# Patient Record
Sex: FEMALE | Race: WHITE | NOT HISPANIC OR LATINO | Employment: OTHER | URBAN - METROPOLITAN AREA
[De-identification: names, ages, dates, MRNs, and addresses within clinical notes are randomized per-mention and may not be internally consistent; named-entity substitution may affect disease eponyms.]

---

## 2019-06-24 ENCOUNTER — HOSPITAL ENCOUNTER (EMERGENCY)
Facility: HOSPITAL | Age: 69
Discharge: HOME | End: 2019-06-24
Attending: EMERGENCY MEDICINE
Payer: MEDICARE

## 2019-06-24 VITALS
WEIGHT: 152 LBS | TEMPERATURE: 98 F | SYSTOLIC BLOOD PRESSURE: 187 MMHG | HEIGHT: 65 IN | DIASTOLIC BLOOD PRESSURE: 77 MMHG | HEART RATE: 75 BPM | BODY MASS INDEX: 25.33 KG/M2 | RESPIRATION RATE: 18 BRPM | OXYGEN SATURATION: 96 %

## 2019-06-24 DIAGNOSIS — M54.2 NECK PAIN: ICD-10-CM

## 2019-06-24 DIAGNOSIS — M25.512 LEFT SHOULDER PAIN: ICD-10-CM

## 2019-06-24 DIAGNOSIS — I10 HYPERTENSION: ICD-10-CM

## 2019-06-24 DIAGNOSIS — S43.002A SUBLUXATION OF LEFT SHOULDER JOINT, INITIAL ENCOUNTER: Primary | ICD-10-CM

## 2019-06-24 PROBLEM — Z72.0 TOBACCO ABUSE: Status: ACTIVE | Noted: 2018-12-03

## 2019-06-24 PROBLEM — I16.0 HYPERTENSIVE URGENCY: Status: ACTIVE | Noted: 2019-06-24

## 2019-06-24 PROBLEM — E78.5 HYPERLIPIDEMIA: Status: ACTIVE | Noted: 2018-12-03

## 2019-06-24 PROBLEM — E11.8 TYPE 2 DIABETES MELLITUS WITH COMPLICATION, WITHOUT LONG-TERM CURRENT USE OF INSULIN: Status: ACTIVE | Noted: 2018-12-03

## 2019-06-24 PROBLEM — R07.9 ACUTE CHEST PAIN: Status: ACTIVE | Noted: 2018-12-03

## 2019-06-24 PROBLEM — I20.0 UNSTABLE ANGINA PECTORIS: Status: ACTIVE | Noted: 2018-12-03

## 2019-06-24 PROBLEM — I25.10 CORONARY ARTERY DISEASE INVOLVING LEFT MAIN CORONARY ARTERY: Status: ACTIVE | Noted: 2018-12-03

## 2019-06-24 PROCEDURE — 93005 ELECTROCARDIOGRAM TRACING: CPT

## 2019-06-24 PROCEDURE — 25000003 PHARM REV CODE 250: Performed by: NURSE PRACTITIONER

## 2019-06-24 PROCEDURE — 99285 EMERGENCY DEPT VISIT HI MDM: CPT | Mod: 25

## 2019-06-24 RX ORDER — LOSARTAN POTASSIUM 50 MG/1
50 TABLET ORAL DAILY
COMMUNITY
End: 2021-04-29 | Stop reason: SDUPTHER

## 2019-06-24 RX ORDER — OXYCODONE AND ACETAMINOPHEN 5; 325 MG/1; MG/1
1 TABLET ORAL
Status: COMPLETED | OUTPATIENT
Start: 2019-06-24 | End: 2019-06-24

## 2019-06-24 RX ORDER — METHOCARBAMOL 500 MG/1
500 TABLET, FILM COATED ORAL
Status: COMPLETED | OUTPATIENT
Start: 2019-06-24 | End: 2019-06-24

## 2019-06-24 RX ORDER — CITALOPRAM 40 MG/1
40 TABLET, FILM COATED ORAL DAILY
COMMUNITY
End: 2021-04-29 | Stop reason: SDUPTHER

## 2019-06-24 RX ORDER — METFORMIN HYDROCHLORIDE 1000 MG/1
2000 TABLET ORAL 2 TIMES DAILY WITH MEALS
COMMUNITY
End: 2020-09-17 | Stop reason: SDUPTHER

## 2019-06-24 RX ORDER — GLYBURIDE 5 MG/1
5 TABLET ORAL
COMMUNITY
End: 2020-09-17 | Stop reason: SDUPTHER

## 2019-06-24 RX ORDER — OXYCODONE AND ACETAMINOPHEN 7.5; 325 MG/1; MG/1
1 TABLET ORAL EVERY 6 HOURS PRN
Qty: 15 TABLET | Refills: 0 | Status: SHIPPED | OUTPATIENT
Start: 2019-06-24 | End: 2020-09-20 | Stop reason: ALTCHOICE

## 2019-06-24 RX ADMIN — OXYCODONE HYDROCHLORIDE AND ACETAMINOPHEN 1 TABLET: 5; 325 TABLET ORAL at 06:06

## 2019-06-24 RX ADMIN — METHOCARBAMOL 500 MG: 500 TABLET, FILM COATED ORAL at 05:06

## 2019-06-24 NOTE — ED PROVIDER NOTES
"Encounter Date: 6/24/2019    SCRIBE #1 NOTE: I, Ju Karson, am scribing for, and in the presence of,  Sheri Griggs NP. I have scribed the following portions of the note - Other sections scribed: HPI,ROS,PE.       History     Chief Complaint   Patient presents with    Neck Pain     Neck pain that radiates to shoulder and back x 7 mths.     This is a 69 y.o smoking female patient, with a PMHX of DM, HTN, CAD, and pancreatitis, who presents to the ED with constant neck pain, that radiates from her left shoulder to upper back, since heart surgery x6 months ago. She describes feeling like "someone wacked me across the neck with plywood". She states her left shoulder "droops" forward and bothers her. Patient states having a cartoid artery test and pulmonary lung function test last week with normal results. Patient reports attempting treatment with Advil, with no relief and Hydrocodone, with little relief. Patient denies any fever, chills, headache, dizziness, chest pain, abdominal pain, shortness of breath, cough, sore throat, nausea, vomting, diarrhea, dysuria, or any other associated symptoms. Patient states she is allergic to Codeine. PSHx of CABG and left heart catheterization.    The history is provided by the patient and a friend. No  was used.     Review of patient's allergies indicates:   Allergen Reactions    Ambien [zolpidem]     Codeine      Past Medical History:   Diagnosis Date    Coronary artery disease     Depression     Diabetes mellitus     Hypertension     Pancreatitis      Past Surgical History:   Procedure Laterality Date    CARDIAC SURGERY      HYSTERECTOMY       History reviewed. No pertinent family history.  Social History     Tobacco Use    Smoking status: Current Every Day Smoker     Packs/day: 0.50    Smokeless tobacco: Never Used   Substance Use Topics    Alcohol use: Never     Frequency: Never    Drug use: Never     Review of Systems   Constitutional: " Negative for chills and fever.   HENT: Negative for congestion, ear pain, rhinorrhea and sore throat.    Eyes: Negative for pain and visual disturbance.   Respiratory: Negative for cough and shortness of breath.    Cardiovascular: Negative for chest pain.   Gastrointestinal: Negative for abdominal pain, diarrhea, nausea and vomiting.   Genitourinary: Negative for dysuria.   Musculoskeletal: Positive for arthralgias (left shoulder), back pain and neck pain.   Skin: Negative for rash.   Neurological: Negative for headaches.       Physical Exam     Initial Vitals [06/24/19 1613]   BP Pulse Resp Temp SpO2   (!) 190/81 72 18 98 °F (36.7 °C) 97 %      MAP       --         Physical Exam    Constitutional: She appears well-developed and well-nourished. No distress.   HENT:   Head: Normocephalic and atraumatic.   Nose: Nose normal.   Eyes: EOM are normal. Pupils are equal, round, and reactive to light.   Neck: Normal range of motion and full passive range of motion without pain. Neck supple.   Cardiovascular: Normal rate, regular rhythm and normal heart sounds. Exam reveals no gallop and no friction rub.    No murmur heard.  Pulses:       Radial pulses are 2+ on the right side, and 2+ on the left side.   Pulmonary/Chest: Breath sounds normal. No respiratory distress. She has no wheezes. She has no rhonchi. She has no rales.   Abdominal: Soft. Bowel sounds are normal. There is no tenderness. There is no rebound and no guarding.   Musculoskeletal:        Left shoulder: She exhibits decreased range of motion, tenderness and spasm.        Cervical back: She exhibits tenderness and spasm.        Back:    Neurological: She is alert and oriented to person, place, and time. She has normal strength. No cranial nerve deficit or sensory deficit.   Skin: Skin is warm and dry. Capillary refill takes less than 2 seconds.   Psychiatric: She has a normal mood and affect.         ED Course   Procedures  Labs Reviewed - No data to  display  EKG Readings: (Independently Interpreted)   Initial Reading: No STEMI. Rhythm: Normal Sinus Rhythm. Heart Rate: 70. Ectopy: No Ectopy. Conduction: Normal.       Imaging Results          X-Ray Shoulder Trauma Left (Final result)  Result time 06/24/19 17:34:52    Final result by Carlos Alberto Swift MD (06/24/19 17:34:52)                 Impression:      Inferior subluxation of the left glenohumeral joint.  No evidence of a fracture or dislocation.    Arthropathy of the left acromioclavicular joint.      Electronically signed by: Carlos Alberto Swift MD  Date:    06/24/2019  Time:    17:34             Narrative:    EXAMINATION:  XR SHOULDER TRAUMA 3 VIEW LEFT    CLINICAL HISTORY:  Pain in left shoulder    TECHNIQUE:  Three views of the left shoulder were performed.    COMPARISON  None    FINDINGS:  There is demineralization of the osseous structures.  There is slight inferior subluxation of the left humerus relative to the glenoid.  There is arthropathy involving the acromioclavicular joint.  There is no evidence of a fracture or dislocation of the left shoulder.    There are postoperative changes in the mediastinum.  The visualized left hemithorax is unremarkable.                               X-Ray Cervical Spine AP And Lateral (Final result)  Result time 06/24/19 17:31:35    Final result by Carlos Alberto Swift MD (06/24/19 17:31:35)                 Impression:      No evidence of acute fracture or listhesis of the cervical spine.    Multilevel degenerative changes of the cervical spine.      Electronically signed by: Carlos Alberto Swift MD  Date:    06/24/2019  Time:    17:31             Narrative:    EXAMINATION:  XR CERVICAL SPINE AP LATERAL    CLINICAL HISTORY:  Cervicalgia    TECHNIQUE:  AP, lateral and open mouth views of the cervical spine were performed.    COMPARISON:  None.    FINDINGS:  The craniocervical junction is within normal limits.  The predental space is maintained.  No prevertebral soft tissue swelling is  identified.    The cervical alignment is maintained.  The vertebral body heights are maintained.  There is hypertrophy of the posterior elements.  The lateral masses of C1 are poorly delineated.  There are multilevel degenerative changes in the cervical spine.  There is no evidence of acute fracture or listhesis of the cervical spine.    There are partially visualized postoperative changes in the mediastinum.  The visualized lung apices are unremarkable.  There is no evidence of a pneumothorax.  No pulmonary contusion is identified.                                 Medical Decision Making:   ED Management:  This is the evaluation of 6-year-old female presenting with left neck and shoulder pain x6 months.  There is no anterior neck pain. No fever.  No weakness.  Patient reports pain ever since she had open heart surgery.  On exam, she has tenderness with palpation of the left cervical and shoulder musculature with muscle spasms present.  There is decreased range of motion. She was most recently evaluated by her cardiovascular surgeon who recommended physical therapy and range of motion exercise for left shoulder pain. X-ray left shoulder with inferior subluxation of the left glenohumeral joint.  No evidence for fracture dislocation.  There is arthropathy of the left AC joint.  X-ray of the cervical spine with no acute fracture or listhesis.  There is multi level degenerative changes cervical spine.  Patient needs ortho follow-up with likely outpatient MRI and physical therapy.  Doubt emergent etiology of neck and shoulder pain. Patient's blood pressure is elevated.  She denies headache, chest pain, shortness of breath. She attributes her elevated blood pressure to pain. EKG without acute changes.  She had some improvement with pain following pain medication.  Will discharge patient home with ortho follow-up.    Based on my clinical evaluation, I do not appreciate any immediate, emergent, or life threatening condition  or etiology that warrants additional workup today.  I feel the patient can be discharged with close follow-up care.    This case was discussed with my attending physician who examined the patient and agrees with my plan of care.                      Clinical Impression:       ICD-10-CM ICD-9-CM   1. Subluxation of left shoulder joint, initial encounter S43.002A 831.00   2. Left shoulder pain M25.512 719.41   3. Neck pain M54.2 723.1   4. Hypertension I10 401.9         Disposition:   Disposition: Discharged  Condition: Stable               Scribe attestation: I, Indu Rivera NP, personally performed the services described in this documentation. All medical record entries made by the scribe were at my direction and in my presence.  I have reviewed the chart and agree that the record reflects my personal performance and is accurate and complete.         Sheri Griggs NP  06/24/19 [id number]

## 2019-06-25 NOTE — DISCHARGE INSTRUCTIONS
You have been prescribed PERCOCET for pain. Please do not take this medication while working, drinking alcohol, swimming, or while driving/operating heavy machinery. This medication may cause drowsiness, impair judgment, and reduce physical capabilities. This medication contains Tylenol. Please do not take any additional Tylenol while you are taking this medication.      Please return to the Emergency Department for any new or worsening symptoms including: fever, chest pain, shortness of breath, loss of consciousness, dizziness, weakness, or any other concerns.     Please follow up with your Primary Care Provider within in the week. If you do not have one, you may contact the one listed on your discharge paperwork or you may also call the [facility] Appointment Desk at [phone] to schedule an appointment with one.     Please take all medication as prescribed.

## 2019-10-09 ENCOUNTER — CLINICAL SUPPORT (OUTPATIENT)
Dept: SMOKING CESSATION | Facility: CLINIC | Age: 69
End: 2019-10-09
Payer: COMMERCIAL

## 2019-10-09 DIAGNOSIS — F17.200 NICOTINE DEPENDENCE: Primary | ICD-10-CM

## 2019-10-09 PROCEDURE — 99404 PR PREVENT COUNSEL,INDIV,60 MIN: ICD-10-PCS | Mod: S$GLB,,,

## 2019-10-09 PROCEDURE — 99404 PREV MED CNSL INDIV APPRX 60: CPT | Mod: S$GLB,,,

## 2019-10-09 RX ORDER — IBUPROFEN 200 MG
1 TABLET ORAL DAILY
Qty: 14 PATCH | Refills: 0 | Status: SHIPPED | OUTPATIENT
Start: 2019-10-09 | End: 2019-10-24 | Stop reason: SDUPTHER

## 2019-10-09 NOTE — PROGRESS NOTES
Patient will be participating in biweekly tobacco cessation meetings and will begin the prescribed tobacco cessation medication regime of  21 mg nicotine patch QD .  She currently smokes 20 cigarettes per day.  Pt started on rate reduction and wait time of 15 min prior to smoking. Pt's exhaled carbon monoxide level was  15 ppm as per Smokerlyzer. (non- smoker = 0-5 ppm.) Will see pt back in office in 2 weeks.

## 2019-10-24 ENCOUNTER — CLINICAL SUPPORT (OUTPATIENT)
Dept: SMOKING CESSATION | Facility: CLINIC | Age: 69
End: 2019-10-24
Payer: COMMERCIAL

## 2019-10-24 DIAGNOSIS — F17.200 NICOTINE DEPENDENCE: ICD-10-CM

## 2019-10-24 PROCEDURE — 99999 PR PBB SHADOW E&M-EST. PATIENT-LVL I: CPT | Mod: PBBFAC,,,

## 2019-10-24 PROCEDURE — 99404 PREV MED CNSL INDIV APPRX 60: CPT | Mod: S$GLB,,,

## 2019-10-24 PROCEDURE — 99999 PR PBB SHADOW E&M-EST. PATIENT-LVL I: ICD-10-PCS | Mod: PBBFAC,,,

## 2019-10-24 PROCEDURE — 99404 PR PREVENT COUNSEL,INDIV,60 MIN: ICD-10-PCS | Mod: S$GLB,,,

## 2019-10-24 RX ORDER — MICONAZOLE NITRATE 2 %
CREAM (GRAM) TOPICAL
Qty: 170 EACH | Refills: 0 | Status: SHIPPED | OUTPATIENT
Start: 2019-10-24 | End: 2019-11-21 | Stop reason: SDUPTHER

## 2019-10-24 RX ORDER — IBUPROFEN 200 MG
1 TABLET ORAL DAILY
Qty: 28 PATCH | Refills: 0 | Status: SHIPPED | OUTPATIENT
Start: 2019-10-24 | End: 2019-11-07 | Stop reason: SDUPTHER

## 2019-11-07 ENCOUNTER — CLINICAL SUPPORT (OUTPATIENT)
Dept: SMOKING CESSATION | Facility: CLINIC | Age: 69
End: 2019-11-07
Payer: COMMERCIAL

## 2019-11-07 DIAGNOSIS — F17.200 NICOTINE DEPENDENCE: Primary | ICD-10-CM

## 2019-11-07 PROCEDURE — 99999 PR PBB SHADOW E&M-EST. PATIENT-LVL I: CPT | Mod: PBBFAC,,,

## 2019-11-07 PROCEDURE — 99999 PR PBB SHADOW E&M-EST. PATIENT-LVL I: ICD-10-PCS | Mod: PBBFAC,,,

## 2019-11-07 PROCEDURE — 99403 PR PREVENT COUNSEL,INDIV,45 MIN: ICD-10-PCS | Mod: S$GLB,,,

## 2019-11-07 PROCEDURE — 99403 PREV MED CNSL INDIV APPRX 45: CPT | Mod: S$GLB,,,

## 2019-11-07 RX ORDER — IBUPROFEN 200 MG
1 TABLET ORAL DAILY
Qty: 28 PATCH | Refills: 0 | Status: SHIPPED | OUTPATIENT
Start: 2019-11-07 | End: 2019-11-21 | Stop reason: SDUPTHER

## 2019-11-07 NOTE — PROGRESS NOTES
Individual Follow-Up Form    11/7/2019    Quit Date: 12/1/19    Clinical Status of Patient: Outpatient    Length of Service: 45 minutes    Continuing Medication: yes  Patches    Other Medications: nrt gum     Target Symptoms: Withdrawal and medication side effects. The following were  rated moderate (3) to severe (4) on TCRS:  · Moderate (3): craving   · Severe (4): none    Comments:  Patient presents for follow up smoking 10  cigarettes per day. Pt remains on tobacco cessation medication of  21 mg nicotine patch QD and 2 mg nicotine gum PRN (1-2 per hour in place of cigarettes.) No adverse effects noted at this time. Discussed that she is lighting more due to habit not addiction. She does smoke much of the cigarette after she lights it.    Pt encouraged to pick a quit day, she said she will not buy another pack.   Reviewed coping strategies/habitual behavior/relapse prevention with patient. Exhaled carbon monoxide level was 5 ppm per Smokerlyzer  ( non- smoker = 0-5 ppm .)  Will see pt back in office in 2 weeks      Diagnosis: F17.200    Next Visit: 2 weeks

## 2019-11-21 ENCOUNTER — CLINICAL SUPPORT (OUTPATIENT)
Dept: SMOKING CESSATION | Facility: CLINIC | Age: 69
End: 2019-11-21
Payer: COMMERCIAL

## 2019-11-21 DIAGNOSIS — F17.200 NICOTINE DEPENDENCE: ICD-10-CM

## 2019-11-21 PROCEDURE — 99999 PR PBB SHADOW E&M-EST. PATIENT-LVL I: CPT | Mod: PBBFAC,,,

## 2019-11-21 PROCEDURE — 99999 PR PBB SHADOW E&M-EST. PATIENT-LVL I: ICD-10-PCS | Mod: PBBFAC,,,

## 2019-11-21 PROCEDURE — 99404 PR PREVENT COUNSEL,INDIV,60 MIN: ICD-10-PCS | Mod: S$GLB,,,

## 2019-11-21 PROCEDURE — 99404 PREV MED CNSL INDIV APPRX 60: CPT | Mod: S$GLB,,,

## 2019-11-21 RX ORDER — IBUPROFEN 200 MG
1 TABLET ORAL DAILY
Qty: 28 PATCH | Refills: 0 | Status: SHIPPED | OUTPATIENT
Start: 2019-11-21 | End: 2019-12-05 | Stop reason: SDUPTHER

## 2019-11-21 RX ORDER — MICONAZOLE NITRATE 2 %
CREAM (GRAM) TOPICAL
Qty: 170 EACH | Refills: 0 | Status: SHIPPED | OUTPATIENT
Start: 2019-11-21 | End: 2020-01-06 | Stop reason: SDUPTHER

## 2019-11-21 NOTE — PROGRESS NOTES
Individual Follow-Up Form    11/21/2019    Quit Date: tbd    Clinical Status of Patient: Outpatient    Length of Service: 60 minutes    Continuing Medication: yes  Patches or Nicotine gum    Other Medications: none     Target Symptoms: Withdrawal and medication side effects. The following were  rated moderate (3) to severe (4) on TCRS:  · Moderate (3): none  · Severe (4): none    Comments: Patient presents for follow up smoking 8 cigarettes per day. Pt remains on tobacco cessation medication of  21 mg nicotine patch QD and 2 mg nicotine gum PRN (1-2 per hour in place of cigarettes.) No adverse effects noted at this time. Pt doing well with rate reduction and wait times prior to smoking. Goal is to smoke 6 cigarettes or less.  Pt encouraged to pick a quit day.  Reviewed coping strategies/habitual behavior/relapse prevention with patient. Exhaled carbon monoxide level was 8 ppm per Smokerlyzer  ( non- smoker = 0-5 ppm .)    Will see pt back in office in 2 weeks      Diagnosis: F17.200    Next Visit: 2 weeks

## 2019-12-05 ENCOUNTER — CLINICAL SUPPORT (OUTPATIENT)
Dept: SMOKING CESSATION | Facility: CLINIC | Age: 69
End: 2019-12-05
Payer: COMMERCIAL

## 2019-12-05 DIAGNOSIS — F17.200 NICOTINE DEPENDENCE: ICD-10-CM

## 2019-12-05 PROCEDURE — 99403 PREV MED CNSL INDIV APPRX 45: CPT | Mod: S$GLB,,,

## 2019-12-05 PROCEDURE — 99403 PR PREVENT COUNSEL,INDIV,45 MIN: ICD-10-PCS | Mod: S$GLB,,,

## 2019-12-05 RX ORDER — IBUPROFEN 200 MG
1 TABLET ORAL DAILY
Qty: 28 PATCH | Refills: 0 | Status: SHIPPED | OUTPATIENT
Start: 2019-12-05 | End: 2020-01-02

## 2019-12-05 NOTE — PROGRESS NOTES
Individual Follow-Up Form    12/5/2019    Quit Date: tbd    Clinical Status of Patient: Outpatient    Length of Service: 45 minutes    Continuing Medication: yes  Patches    Other Medications: nrt gum     Target Symptoms: Withdrawal and medication side effects. The following were  rated moderate (3) to severe (4) on TCRS:  · Moderate (3): none  · Severe (4): none    Comments:  Patient presents for follow up smoking 4-5cigarettes per day. Pt remains on tobacco cessation medication of  21 mg nicotine patch QD and 2 mg nicotine  gum PRN (1-2 per hour in place of cigarettes.) No adverse effects noted at this time.  Pt encouraged to pick a quit day.  Reviewed coping strategies/habitual behavior/relapse prevention with patient. Exhaled carbon monoxide level was 6 ppm per Smokerlyzer  ( non- smoker = 0-5 ppm .)  Will see pt back in office in 2 weeks      Diagnosis: F17.200    Next Visit: 2 weeks

## 2020-01-06 ENCOUNTER — CLINICAL SUPPORT (OUTPATIENT)
Dept: SMOKING CESSATION | Facility: CLINIC | Age: 70
End: 2020-01-06
Payer: COMMERCIAL

## 2020-01-06 DIAGNOSIS — F17.200 NICOTINE DEPENDENCE: ICD-10-CM

## 2020-01-06 PROCEDURE — 99407 BEHAV CHNG SMOKING > 10 MIN: CPT | Mod: S$GLB,,,

## 2020-01-06 PROCEDURE — 99407 PR TOBACCO USE CESSATION INTENSIVE >10 MINUTES: ICD-10-PCS | Mod: S$GLB,,,

## 2020-01-06 RX ORDER — MICONAZOLE NITRATE 2 %
CREAM (GRAM) TOPICAL
Qty: 170 EACH | Refills: 0 | Status: SHIPPED | OUTPATIENT
Start: 2020-01-06 | End: 2020-09-20

## 2020-01-14 ENCOUNTER — CLINICAL SUPPORT (OUTPATIENT)
Dept: SMOKING CESSATION | Facility: CLINIC | Age: 70
End: 2020-01-14
Payer: COMMERCIAL

## 2020-01-14 DIAGNOSIS — F17.200 NICOTINE DEPENDENCE: Primary | ICD-10-CM

## 2020-01-14 PROCEDURE — 99407 PR TOBACCO USE CESSATION INTENSIVE >10 MINUTES: ICD-10-PCS | Mod: S$GLB,,,

## 2020-01-14 PROCEDURE — 99407 BEHAV CHNG SMOKING > 10 MIN: CPT | Mod: S$GLB,,,

## 2020-01-14 NOTE — PROGRESS NOTES
Spoke with patient today in regard to smoking cessation progress for 3 month telephone follow up, Patient states she  tobacco free since 12/2019. Informed patient of benefit period, future follow up, and contact information if any further help or support is needed. Will complete smart form for 3  month follow up on Quit attempt #1.

## 2020-03-13 LAB
CHOLEST SERPL-MSCNC: 105 MG/DL (ref 0–200)
HBA1C MFR BLD: 7.3 % (ref 4–6)
HDLC SERPL-MCNC: 30 MG/DL
LDLC SERPL CALC-MCNC: 20 MG/DL
TRIGL SERPL-MCNC: 274 MG/DL

## 2020-09-10 DIAGNOSIS — M79.672 FOOT PAIN, LEFT: ICD-10-CM
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
## 2020-09-10 DIAGNOSIS — R60.9 SWELLING: ICD-10-CM

## 2020-09-11 ENCOUNTER — HOSPITAL ENCOUNTER (OUTPATIENT)
Dept: RADIOLOGY | Facility: HOSPITAL | Age: 70
Discharge: HOME | End: 2020-09-11
Attending: FAMILY MEDICINE
Payer: MEDICARE

## 2020-09-11 DIAGNOSIS — M79.672 FOOT PAIN, LEFT: ICD-10-CM
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
## 2020-09-11 DIAGNOSIS — R60.9 SWELLING: ICD-10-CM

## 2020-09-11 PROCEDURE — 73630 XR FOOT COMPLETE 3 VIEW LEFT: ICD-10-PCS | Mod: 26,LT,, | Performed by: RADIOLOGY

## 2020-09-11 PROCEDURE — 73630 X-RAY EXAM OF FOOT: CPT | Mod: 26,LT,, | Performed by: RADIOLOGY

## 2020-09-11 PROCEDURE — 70450 CT HEAD/BRAIN W/O DYE: CPT | Mod: TC

## 2020-09-11 PROCEDURE — 70450 CT HEAD/BRAIN W/O DYE: CPT | Mod: 26,,, | Performed by: RADIOLOGY

## 2020-09-11 PROCEDURE — 73610 XR ANKLE COMPLETE 3 VIEW LEFT: ICD-10-PCS | Mod: 26,LT,, | Performed by: RADIOLOGY

## 2020-09-11 PROCEDURE — 73610 X-RAY EXAM OF ANKLE: CPT | Mod: 26,LT,, | Performed by: RADIOLOGY

## 2020-09-11 PROCEDURE — 73610 X-RAY EXAM OF ANKLE: CPT | Mod: TC,FY,LT

## 2020-09-11 PROCEDURE — 70450 CT HEAD WITHOUT CONTRAST: ICD-10-PCS | Mod: 26,,, | Performed by: RADIOLOGY

## 2020-09-11 PROCEDURE — 73630 X-RAY EXAM OF FOOT: CPT | Mod: TC,FY,LT

## 2020-09-17 ENCOUNTER — OFFICE VISIT (OUTPATIENT)
Dept: PODIATRY | Facility: CLINIC | Age: 70
End: 2020-09-17
Payer: MEDICARE

## 2020-09-17 VITALS
RESPIRATION RATE: 19 BRPM | TEMPERATURE: 97 F | OXYGEN SATURATION: 98 % | HEIGHT: 65 IN | WEIGHT: 152 LBS | HEART RATE: 68 BPM | DIASTOLIC BLOOD PRESSURE: 73 MMHG | BODY MASS INDEX: 25.33 KG/M2 | SYSTOLIC BLOOD PRESSURE: 179 MMHG

## 2020-09-17 DIAGNOSIS — S92.355A CLOSED NONDISPLACED FRACTURE OF FIFTH METATARSAL BONE OF LEFT FOOT, INITIAL ENCOUNTER: Primary | ICD-10-CM

## 2020-09-17 PROCEDURE — 99203 PR OFFICE/OUTPT VISIT, NEW, LEVL III, 30-44 MIN: ICD-10-PCS | Mod: S$GLB,,, | Performed by: PODIATRIST

## 2020-09-17 PROCEDURE — 1100F PTFALLS ASSESS-DOCD GE2>/YR: CPT | Mod: CPTII,S$GLB,, | Performed by: PODIATRIST

## 2020-09-17 PROCEDURE — 3078F DIAST BP <80 MM HG: CPT | Mod: CPTII,S$GLB,, | Performed by: PODIATRIST

## 2020-09-17 PROCEDURE — 1100F PR PT FALLS ASSESS DOC 2+ FALLS/FALL W/INJURY/YR: ICD-10-PCS | Mod: CPTII,S$GLB,, | Performed by: PODIATRIST

## 2020-09-17 PROCEDURE — 3078F PR MOST RECENT DIASTOLIC BLOOD PRESSURE < 80 MM HG: ICD-10-PCS | Mod: CPTII,S$GLB,, | Performed by: PODIATRIST

## 2020-09-17 PROCEDURE — 99999 PR PBB SHADOW E&M-EST. PATIENT-LVL V: ICD-10-PCS | Mod: PBBFAC,,, | Performed by: PODIATRIST

## 2020-09-17 PROCEDURE — 1159F PR MEDICATION LIST DOCUMENTED IN MEDICAL RECORD: ICD-10-PCS | Mod: S$GLB,,, | Performed by: PODIATRIST

## 2020-09-17 PROCEDURE — 3288F PR FALLS RISK ASSESSMENT DOCUMENTED: ICD-10-PCS | Mod: CPTII,S$GLB,, | Performed by: PODIATRIST

## 2020-09-17 PROCEDURE — 99999 PR PBB SHADOW E&M-EST. PATIENT-LVL V: CPT | Mod: PBBFAC,,, | Performed by: PODIATRIST

## 2020-09-17 PROCEDURE — 1159F MED LIST DOCD IN RCRD: CPT | Mod: S$GLB,,, | Performed by: PODIATRIST

## 2020-09-17 PROCEDURE — 1125F PR PAIN SEVERITY QUANTIFIED, PAIN PRESENT: ICD-10-PCS | Mod: S$GLB,,, | Performed by: PODIATRIST

## 2020-09-17 PROCEDURE — 3008F BODY MASS INDEX DOCD: CPT | Mod: CPTII,S$GLB,, | Performed by: PODIATRIST

## 2020-09-17 PROCEDURE — 1125F AMNT PAIN NOTED PAIN PRSNT: CPT | Mod: S$GLB,,, | Performed by: PODIATRIST

## 2020-09-17 PROCEDURE — 3008F PR BODY MASS INDEX (BMI) DOCUMENTED: ICD-10-PCS | Mod: CPTII,S$GLB,, | Performed by: PODIATRIST

## 2020-09-17 PROCEDURE — 3288F FALL RISK ASSESSMENT DOCD: CPT | Mod: CPTII,S$GLB,, | Performed by: PODIATRIST

## 2020-09-17 PROCEDURE — 3077F SYST BP >= 140 MM HG: CPT | Mod: CPTII,S$GLB,, | Performed by: PODIATRIST

## 2020-09-17 PROCEDURE — 3077F PR MOST RECENT SYSTOLIC BLOOD PRESSURE >= 140 MM HG: ICD-10-PCS | Mod: CPTII,S$GLB,, | Performed by: PODIATRIST

## 2020-09-17 PROCEDURE — 99203 OFFICE O/P NEW LOW 30 MIN: CPT | Mod: S$GLB,,, | Performed by: PODIATRIST

## 2020-09-17 RX ORDER — SULFAMETHOXAZOLE AND TRIMETHOPRIM 800; 160 MG/1; MG/1
1 TABLET ORAL 2 TIMES DAILY
COMMUNITY
Start: 2020-09-10 | End: 2021-04-29 | Stop reason: ALTCHOICE

## 2020-09-17 RX ORDER — GLUCOSAM/CHONDRO/HERB 149/HYAL 750-100 MG
1 TABLET ORAL
COMMUNITY
End: 2020-09-20

## 2020-09-17 RX ORDER — GLIMEPIRIDE 4 MG/1
4 TABLET ORAL DAILY
COMMUNITY
Start: 2020-09-11 | End: 2021-04-29 | Stop reason: SDUPTHER

## 2020-09-17 RX ORDER — VIT C/E/ZN/COPPR/LUTEIN/ZEAXAN 250MG-90MG
2000 CAPSULE ORAL
COMMUNITY
End: 2022-03-30

## 2020-09-17 RX ORDER — SILVER SULFADIAZINE 10 G/1000G
1 CREAM TOPICAL DAILY
COMMUNITY
Start: 2020-09-10 | End: 2021-04-29 | Stop reason: ALTCHOICE

## 2020-09-17 RX ORDER — ROSUVASTATIN CALCIUM 40 MG/1
40 TABLET, COATED ORAL DAILY
COMMUNITY
Start: 2020-09-11 | End: 2021-04-29 | Stop reason: SDUPTHER

## 2020-09-17 RX ORDER — PANTOPRAZOLE SODIUM 40 MG/1
40 TABLET, DELAYED RELEASE ORAL DAILY
COMMUNITY
Start: 2020-09-11 | End: 2021-04-29 | Stop reason: SDUPTHER

## 2020-09-17 RX ORDER — GLUCOSAMINE HCL 500 MG
1 TABLET ORAL
COMMUNITY
End: 2020-09-20

## 2020-09-17 RX ORDER — BUPROPION HYDROCHLORIDE 75 MG/1
150 TABLET ORAL
COMMUNITY
End: 2020-09-20

## 2020-09-17 RX ORDER — METFORMIN HYDROCHLORIDE 1000 MG/1
1000 TABLET ORAL
COMMUNITY
End: 2021-04-29 | Stop reason: SDUPTHER

## 2020-09-17 RX ORDER — DIAZEPAM 5 MG/1
5 TABLET ORAL DAILY PRN
COMMUNITY
Start: 2020-07-13 | End: 2021-04-29 | Stop reason: SDUPTHER

## 2020-09-17 RX ORDER — VENLAFAXINE HYDROCHLORIDE 75 MG/1
75 CAPSULE, EXTENDED RELEASE ORAL
COMMUNITY
End: 2020-09-17 | Stop reason: SDUPTHER

## 2020-09-17 RX ORDER — ASCORBIC ACID 500 MG
1 TABLET ORAL
COMMUNITY
End: 2020-09-20

## 2020-09-17 RX ORDER — METOPROLOL TARTRATE 25 MG/1
25 TABLET, FILM COATED ORAL 2 TIMES DAILY
COMMUNITY
Start: 2020-06-17 | End: 2021-02-18 | Stop reason: SDUPTHER

## 2020-09-17 NOTE — LETTER
September 17, 2020      Osiel Burger MD  [address]  [place] [address]

## 2020-09-20 NOTE — PROGRESS NOTES
Subjective:       Patient ID: Epi Mcintosh is a 70 y.o. female.    Chief Complaint: Foot Pain (injury left/fractue) and Wound Check  Patient presents for evaluation of fracture left foot and wound left lower leg, referred by Dr. Baker.  She fell down the stairs 09/10.  He ordered an x-ray and she had it performed 09/11 along with a head CT.  Reports her foot is painful, swollen, decreased bruising, slowly improving.  She has been wearing slippers, not doing much walking.  She has been applying Silvadene cream to leg wound, cleaning twice a day, keeping it covered, she feels it has improved considerably.  She is taking Bactrim. Relates there was a lot of black tissue through the wound and this has all resolved. (pic on phone)   Has a history of type 2 diabetes, feels it is well controlled on 1000 mg metformin and glimeperide     Past Medical History:   Diagnosis Date    Coronary artery disease     Depression     Diabetes mellitus     Hypertension     Pancreatitis      Past Surgical History:   Procedure Laterality Date    CARDIAC SURGERY      HYSTERECTOMY       History reviewed. No pertinent family history.  Social History     Socioeconomic History    Marital status:      Spouse name: Not on file    Number of children: Not on file    Years of education: Not on file    Highest education level: Not on file   Occupational History    Not on file   Social Needs    Financial resource strain: Not on file    Food insecurity     Worry: Not on file     Inability: Not on file    Transportation needs     Medical: Not on file     Non-medical: Not on file   Tobacco Use    Smoking status: Current Every Day Smoker     Packs/day: 0.50    Smokeless tobacco: Never Used   Substance and Sexual Activity    Alcohol use: Never     Frequency: Never    Drug use: Never    Sexual activity: Not on file   Lifestyle    Physical activity     Days per week: Not on file     Minutes per session: Not on file    Stress:  Not on file   Relationships    Social connections     Talks on phone: Not on file     Gets together: Not on file     Attends Yazidism service: Not on file     Active member of club or organization: Not on file     Attends meetings of clubs or organizations: Not on file     Relationship status: Not on file   Other Topics Concern    Not on file   Social History Narrative    Not on file       Current Outpatient Medications   Medication Sig Dispense Refill    cholecalciferol, vitamin D3, (VITAMIN D3) 25 mcg (1,000 unit) capsule Take 2,000 Units by mouth.      citalopram (CELEXA) 40 MG tablet Take 40 mg by mouth once daily.      diazePAM (VALIUM) 5 MG tablet Take 5 mg by mouth daily as needed.      glimepiride (AMARYL) 4 MG tablet Take 4 mg by mouth once daily.      losartan (COZAAR) 50 MG tablet Take 50 mg by mouth once daily.      metFORMIN (GLUCOPHAGE) 1000 MG tablet Take 1,000 mg by mouth.      metoprolol tartrate (LOPRESSOR) 25 MG tablet Take 25 mg by mouth 2 (two) times daily.      multivitamin with minerals tablet Take 1 tablet by mouth.      pantoprazole (PROTONIX) 40 MG tablet Take 40 mg by mouth once daily.      rosuvastatin (CRESTOR) 40 MG Tab Take 40 mg by mouth once daily.      silver sulfADIAZINE 1% (SILVADENE) 1 % cream 1 application once daily. Apply to affected area      sulfamethoxazole-trimethoprim 800-160mg (BACTRIM DS) 800-160 mg Tab Take 1 tablet by mouth 2 (two) times daily.      buPROPion (WELLBUTRIN) 75 MG tablet Take 150 mg by mouth.      calcium carb/mag oxide/Cu/zinc (CALCIUM-MAGNESIUM-COPPER-ZINC) Tab Take 1 tablet by mouth.      nicotine, polacrilex, (NICORETTE) 2 mg Gum 1-2 per hour in place of a cigarette. Limit to 10 a day - oral. (Patient not taking: Reported on 9/17/2020) 170 each 0    omega 3-dha-epa-fish oil 1,000 mg (120 mg-180 mg) Cap Take 1 capsule by mouth.      oxyCODONE-acetaminophen (PERCOCET) 7.5-325 mg per tablet Take 1 tablet by mouth every 6 (six) hours  "as needed for Pain. (Patient not taking: Reported on 9/17/2020) 15 tablet 0    ubidecarenone (COENZYME Q10) 100 mg Tab Take 1 tablet by mouth.       No current facility-administered medications for this visit.      Review of patient's allergies indicates:   Allergen Reactions    Zolpidem      Other reaction(s): Other (See Comments)  Seeing people who were not in the room     Codeine        Review of Systems   Constitutional: Negative for fever.   HENT: Negative for congestion.    Respiratory: Negative for cough and shortness of breath.    Cardiovascular: Positive for leg swelling.        Right lower/foot injury    Skin: Positive for wound.   All other systems reviewed and are negative.      Objective:      Vitals:    09/17/20 1311   BP: (!) 179/73   Pulse: 68   Resp: 19   Temp: 97.2 °F (36.2 °C)   TempSrc: Temporal   SpO2: 98%   Weight: 68.9 kg (152 lb)   Height: 5' 5" (1.651 m)     Physical Exam  Vitals signs and nursing note reviewed.   Constitutional:       General: She is not in acute distress.     Appearance: Normal appearance.   Cardiovascular:      Pulses:           Dorsalis pedis pulses are 2+ on the right side and 1+ on the left side.        Posterior tibial pulses are 2+ on the right side and 1+ on the left side.   Pulmonary:      Effort: Pulmonary effort is normal.   Musculoskeletal:         General: Swelling and tenderness present.   Skin:     Capillary Refill: Capillary refill takes 2 to 3 seconds.      Findings: Bruising and erythema present.     Vascular         Normal CFT bilateral   No overall lower extremity edema bilateral, but moderate left foot and ankle edema noted   Pedal skin temperature normal bilateral     Integumentary   Mild erythema dorsal lateral left foot, ecchymosis hallux, 2nd digits left  Wound anterior left lower leg is approximately  5-7 cm in length, 2-4 cm in with, 0.2 cm deep, fairly superficial with yellow drainage.  Cleaning this area with wound  revealed 2 small " areas fibrous tissue, no tracking or undermining.  There is some surrounding erythema, minimal calor.   Previous pictures patient had on her phone shows several areas of black necrotic tissue and it has improved since that time         Neurological   Gross sensation intact, no paresthesias bilateral feet     Musculoskeletal   Muscle Strength/Testing and Tone:  Intact right, guarding left, normal tone bilateral   Pain 5th metatarsal styloid process left  No pain ATFL ot CFT,  posterior tibial or peroneals left        Walks well unassisted        Presents in slippers        9/11/2020  XR FOOT COMPLETE 3 VIEW LEFT  CLINICAL HISTORY:  Pain in left foot  COMPARISON:  None     FINDINGS:  Nondisplaced, oblique fracture through the neck and distal shaft of the 5th metatarsal without intra-articular extension.  Moderate overlying soft tissue swelling.  Hallux valgus deformity with bunion formation along the medial aspect of the 1st metatarsal head.  Mild degenerative change at the 1st metatarsophalangeal and interphalangeal joints.  Additional degenerative joint space narrowing noted to involve the 2nd through 5th interphalangeal joints. Achilles tendon insertion calcaneal enthesophyte formation is noted.  Plantar calcaneal spurring is present.     Impression:  1. Nondisplaced, oblique fracture through the distal 5th metatarsal without intra-articular extension.  2. Hallux valgus deformity with bunion formation along the medial 1st metatarsal head.  3.  Achilles tendon insertion calcaneal enthesophyte formation and plantar calcaneal spurring.  4. Mild degenerative change of the forefoot joints..                    Assessment:       1. Closed nondisplaced fracture of fifth metatarsal bone of left foot, initial encounter        Plan:         Reviewed x-rays with patient and pointed out fairly lengthy fracture of the 5th metatarsal, however in excellent alignment.  Explained the patient she needs to utilize a walking boot which  is essentially a removable cast.  This will help protect her foot well weight-bearing, to be removed for elevation, shower in bed only.  Explained the patient due to the length of this fracture if she continues to walk on it unprotected i.e. in her slippers it can displace in this would require surgery.  Advised patient immobilization for fracture is typically 6 weeks, we will take another x-ray in 2 weeks to evaluate healing process.  Instructed patient to utilize ice/cool therapy we discussed frequency this should be applied to help resolve pain, swelling left foot  Walking boot ordered from [employer]  Advised patient compared to previous photo, wound leg is improving, however constant Silvadene and occlusion with bandage is keeping the area wet and this also can attract additional bacteria to the area.  Instructed patient to apply a very small amount of Silvadene cream at night only, cover with a gauze like dressing to help remove some drainage from the area. Instructed to use Betadine/iodine during the day keeping it uncovered while in a clean environment. Advised patient any drainage or discharge on the top of the wound does need to be cleaned regularly and can continue to clean it twice daily.  Continue Bactrim  Patient was in understanding and agreement with treatment plan.  I counseled the patient on their conditions, implications and medical management.  Instructed patient/family to contact the office with any changes, questions, concerns, worsening of symptoms.   Total face-to-face time, exam, assessment, treatment, discussion, documentation 30 minutes, more than half this time spent on consultation and coordination of care.  Follow up 2 weeks    This note was created using [employer] voice recognition software that occasionally misinterpreted phrases or words.

## 2020-10-01 ENCOUNTER — OFFICE VISIT (OUTPATIENT)
Dept: PODIATRY | Facility: CLINIC | Age: 70
End: 2020-10-01
Payer: MEDICARE

## 2020-10-01 ENCOUNTER — HOSPITAL ENCOUNTER (OUTPATIENT)
Dept: RADIOLOGY | Facility: HOSPITAL | Age: 70
Discharge: HOME | End: 2020-10-01
Attending: PODIATRIST
Payer: MEDICARE

## 2020-10-01 VITALS
WEIGHT: 152 LBS | RESPIRATION RATE: 19 BRPM | TEMPERATURE: 98 F | BODY MASS INDEX: 25.33 KG/M2 | OXYGEN SATURATION: 99 % | DIASTOLIC BLOOD PRESSURE: 66 MMHG | HEART RATE: 56 BPM | SYSTOLIC BLOOD PRESSURE: 122 MMHG | HEIGHT: 65 IN

## 2020-10-01 DIAGNOSIS — S81.802S WOUND OF LEFT LEG, SEQUELA: ICD-10-CM

## 2020-10-01 DIAGNOSIS — S92.355D CLOSED NONDISPLACED FRACTURE OF FIFTH METATARSAL BONE OF LEFT FOOT WITH ROUTINE HEALING, SUBSEQUENT ENCOUNTER: ICD-10-CM

## 2020-10-01 DIAGNOSIS — S92.355D CLOSED NONDISPLACED FRACTURE OF FIFTH METATARSAL BONE OF LEFT FOOT WITH ROUTINE HEALING, SUBSEQUENT ENCOUNTER: Primary | ICD-10-CM

## 2020-10-01 PROCEDURE — 3008F PR BODY MASS INDEX (BMI) DOCUMENTED: ICD-10-PCS | Mod: CPTII,S$GLB,, | Performed by: PODIATRIST

## 2020-10-01 PROCEDURE — 99214 OFFICE O/P EST MOD 30 MIN: CPT | Mod: S$GLB,,, | Performed by: PODIATRIST

## 2020-10-01 PROCEDURE — 87070 CULTURE OTHR SPECIMN AEROBIC: CPT

## 2020-10-01 PROCEDURE — 73630 X-RAY EXAM OF FOOT: CPT | Mod: TC,FY,LT

## 2020-10-01 PROCEDURE — 3074F SYST BP LT 130 MM HG: CPT | Mod: CPTII,S$GLB,, | Performed by: PODIATRIST

## 2020-10-01 PROCEDURE — 3078F DIAST BP <80 MM HG: CPT | Mod: CPTII,S$GLB,, | Performed by: PODIATRIST

## 2020-10-01 PROCEDURE — 3008F BODY MASS INDEX DOCD: CPT | Mod: CPTII,S$GLB,, | Performed by: PODIATRIST

## 2020-10-01 PROCEDURE — 73630 X-RAY EXAM OF FOOT: CPT | Mod: 26,LT,, | Performed by: RADIOLOGY

## 2020-10-01 PROCEDURE — 99214 PR OFFICE/OUTPT VISIT, EST, LEVL IV, 30-39 MIN: ICD-10-PCS | Mod: S$GLB,,, | Performed by: PODIATRIST

## 2020-10-01 PROCEDURE — 3078F PR MOST RECENT DIASTOLIC BLOOD PRESSURE < 80 MM HG: ICD-10-PCS | Mod: CPTII,S$GLB,, | Performed by: PODIATRIST

## 2020-10-01 PROCEDURE — 3074F PR MOST RECENT SYSTOLIC BLOOD PRESSURE < 130 MM HG: ICD-10-PCS | Mod: CPTII,S$GLB,, | Performed by: PODIATRIST

## 2020-10-01 PROCEDURE — 1159F MED LIST DOCD IN RCRD: CPT | Mod: S$GLB,,, | Performed by: PODIATRIST

## 2020-10-01 PROCEDURE — 1159F PR MEDICATION LIST DOCUMENTED IN MEDICAL RECORD: ICD-10-PCS | Mod: S$GLB,,, | Performed by: PODIATRIST

## 2020-10-01 PROCEDURE — 1101F PT FALLS ASSESS-DOCD LE1/YR: CPT | Mod: CPTII,S$GLB,, | Performed by: PODIATRIST

## 2020-10-01 PROCEDURE — 73630 XR FOOT COMPLETE 3 VIEW LEFT: ICD-10-PCS | Mod: 26,LT,, | Performed by: RADIOLOGY

## 2020-10-01 PROCEDURE — 99999 PR PBB SHADOW E&M-EST. PATIENT-LVL V: ICD-10-PCS | Mod: PBBFAC,,, | Performed by: PODIATRIST

## 2020-10-01 PROCEDURE — 1101F PR PT FALLS ASSESS DOC 0-1 FALLS W/OUT INJ PAST YR: ICD-10-PCS | Mod: CPTII,S$GLB,, | Performed by: PODIATRIST

## 2020-10-01 PROCEDURE — 87186 SC STD MICRODIL/AGAR DIL: CPT

## 2020-10-01 PROCEDURE — 1125F PR PAIN SEVERITY QUANTIFIED, PAIN PRESENT: ICD-10-PCS | Mod: S$GLB,,, | Performed by: PODIATRIST

## 2020-10-01 PROCEDURE — 87077 CULTURE AEROBIC IDENTIFY: CPT

## 2020-10-01 PROCEDURE — 1125F AMNT PAIN NOTED PAIN PRSNT: CPT | Mod: S$GLB,,, | Performed by: PODIATRIST

## 2020-10-01 PROCEDURE — 99999 PR PBB SHADOW E&M-EST. PATIENT-LVL V: CPT | Mod: PBBFAC,,, | Performed by: PODIATRIST

## 2020-10-01 RX ORDER — BLOOD GLUCOSE CONTROL HIGH,LOW
EACH MISCELLANEOUS
COMMUNITY
Start: 2020-09-17

## 2020-10-01 RX ORDER — BLOOD SUGAR DIAGNOSTIC
STRIP MISCELLANEOUS
COMMUNITY
Start: 2020-09-17

## 2020-10-01 RX ORDER — LANCETS
EACH MISCELLANEOUS
COMMUNITY
Start: 2020-09-17

## 2020-10-01 RX ORDER — ISOPROPYL ALCOHOL 0.75 G/1
SWAB TOPICAL
COMMUNITY
Start: 2020-09-17

## 2020-10-01 NOTE — LETTER
October 4, 2020      Osiel Burger MD  [address] [facility] [address]  [place] [address]           [facility]ound [facility]  [address] [place] [address]  Phone: [phone]  Fax: [phone]          Patient: Epi Mcintosh   MR Number: [id number]   YOB: 1950   Date of Visit: 10/1/2020       Dear Dr. Osiel Burger:    Thank you for referring Epi Mcintosh to me for evaluation. Attached you will find relevant portions of my assessment and plan of care.    If you have questions, please do not hesitate to call me. I look forward to following Epi Mcintosh along with you.    Sincerely,    Estrella Lopez, DPWM    Enclosure  CC:  No Recipients    If you would like to receive this communication electronically, please contact [email or web address] or [phone] to request more information on [employer] Link access.    For providers and/or their staff who would like to refer a patient to [facility], please contact us through our one-stop-shop provider referral line, [facility], at [phone].    If you feel you have received this communication in error or would no longer like to receive these types of communications, please e-mail [email or web address]

## 2020-10-05 ENCOUNTER — TELEPHONE (OUTPATIENT)
Dept: PODIATRY | Facility: CLINIC | Age: 70
End: 2020-10-05

## 2020-10-05 DIAGNOSIS — S81.802S WOUND OF LEFT LEG, SEQUELA: Primary | ICD-10-CM

## 2020-10-05 LAB — BACTERIA SPEC AEROBE CULT: ABNORMAL

## 2020-10-05 RX ORDER — DOXYCYCLINE 100 MG/1
100 CAPSULE ORAL EVERY 12 HOURS
Qty: 14 CAPSULE | Refills: 0 | Status: SHIPPED | OUTPATIENT
Start: 2020-10-05 | End: 2020-10-12

## 2020-10-05 NOTE — PROGRESS NOTES
Please call patient, culture of wound left lower leg is positive.  Antibiotic called in to her pharmacy.  Contact us if there are any changes.  Please verify patient is nonweightbearing due to fracture left foot.  Thank you

## 2020-10-05 NOTE — PROGRESS NOTES
Subjective:       Patient ID: Epi Mcintosh is a 70 y.o. female.    Chief Complaint: Wound Check, Follow-up, and Fracture (foot)  Patient presents for follow-up closed fracture 5th metatarsal left foot,  open wound anterior left lower leg.  Patient reports wound was doing great, cleaning with wound , applying iodine and leaving the area open, however she covered it with a Band-Aid yesterday and soon as she removed it she has noted drainage, now draining more this morning. Relates she has been wearing walking boot at all times of weight-bearing, left foot pain is not really too bad, wound on the front of her leg is more painful.   Finished Bactrim prescribed by Dr. Baker. Pain level 5/10.       Past Medical History:   Diagnosis Date    Coronary artery disease     Depression     Diabetes mellitus     Hypertension     Pancreatitis      Past Surgical History:   Procedure Laterality Date    CARDIAC SURGERY      HYSTERECTOMY       History reviewed. No pertinent family history.  Social History     Socioeconomic History    Marital status:      Spouse name: Not on file    Number of children: Not on file    Years of education: Not on file    Highest education level: Not on file   Occupational History    Not on file   Social Needs    Financial resource strain: Not on file    Food insecurity     Worry: Not on file     Inability: Not on file    Transportation needs     Medical: Not on file     Non-medical: Not on file   Tobacco Use    Smoking status: Current Every Day Smoker     Packs/day: 0.50    Smokeless tobacco: Never Used   Substance and Sexual Activity    Alcohol use: Never     Frequency: Never    Drug use: Never    Sexual activity: Not on file   Lifestyle    Physical activity     Days per week: Not on file     Minutes per session: Not on file    Stress: Not on file   Relationships    Social connections     Talks on phone: Not on file     Gets together: Not on file     Attends  Oriental orthodox service: Not on file     Active member of club or organization: Not on file     Attends meetings of clubs or organizations: Not on file     Relationship status: Not on file   Other Topics Concern    Not on file   Social History Narrative    Not on file       Current Outpatient Medications   Medication Sig Dispense Refill    ACCU-CHEK ALE CONTROL SOLN Soln       ACCU-CHEK ALE PLUS METER Misc       ACCU-CHEK ALE PLUS TEST STRP Strp       ACCU-CHEK SOFTCLIX LANCETS Misc       BD ALCOHOL SWABS PadM       cholecalciferol, vitamin D3, (VITAMIN D3) 25 mcg (1,000 unit) capsule Take 2,000 Units by mouth.      citalopram (CELEXA) 40 MG tablet Take 40 mg by mouth once daily.      diazePAM (VALIUM) 5 MG tablet Take 5 mg by mouth daily as needed.      glimepiride (AMARYL) 4 MG tablet Take 4 mg by mouth once daily.      losartan (COZAAR) 50 MG tablet Take 50 mg by mouth once daily.      metFORMIN (GLUCOPHAGE) 1000 MG tablet Take 1,000 mg by mouth.      metoprolol tartrate (LOPRESSOR) 25 MG tablet Take 25 mg by mouth 2 (two) times daily.      multivitamin with minerals tablet Take 1 tablet by mouth.      pantoprazole (PROTONIX) 40 MG tablet Take 40 mg by mouth once daily.      rosuvastatin (CRESTOR) 40 MG Tab Take 40 mg by mouth once daily.      silver sulfADIAZINE 1% (SILVADENE) 1 % cream 1 application once daily. Apply to affected area      sulfamethoxazole-trimethoprim 800-160mg (BACTRIM DS) 800-160 mg Tab Take 1 tablet by mouth 2 (two) times daily.       No current facility-administered medications for this visit.      Review of patient's allergies indicates:   Allergen Reactions    Zolpidem      Other reaction(s): Other (See Comments)  Seeing people who were not in the room     Codeine        Review of Systems   Constitutional: Negative for fever.   HENT: Negative for congestion.    Respiratory: Negative for cough and shortness of breath.    Cardiovascular: Negative for leg swelling.  "  Musculoskeletal: Positive for gait problem.        Boot left   Skin: Positive for wound.   All other systems reviewed and are negative.      Objective:      Vitals:    10/01/20 1356   BP: 122/66   Pulse: (!) 56   Resp: 19   Temp: 97.9 °F (36.6 °C)   TempSrc: Oral   SpO2: 99%   Weight: 68.9 kg (152 lb)   Height: 5' 5" (1.651 m)     Physical Exam  Vitals signs and nursing note reviewed.   Constitutional:       General: She is not in acute distress.  Cardiovascular:      Pulses:           Dorsalis pedis pulses are 2+ on the right side and 1+ on the left side.        Posterior tibial pulses are 2+ on the right side and 1+ on the left side.   Pulmonary:      Effort: Pulmonary effort is normal.   Musculoskeletal:         General: Swelling and tenderness present.   Skin:     Capillary Refill: Capillary refill takes 2 to 3 seconds.      Findings: Erythema present.   Neurological:      General: No focal deficit present.   Psychiatric:         Mood and Affect: Mood normal.         Behavior: Behavior normal.     Vascular         Normal CFT bilateral   No overall lower extremity edema bilateral, mild to moderate left dorsal-lateral left foot    Pedal skin temperature normal bilateral     Integumentary   Ecchymosis resolved left foot   Wound anterior left lower leg approximately  6 cm in length, 3 cm in with, fairly superficial with drainage from one area in the center.  This area is superficial, remaining portion of the wound has a firm scab.  There is surrounding erythema and minimal calor.        Neurological   Gross sensation intact bilateral feet     Musculoskeletal   Muscle Strength/Testing:  Intact right, guarding left, but improved   Pain 5th metatarsal styloid process left        Walks well unassisted        Presents short walking boot left, tennis shoe right      10/1/2020  XR FOOT COMPLETE 3 VIEW LEFT  CLINICAL HISTORY:  Nondisplaced fracture of fifth metatarsal bone, left foot, subsequent encounter for fracture " with routine healing  COMPARISON:  09/11/2020.     FINDINGS:  There is a subacute minimally displaced spiral fracture involving the 5th metacarpal shaft.  This is not significantly changed over the interval.  Persistent mild adjacent soft tissue swelling slightly improved over the interval.     Mild hallux valgus deformity with mild degenerative osteoarthrosis of the 1st MTP joint.  Mild degenerative osteoarthrosis involving the IP joints of the digits.     Tarsal bones are intact with mild degenerative osteoarthrosis.  Normal tarsometatarsal alignment.  Small dorsal and plantar calcaneal spurs.     Impression:  1. Subacute minimally displaced spiral fracture of the 5th metacarpal shaft.  2. Mild hallux valgus deformity with mild degenerative osteoarthrosis.  3. Small calcaneal spurs.              Assessment:       1. Closed nondisplaced fracture of fifth metatarsal bone of left foot with routine healing, subsequent encounter    2. Wound of left leg, sequela        Plan:         X-RAY THREE VIEWS LEFT FOOT  C&S WOUND LEFT LOWER LEG      We discussed changes in wound, concern for infection.  Discussed need to culture the wound and we will wait on any further antibiotics before the results come back since she just finished Bactrim.  Instructed patient to continue to clean the area with wound  daily, utilized iodine in the center only, keep open to air out but needs to keep covered when in the shower, do not allow the area to get wet.  Contact the office immediately with any change, any increased pain, redness, swelling  Reviewed x-rays with patient and advised there is minimal displacement compared to previous views, she needs to not only continue walking boot but she needs to be nonweightbearing.  We discussed knee roller later, knee walker /scooter  Utilized at all times of weight-bearing, even at home.  I offered to order equipment for patient but she states she has a family member who has a knee roller  which she can use and confirms she will be able to be nonweightbearing.  Advised patient if there is any further separation surgery may be required.  Patient verbalized understanding  Reviewed with patient ice/cool therapy in this location to reduce inflammation  Patient was in understanding and agreement with treatment plan.  I counseled the patient on their conditions, implications and medical management.  Instructed patient/family to contact the office with any changes, questions, concerns, worsening of symptoms.   Total face-to-face time, exam, assessment, treatment, discussion, documentation 25 minutes, more than half this time spent on consultation and coordination of care.  Follow up 2 weeks    This note was created using [employer] voice recognition software that occasionally misinterpreted phrases or words.

## 2020-10-05 NOTE — TELEPHONE ENCOUNTER
Done. Pt notified of results and instructed on boot. Pt. Stated she has already picked up Rx and has been NWB as instructed.

## 2020-10-05 NOTE — TELEPHONE ENCOUNTER
----- Message from Estrella Lopez DPM sent at 10/5/2020 10:41 AM CDT -----  Please call patient, culture of wound left lower leg is positive.  Antibiotic called in to her pharmacy.  Contact us if there are any changes.  Please verify patient is nonweightbearing due to fracture left foot.  Thank you

## 2020-10-09 ENCOUNTER — TELEPHONE (OUTPATIENT)
Dept: PODIATRY | Facility: CLINIC | Age: 70
End: 2020-10-09

## 2020-10-09 NOTE — TELEPHONE ENCOUNTER
Advised patient to eat breakfast in the am and then about 30 min later take the medication. Patient verbalized understanding and she may have misunderstood directions

## 2020-10-09 NOTE — TELEPHONE ENCOUNTER
----- Message from Ira Rubio sent at 10/9/2020 11:12 AM CDT -----  Type: Needs Medical Advice  Who Called:  pt  Best Call Back Number: [phone]  Additional Information: pt states that she was told to take medication doxycycline (VIBRAMYCIN) 100 MG Cap  on an empty stomach. States that she takes the medication on a empty stomach and stating vomiting 30 minutes later. She wants to know if that is ok or is it defeating the purpose. Please give call back. thanks

## 2020-10-15 ENCOUNTER — HOSPITAL ENCOUNTER (OUTPATIENT)
Dept: RADIOLOGY | Facility: HOSPITAL | Age: 70
Discharge: HOME | End: 2020-10-15
Attending: PODIATRIST
Payer: MEDICARE

## 2020-10-15 ENCOUNTER — OFFICE VISIT (OUTPATIENT)
Dept: PODIATRY | Facility: CLINIC | Age: 70
End: 2020-10-15
Payer: MEDICARE

## 2020-10-15 VITALS
HEART RATE: 51 BPM | WEIGHT: 152 LBS | TEMPERATURE: 97 F | HEIGHT: 65 IN | SYSTOLIC BLOOD PRESSURE: 132 MMHG | DIASTOLIC BLOOD PRESSURE: 55 MMHG | OXYGEN SATURATION: 99 % | RESPIRATION RATE: 18 BRPM | BODY MASS INDEX: 25.33 KG/M2

## 2020-10-15 DIAGNOSIS — S92.355D CLOSED NONDISPLACED FRACTURE OF FIFTH METATARSAL BONE OF LEFT FOOT WITH ROUTINE HEALING, SUBSEQUENT ENCOUNTER: ICD-10-CM

## 2020-10-15 DIAGNOSIS — S81.802S WOUND OF LEFT LEG, SEQUELA: ICD-10-CM

## 2020-10-15 DIAGNOSIS — S92.352G CLOSED DISPLACED FRACTURE OF FIFTH METATARSAL BONE OF LEFT FOOT WITH DELAYED HEALING, SUBSEQUENT ENCOUNTER: Primary | ICD-10-CM

## 2020-10-15 PROCEDURE — 99999 PR PBB SHADOW E&M-EST. PATIENT-LVL V: CPT | Mod: PBBFAC,,, | Performed by: PODIATRIST

## 2020-10-15 PROCEDURE — 3078F DIAST BP <80 MM HG: CPT | Mod: CPTII,S$GLB,, | Performed by: PODIATRIST

## 2020-10-15 PROCEDURE — 1126F PR PAIN SEVERITY QUANTIFIED, NO PAIN PRESENT: ICD-10-PCS | Mod: S$GLB,,, | Performed by: PODIATRIST

## 2020-10-15 PROCEDURE — 3075F SYST BP GE 130 - 139MM HG: CPT | Mod: CPTII,S$GLB,, | Performed by: PODIATRIST

## 2020-10-15 PROCEDURE — 1100F PR PT FALLS ASSESS DOC 2+ FALLS/FALL W/INJURY/YR: ICD-10-PCS | Mod: CPTII,S$GLB,, | Performed by: PODIATRIST

## 2020-10-15 PROCEDURE — 73630 X-RAY EXAM OF FOOT: CPT | Mod: TC,FY,LT

## 2020-10-15 PROCEDURE — 1159F PR MEDICATION LIST DOCUMENTED IN MEDICAL RECORD: ICD-10-PCS | Mod: S$GLB,,, | Performed by: PODIATRIST

## 2020-10-15 PROCEDURE — 3008F BODY MASS INDEX DOCD: CPT | Mod: CPTII,S$GLB,, | Performed by: PODIATRIST

## 2020-10-15 PROCEDURE — 3075F PR MOST RECENT SYSTOLIC BLOOD PRESS GE 130-139MM HG: ICD-10-PCS | Mod: CPTII,S$GLB,, | Performed by: PODIATRIST

## 2020-10-15 PROCEDURE — 3078F PR MOST RECENT DIASTOLIC BLOOD PRESSURE < 80 MM HG: ICD-10-PCS | Mod: CPTII,S$GLB,, | Performed by: PODIATRIST

## 2020-10-15 PROCEDURE — 3288F PR FALLS RISK ASSESSMENT DOCUMENTED: ICD-10-PCS | Mod: CPTII,S$GLB,, | Performed by: PODIATRIST

## 2020-10-15 PROCEDURE — 1126F AMNT PAIN NOTED NONE PRSNT: CPT | Mod: S$GLB,,, | Performed by: PODIATRIST

## 2020-10-15 PROCEDURE — 99214 PR OFFICE/OUTPT VISIT, EST, LEVL IV, 30-39 MIN: ICD-10-PCS | Mod: S$GLB,,, | Performed by: PODIATRIST

## 2020-10-15 PROCEDURE — 73630 XR FOOT COMPLETE 3 VIEW LEFT: ICD-10-PCS | Mod: 26,LT,, | Performed by: RADIOLOGY

## 2020-10-15 PROCEDURE — 99999 PR PBB SHADOW E&M-EST. PATIENT-LVL V: ICD-10-PCS | Mod: PBBFAC,,, | Performed by: PODIATRIST

## 2020-10-15 PROCEDURE — 3008F PR BODY MASS INDEX (BMI) DOCUMENTED: ICD-10-PCS | Mod: CPTII,S$GLB,, | Performed by: PODIATRIST

## 2020-10-15 PROCEDURE — 1100F PTFALLS ASSESS-DOCD GE2>/YR: CPT | Mod: CPTII,S$GLB,, | Performed by: PODIATRIST

## 2020-10-15 PROCEDURE — 99214 OFFICE O/P EST MOD 30 MIN: CPT | Mod: S$GLB,,, | Performed by: PODIATRIST

## 2020-10-15 PROCEDURE — 1159F MED LIST DOCD IN RCRD: CPT | Mod: S$GLB,,, | Performed by: PODIATRIST

## 2020-10-15 PROCEDURE — 73630 X-RAY EXAM OF FOOT: CPT | Mod: 26,LT,, | Performed by: RADIOLOGY

## 2020-10-15 PROCEDURE — 3288F FALL RISK ASSESSMENT DOCD: CPT | Mod: CPTII,S$GLB,, | Performed by: PODIATRIST

## 2020-10-18 PROBLEM — S92.352G CLOSED DISPLACED FRACTURE OF FIFTH METATARSAL BONE OF LEFT FOOT WITH DELAYED HEALING: Status: ACTIVE | Noted: 2020-10-18

## 2020-10-18 PROBLEM — S81.802S WOUND OF LEFT LEG, SEQUELA: Status: ACTIVE | Noted: 2020-10-18

## 2020-10-18 NOTE — PROGRESS NOTES
Subjective:       Patient ID: Epi Mcintosh is a 70 y.o. female.    Chief Complaint: Follow-up  Patient presents for follow-up closed fracture 5th metatarsal left foot,  open wound anterior left lower leg.    Patient confirms she received call from nurse regarding culture wound left leg and took doxycycline as directed.  She did have difficulty taking medicine originally, was told to take it as empty stomach, contacted the nurse and when she ate and took the medication 30 min later she tolerated it very well.  She feels it has benefitted wound on her left leg significantly which has stayed dry with no swelling, redness, warmth or pain.  Area is healing well.  She feels her foot is healing well also.  Has some residual swelling but no pain. Confirms she is wearing walking boot at all times comfortably, presents with walking boot today but not with knee scooter.  She states she is using a borrowed knee scooter from a friend at all times and not putting any weight on her foot.  No pain today       Past Medical History:   Diagnosis Date    Coronary artery disease     Depression     Diabetes mellitus     Hypertension     Pancreatitis      Past Surgical History:   Procedure Laterality Date    CARDIAC SURGERY      HYSTERECTOMY       History reviewed. No pertinent family history.  Social History     Socioeconomic History    Marital status:      Spouse name: Not on file    Number of children: Not on file    Years of education: Not on file    Highest education level: Not on file   Occupational History    Not on file   Social Needs    Financial resource strain: Not on file    Food insecurity     Worry: Not on file     Inability: Not on file    Transportation needs     Medical: Not on file     Non-medical: Not on file   Tobacco Use    Smoking status: Current Every Day Smoker     Packs/day: 0.50    Smokeless tobacco: Never Used   Substance and Sexual Activity    Alcohol use: Never     Frequency:  Never    Drug use: Never    Sexual activity: Not on file   Lifestyle    Physical activity     Days per week: Not on file     Minutes per session: Not on file    Stress: Not on file   Relationships    Social connections     Talks on phone: Not on file     Gets together: Not on file     Attends Evangelical service: Not on file     Active member of club or organization: Not on file     Attends meetings of clubs or organizations: Not on file     Relationship status: Not on file   Other Topics Concern    Not on file   Social History Narrative    Not on file       Current Outpatient Medications   Medication Sig Dispense Refill    ACCU-CHEK ALE CONTROL SOLN Soln       ACCU-CHEK ALE PLUS METER Misc       ACCU-CHEK ALE PLUS TEST STRP Strp       ACCU-CHEK SOFTCLIX LANCETS Misc       BD ALCOHOL SWABS PadM       cholecalciferol, vitamin D3, (VITAMIN D3) 25 mcg (1,000 unit) capsule Take 2,000 Units by mouth.      citalopram (CELEXA) 40 MG tablet Take 40 mg by mouth once daily.      diazePAM (VALIUM) 5 MG tablet Take 5 mg by mouth daily as needed.      glimepiride (AMARYL) 4 MG tablet Take 4 mg by mouth once daily.      losartan (COZAAR) 50 MG tablet Take 50 mg by mouth once daily.      metFORMIN (GLUCOPHAGE) 1000 MG tablet Take 1,000 mg by mouth.      metoprolol tartrate (LOPRESSOR) 25 MG tablet Take 25 mg by mouth 2 (two) times daily.      multivitamin with minerals tablet Take 1 tablet by mouth.      pantoprazole (PROTONIX) 40 MG tablet Take 40 mg by mouth once daily.      rosuvastatin (CRESTOR) 40 MG Tab Take 40 mg by mouth once daily.      silver sulfADIAZINE 1% (SILVADENE) 1 % cream 1 application once daily. Apply to affected area      sulfamethoxazole-trimethoprim 800-160mg (BACTRIM DS) 800-160 mg Tab Take 1 tablet by mouth 2 (two) times daily.       No current facility-administered medications for this visit.      Review of patient's allergies indicates:   Allergen Reactions    Zolpidem       "Other reaction(s): Other (See Comments)  Seeing people who were not in the room     Codeine        Review of Systems   Constitutional: Negative for fever.   HENT: Negative for congestion.    Respiratory: Negative for cough and shortness of breath.    Cardiovascular: Negative for leg swelling.   Musculoskeletal: Positive for gait problem.        Boot left   Skin: Positive for wound.   All other systems reviewed and are negative.      Objective:      Vitals:    10/15/20 1340   BP: (!) 132/55   Pulse: (!) 51   Resp: 18   Temp: 97.2 °F (36.2 °C)   TempSrc: Temporal   SpO2: 99%   Weight: 68.9 kg (152 lb)   Height: 5' 5" (1.651 m)     Physical Exam  Vitals signs and nursing note reviewed.   Constitutional:       General: She is not in acute distress.  Cardiovascular:      Pulses:           Dorsalis pedis pulses are 2+ on the right side and 2+ on the left side.        Posterior tibial pulses are 2+ on the right side and 2+ on the left side.   Pulmonary:      Effort: Pulmonary effort is normal.   Musculoskeletal:         General: Swelling present. No tenderness.   Feet:      Left foot:      Skin integrity: No erythema or warmth.   Skin:     Capillary Refill: Capillary refill takes 2 to 3 seconds.      Findings: No erythema.   Neurological:      General: No focal deficit present.   Psychiatric:         Mood and Affect: Mood normal.         Behavior: Behavior normal.     Vascular         Normal CFT bilateral   Mild edema left dorsal-lateral left foot    Pedal skin temperature normal bilateral     Integumentary  Wound anterior left lower leg approximately 5 x 2 cm is much improved. Dry, healthy scab, peeling edges, no drainage, erythema, calor or pain       Neurological   Gross sensation intact bilateral feet     Musculoskeletal   Muscle Strength/Testing:  Intact left foot against resistance in all planes with no pain         Presents walking boot left, tennis shoe right      10/15/2020  XR FOOT COMPLETE 3 VIEW LEFT  CLINICAL " HISTORY:  Nondisplaced fracture of fifth metatarsal bone, left foot, subsequent encounter for fracture with routine healing  COMPARISON:  Left foot-10/01/2020     FINDINGS:  There is bunion formation at the great toe metatarsal and left great toe hallux valgus.  There is degenerative change of the great toe MTP joints.  There is an unchanged obliquely oriented displaced left 5th metatarsal shaft fracture.  There is Achilles insertion calcaneal enthesophyte formation.  There is a plantar calcaneal spur present.  No widening of the Lisfranc joint.     Impression: No interval change in healing or alignment at 5th metatarsal shaft fracture site when compared to the prior study.        Aerobic culture  Specimen Information: Leg, Left; Wound        Component 2wk ago   Aerobic Bacterial Culture Abnormal   ACINETOBACTER BAUMANNII/HAEMOLYTICUS   Rare     Resulting Agency [facility]   Susceptibility     ACINETOBACTER BAUMANNII/HAEMOLYTICUS     CULTURE, AEROBIC  (SPECIFY SOURCE)     Amikacin <=16 mcg/mL Sensitive     Amp/Sulbactam <=8/4 mcg/mL Sensitive     Cefepime <=2 mcg/mL Sensitive     Ceftriaxone 8 mcg/mL Sensitive     Ciprofloxacin <=1 mcg/mL Sensitive     Gentamicin <=4 mcg/mL Sensitive     Levofloxacin <=2 mcg/mL Sensitive     Meropenem <=1 mcg/mL Sensitive     Tetracycline <=4 mcg/mL Sensitive     Tobramycin <=4 mcg/mL Sensitive     Trimeth/Sulfa >2/38 mcg/mL Resistant                               Assessment:       1. Closed displaced fracture of fifth metatarsal bone of left foot with delayed healing, subsequent encounter    2. Wound of left leg, sequela        Plan:         X-RAY THREE VIEWS LEFT FOOT      Advised patient wound left lower leg has improved considerable considerably with doxycycline.  No further treatment is needed, keep the area clean and dry, allow large scab to, on its own.  Contact the office immediately with any drainage, redness or warmth.  We had a lengthy discussion regarding x-rays today, same  slight displacement with no evidence of healing.  Advised patient although it is early to diagnose this as true delayed healing there should have been some signs at this point and there is concern regarding healing of this fracture.  Advised patient I would recommend she consider surgical intervention.    Patient did not want to entertain surgery at this time, she stated she would like to wait 4 weeks to see if any healing occurred.   I advised patient leading can potentially put her at risk for further problems healing this fracture even with surgical intervention  Instructed patient to remain in walking boot at all times, utilizing knee roller, absolutely no weight on left foot  Start vit C with D  Call with any changes   Patient was in understanding and agreement with treatment plan.  I counseled the patient on their conditions, implications and medical management.  Instructed patient/family to contact the office with any changes, questions, concerns, worsening of symptoms.   Total face-to-face time, exam, assessment, treatment, discussion, documentation 25 minutes, more than half this time spent on consultation and coordination of care.  Follow up 4 weeks    This note was created using [employer] voice recognition software that occasionally misinterpreted phrases or words.

## 2020-11-12 ENCOUNTER — HOSPITAL ENCOUNTER (OUTPATIENT)
Dept: RADIOLOGY | Facility: HOSPITAL | Age: 70
Discharge: HOME | End: 2020-11-12
Attending: PODIATRIST
Payer: MEDICARE

## 2020-11-12 ENCOUNTER — OFFICE VISIT (OUTPATIENT)
Dept: PODIATRY | Facility: CLINIC | Age: 70
End: 2020-11-12
Payer: MEDICARE

## 2020-11-12 VITALS
HEIGHT: 64 IN | RESPIRATION RATE: 16 BRPM | HEART RATE: 53 BPM | WEIGHT: 150.5 LBS | OXYGEN SATURATION: 97 % | SYSTOLIC BLOOD PRESSURE: 141 MMHG | BODY MASS INDEX: 25.7 KG/M2 | TEMPERATURE: 97 F | DIASTOLIC BLOOD PRESSURE: 65 MMHG

## 2020-11-12 DIAGNOSIS — S92.352G CLOSED DISPLACED FRACTURE OF FIFTH METATARSAL BONE OF LEFT FOOT WITH DELAYED HEALING, SUBSEQUENT ENCOUNTER: Primary | ICD-10-CM

## 2020-11-12 DIAGNOSIS — S92.352G CLOSED DISPLACED FRACTURE OF FIFTH METATARSAL BONE OF LEFT FOOT WITH DELAYED HEALING, SUBSEQUENT ENCOUNTER: ICD-10-CM

## 2020-11-12 PROCEDURE — 3008F PR BODY MASS INDEX (BMI) DOCUMENTED: ICD-10-PCS | Mod: CPTII,S$GLB,, | Performed by: PODIATRIST

## 2020-11-12 PROCEDURE — 3078F DIAST BP <80 MM HG: CPT | Mod: CPTII,S$GLB,, | Performed by: PODIATRIST

## 2020-11-12 PROCEDURE — 3077F PR MOST RECENT SYSTOLIC BLOOD PRESSURE >= 140 MM HG: ICD-10-PCS | Mod: CPTII,S$GLB,, | Performed by: PODIATRIST

## 2020-11-12 PROCEDURE — 3288F FALL RISK ASSESSMENT DOCD: CPT | Mod: CPTII,S$GLB,, | Performed by: PODIATRIST

## 2020-11-12 PROCEDURE — 3008F BODY MASS INDEX DOCD: CPT | Mod: CPTII,S$GLB,, | Performed by: PODIATRIST

## 2020-11-12 PROCEDURE — 3077F SYST BP >= 140 MM HG: CPT | Mod: CPTII,S$GLB,, | Performed by: PODIATRIST

## 2020-11-12 PROCEDURE — 73630 XR FOOT COMPLETE 3 VIEW LEFT: ICD-10-PCS | Mod: 26,LT,, | Performed by: RADIOLOGY

## 2020-11-12 PROCEDURE — 99213 PR OFFICE/OUTPT VISIT, EST, LEVL III, 20-29 MIN: ICD-10-PCS | Mod: S$GLB,,, | Performed by: PODIATRIST

## 2020-11-12 PROCEDURE — 1125F AMNT PAIN NOTED PAIN PRSNT: CPT | Mod: S$GLB,,, | Performed by: PODIATRIST

## 2020-11-12 PROCEDURE — 73630 X-RAY EXAM OF FOOT: CPT | Mod: 26,LT,, | Performed by: RADIOLOGY

## 2020-11-12 PROCEDURE — 3288F PR FALLS RISK ASSESSMENT DOCUMENTED: ICD-10-PCS | Mod: CPTII,S$GLB,, | Performed by: PODIATRIST

## 2020-11-12 PROCEDURE — 1100F PR PT FALLS ASSESS DOC 2+ FALLS/FALL W/INJURY/YR: ICD-10-PCS | Mod: CPTII,S$GLB,, | Performed by: PODIATRIST

## 2020-11-12 PROCEDURE — 1125F PR PAIN SEVERITY QUANTIFIED, PAIN PRESENT: ICD-10-PCS | Mod: S$GLB,,, | Performed by: PODIATRIST

## 2020-11-12 PROCEDURE — 99213 OFFICE O/P EST LOW 20 MIN: CPT | Mod: S$GLB,,, | Performed by: PODIATRIST

## 2020-11-12 PROCEDURE — 99999 PR PBB SHADOW E&M-EST. PATIENT-LVL IV: ICD-10-PCS | Mod: PBBFAC,,, | Performed by: PODIATRIST

## 2020-11-12 PROCEDURE — 3078F PR MOST RECENT DIASTOLIC BLOOD PRESSURE < 80 MM HG: ICD-10-PCS | Mod: CPTII,S$GLB,, | Performed by: PODIATRIST

## 2020-11-12 PROCEDURE — 99999 PR PBB SHADOW E&M-EST. PATIENT-LVL IV: CPT | Mod: PBBFAC,,, | Performed by: PODIATRIST

## 2020-11-12 PROCEDURE — 73630 X-RAY EXAM OF FOOT: CPT | Mod: TC,FY,LT

## 2020-11-12 PROCEDURE — 1100F PTFALLS ASSESS-DOCD GE2>/YR: CPT | Mod: CPTII,S$GLB,, | Performed by: PODIATRIST

## 2020-11-12 PROCEDURE — 1159F MED LIST DOCD IN RCRD: CPT | Mod: S$GLB,,, | Performed by: PODIATRIST

## 2020-11-12 PROCEDURE — 1159F PR MEDICATION LIST DOCUMENTED IN MEDICAL RECORD: ICD-10-PCS | Mod: S$GLB,,, | Performed by: PODIATRIST

## 2020-11-15 NOTE — PROGRESS NOTES
Subjective:       Patient ID: Epi Mcintosh is a 70 y.o. female.    Chief Complaint: Follow-up (foot fracture)  Patient presents for follow-up closed fracture 5th metatarsal left foot, open wound anterior left lower leg. Injury initially took place 9/10, not much healing on xray last month.  Patient confirms she has been wearing walking boot at all times and has been mostly nonweightbearing since last visit in hopes of avoiding surgery to reduce fracture. Has no foot pain.  States wound on the front of the left leg improved significantly following antibiotics about a month ago.  She has not had any further pain or redness around the area, it has remained dry with thick black scab.       Past Medical History:   Diagnosis Date    Coronary artery disease     Depression     Diabetes mellitus     Hypertension     Pancreatitis      Past Surgical History:   Procedure Laterality Date    CARDIAC SURGERY      HYSTERECTOMY       History reviewed. No pertinent family history.  Social History     Socioeconomic History    Marital status:      Spouse name: Not on file    Number of children: Not on file    Years of education: Not on file    Highest education level: Not on file   Occupational History    Not on file   Social Needs    Financial resource strain: Not on file    Food insecurity     Worry: Not on file     Inability: Not on file    Transportation needs     Medical: Not on file     Non-medical: Not on file   Tobacco Use    Smoking status: Current Every Day Smoker     Packs/day: 0.50    Smokeless tobacco: Never Used   Substance and Sexual Activity    Alcohol use: Never     Frequency: Never    Drug use: Never    Sexual activity: Not on file   Lifestyle    Physical activity     Days per week: Not on file     Minutes per session: Not on file    Stress: Not on file   Relationships    Social connections     Talks on phone: Not on file     Gets together: Not on file     Attends Zoroastrianism service:  Not on file     Active member of club or organization: Not on file     Attends meetings of clubs or organizations: Not on file     Relationship status: Not on file   Other Topics Concern    Not on file   Social History Narrative    Not on file       Current Outpatient Medications   Medication Sig Dispense Refill    ACCU-CHEK ALE CONTROL SOLN Soln       ACCU-CHEK ALE PLUS METER Misc       ACCU-CHEK ALE PLUS TEST STRP Strp       ACCU-CHEK SOFTCLIX LANCETS Misc       BD ALCOHOL SWABS PadM       cholecalciferol, vitamin D3, (VITAMIN D3) 25 mcg (1,000 unit) capsule Take 2,000 Units by mouth.      citalopram (CELEXA) 40 MG tablet Take 40 mg by mouth once daily.      diazePAM (VALIUM) 5 MG tablet Take 5 mg by mouth daily as needed.      glimepiride (AMARYL) 4 MG tablet Take 4 mg by mouth once daily.      losartan (COZAAR) 50 MG tablet Take 50 mg by mouth once daily.      metFORMIN (GLUCOPHAGE) 1000 MG tablet Take 1,000 mg by mouth.      metoprolol tartrate (LOPRESSOR) 25 MG tablet Take 25 mg by mouth 2 (two) times daily.      multivitamin with minerals tablet Take 1 tablet by mouth.      pantoprazole (PROTONIX) 40 MG tablet Take 40 mg by mouth once daily.      rosuvastatin (CRESTOR) 40 MG Tab Take 40 mg by mouth once daily.      silver sulfADIAZINE 1% (SILVADENE) 1 % cream 1 application once daily. Apply to affected area      sulfamethoxazole-trimethoprim 800-160mg (BACTRIM DS) 800-160 mg Tab Take 1 tablet by mouth 2 (two) times daily.       No current facility-administered medications for this visit.      Review of patient's allergies indicates:   Allergen Reactions    Zolpidem      Other reaction(s): Other (See Comments)  Seeing people who were not in the room     Codeine        Review of Systems   Constitutional: Negative for fever.   HENT: Negative for congestion.    Respiratory: Negative for cough and shortness of breath.    Cardiovascular: Negative for leg swelling.   Musculoskeletal:  "Positive for gait problem.        Boot left   Skin: Positive for wound.   All other systems reviewed and are negative.      Objective:      Vitals:    11/12/20 1428   BP: (!) 141/65   Pulse: (!) 53   Resp: 16   Temp: 96.9 °F (36.1 °C)   TempSrc: Temporal   SpO2: 97%   Weight: 68.3 kg (150 lb 8 oz)   Height: 5' 4" (1.626 m)     Physical Exam  Vitals signs and nursing note reviewed.   Constitutional:       General: She is not in acute distress.  Cardiovascular:      Pulses:           Dorsalis pedis pulses are 2+ on the right side and 2+ on the left side.        Posterior tibial pulses are 2+ on the right side and 2+ on the left side.   Pulmonary:      Effort: Pulmonary effort is normal.   Musculoskeletal:         General: Swelling present. No tenderness.   Feet:      Left foot:      Skin integrity: No erythema or warmth.   Skin:     Capillary Refill: Capillary refill takes 2 to 3 seconds.      Findings: No erythema.   Neurological:      General: No focal deficit present.   Psychiatric:         Mood and Affect: Mood normal.         Behavior: Behavior normal.     Vascular         Normal CFT bilateral   Mild edema left dorsal-lateral left foot    Pedal skin temperature normal bilateral     Integumentary        Raised black scab anterior left lower leg dry, non tender, no erythema        Neurological   Gross sensation intact bilateral feet     Musculoskeletal   Muscle Strength/Testing:  Intact left foot against resistance in all planes with no pain         Presents walking boot left, tennis shoe right        11/12/2020  XR FOOT COMPLETE 3 VIEW LEFT  CLINICAL HISTORY:  Displaced fracture of fifth metatarsal bone, left foot, subsequent encounter for fracture with delayed healing  COMPARISON:  10/15/2020.     FINDINGS:  Mild hallux valgus deformity with mild degenerative osteoarthrosis of the 1st MTP joint.  Mild degenerative osteoarthrosis involving the IP joints of the digits.  There is a persistent subacute minimally " displaced spiral fracture involving the body of the 5th metatarsal.  This is not significantly changed over the interval.  Persistent mild adjacent soft tissue swelling.  Tarsal bones are intact with mild degenerative osteoarthrosis.  Normal tarsometatarsal alignment.  Small dorsal and plantar calcaneal spurs.     Impression:  1. Subacute minimally displaced spiral fracture of the 5th metatarsal.  2. Mild hallux valgus deformity with mild degenerative osteoarthrosis.  3. Small calcaneal spurs.           Assessment:       1. Closed displaced fracture of fifth metatarsal bone of left foot with delayed healing, subsequent encounter        Plan:         X-RAY THREE VIEWS LEFT FOOT      Reviewed x-rays at length with patient and advised there are subtle changes indicating healing at the most proximal aspect of the fracture.  Reviewed AP view with patient, fracture remains in excellent alignment, however there is a slight gap which has not changed much.  Explained a nonhealing fracture is not diagnosed uness healing is not displayed for 6 months.  While I would do feel this fracture should displayed more signs of healing at this point there is a chance it can still possibly heal is long as she remains nonweightbearing  Patient does understand if at any point this fracture is determined a nonunion, with no ability to heal, she will require surgery  We agreed to continue treating conservatively, walking boot and nonweightbearing.    Patient declines surgery due to living at home by herself.  At this point she is not interested in pursuing surgery.  She would like to continue conservative treatments  Advised patient wound on the front of the left lower leg is healing well, stable, dry, allow scab to fall off on its own   Patient was in understanding and agreement with treatment plan.  I counseled the patient on their conditions, implications and medical management.  Instructed patient/family to contact the office with any  changes, questions, concerns, worsening of symptoms.   Total face-to-face time, exam, assessment, treatment, discussion, documentation 15 minutes, more than half this time spent on consultation and coordination of care.  Follow up 4 weeks    This note was created using [employer] voice recognition software that occasionally misinterpreted phrases or words.

## 2020-11-19 ENCOUNTER — CLINICAL SUPPORT (OUTPATIENT)
Dept: SMOKING CESSATION | Facility: CLINIC | Age: 70
End: 2020-11-19
Payer: COMMERCIAL

## 2020-11-19 DIAGNOSIS — F17.200 NICOTINE DEPENDENCE: Primary | ICD-10-CM

## 2020-11-19 PROCEDURE — 99407 PR TOBACCO USE CESSATION INTENSIVE >10 MINUTES: ICD-10-PCS | Mod: S$GLB,,,

## 2020-11-19 PROCEDURE — 99407 BEHAV CHNG SMOKING > 10 MIN: CPT | Mod: S$GLB,,,

## 2020-11-19 NOTE — PROGRESS NOTES
Spoke with patient today in regard to smoking cessation progress for 12-month telephone follow up on Quit 1. Patient states she is  tobacco free. Congratulated patient on her accomplishment. Informed patient of benefit period and contact information if any further help or support is needed. Will complete / resolve smart form for 12 month follow up on Quit attempt #1.

## 2021-01-05 ENCOUNTER — HOSPITAL ENCOUNTER (OUTPATIENT)
Dept: RADIOLOGY | Facility: HOSPITAL | Age: 71
Discharge: HOME | End: 2021-01-05
Attending: PODIATRIST
Payer: MEDICARE

## 2021-01-05 ENCOUNTER — OFFICE VISIT (OUTPATIENT)
Dept: PODIATRY | Facility: CLINIC | Age: 71
End: 2021-01-05
Payer: MEDICARE

## 2021-01-05 VITALS
HEIGHT: 64 IN | BODY MASS INDEX: 25.27 KG/M2 | SYSTOLIC BLOOD PRESSURE: 199 MMHG | WEIGHT: 148 LBS | TEMPERATURE: 98 F | HEART RATE: 73 BPM | OXYGEN SATURATION: 98 % | RESPIRATION RATE: 13 BRPM | DIASTOLIC BLOOD PRESSURE: 79 MMHG

## 2021-01-05 DIAGNOSIS — S92.352G CLOSED DISPLACED FRACTURE OF FIFTH METATARSAL BONE OF LEFT FOOT WITH DELAYED HEALING, SUBSEQUENT ENCOUNTER: ICD-10-CM

## 2021-01-05 DIAGNOSIS — S92.352G CLOSED DISPLACED FRACTURE OF FIFTH METATARSAL BONE OF LEFT FOOT WITH DELAYED HEALING, SUBSEQUENT ENCOUNTER: Primary | ICD-10-CM

## 2021-01-05 DIAGNOSIS — E11.8 TYPE 2 DIABETES MELLITUS WITH COMPLICATION, WITHOUT LONG-TERM CURRENT USE OF INSULIN: ICD-10-CM

## 2021-01-05 PROCEDURE — 3077F SYST BP >= 140 MM HG: CPT | Mod: CPTII,S$GLB,, | Performed by: PODIATRIST

## 2021-01-05 PROCEDURE — 1101F PT FALLS ASSESS-DOCD LE1/YR: CPT | Mod: CPTII,S$GLB,, | Performed by: PODIATRIST

## 2021-01-05 PROCEDURE — 1101F PR PT FALLS ASSESS DOC 0-1 FALLS W/OUT INJ PAST YR: ICD-10-PCS | Mod: CPTII,S$GLB,, | Performed by: PODIATRIST

## 2021-01-05 PROCEDURE — 1126F AMNT PAIN NOTED NONE PRSNT: CPT | Mod: S$GLB,,, | Performed by: PODIATRIST

## 2021-01-05 PROCEDURE — 1159F PR MEDICATION LIST DOCUMENTED IN MEDICAL RECORD: ICD-10-PCS | Mod: S$GLB,,, | Performed by: PODIATRIST

## 2021-01-05 PROCEDURE — 3077F PR MOST RECENT SYSTOLIC BLOOD PRESSURE >= 140 MM HG: ICD-10-PCS | Mod: CPTII,S$GLB,, | Performed by: PODIATRIST

## 2021-01-05 PROCEDURE — 99213 OFFICE O/P EST LOW 20 MIN: CPT | Mod: S$GLB,,, | Performed by: PODIATRIST

## 2021-01-05 PROCEDURE — 99999 PR PBB SHADOW E&M-EST. PATIENT-LVL IV: ICD-10-PCS | Mod: PBBFAC,,, | Performed by: PODIATRIST

## 2021-01-05 PROCEDURE — 3078F DIAST BP <80 MM HG: CPT | Mod: CPTII,S$GLB,, | Performed by: PODIATRIST

## 2021-01-05 PROCEDURE — 3288F FALL RISK ASSESSMENT DOCD: CPT | Mod: CPTII,S$GLB,, | Performed by: PODIATRIST

## 2021-01-05 PROCEDURE — 1159F MED LIST DOCD IN RCRD: CPT | Mod: S$GLB,,, | Performed by: PODIATRIST

## 2021-01-05 PROCEDURE — 73630 X-RAY EXAM OF FOOT: CPT | Mod: 26,LT,, | Performed by: RADIOLOGY

## 2021-01-05 PROCEDURE — 3288F PR FALLS RISK ASSESSMENT DOCUMENTED: ICD-10-PCS | Mod: CPTII,S$GLB,, | Performed by: PODIATRIST

## 2021-01-05 PROCEDURE — 99999 PR PBB SHADOW E&M-EST. PATIENT-LVL IV: CPT | Mod: PBBFAC,,, | Performed by: PODIATRIST

## 2021-01-05 PROCEDURE — 3008F BODY MASS INDEX DOCD: CPT | Mod: CPTII,S$GLB,, | Performed by: PODIATRIST

## 2021-01-05 PROCEDURE — 73630 X-RAY EXAM OF FOOT: CPT | Mod: TC,FY,LT

## 2021-01-05 PROCEDURE — 3078F PR MOST RECENT DIASTOLIC BLOOD PRESSURE < 80 MM HG: ICD-10-PCS | Mod: CPTII,S$GLB,, | Performed by: PODIATRIST

## 2021-01-05 PROCEDURE — 73630 XR FOOT COMPLETE 3 VIEW LEFT: ICD-10-PCS | Mod: 26,LT,, | Performed by: RADIOLOGY

## 2021-01-05 PROCEDURE — 1126F PR PAIN SEVERITY QUANTIFIED, NO PAIN PRESENT: ICD-10-PCS | Mod: S$GLB,,, | Performed by: PODIATRIST

## 2021-01-05 PROCEDURE — 99213 PR OFFICE/OUTPT VISIT, EST, LEVL III, 20-29 MIN: ICD-10-PCS | Mod: S$GLB,,, | Performed by: PODIATRIST

## 2021-01-05 PROCEDURE — 3008F PR BODY MASS INDEX (BMI) DOCUMENTED: ICD-10-PCS | Mod: CPTII,S$GLB,, | Performed by: PODIATRIST

## 2021-02-09 ENCOUNTER — TELEPHONE (OUTPATIENT)
Dept: PODIATRY | Facility: CLINIC | Age: 71
End: 2021-02-09

## 2021-02-17 ENCOUNTER — TELEPHONE (OUTPATIENT)
Dept: FAMILY MEDICINE | Facility: CLINIC | Age: 71
End: 2021-02-17

## 2021-02-18 RX ORDER — METOPROLOL TARTRATE 25 MG/1
25 TABLET, FILM COATED ORAL 2 TIMES DAILY
Qty: 42 TABLET | Refills: 0 | Status: SHIPPED | OUTPATIENT
Start: 2021-02-18 | End: 2021-03-04

## 2021-04-29 ENCOUNTER — OFFICE VISIT (OUTPATIENT)
Dept: FAMILY MEDICINE | Facility: CLINIC | Age: 71
End: 2021-04-29
Payer: MEDICARE

## 2021-04-29 VITALS
BODY MASS INDEX: 25.61 KG/M2 | RESPIRATION RATE: 18 BRPM | DIASTOLIC BLOOD PRESSURE: 74 MMHG | HEIGHT: 64 IN | HEART RATE: 52 BPM | OXYGEN SATURATION: 95 % | SYSTOLIC BLOOD PRESSURE: 166 MMHG | WEIGHT: 150 LBS

## 2021-04-29 DIAGNOSIS — I25.10 CORONARY ARTERY DISEASE INVOLVING LEFT MAIN CORONARY ARTERY: ICD-10-CM

## 2021-04-29 DIAGNOSIS — E11.8 TYPE 2 DIABETES MELLITUS WITH COMPLICATION, WITHOUT LONG-TERM CURRENT USE OF INSULIN: ICD-10-CM

## 2021-04-29 DIAGNOSIS — Z76.0 MEDICATION REFILL: ICD-10-CM

## 2021-04-29 DIAGNOSIS — I10 ESSENTIAL HYPERTENSION: Primary | ICD-10-CM

## 2021-04-29 PROBLEM — R07.9 ACUTE CHEST PAIN: Status: RESOLVED | Noted: 2018-12-03 | Resolved: 2021-04-29

## 2021-04-29 PROCEDURE — 3078F DIAST BP <80 MM HG: CPT | Mod: CPTII,S$GLB,, | Performed by: FAMILY MEDICINE

## 2021-04-29 PROCEDURE — 1126F PR PAIN SEVERITY QUANTIFIED, NO PAIN PRESENT: ICD-10-PCS | Mod: S$GLB,,, | Performed by: FAMILY MEDICINE

## 2021-04-29 PROCEDURE — 99999 PR PBB SHADOW E&M-EST. PATIENT-LVL IV: CPT | Mod: PBBFAC,,, | Performed by: FAMILY MEDICINE

## 2021-04-29 PROCEDURE — 1100F PR PT FALLS ASSESS DOC 2+ FALLS/FALL W/INJURY/YR: ICD-10-PCS | Mod: CPTII,S$GLB,, | Performed by: FAMILY MEDICINE

## 2021-04-29 PROCEDURE — 3078F PR MOST RECENT DIASTOLIC BLOOD PRESSURE < 80 MM HG: ICD-10-PCS | Mod: CPTII,S$GLB,, | Performed by: FAMILY MEDICINE

## 2021-04-29 PROCEDURE — 99203 OFFICE O/P NEW LOW 30 MIN: CPT | Mod: S$GLB,,, | Performed by: FAMILY MEDICINE

## 2021-04-29 PROCEDURE — 1100F PTFALLS ASSESS-DOCD GE2>/YR: CPT | Mod: CPTII,S$GLB,, | Performed by: FAMILY MEDICINE

## 2021-04-29 PROCEDURE — 3008F BODY MASS INDEX DOCD: CPT | Mod: CPTII,S$GLB,, | Performed by: FAMILY MEDICINE

## 2021-04-29 PROCEDURE — 3288F FALL RISK ASSESSMENT DOCD: CPT | Mod: CPTII,S$GLB,, | Performed by: FAMILY MEDICINE

## 2021-04-29 PROCEDURE — 3077F SYST BP >= 140 MM HG: CPT | Mod: CPTII,S$GLB,, | Performed by: FAMILY MEDICINE

## 2021-04-29 PROCEDURE — 1159F MED LIST DOCD IN RCRD: CPT | Mod: S$GLB,,, | Performed by: FAMILY MEDICINE

## 2021-04-29 PROCEDURE — 99203 PR OFFICE/OUTPT VISIT, NEW, LEVL III, 30-44 MIN: ICD-10-PCS | Mod: S$GLB,,, | Performed by: FAMILY MEDICINE

## 2021-04-29 PROCEDURE — 1126F AMNT PAIN NOTED NONE PRSNT: CPT | Mod: S$GLB,,, | Performed by: FAMILY MEDICINE

## 2021-04-29 PROCEDURE — 3288F PR FALLS RISK ASSESSMENT DOCUMENTED: ICD-10-PCS | Mod: CPTII,S$GLB,, | Performed by: FAMILY MEDICINE

## 2021-04-29 PROCEDURE — 99999 PR PBB SHADOW E&M-EST. PATIENT-LVL IV: ICD-10-PCS | Mod: PBBFAC,,, | Performed by: FAMILY MEDICINE

## 2021-04-29 PROCEDURE — 3077F PR MOST RECENT SYSTOLIC BLOOD PRESSURE >= 140 MM HG: ICD-10-PCS | Mod: CPTII,S$GLB,, | Performed by: FAMILY MEDICINE

## 2021-04-29 PROCEDURE — 3008F PR BODY MASS INDEX (BMI) DOCUMENTED: ICD-10-PCS | Mod: CPTII,S$GLB,, | Performed by: FAMILY MEDICINE

## 2021-04-29 PROCEDURE — 1159F PR MEDICATION LIST DOCUMENTED IN MEDICAL RECORD: ICD-10-PCS | Mod: S$GLB,,, | Performed by: FAMILY MEDICINE

## 2021-04-29 RX ORDER — METOPROLOL TARTRATE 25 MG/1
25 TABLET, FILM COATED ORAL 2 TIMES DAILY
Qty: 180 TABLET | Refills: 1 | Status: SHIPPED | OUTPATIENT
Start: 2021-04-29 | End: 2021-07-29 | Stop reason: SDUPTHER

## 2021-04-29 RX ORDER — METFORMIN HYDROCHLORIDE 1000 MG/1
1000 TABLET ORAL 2 TIMES DAILY WITH MEALS
Qty: 180 TABLET | Refills: 1 | Status: SHIPPED | OUTPATIENT
Start: 2021-04-29 | End: 2021-07-29 | Stop reason: SDUPTHER

## 2021-04-29 RX ORDER — GLIMEPIRIDE 4 MG/1
4 TABLET ORAL DAILY
Qty: 90 TABLET | Refills: 1 | Status: SHIPPED | OUTPATIENT
Start: 2021-04-29 | End: 2021-07-29 | Stop reason: SDUPTHER

## 2021-04-29 RX ORDER — AMOXICILLIN 500 MG
1 CAPSULE ORAL DAILY
COMMUNITY
End: 2022-03-30

## 2021-04-29 RX ORDER — TIZANIDINE HYDROCHLORIDE 2 MG/1
CAPSULE, GELATIN COATED ORAL DAILY
COMMUNITY
End: 2021-06-15

## 2021-04-29 RX ORDER — PANTOPRAZOLE SODIUM 40 MG/1
40 TABLET, DELAYED RELEASE ORAL DAILY
Qty: 90 TABLET | Refills: 1 | Status: SHIPPED | OUTPATIENT
Start: 2021-04-29 | End: 2021-07-29 | Stop reason: SDUPTHER

## 2021-04-29 RX ORDER — DIAZEPAM 5 MG/1
5 TABLET ORAL DAILY PRN
Qty: 30 TABLET | Refills: 0 | Status: SHIPPED | OUTPATIENT
Start: 2021-04-29 | End: 2021-07-29 | Stop reason: SDUPTHER

## 2021-04-29 RX ORDER — ROSUVASTATIN CALCIUM 40 MG/1
40 TABLET, COATED ORAL DAILY
Qty: 90 TABLET | Refills: 1 | Status: SHIPPED | OUTPATIENT
Start: 2021-04-29 | End: 2021-07-29 | Stop reason: SDUPTHER

## 2021-04-29 RX ORDER — ASPIRIN 81 MG/1
81 TABLET ORAL DAILY
COMMUNITY
End: 2022-03-30

## 2021-04-29 RX ORDER — LOSARTAN POTASSIUM 50 MG/1
50 TABLET ORAL DAILY
Qty: 90 TABLET | Refills: 1 | Status: SHIPPED | OUTPATIENT
Start: 2021-04-29 | End: 2021-06-08 | Stop reason: SDUPTHER

## 2021-04-29 RX ORDER — CITALOPRAM 40 MG/1
40 TABLET, FILM COATED ORAL DAILY
Qty: 90 TABLET | Refills: 1 | Status: SHIPPED | OUTPATIENT
Start: 2021-04-29 | End: 2021-07-29 | Stop reason: SDUPTHER

## 2021-05-03 ENCOUNTER — PATIENT OUTREACH (OUTPATIENT)
Dept: ADMINISTRATIVE | Facility: HOSPITAL | Age: 71
End: 2021-05-03

## 2021-05-03 DIAGNOSIS — Z11.59 NEED FOR HEPATITIS C SCREENING TEST: Primary | ICD-10-CM

## 2021-05-04 DIAGNOSIS — E11.9 TYPE 2 DIABETES MELLITUS WITHOUT COMPLICATION: ICD-10-CM

## 2021-05-04 DIAGNOSIS — Z12.11 COLON CANCER SCREENING: ICD-10-CM

## 2021-05-04 DIAGNOSIS — Z12.31 OTHER SCREENING MAMMOGRAM: ICD-10-CM

## 2021-05-13 DIAGNOSIS — E11.9 TYPE 2 DIABETES MELLITUS WITHOUT COMPLICATION, UNSPECIFIED WHETHER LONG TERM INSULIN USE: ICD-10-CM

## 2021-05-21 ENCOUNTER — LAB VISIT (OUTPATIENT)
Dept: LAB | Facility: HOSPITAL | Age: 71
End: 2021-05-21
Attending: FAMILY MEDICINE
Payer: MEDICARE

## 2021-05-21 DIAGNOSIS — E11.8 TYPE 2 DIABETES MELLITUS WITH COMPLICATION, WITHOUT LONG-TERM CURRENT USE OF INSULIN: ICD-10-CM

## 2021-05-21 DIAGNOSIS — Z11.59 NEED FOR HEPATITIS C SCREENING TEST: ICD-10-CM

## 2021-05-21 DIAGNOSIS — I10 ESSENTIAL HYPERTENSION: ICD-10-CM

## 2021-05-21 LAB
ALBUMIN SERPL BCP-MCNC: 3.8 G/DL (ref 3.5–5.2)
ALP SERPL-CCNC: 56 U/L (ref 55–135)
ALT SERPL W/O P-5'-P-CCNC: 28 U/L (ref 10–44)
ANION GAP SERPL CALC-SCNC: 10 MMOL/L (ref 8–16)
AST SERPL-CCNC: 33 U/L (ref 10–40)
BASOPHILS # BLD AUTO: 0.03 K/UL (ref 0–0.2)
BASOPHILS NFR BLD: 0.5 % (ref 0–1.9)
BILIRUB SERPL-MCNC: 0.4 MG/DL (ref 0.1–1)
BUN SERPL-MCNC: 21 MG/DL (ref 8–23)
CALCIUM SERPL-MCNC: 9 MG/DL (ref 8.7–10.5)
CHLORIDE SERPL-SCNC: 108 MMOL/L (ref 95–110)
CHOLEST SERPL-MCNC: 137 MG/DL (ref 120–199)
CHOLEST/HDLC SERPL: 3.8 {RATIO} (ref 2–5)
CO2 SERPL-SCNC: 22 MMOL/L (ref 23–29)
CREAT SERPL-MCNC: 1.6 MG/DL (ref 0.5–1.4)
DIFFERENTIAL METHOD: ABNORMAL
EOSINOPHIL # BLD AUTO: 0.2 K/UL (ref 0–0.5)
EOSINOPHIL NFR BLD: 2.8 % (ref 0–8)
ERYTHROCYTE [DISTWIDTH] IN BLOOD BY AUTOMATED COUNT: 12.8 % (ref 11.5–14.5)
EST. GFR  (AFRICAN AMERICAN): 36.8 ML/MIN/1.73 M^2
EST. GFR  (NON AFRICAN AMERICAN): 32 ML/MIN/1.73 M^2
ESTIMATED AVG GLUCOSE: 177 MG/DL (ref 68–131)
GLUCOSE SERPL-MCNC: 162 MG/DL (ref 70–110)
HBA1C MFR BLD: 7.8 % (ref 4–5.6)
HCT VFR BLD AUTO: 36.3 % (ref 37–48.5)
HCV AB SERPL QL IA: NEGATIVE
HDLC SERPL-MCNC: 36 MG/DL (ref 40–75)
HDLC SERPL: 26.3 % (ref 20–50)
HGB BLD-MCNC: 11.8 G/DL (ref 12–16)
IMM GRANULOCYTES # BLD AUTO: 0.05 K/UL (ref 0–0.04)
IMM GRANULOCYTES NFR BLD AUTO: 0.8 % (ref 0–0.5)
LDLC SERPL CALC-MCNC: 60.4 MG/DL (ref 63–159)
LYMPHOCYTES # BLD AUTO: 1.7 K/UL (ref 1–4.8)
LYMPHOCYTES NFR BLD: 26.3 % (ref 18–48)
MCH RBC QN AUTO: 32.2 PG (ref 27–31)
MCHC RBC AUTO-ENTMCNC: 32.5 G/DL (ref 32–36)
MCV RBC AUTO: 99 FL (ref 82–98)
MONOCYTES # BLD AUTO: 0.7 K/UL (ref 0.3–1)
MONOCYTES NFR BLD: 10.2 % (ref 4–15)
NEUTROPHILS # BLD AUTO: 3.9 K/UL (ref 1.8–7.7)
NEUTROPHILS NFR BLD: 59.4 % (ref 38–73)
NONHDLC SERPL-MCNC: 101 MG/DL
NRBC BLD-RTO: 0 /100 WBC
PLATELET # BLD AUTO: 220 K/UL (ref 150–450)
PMV BLD AUTO: 9.3 FL (ref 9.2–12.9)
POTASSIUM SERPL-SCNC: 5.2 MMOL/L (ref 3.5–5.1)
PROT SERPL-MCNC: 6.6 G/DL (ref 6–8.4)
RBC # BLD AUTO: 3.66 M/UL (ref 4–5.4)
SODIUM SERPL-SCNC: 140 MMOL/L (ref 136–145)
T4 FREE SERPL-MCNC: 0.87 NG/DL (ref 0.71–1.51)
TRIGL SERPL-MCNC: 203 MG/DL (ref 30–150)
TSH SERPL DL<=0.005 MIU/L-ACNC: 5.37 UIU/ML (ref 0.4–4)
WBC # BLD AUTO: 6.5 K/UL (ref 3.9–12.7)

## 2021-05-21 PROCEDURE — 84439 ASSAY OF FREE THYROXINE: CPT | Performed by: FAMILY MEDICINE

## 2021-05-21 PROCEDURE — 84443 ASSAY THYROID STIM HORMONE: CPT | Performed by: FAMILY MEDICINE

## 2021-05-21 PROCEDURE — 85025 COMPLETE CBC W/AUTO DIFF WBC: CPT | Performed by: FAMILY MEDICINE

## 2021-05-21 PROCEDURE — 83036 HEMOGLOBIN GLYCOSYLATED A1C: CPT | Performed by: FAMILY MEDICINE

## 2021-05-21 PROCEDURE — 80053 COMPREHEN METABOLIC PANEL: CPT | Performed by: FAMILY MEDICINE

## 2021-05-21 PROCEDURE — 86803 HEPATITIS C AB TEST: CPT | Performed by: FAMILY MEDICINE

## 2021-05-21 PROCEDURE — 36415 COLL VENOUS BLD VENIPUNCTURE: CPT | Performed by: FAMILY MEDICINE

## 2021-05-21 PROCEDURE — 80061 LIPID PANEL: CPT | Performed by: FAMILY MEDICINE

## 2021-06-08 ENCOUNTER — CLINICAL SUPPORT (OUTPATIENT)
Dept: FAMILY MEDICINE | Facility: CLINIC | Age: 71
End: 2021-06-08
Attending: FAMILY MEDICINE
Payer: MEDICARE

## 2021-06-08 ENCOUNTER — OFFICE VISIT (OUTPATIENT)
Dept: FAMILY MEDICINE | Facility: CLINIC | Age: 71
End: 2021-06-08
Payer: MEDICARE

## 2021-06-08 VITALS
RESPIRATION RATE: 17 BRPM | WEIGHT: 152.88 LBS | BODY MASS INDEX: 26.1 KG/M2 | HEIGHT: 64 IN | SYSTOLIC BLOOD PRESSURE: 189 MMHG | DIASTOLIC BLOOD PRESSURE: 82 MMHG | HEART RATE: 57 BPM | OXYGEN SATURATION: 97 %

## 2021-06-08 DIAGNOSIS — R79.89 ELEVATED TSH: ICD-10-CM

## 2021-06-08 DIAGNOSIS — I10 ESSENTIAL HYPERTENSION: Primary | ICD-10-CM

## 2021-06-08 DIAGNOSIS — Z79.899 ENCOUNTER FOR LONG-TERM CURRENT USE OF MEDICATION: ICD-10-CM

## 2021-06-08 DIAGNOSIS — E11.9 TYPE 2 DIABETES MELLITUS WITHOUT COMPLICATION, UNSPECIFIED WHETHER LONG TERM INSULIN USE: ICD-10-CM

## 2021-06-08 DIAGNOSIS — E11.8 TYPE 2 DIABETES MELLITUS WITH COMPLICATION, WITHOUT LONG-TERM CURRENT USE OF INSULIN: ICD-10-CM

## 2021-06-08 DIAGNOSIS — D64.9 ANEMIA, UNSPECIFIED TYPE: ICD-10-CM

## 2021-06-08 DIAGNOSIS — R94.4 DECREASED GFR: ICD-10-CM

## 2021-06-08 DIAGNOSIS — I73.9 CLAUDICATION OF RIGHT LOWER EXTREMITY: ICD-10-CM

## 2021-06-08 PROCEDURE — 1100F PTFALLS ASSESS-DOCD GE2>/YR: CPT | Mod: CPTII,S$GLB,, | Performed by: FAMILY MEDICINE

## 2021-06-08 PROCEDURE — 80307 DRUG TEST PRSMV CHEM ANLYZR: CPT | Performed by: FAMILY MEDICINE

## 2021-06-08 PROCEDURE — 3079F DIAST BP 80-89 MM HG: CPT | Mod: CPTII,S$GLB,, | Performed by: FAMILY MEDICINE

## 2021-06-08 PROCEDURE — 92228 DIABETIC EYE SCREENING PHOTO: ICD-10-PCS | Mod: 26,S$GLB,, | Performed by: OPTOMETRIST

## 2021-06-08 PROCEDURE — 1159F PR MEDICATION LIST DOCUMENTED IN MEDICAL RECORD: ICD-10-PCS | Mod: S$GLB,,, | Performed by: FAMILY MEDICINE

## 2021-06-08 PROCEDURE — 3077F SYST BP >= 140 MM HG: CPT | Mod: CPTII,S$GLB,, | Performed by: FAMILY MEDICINE

## 2021-06-08 PROCEDURE — 82570 ASSAY OF URINE CREATININE: CPT | Mod: 59 | Performed by: FAMILY MEDICINE

## 2021-06-08 PROCEDURE — 92228 IMG RTA DETC/MNTR DS PHY/QHP: CPT | Mod: 26,S$GLB,, | Performed by: OPTOMETRIST

## 2021-06-08 PROCEDURE — 3051F HG A1C>EQUAL 7.0%<8.0%: CPT | Mod: CPTII,S$GLB,, | Performed by: FAMILY MEDICINE

## 2021-06-08 PROCEDURE — 99214 PR OFFICE/OUTPT VISIT, EST, LEVL IV, 30-39 MIN: ICD-10-PCS | Mod: S$GLB,,, | Performed by: FAMILY MEDICINE

## 2021-06-08 PROCEDURE — 3051F PR MOST RECENT HEMOGLOBIN A1C LEVEL 7.0 - < 8.0%: ICD-10-PCS | Mod: CPTII,S$GLB,, | Performed by: FAMILY MEDICINE

## 2021-06-08 PROCEDURE — 1126F AMNT PAIN NOTED NONE PRSNT: CPT | Mod: S$GLB,,, | Performed by: FAMILY MEDICINE

## 2021-06-08 PROCEDURE — 82043 UR ALBUMIN QUANTITATIVE: CPT | Performed by: FAMILY MEDICINE

## 2021-06-08 PROCEDURE — 3008F PR BODY MASS INDEX (BMI) DOCUMENTED: ICD-10-PCS | Mod: CPTII,S$GLB,, | Performed by: FAMILY MEDICINE

## 2021-06-08 PROCEDURE — 3079F PR MOST RECENT DIASTOLIC BLOOD PRESSURE 80-89 MM HG: ICD-10-PCS | Mod: CPTII,S$GLB,, | Performed by: FAMILY MEDICINE

## 2021-06-08 PROCEDURE — 3077F PR MOST RECENT SYSTOLIC BLOOD PRESSURE >= 140 MM HG: ICD-10-PCS | Mod: CPTII,S$GLB,, | Performed by: FAMILY MEDICINE

## 2021-06-08 PROCEDURE — 99999 PR PBB SHADOW E&M-EST. PATIENT-LVL V: ICD-10-PCS | Mod: PBBFAC,,, | Performed by: FAMILY MEDICINE

## 2021-06-08 PROCEDURE — 92228 IMG RTA DETC/MNTR DS PHY/QHP: CPT | Mod: TC,S$GLB,, | Performed by: FAMILY MEDICINE

## 2021-06-08 PROCEDURE — 1159F MED LIST DOCD IN RCRD: CPT | Mod: S$GLB,,, | Performed by: FAMILY MEDICINE

## 2021-06-08 PROCEDURE — 3288F FALL RISK ASSESSMENT DOCD: CPT | Mod: CPTII,S$GLB,, | Performed by: FAMILY MEDICINE

## 2021-06-08 PROCEDURE — 99214 OFFICE O/P EST MOD 30 MIN: CPT | Mod: S$GLB,,, | Performed by: FAMILY MEDICINE

## 2021-06-08 PROCEDURE — 92228 DIABETIC EYE SCREENING PHOTO: ICD-10-PCS | Mod: TC,S$GLB,, | Performed by: FAMILY MEDICINE

## 2021-06-08 PROCEDURE — 3008F BODY MASS INDEX DOCD: CPT | Mod: CPTII,S$GLB,, | Performed by: FAMILY MEDICINE

## 2021-06-08 PROCEDURE — 99999 PR PBB SHADOW E&M-EST. PATIENT-LVL V: CPT | Mod: PBBFAC,,, | Performed by: FAMILY MEDICINE

## 2021-06-08 PROCEDURE — 1100F PR PT FALLS ASSESS DOC 2+ FALLS/FALL W/INJURY/YR: ICD-10-PCS | Mod: CPTII,S$GLB,, | Performed by: FAMILY MEDICINE

## 2021-06-08 PROCEDURE — 3288F PR FALLS RISK ASSESSMENT DOCUMENTED: ICD-10-PCS | Mod: CPTII,S$GLB,, | Performed by: FAMILY MEDICINE

## 2021-06-08 PROCEDURE — 1126F PR PAIN SEVERITY QUANTIFIED, NO PAIN PRESENT: ICD-10-PCS | Mod: S$GLB,,, | Performed by: FAMILY MEDICINE

## 2021-06-08 RX ORDER — LOSARTAN POTASSIUM 100 MG/1
100 TABLET ORAL DAILY
Qty: 90 TABLET | Refills: 1 | Status: SHIPPED | OUTPATIENT
Start: 2021-06-08 | End: 2021-07-12

## 2021-06-10 LAB
ALBUMIN/CREAT UR: 129.6 UG/MG (ref 0–30)
CREAT UR-MCNC: 27 MG/DL (ref 15–325)
MICROALBUMIN UR DL<=1MG/L-MCNC: 35 UG/ML

## 2021-06-11 ENCOUNTER — TELEPHONE (OUTPATIENT)
Dept: FAMILY MEDICINE | Facility: CLINIC | Age: 71
End: 2021-06-11

## 2021-06-14 ENCOUNTER — PATIENT OUTREACH (OUTPATIENT)
Dept: ADMINISTRATIVE | Facility: OTHER | Age: 71
End: 2021-06-14

## 2021-06-14 LAB
6MAM UR QL: NOT DETECTED
7AMINOCLONAZEPAM UR QL: NOT DETECTED
A-OH ALPRAZ UR QL: NOT DETECTED
ALPHA-OH-MIDAZOLAM: NOT DETECTED
ALPRAZ UR QL: NOT DETECTED
AMPHET UR QL SCN: NOT DETECTED
ANNOTATION COMMENT IMP: NORMAL
ANNOTATION COMMENT IMP: NORMAL
BARBITURATES UR QL: NOT DETECTED
BUPRENORPHINE UR QL: NOT DETECTED
BZE UR QL: NOT DETECTED
CARBOXYTHC UR QL: NOT DETECTED
CARISOPRODOL UR QL: NOT DETECTED
CLONAZEPAM UR QL: NOT DETECTED
CODEINE UR QL: NOT DETECTED
CREAT UR-MCNC: 27.3 MG/DL (ref 20–400)
DIAZEPAM UR QL: NOT DETECTED
ETHYL GLUCURONIDE UR QL: NOT DETECTED
FENTANYL UR QL: NOT DETECTED
GABAPENTIN: NOT DETECTED
HYDROCODONE UR QL: NOT DETECTED
HYDROMORPHONE UR QL: NOT DETECTED
LORAZEPAM UR QL: NOT DETECTED
MDA UR QL: NOT DETECTED
MDEA UR QL: NOT DETECTED
MDMA UR QL: NOT DETECTED
ME-PHENIDATE UR QL: NOT DETECTED
METHADONE UR QL: NOT DETECTED
METHAMPHET UR QL: NOT DETECTED
MIDAZOLAM UR QL SCN: NOT DETECTED
MORPHINE UR QL: NOT DETECTED
NALOXONE: NOT DETECTED
NORBUPRENORPHINE UR QL CFM: NOT DETECTED
NORDIAZEPAM UR QL: PRESENT
NORFENTANYL UR QL: NOT DETECTED
NORHYDROCODONE UR QL CFM: NOT DETECTED
NORMEPERIDINE UR QL CFM: NOT DETECTED
NOROXYCODONE UR QL CFM: NOT DETECTED
NOROXYMORPHONE UR QL SCN: NOT DETECTED
OXAZEPAM UR QL: PRESENT
OXYCODONE UR QL: NOT DETECTED
OXYMORPHONE UR QL: NOT DETECTED
PATHOLOGY STUDY: NORMAL
PCP UR QL: NOT DETECTED
PHENTERMINE UR QL: NOT DETECTED
PREGABALIN: NOT DETECTED
SERVICE CMNT-IMP: NORMAL
TAPENTADOL UR QL SCN: NOT DETECTED
TAPENTADOL-O-SULF: NOT DETECTED
TEMAZEPAM UR QL: PRESENT
TRAMADOL UR QL: NOT DETECTED
ZOLPIDEM METABOLITE: NOT DETECTED
ZOLPIDEM UR QL: NOT DETECTED

## 2021-06-15 ENCOUNTER — OFFICE VISIT (OUTPATIENT)
Dept: CARDIOLOGY | Facility: CLINIC | Age: 71
End: 2021-06-15
Payer: MEDICARE

## 2021-06-15 ENCOUNTER — LAB VISIT (OUTPATIENT)
Dept: LAB | Facility: HOSPITAL | Age: 71
End: 2021-06-15
Attending: FAMILY MEDICINE
Payer: MEDICARE

## 2021-06-15 VITALS
OXYGEN SATURATION: 98 % | HEART RATE: 57 BPM | RESPIRATION RATE: 17 BRPM | WEIGHT: 152.19 LBS | DIASTOLIC BLOOD PRESSURE: 65 MMHG | HEIGHT: 65 IN | BODY MASS INDEX: 25.36 KG/M2 | TEMPERATURE: 98 F | SYSTOLIC BLOOD PRESSURE: 148 MMHG

## 2021-06-15 DIAGNOSIS — R06.09 DOE (DYSPNEA ON EXERTION): ICD-10-CM

## 2021-06-15 DIAGNOSIS — R79.89 ELEVATED TSH: ICD-10-CM

## 2021-06-15 DIAGNOSIS — T50.905A BRADYCARDIA, DRUG INDUCED: ICD-10-CM

## 2021-06-15 DIAGNOSIS — G47.9 SLEEP DISORDER: ICD-10-CM

## 2021-06-15 DIAGNOSIS — R80.9 MICROALBUMINURIA: ICD-10-CM

## 2021-06-15 DIAGNOSIS — Z79.899 ENCOUNTER FOR LONG-TERM CURRENT USE OF MEDICATION: ICD-10-CM

## 2021-06-15 DIAGNOSIS — E87.5 HYPERKALEMIA: ICD-10-CM

## 2021-06-15 DIAGNOSIS — E65 ABDOMINAL OBESITY: ICD-10-CM

## 2021-06-15 DIAGNOSIS — D64.9 ANEMIA, UNSPECIFIED TYPE: ICD-10-CM

## 2021-06-15 DIAGNOSIS — F17.298 OTHER TOBACCO PRODUCT NICOTINE DEPENDENCE WITH OTHER NICOTINE-INDUCED DISORDER: ICD-10-CM

## 2021-06-15 DIAGNOSIS — I10 ESSENTIAL HYPERTENSION: ICD-10-CM

## 2021-06-15 DIAGNOSIS — F09 COGNITIVE DYSFUNCTION: ICD-10-CM

## 2021-06-15 DIAGNOSIS — E11.8 TYPE 2 DIABETES MELLITUS WITH COMPLICATION, WITHOUT LONG-TERM CURRENT USE OF INSULIN: ICD-10-CM

## 2021-06-15 DIAGNOSIS — R94.4 DECREASED GFR: ICD-10-CM

## 2021-06-15 DIAGNOSIS — E78.1 HYPERTRIGLYCERIDEMIA: ICD-10-CM

## 2021-06-15 DIAGNOSIS — N18.32 STAGE 3B CHRONIC KIDNEY DISEASE: ICD-10-CM

## 2021-06-15 DIAGNOSIS — J44.9 CHRONIC OBSTRUCTIVE PULMONARY DISEASE, UNSPECIFIED COPD TYPE: ICD-10-CM

## 2021-06-15 DIAGNOSIS — Z78.9 EXCESSIVE CAFFEINE INTAKE: ICD-10-CM

## 2021-06-15 DIAGNOSIS — I70.0 AORTIC CALCIFICATION: ICD-10-CM

## 2021-06-15 DIAGNOSIS — R00.1 BRADYCARDIA, DRUG INDUCED: ICD-10-CM

## 2021-06-15 DIAGNOSIS — Z95.1 S/P CABG X 1: Primary | ICD-10-CM

## 2021-06-15 PROBLEM — F17.200 NICOTINE ADDICTION: Status: ACTIVE | Noted: 2021-06-15

## 2021-06-15 LAB
ANION GAP SERPL CALC-SCNC: 13 MMOL/L (ref 8–16)
BUN SERPL-MCNC: 20 MG/DL (ref 8–23)
CALCIUM SERPL-MCNC: 9.6 MG/DL (ref 8.7–10.5)
CHLORIDE SERPL-SCNC: 103 MMOL/L (ref 95–110)
CO2 SERPL-SCNC: 22 MMOL/L (ref 23–29)
CREAT SERPL-MCNC: 1.5 MG/DL (ref 0.5–1.4)
EST. GFR  (AFRICAN AMERICAN): 39.8 ML/MIN/1.73 M^2
EST. GFR  (NON AFRICAN AMERICAN): 34.6 ML/MIN/1.73 M^2
FOLATE SERPL-MCNC: 16 NG/ML (ref 4–24)
GLUCOSE SERPL-MCNC: 176 MG/DL (ref 70–110)
IRON SERPL-MCNC: 87 UG/DL (ref 30–160)
POTASSIUM SERPL-SCNC: 5 MMOL/L (ref 3.5–5.1)
SATURATED IRON: 22 % (ref 20–50)
SODIUM SERPL-SCNC: 138 MMOL/L (ref 136–145)
T4 FREE SERPL-MCNC: 0.84 NG/DL (ref 0.71–1.51)
THYROGLOB AB SERPL IA-ACNC: <4 IU/ML (ref 0–3.9)
THYROPEROXIDASE IGG SERPL-ACNC: <6 IU/ML
TOTAL IRON BINDING CAPACITY: 397 UG/DL (ref 250–450)
TRANSFERRIN SERPL-MCNC: 268 MG/DL (ref 200–375)
TSH SERPL DL<=0.005 MIU/L-ACNC: 4.95 UIU/ML (ref 0.4–4)
VIT B12 SERPL-MCNC: 1941 PG/ML (ref 210–950)

## 2021-06-15 PROCEDURE — 1126F PR PAIN SEVERITY QUANTIFIED, NO PAIN PRESENT: ICD-10-PCS | Mod: S$GLB,,, | Performed by: INTERNAL MEDICINE

## 2021-06-15 PROCEDURE — 84439 ASSAY OF FREE THYROXINE: CPT | Performed by: FAMILY MEDICINE

## 2021-06-15 PROCEDURE — 3051F HG A1C>EQUAL 7.0%<8.0%: CPT | Mod: CPTII,S$GLB,, | Performed by: INTERNAL MEDICINE

## 2021-06-15 PROCEDURE — 99999 PR PBB SHADOW E&M-EST. PATIENT-LVL V: ICD-10-PCS | Mod: PBBFAC,,, | Performed by: INTERNAL MEDICINE

## 2021-06-15 PROCEDURE — 3008F BODY MASS INDEX DOCD: CPT | Mod: CPTII,S$GLB,, | Performed by: INTERNAL MEDICINE

## 2021-06-15 PROCEDURE — 93000 ELECTROCARDIOGRAM COMPLETE: CPT | Mod: S$GLB,,, | Performed by: INTERNAL MEDICINE

## 2021-06-15 PROCEDURE — 1100F PTFALLS ASSESS-DOCD GE2>/YR: CPT | Mod: CPTII,S$GLB,, | Performed by: INTERNAL MEDICINE

## 2021-06-15 PROCEDURE — 82746 ASSAY OF FOLIC ACID SERUM: CPT | Performed by: FAMILY MEDICINE

## 2021-06-15 PROCEDURE — 99999 PR PBB SHADOW E&M-EST. PATIENT-LVL V: CPT | Mod: PBBFAC,,, | Performed by: INTERNAL MEDICINE

## 2021-06-15 PROCEDURE — 3008F PR BODY MASS INDEX (BMI) DOCUMENTED: ICD-10-PCS | Mod: CPTII,S$GLB,, | Performed by: INTERNAL MEDICINE

## 2021-06-15 PROCEDURE — 3288F PR FALLS RISK ASSESSMENT DOCUMENTED: ICD-10-PCS | Mod: CPTII,S$GLB,, | Performed by: INTERNAL MEDICINE

## 2021-06-15 PROCEDURE — 84443 ASSAY THYROID STIM HORMONE: CPT | Performed by: FAMILY MEDICINE

## 2021-06-15 PROCEDURE — 99205 PR OFFICE/OUTPT VISIT, NEW, LEVL V, 60-74 MIN: ICD-10-PCS | Mod: S$GLB,,, | Performed by: INTERNAL MEDICINE

## 2021-06-15 PROCEDURE — 86800 THYROGLOBULIN ANTIBODY: CPT | Performed by: FAMILY MEDICINE

## 2021-06-15 PROCEDURE — 1159F MED LIST DOCD IN RCRD: CPT | Mod: S$GLB,,, | Performed by: INTERNAL MEDICINE

## 2021-06-15 PROCEDURE — 83540 ASSAY OF IRON: CPT | Performed by: FAMILY MEDICINE

## 2021-06-15 PROCEDURE — 3288F FALL RISK ASSESSMENT DOCD: CPT | Mod: CPTII,S$GLB,, | Performed by: INTERNAL MEDICINE

## 2021-06-15 PROCEDURE — 99205 OFFICE O/P NEW HI 60 MIN: CPT | Mod: S$GLB,,, | Performed by: INTERNAL MEDICINE

## 2021-06-15 PROCEDURE — 86376 MICROSOMAL ANTIBODY EACH: CPT | Performed by: FAMILY MEDICINE

## 2021-06-15 PROCEDURE — 1126F AMNT PAIN NOTED NONE PRSNT: CPT | Mod: S$GLB,,, | Performed by: INTERNAL MEDICINE

## 2021-06-15 PROCEDURE — 1100F PR PT FALLS ASSESS DOC 2+ FALLS/FALL W/INJURY/YR: ICD-10-PCS | Mod: CPTII,S$GLB,, | Performed by: INTERNAL MEDICINE

## 2021-06-15 PROCEDURE — 1159F PR MEDICATION LIST DOCUMENTED IN MEDICAL RECORD: ICD-10-PCS | Mod: S$GLB,,, | Performed by: INTERNAL MEDICINE

## 2021-06-15 PROCEDURE — 3051F PR MOST RECENT HEMOGLOBIN A1C LEVEL 7.0 - < 8.0%: ICD-10-PCS | Mod: CPTII,S$GLB,, | Performed by: INTERNAL MEDICINE

## 2021-06-15 PROCEDURE — 36415 COLL VENOUS BLD VENIPUNCTURE: CPT | Performed by: FAMILY MEDICINE

## 2021-06-15 PROCEDURE — 93000 EKG 12-LEAD: ICD-10-PCS | Mod: S$GLB,,, | Performed by: INTERNAL MEDICINE

## 2021-06-15 PROCEDURE — 80048 BASIC METABOLIC PNL TOTAL CA: CPT | Performed by: FAMILY MEDICINE

## 2021-06-15 PROCEDURE — 82607 VITAMIN B-12: CPT | Performed by: FAMILY MEDICINE

## 2021-06-17 ENCOUNTER — TELEPHONE (OUTPATIENT)
Dept: FAMILY MEDICINE | Facility: CLINIC | Age: 71
End: 2021-06-17

## 2021-06-17 DIAGNOSIS — I73.9 CLAUDICATION OF BOTH LOWER EXTREMITIES: Primary | ICD-10-CM

## 2021-06-21 ENCOUNTER — TELEPHONE (OUTPATIENT)
Dept: FAMILY MEDICINE | Facility: CLINIC | Age: 71
End: 2021-06-21

## 2021-06-22 ENCOUNTER — HOSPITAL ENCOUNTER (OUTPATIENT)
Dept: RADIOLOGY | Facility: HOSPITAL | Age: 71
Discharge: HOME | End: 2021-06-22
Attending: FAMILY MEDICINE
Payer: MEDICARE

## 2021-06-22 ENCOUNTER — HOSPITAL ENCOUNTER (OUTPATIENT)
Dept: CARDIOLOGY | Facility: HOSPITAL | Age: 71
Discharge: HOME | End: 2021-06-22
Attending: INTERNAL MEDICINE
Payer: MEDICARE

## 2021-06-22 VITALS — BODY MASS INDEX: 25.33 KG/M2 | WEIGHT: 152 LBS | HEIGHT: 65 IN

## 2021-06-22 DIAGNOSIS — E11.8 TYPE 2 DIABETES MELLITUS WITH COMPLICATION, WITHOUT LONG-TERM CURRENT USE OF INSULIN: ICD-10-CM

## 2021-06-22 DIAGNOSIS — I10 ESSENTIAL HYPERTENSION: ICD-10-CM

## 2021-06-22 DIAGNOSIS — R06.09 DOE (DYSPNEA ON EXERTION): ICD-10-CM

## 2021-06-22 DIAGNOSIS — Z95.1 S/P CABG X 1: ICD-10-CM

## 2021-06-22 DIAGNOSIS — I73.9 CLAUDICATION IN PERIPHERAL VASCULAR DISEASE: ICD-10-CM

## 2021-06-22 DIAGNOSIS — I73.9 CLAUDICATION OF BOTH LOWER EXTREMITIES: ICD-10-CM

## 2021-06-22 DIAGNOSIS — I73.9 PERIPHERAL VASCULAR DISEASE: Primary | ICD-10-CM

## 2021-06-22 DIAGNOSIS — J44.9 CHRONIC OBSTRUCTIVE PULMONARY DISEASE, UNSPECIFIED COPD TYPE: ICD-10-CM

## 2021-06-22 LAB
AORTIC ROOT ANNULUS: 2.72 CM
AORTIC VALVE CUSP SEPERATION: 1.97 CM
AV INDEX (PROSTH): 0.59
AV MEAN GRADIENT: 5 MMHG
AV PEAK GRADIENT: 9 MMHG
AV VALVE AREA: 2.06 CM2
AV VELOCITY RATIO: 0.55
BSA FOR ECHO PROCEDURE: 1.78 M2
CV ECHO LV RWT: 0.44 CM
DOP CALC AO PEAK VEL: 1.47 M/S
DOP CALC AO VTI: 41.36 CM
DOP CALC LVOT AREA: 3.5 CM2
DOP CALC LVOT DIAMETER: 2.11 CM
DOP CALC LVOT PEAK VEL: 0.81 M/S
DOP CALC LVOT STROKE VOLUME: 85.17 CM3
DOP CALCLVOT PEAK VEL VTI: 24.37 CM
E WAVE DECELERATION TIME: 306.78 MSEC
E/A RATIO: 0.93
E/E' RATIO: 13.71 M/S
ECHO LV POSTERIOR WALL: 0.98 CM (ref 0.6–1.1)
EJECTION FRACTION: 64 %
FRACTIONAL SHORTENING: 35 % (ref 28–44)
INTERVENTRICULAR SEPTUM: 1.29 CM (ref 0.6–1.1)
LA MAJOR: 4.23 CM
LA MINOR: 3.4 CM
LEFT ATRIUM SIZE: 3.88 CM
LEFT ATRIUM VOLUME INDEX MOD: 10.8 ML/M2
LEFT ATRIUM VOLUME MOD: 18.94 CM3
LEFT INTERNAL DIMENSION IN SYSTOLE: 2.91 CM (ref 2.1–4)
LEFT VENTRICLE DIASTOLIC VOLUME INDEX: 51.08 ML/M2
LEFT VENTRICLE DIASTOLIC VOLUME: 89.9 ML
LEFT VENTRICLE MASS INDEX: 102 G/M2
LEFT VENTRICLE SYSTOLIC VOLUME INDEX: 18.5 ML/M2
LEFT VENTRICLE SYSTOLIC VOLUME: 32.53 ML
LEFT VENTRICULAR INTERNAL DIMENSION IN DIASTOLE: 4.45 CM (ref 3.5–6)
LEFT VENTRICULAR MASS: 179.77 G
LV LATERAL E/E' RATIO: 12 M/S
LV SEPTAL E/E' RATIO: 16 M/S
MV PEAK A VEL: 1.03 M/S
MV PEAK E VEL: 0.96 M/S
MV STENOSIS PRESSURE HALF TIME: 88.97 MS
MV VALVE AREA P 1/2 METHOD: 2.47 CM2
PISA MRMAX VEL: 0.06 M/S
PISA TR MAX VEL: 1.99 M/S
PV PEAK VELOCITY: 0.94 CM/S
RA MAJOR: 4.68 CM
RA PRESSURE: 3 MMHG
RA WIDTH: 2.3 CM
RIGHT VENTRICULAR END-DIASTOLIC DIMENSION: 2.49 CM
TDI LATERAL: 0.08 M/S
TDI SEPTAL: 0.06 M/S
TDI: 0.07 M/S
TR MAX PG: 16 MMHG
TV REST PULMONARY ARTERY PRESSURE: 19 MMHG

## 2021-06-22 PROCEDURE — 93356 MYOCRD STRAIN IMG SPCKL TRCK: CPT | Mod: ,,, | Performed by: INTERNAL MEDICINE

## 2021-06-22 PROCEDURE — 93925 LOWER EXTREMITY STUDY: CPT | Mod: 26,,, | Performed by: RADIOLOGY

## 2021-06-22 PROCEDURE — 93306 ECHO (CUPID ONLY): ICD-10-PCS | Mod: 26,,, | Performed by: INTERNAL MEDICINE

## 2021-06-22 PROCEDURE — 93306 TTE W/DOPPLER COMPLETE: CPT

## 2021-06-22 PROCEDURE — 93922 UPR/L XTREMITY ART 2 LEVELS: CPT | Mod: 26,,, | Performed by: RADIOLOGY

## 2021-06-22 PROCEDURE — 93306 TTE W/DOPPLER COMPLETE: CPT | Mod: 26,,, | Performed by: INTERNAL MEDICINE

## 2021-06-22 PROCEDURE — 93356 ECHO (CUPID ONLY): ICD-10-PCS | Mod: ,,, | Performed by: INTERNAL MEDICINE

## 2021-06-22 PROCEDURE — 93922 UPR/L XTREMITY ART 2 LEVELS: CPT | Mod: TC

## 2021-06-22 PROCEDURE — 93922 US ARTERIAL LOWER EXTREMITY BILAT WITH ABI (XPD): ICD-10-PCS | Mod: 26,,, | Performed by: RADIOLOGY

## 2021-06-22 PROCEDURE — 93925 US ARTERIAL LOWER EXTREMITY BILAT WITH ABI (XPD): ICD-10-PCS | Mod: 26,,, | Performed by: RADIOLOGY

## 2021-07-09 ENCOUNTER — TELEPHONE (OUTPATIENT)
Dept: FAMILY MEDICINE | Facility: CLINIC | Age: 71
End: 2021-07-09

## 2021-07-09 DIAGNOSIS — I10 ESSENTIAL HYPERTENSION: ICD-10-CM

## 2021-07-12 RX ORDER — LOSARTAN POTASSIUM 25 MG/1
75 TABLET ORAL DAILY
Qty: 90 TABLET | Refills: 0 | Status: SHIPPED | OUTPATIENT
Start: 2021-07-12 | End: 2021-07-13

## 2021-07-15 ENCOUNTER — PATIENT OUTREACH (OUTPATIENT)
Dept: ADMINISTRATIVE | Facility: HOSPITAL | Age: 71
End: 2021-07-15

## 2021-07-15 ENCOUNTER — PATIENT MESSAGE (OUTPATIENT)
Dept: ADMINISTRATIVE | Facility: HOSPITAL | Age: 71
End: 2021-07-15

## 2021-07-23 ENCOUNTER — PATIENT OUTREACH (OUTPATIENT)
Dept: ADMINISTRATIVE | Facility: HOSPITAL | Age: 71
End: 2021-07-23

## 2021-07-29 ENCOUNTER — OFFICE VISIT (OUTPATIENT)
Dept: FAMILY MEDICINE | Facility: CLINIC | Age: 71
End: 2021-07-29
Payer: MEDICARE

## 2021-07-29 VITALS
HEIGHT: 65 IN | DIASTOLIC BLOOD PRESSURE: 80 MMHG | WEIGHT: 150.69 LBS | BODY MASS INDEX: 25.11 KG/M2 | HEART RATE: 112 BPM | OXYGEN SATURATION: 94 % | SYSTOLIC BLOOD PRESSURE: 132 MMHG

## 2021-07-29 DIAGNOSIS — I10 ESSENTIAL HYPERTENSION: ICD-10-CM

## 2021-07-29 DIAGNOSIS — I73.9 PERIPHERAL VASCULAR DISEASE: ICD-10-CM

## 2021-07-29 DIAGNOSIS — Z76.0 MEDICATION REFILL: ICD-10-CM

## 2021-07-29 DIAGNOSIS — N18.32 STAGE 3B CHRONIC KIDNEY DISEASE: Primary | ICD-10-CM

## 2021-07-29 DIAGNOSIS — E11.8 TYPE 2 DIABETES MELLITUS WITH COMPLICATION, WITHOUT LONG-TERM CURRENT USE OF INSULIN: ICD-10-CM

## 2021-07-29 PROCEDURE — 99999 PR PBB SHADOW E&M-EST. PATIENT-LVL III: ICD-10-PCS | Mod: PBBFAC,,, | Performed by: FAMILY MEDICINE

## 2021-07-29 PROCEDURE — 3075F PR MOST RECENT SYSTOLIC BLOOD PRESS GE 130-139MM HG: ICD-10-PCS | Mod: CPTII,S$GLB,, | Performed by: FAMILY MEDICINE

## 2021-07-29 PROCEDURE — 3051F HG A1C>EQUAL 7.0%<8.0%: CPT | Mod: CPTII,S$GLB,, | Performed by: FAMILY MEDICINE

## 2021-07-29 PROCEDURE — 99999 PR PBB SHADOW E&M-EST. PATIENT-LVL III: CPT | Mod: PBBFAC,,, | Performed by: FAMILY MEDICINE

## 2021-07-29 PROCEDURE — 1101F PT FALLS ASSESS-DOCD LE1/YR: CPT | Mod: CPTII,S$GLB,, | Performed by: FAMILY MEDICINE

## 2021-07-29 PROCEDURE — 3075F SYST BP GE 130 - 139MM HG: CPT | Mod: CPTII,S$GLB,, | Performed by: FAMILY MEDICINE

## 2021-07-29 PROCEDURE — 99214 PR OFFICE/OUTPT VISIT, EST, LEVL IV, 30-39 MIN: ICD-10-PCS | Mod: S$GLB,,, | Performed by: FAMILY MEDICINE

## 2021-07-29 PROCEDURE — 99214 OFFICE O/P EST MOD 30 MIN: CPT | Mod: S$GLB,,, | Performed by: FAMILY MEDICINE

## 2021-07-29 PROCEDURE — 1159F PR MEDICATION LIST DOCUMENTED IN MEDICAL RECORD: ICD-10-PCS | Mod: CPTII,S$GLB,, | Performed by: FAMILY MEDICINE

## 2021-07-29 PROCEDURE — 1159F MED LIST DOCD IN RCRD: CPT | Mod: CPTII,S$GLB,, | Performed by: FAMILY MEDICINE

## 2021-07-29 PROCEDURE — 3008F BODY MASS INDEX DOCD: CPT | Mod: CPTII,S$GLB,, | Performed by: FAMILY MEDICINE

## 2021-07-29 PROCEDURE — 1160F RVW MEDS BY RX/DR IN RCRD: CPT | Mod: CPTII,S$GLB,, | Performed by: FAMILY MEDICINE

## 2021-07-29 PROCEDURE — 1126F PR PAIN SEVERITY QUANTIFIED, NO PAIN PRESENT: ICD-10-PCS | Mod: CPTII,S$GLB,, | Performed by: FAMILY MEDICINE

## 2021-07-29 PROCEDURE — 3008F PR BODY MASS INDEX (BMI) DOCUMENTED: ICD-10-PCS | Mod: CPTII,S$GLB,, | Performed by: FAMILY MEDICINE

## 2021-07-29 PROCEDURE — 3051F PR MOST RECENT HEMOGLOBIN A1C LEVEL 7.0 - < 8.0%: ICD-10-PCS | Mod: CPTII,S$GLB,, | Performed by: FAMILY MEDICINE

## 2021-07-29 PROCEDURE — 1160F PR REVIEW ALL MEDS BY PRESCRIBER/CLIN PHARMACIST DOCUMENTED: ICD-10-PCS | Mod: CPTII,S$GLB,, | Performed by: FAMILY MEDICINE

## 2021-07-29 PROCEDURE — 3079F PR MOST RECENT DIASTOLIC BLOOD PRESSURE 80-89 MM HG: ICD-10-PCS | Mod: CPTII,S$GLB,, | Performed by: FAMILY MEDICINE

## 2021-07-29 PROCEDURE — 1126F AMNT PAIN NOTED NONE PRSNT: CPT | Mod: CPTII,S$GLB,, | Performed by: FAMILY MEDICINE

## 2021-07-29 PROCEDURE — 3288F FALL RISK ASSESSMENT DOCD: CPT | Mod: CPTII,S$GLB,, | Performed by: FAMILY MEDICINE

## 2021-07-29 PROCEDURE — 1101F PR PT FALLS ASSESS DOC 0-1 FALLS W/OUT INJ PAST YR: ICD-10-PCS | Mod: CPTII,S$GLB,, | Performed by: FAMILY MEDICINE

## 2021-07-29 PROCEDURE — 3288F PR FALLS RISK ASSESSMENT DOCUMENTED: ICD-10-PCS | Mod: CPTII,S$GLB,, | Performed by: FAMILY MEDICINE

## 2021-07-29 PROCEDURE — 3079F DIAST BP 80-89 MM HG: CPT | Mod: CPTII,S$GLB,, | Performed by: FAMILY MEDICINE

## 2021-07-29 RX ORDER — PANTOPRAZOLE SODIUM 40 MG/1
40 TABLET, DELAYED RELEASE ORAL DAILY
Qty: 90 TABLET | Refills: 1 | Status: SHIPPED | OUTPATIENT
Start: 2021-07-29 | End: 2022-03-16 | Stop reason: SDUPTHER

## 2021-07-29 RX ORDER — METFORMIN HYDROCHLORIDE 1000 MG/1
1000 TABLET ORAL 2 TIMES DAILY WITH MEALS
Qty: 180 TABLET | Refills: 1 | Status: SHIPPED | OUTPATIENT
Start: 2021-07-29 | End: 2022-03-16

## 2021-07-29 RX ORDER — GLIMEPIRIDE 4 MG/1
4 TABLET ORAL DAILY
Qty: 90 TABLET | Refills: 1 | Status: SHIPPED | OUTPATIENT
Start: 2021-07-29 | End: 2022-03-10

## 2021-07-29 RX ORDER — CITALOPRAM 40 MG/1
40 TABLET, FILM COATED ORAL DAILY
Qty: 90 TABLET | Refills: 1 | Status: SHIPPED | OUTPATIENT
Start: 2021-07-29 | End: 2022-03-16 | Stop reason: SDUPTHER

## 2021-07-29 RX ORDER — LOSARTAN POTASSIUM 50 MG/1
50 TABLET ORAL 2 TIMES DAILY
Qty: 180 TABLET | Refills: 3 | Status: SHIPPED | OUTPATIENT
Start: 2021-07-29 | End: 2022-03-16 | Stop reason: SDUPTHER

## 2021-07-29 RX ORDER — ROSUVASTATIN CALCIUM 40 MG/1
40 TABLET, COATED ORAL DAILY
Qty: 90 TABLET | Refills: 1 | Status: SHIPPED | OUTPATIENT
Start: 2021-07-29 | End: 2022-03-16 | Stop reason: SDUPTHER

## 2021-07-29 RX ORDER — METOPROLOL TARTRATE 25 MG/1
25 TABLET, FILM COATED ORAL 2 TIMES DAILY
Qty: 180 TABLET | Refills: 1 | Status: SHIPPED | OUTPATIENT
Start: 2021-07-29 | End: 2022-03-16 | Stop reason: SDUPTHER

## 2021-07-29 RX ORDER — DIAZEPAM 5 MG/1
5 TABLET ORAL DAILY PRN
Qty: 30 TABLET | Refills: 0 | Status: SHIPPED | OUTPATIENT
Start: 2021-07-29 | End: 2022-03-16 | Stop reason: SDUPTHER

## 2021-08-02 ENCOUNTER — PATIENT MESSAGE (OUTPATIENT)
Dept: ADMINISTRATIVE | Facility: HOSPITAL | Age: 71
End: 2021-08-02

## 2021-08-02 ENCOUNTER — TELEPHONE (OUTPATIENT)
Dept: VASCULAR SURGERY | Facility: CLINIC | Age: 71
End: 2021-08-02

## 2021-08-09 ENCOUNTER — PATIENT OUTREACH (OUTPATIENT)
Dept: ADMINISTRATIVE | Facility: OTHER | Age: 71
End: 2021-08-09

## 2021-08-11 ENCOUNTER — OFFICE VISIT (OUTPATIENT)
Dept: VASCULAR SURGERY | Facility: CLINIC | Age: 71
End: 2021-08-11
Payer: MEDICARE

## 2021-08-11 VITALS
HEIGHT: 65 IN | DIASTOLIC BLOOD PRESSURE: 67 MMHG | BODY MASS INDEX: 24.99 KG/M2 | HEART RATE: 49 BPM | WEIGHT: 150 LBS | SYSTOLIC BLOOD PRESSURE: 161 MMHG

## 2021-08-11 DIAGNOSIS — I73.9 PERIPHERAL VASCULAR DISEASE: Primary | ICD-10-CM

## 2021-08-11 PROCEDURE — 1159F MED LIST DOCD IN RCRD: CPT | Mod: CPTII,S$GLB,, | Performed by: THORACIC SURGERY (CARDIOTHORACIC VASCULAR SURGERY)

## 2021-08-11 PROCEDURE — 99999 PR PBB SHADOW E&M-EST. PATIENT-LVL IV: ICD-10-PCS | Mod: PBBFAC,,, | Performed by: THORACIC SURGERY (CARDIOTHORACIC VASCULAR SURGERY)

## 2021-08-11 PROCEDURE — 1160F PR REVIEW ALL MEDS BY PRESCRIBER/CLIN PHARMACIST DOCUMENTED: ICD-10-PCS | Mod: CPTII,S$GLB,, | Performed by: THORACIC SURGERY (CARDIOTHORACIC VASCULAR SURGERY)

## 2021-08-11 PROCEDURE — 99203 OFFICE O/P NEW LOW 30 MIN: CPT | Mod: S$GLB,,, | Performed by: THORACIC SURGERY (CARDIOTHORACIC VASCULAR SURGERY)

## 2021-08-11 PROCEDURE — 1160F RVW MEDS BY RX/DR IN RCRD: CPT | Mod: CPTII,S$GLB,, | Performed by: THORACIC SURGERY (CARDIOTHORACIC VASCULAR SURGERY)

## 2021-08-11 PROCEDURE — 1159F PR MEDICATION LIST DOCUMENTED IN MEDICAL RECORD: ICD-10-PCS | Mod: CPTII,S$GLB,, | Performed by: THORACIC SURGERY (CARDIOTHORACIC VASCULAR SURGERY)

## 2021-08-11 PROCEDURE — 99999 PR PBB SHADOW E&M-EST. PATIENT-LVL IV: CPT | Mod: PBBFAC,,, | Performed by: THORACIC SURGERY (CARDIOTHORACIC VASCULAR SURGERY)

## 2021-08-11 PROCEDURE — 99203 PR OFFICE/OUTPT VISIT, NEW, LEVL III, 30-44 MIN: ICD-10-PCS | Mod: S$GLB,,, | Performed by: THORACIC SURGERY (CARDIOTHORACIC VASCULAR SURGERY)

## 2021-08-11 PROCEDURE — 3078F PR MOST RECENT DIASTOLIC BLOOD PRESSURE < 80 MM HG: ICD-10-PCS | Mod: CPTII,S$GLB,, | Performed by: THORACIC SURGERY (CARDIOTHORACIC VASCULAR SURGERY)

## 2021-08-11 PROCEDURE — 1101F PR PT FALLS ASSESS DOC 0-1 FALLS W/OUT INJ PAST YR: ICD-10-PCS | Mod: CPTII,S$GLB,, | Performed by: THORACIC SURGERY (CARDIOTHORACIC VASCULAR SURGERY)

## 2021-08-11 PROCEDURE — 3288F PR FALLS RISK ASSESSMENT DOCUMENTED: ICD-10-PCS | Mod: CPTII,S$GLB,, | Performed by: THORACIC SURGERY (CARDIOTHORACIC VASCULAR SURGERY)

## 2021-08-11 PROCEDURE — 3008F PR BODY MASS INDEX (BMI) DOCUMENTED: ICD-10-PCS | Mod: CPTII,S$GLB,, | Performed by: THORACIC SURGERY (CARDIOTHORACIC VASCULAR SURGERY)

## 2021-08-11 PROCEDURE — 3078F DIAST BP <80 MM HG: CPT | Mod: CPTII,S$GLB,, | Performed by: THORACIC SURGERY (CARDIOTHORACIC VASCULAR SURGERY)

## 2021-08-11 PROCEDURE — 3008F BODY MASS INDEX DOCD: CPT | Mod: CPTII,S$GLB,, | Performed by: THORACIC SURGERY (CARDIOTHORACIC VASCULAR SURGERY)

## 2021-08-11 PROCEDURE — 1101F PT FALLS ASSESS-DOCD LE1/YR: CPT | Mod: CPTII,S$GLB,, | Performed by: THORACIC SURGERY (CARDIOTHORACIC VASCULAR SURGERY)

## 2021-08-11 PROCEDURE — 1126F PR PAIN SEVERITY QUANTIFIED, NO PAIN PRESENT: ICD-10-PCS | Mod: CPTII,S$GLB,, | Performed by: THORACIC SURGERY (CARDIOTHORACIC VASCULAR SURGERY)

## 2021-08-11 PROCEDURE — 3077F PR MOST RECENT SYSTOLIC BLOOD PRESSURE >= 140 MM HG: ICD-10-PCS | Mod: CPTII,S$GLB,, | Performed by: THORACIC SURGERY (CARDIOTHORACIC VASCULAR SURGERY)

## 2021-08-11 PROCEDURE — 1126F AMNT PAIN NOTED NONE PRSNT: CPT | Mod: CPTII,S$GLB,, | Performed by: THORACIC SURGERY (CARDIOTHORACIC VASCULAR SURGERY)

## 2021-08-11 PROCEDURE — 3051F HG A1C>EQUAL 7.0%<8.0%: CPT | Mod: CPTII,S$GLB,, | Performed by: THORACIC SURGERY (CARDIOTHORACIC VASCULAR SURGERY)

## 2021-08-11 PROCEDURE — 3288F FALL RISK ASSESSMENT DOCD: CPT | Mod: CPTII,S$GLB,, | Performed by: THORACIC SURGERY (CARDIOTHORACIC VASCULAR SURGERY)

## 2021-08-11 PROCEDURE — 3051F PR MOST RECENT HEMOGLOBIN A1C LEVEL 7.0 - < 8.0%: ICD-10-PCS | Mod: CPTII,S$GLB,, | Performed by: THORACIC SURGERY (CARDIOTHORACIC VASCULAR SURGERY)

## 2021-08-11 PROCEDURE — 3077F SYST BP >= 140 MM HG: CPT | Mod: CPTII,S$GLB,, | Performed by: THORACIC SURGERY (CARDIOTHORACIC VASCULAR SURGERY)

## 2021-09-28 ENCOUNTER — LAB VISIT (OUTPATIENT)
Dept: LAB | Facility: HOSPITAL | Age: 71
End: 2021-09-28
Attending: FAMILY MEDICINE
Payer: MEDICARE

## 2021-09-28 DIAGNOSIS — N18.32 STAGE 3B CHRONIC KIDNEY DISEASE: ICD-10-CM

## 2021-09-28 DIAGNOSIS — E11.8 TYPE 2 DIABETES MELLITUS WITH COMPLICATION, WITHOUT LONG-TERM CURRENT USE OF INSULIN: ICD-10-CM

## 2021-09-28 LAB
ALBUMIN SERPL BCP-MCNC: 3.8 G/DL (ref 3.5–5.2)
ALP SERPL-CCNC: 61 U/L (ref 55–135)
ALT SERPL W/O P-5'-P-CCNC: 20 U/L (ref 10–44)
ANION GAP SERPL CALC-SCNC: 10 MMOL/L (ref 8–16)
AST SERPL-CCNC: 27 U/L (ref 10–40)
BASOPHILS # BLD AUTO: 0.03 K/UL (ref 0–0.2)
BASOPHILS NFR BLD: 0.5 % (ref 0–1.9)
BILIRUB SERPL-MCNC: 0.3 MG/DL (ref 0.1–1)
BUN SERPL-MCNC: 20 MG/DL (ref 8–23)
CALCIUM SERPL-MCNC: 9.9 MG/DL (ref 8.7–10.5)
CHLORIDE SERPL-SCNC: 106 MMOL/L (ref 95–110)
CO2 SERPL-SCNC: 23 MMOL/L (ref 23–29)
CREAT SERPL-MCNC: 1.6 MG/DL (ref 0.5–1.4)
DIFFERENTIAL METHOD: ABNORMAL
EOSINOPHIL # BLD AUTO: 0.2 K/UL (ref 0–0.5)
EOSINOPHIL NFR BLD: 2.7 % (ref 0–8)
ERYTHROCYTE [DISTWIDTH] IN BLOOD BY AUTOMATED COUNT: 12.9 % (ref 11.5–14.5)
EST. GFR  (AFRICAN AMERICAN): 36.8 ML/MIN/1.73 M^2
EST. GFR  (NON AFRICAN AMERICAN): 32 ML/MIN/1.73 M^2
ESTIMATED AVG GLUCOSE: 186 MG/DL (ref 68–131)
GLUCOSE SERPL-MCNC: 159 MG/DL (ref 70–110)
HBA1C MFR BLD: 8.1 % (ref 4–5.6)
HCT VFR BLD AUTO: 37.1 % (ref 37–48.5)
HGB BLD-MCNC: 12.2 G/DL (ref 12–16)
IMM GRANULOCYTES # BLD AUTO: 0.02 K/UL (ref 0–0.04)
IMM GRANULOCYTES NFR BLD AUTO: 0.4 % (ref 0–0.5)
LYMPHOCYTES # BLD AUTO: 1.9 K/UL (ref 1–4.8)
LYMPHOCYTES NFR BLD: 34.8 % (ref 18–48)
MCH RBC QN AUTO: 31.3 PG (ref 27–31)
MCHC RBC AUTO-ENTMCNC: 32.9 G/DL (ref 32–36)
MCV RBC AUTO: 95 FL (ref 82–98)
MONOCYTES # BLD AUTO: 0.4 K/UL (ref 0.3–1)
MONOCYTES NFR BLD: 7.7 % (ref 4–15)
NEUTROPHILS # BLD AUTO: 3 K/UL (ref 1.8–7.7)
NEUTROPHILS NFR BLD: 53.9 % (ref 38–73)
NRBC BLD-RTO: 0 /100 WBC
PLATELET # BLD AUTO: 223 K/UL (ref 150–450)
PMV BLD AUTO: 9.5 FL (ref 9.2–12.9)
POTASSIUM SERPL-SCNC: 4.7 MMOL/L (ref 3.5–5.1)
PROT SERPL-MCNC: 6.8 G/DL (ref 6–8.4)
RBC # BLD AUTO: 3.9 M/UL (ref 4–5.4)
SODIUM SERPL-SCNC: 139 MMOL/L (ref 136–145)
T4 FREE SERPL-MCNC: 0.91 NG/DL (ref 0.71–1.51)
TSH SERPL DL<=0.005 MIU/L-ACNC: 5.93 UIU/ML (ref 0.4–4)
WBC # BLD AUTO: 5.57 K/UL (ref 3.9–12.7)

## 2021-09-28 PROCEDURE — 85025 COMPLETE CBC W/AUTO DIFF WBC: CPT | Performed by: FAMILY MEDICINE

## 2021-09-28 PROCEDURE — 36415 COLL VENOUS BLD VENIPUNCTURE: CPT | Performed by: FAMILY MEDICINE

## 2021-09-28 PROCEDURE — 80053 COMPREHEN METABOLIC PANEL: CPT | Performed by: FAMILY MEDICINE

## 2021-09-28 PROCEDURE — 84439 ASSAY OF FREE THYROXINE: CPT | Performed by: FAMILY MEDICINE

## 2021-09-28 PROCEDURE — 83036 HEMOGLOBIN GLYCOSYLATED A1C: CPT | Performed by: FAMILY MEDICINE

## 2021-09-28 PROCEDURE — 84443 ASSAY THYROID STIM HORMONE: CPT | Performed by: FAMILY MEDICINE

## 2021-10-05 ENCOUNTER — OFFICE VISIT (OUTPATIENT)
Dept: FAMILY MEDICINE | Facility: CLINIC | Age: 71
End: 2021-10-05
Payer: MEDICARE

## 2021-10-05 VITALS
OXYGEN SATURATION: 96 % | WEIGHT: 152.63 LBS | HEART RATE: 60 BPM | DIASTOLIC BLOOD PRESSURE: 62 MMHG | SYSTOLIC BLOOD PRESSURE: 138 MMHG | BODY MASS INDEX: 25.43 KG/M2 | HEIGHT: 65 IN | RESPIRATION RATE: 17 BRPM

## 2021-10-05 DIAGNOSIS — Z23 NEEDS FLU SHOT: ICD-10-CM

## 2021-10-05 DIAGNOSIS — R79.89 ELEVATED TSH: ICD-10-CM

## 2021-10-05 DIAGNOSIS — E11.8 TYPE 2 DIABETES MELLITUS WITH COMPLICATION, WITHOUT LONG-TERM CURRENT USE OF INSULIN: Primary | ICD-10-CM

## 2021-10-05 DIAGNOSIS — N18.32 STAGE 3B CHRONIC KIDNEY DISEASE: ICD-10-CM

## 2021-10-05 PROCEDURE — 3288F FALL RISK ASSESSMENT DOCD: CPT | Mod: CPTII,S$GLB,, | Performed by: FAMILY MEDICINE

## 2021-10-05 PROCEDURE — 3052F PR MOST RECENT HEMOGLOBIN A1C LEVEL 8.0 - < 9.0%: ICD-10-PCS | Mod: CPTII,S$GLB,, | Performed by: FAMILY MEDICINE

## 2021-10-05 PROCEDURE — 90694 VACC AIIV4 NO PRSRV 0.5ML IM: CPT | Mod: S$GLB,,, | Performed by: FAMILY MEDICINE

## 2021-10-05 PROCEDURE — 3008F BODY MASS INDEX DOCD: CPT | Mod: CPTII,S$GLB,, | Performed by: FAMILY MEDICINE

## 2021-10-05 PROCEDURE — 99999 PR PBB SHADOW E&M-EST. PATIENT-LVL IV: ICD-10-PCS | Mod: PBBFAC,,, | Performed by: FAMILY MEDICINE

## 2021-10-05 PROCEDURE — 1126F AMNT PAIN NOTED NONE PRSNT: CPT | Mod: CPTII,S$GLB,, | Performed by: FAMILY MEDICINE

## 2021-10-05 PROCEDURE — 4010F PR ACE/ARB THEARPY RXD/TAKEN: ICD-10-PCS | Mod: CPTII,S$GLB,, | Performed by: FAMILY MEDICINE

## 2021-10-05 PROCEDURE — G0008 ADMIN INFLUENZA VIRUS VAC: HCPCS | Mod: S$GLB,,, | Performed by: FAMILY MEDICINE

## 2021-10-05 PROCEDURE — 3075F SYST BP GE 130 - 139MM HG: CPT | Mod: CPTII,S$GLB,, | Performed by: FAMILY MEDICINE

## 2021-10-05 PROCEDURE — 3078F PR MOST RECENT DIASTOLIC BLOOD PRESSURE < 80 MM HG: ICD-10-PCS | Mod: CPTII,S$GLB,, | Performed by: FAMILY MEDICINE

## 2021-10-05 PROCEDURE — 3066F NEPHROPATHY DOC TX: CPT | Mod: CPTII,S$GLB,, | Performed by: FAMILY MEDICINE

## 2021-10-05 PROCEDURE — 1160F RVW MEDS BY RX/DR IN RCRD: CPT | Mod: CPTII,S$GLB,, | Performed by: FAMILY MEDICINE

## 2021-10-05 PROCEDURE — 4010F ACE/ARB THERAPY RXD/TAKEN: CPT | Mod: CPTII,S$GLB,, | Performed by: FAMILY MEDICINE

## 2021-10-05 PROCEDURE — 3075F PR MOST RECENT SYSTOLIC BLOOD PRESS GE 130-139MM HG: ICD-10-PCS | Mod: CPTII,S$GLB,, | Performed by: FAMILY MEDICINE

## 2021-10-05 PROCEDURE — 3066F PR DOCUMENTATION OF TREATMENT FOR NEPHROPATHY: ICD-10-PCS | Mod: CPTII,S$GLB,, | Performed by: FAMILY MEDICINE

## 2021-10-05 PROCEDURE — 1101F PT FALLS ASSESS-DOCD LE1/YR: CPT | Mod: CPTII,S$GLB,, | Performed by: FAMILY MEDICINE

## 2021-10-05 PROCEDURE — 3052F HG A1C>EQUAL 8.0%<EQUAL 9.0%: CPT | Mod: CPTII,S$GLB,, | Performed by: FAMILY MEDICINE

## 2021-10-05 PROCEDURE — 99214 OFFICE O/P EST MOD 30 MIN: CPT | Mod: S$GLB,,, | Performed by: FAMILY MEDICINE

## 2021-10-05 PROCEDURE — 1160F PR REVIEW ALL MEDS BY PRESCRIBER/CLIN PHARMACIST DOCUMENTED: ICD-10-PCS | Mod: CPTII,S$GLB,, | Performed by: FAMILY MEDICINE

## 2021-10-05 PROCEDURE — 99999 PR PBB SHADOW E&M-EST. PATIENT-LVL IV: CPT | Mod: PBBFAC,,, | Performed by: FAMILY MEDICINE

## 2021-10-05 PROCEDURE — 3008F PR BODY MASS INDEX (BMI) DOCUMENTED: ICD-10-PCS | Mod: CPTII,S$GLB,, | Performed by: FAMILY MEDICINE

## 2021-10-05 PROCEDURE — 3288F PR FALLS RISK ASSESSMENT DOCUMENTED: ICD-10-PCS | Mod: CPTII,S$GLB,, | Performed by: FAMILY MEDICINE

## 2021-10-05 PROCEDURE — 1126F PR PAIN SEVERITY QUANTIFIED, NO PAIN PRESENT: ICD-10-PCS | Mod: CPTII,S$GLB,, | Performed by: FAMILY MEDICINE

## 2021-10-05 PROCEDURE — 90694 FLU VACCINE - QUADRIVALENT - ADJUVANTED: ICD-10-PCS | Mod: S$GLB,,, | Performed by: FAMILY MEDICINE

## 2021-10-05 PROCEDURE — 99214 PR OFFICE/OUTPT VISIT, EST, LEVL IV, 30-39 MIN: ICD-10-PCS | Mod: S$GLB,,, | Performed by: FAMILY MEDICINE

## 2021-10-05 PROCEDURE — 1101F PR PT FALLS ASSESS DOC 0-1 FALLS W/OUT INJ PAST YR: ICD-10-PCS | Mod: CPTII,S$GLB,, | Performed by: FAMILY MEDICINE

## 2021-10-05 PROCEDURE — 1159F MED LIST DOCD IN RCRD: CPT | Mod: CPTII,S$GLB,, | Performed by: FAMILY MEDICINE

## 2021-10-05 PROCEDURE — G0008 FLU VACCINE - QUADRIVALENT - ADJUVANTED: ICD-10-PCS | Mod: S$GLB,,, | Performed by: FAMILY MEDICINE

## 2021-10-05 PROCEDURE — 3078F DIAST BP <80 MM HG: CPT | Mod: CPTII,S$GLB,, | Performed by: FAMILY MEDICINE

## 2021-10-05 PROCEDURE — 3060F PR POS MICROALBUMINURIA RESULT DOCUMENTED/REVIEW: ICD-10-PCS | Mod: CPTII,S$GLB,, | Performed by: FAMILY MEDICINE

## 2021-10-05 PROCEDURE — 3060F POS MICROALBUMINURIA REV: CPT | Mod: CPTII,S$GLB,, | Performed by: FAMILY MEDICINE

## 2021-10-05 PROCEDURE — 1159F PR MEDICATION LIST DOCUMENTED IN MEDICAL RECORD: ICD-10-PCS | Mod: CPTII,S$GLB,, | Performed by: FAMILY MEDICINE

## 2021-10-19 ENCOUNTER — TELEPHONE (OUTPATIENT)
Dept: FAMILY MEDICINE | Facility: CLINIC | Age: 71
End: 2021-10-19

## 2021-12-21 ENCOUNTER — IMMUNIZATION (OUTPATIENT)
Dept: FAMILY MEDICINE | Facility: CLINIC | Age: 71
End: 2021-12-21
Payer: MEDICARE

## 2021-12-21 DIAGNOSIS — Z23 NEED FOR VACCINATION: Primary | ICD-10-CM

## 2021-12-21 PROCEDURE — 0013A COVID-19, MRNA, LNP-S, PF, 100 MCG/0.5 ML DOSE VACCINE: CPT | Mod: PBBFAC | Performed by: FAMILY MEDICINE

## 2021-12-21 PROCEDURE — 91301 COVID-19, MRNA, LNP-S, PF, 100 MCG/0.5 ML DOSE VACCINE: CPT | Mod: PBBFAC | Performed by: FAMILY MEDICINE

## 2022-01-06 ENCOUNTER — PATIENT MESSAGE (OUTPATIENT)
Dept: ADMINISTRATIVE | Facility: HOSPITAL | Age: 72
End: 2022-01-06
Payer: MEDICARE

## 2022-01-06 ENCOUNTER — PATIENT OUTREACH (OUTPATIENT)
Dept: ADMINISTRATIVE | Facility: HOSPITAL | Age: 72
End: 2022-01-06
Payer: MEDICARE

## 2022-01-06 NOTE — PROGRESS NOTES
Population Health Outreach.  Calling patient regarding overdue diabetic eye exam for 2021. No answer, left Vm and sending portal message at this time.

## 2022-01-10 ENCOUNTER — PATIENT MESSAGE (OUTPATIENT)
Dept: ADMINISTRATIVE | Facility: HOSPITAL | Age: 72
End: 2022-01-10
Payer: MEDICARE

## 2022-02-15 NOTE — PROGRESS NOTES
Entered by Adriana Wadsworth CMA on February 13, 2019 9:21:31 AM CST  ---------------------  From: Adriana Wadsworth CMA   To: [id number]    Sent: 2/13/2019 9:21:31 AM CST  Subject: Medication Management     ** Not Approved: Patient needs appointment **  traZODone (TRAZODONE 50MG TABLETS)  TAKE 1 TABLET BY MOUTH AT BEDTIME  Qty:  90 tab(s)        Days Supply:  90        Refills:  0          BRIANNA     Route To Pharmacy - [facility] [id number]   Signed by Adriana Wadsworth CMA          Entered by Adriana Wadsworth CMA on February 13, 2019 9:20:52 AM CST  3/29/18 #90, 3 refills Ambika Girard      ** Patient matched by Adriana Wadsworth CMA on 2/13/2019 9:18:46 AM CST **      ------------------------------------------  From: [id number]  To: Kaylah Ruiz MD  Sent: February 12, 2019 12:42:06 PM CST  Subject: Medication Management  Due: February 13, 2019 12:42:06 PM CST    ** On Hold Pending Signature **  Drug: traZODone (traZODone 50 mg oral tablet)  TAKE 1 TABLET BY MOUTH AT BEDTIME  Quantity: 90 tab(s)     Days Supply: 90        Refills: 0  Substitutions Allowed  Notes from Pharmacy:     Dispensed Drug: traZODone (traZODone 50 mg oral tablet)  TAKE 1 TABLET BY MOUTH AT BEDTIME  Quantity: 90 tab(s)     Days Supply: 90        Refills: 0  Substitutions Allowed  Notes from Pharmacy:   ------------------------------------------   Individual Follow-Up Form    10/24/2019    Quit Date: tbd    Clinical Status of Patient: Outpatient    Length of Service: 60 minutes    Continuing Medication: yes  Patches    Other Medications: none     Target Symptoms: Withdrawal and medication side effects. The following were  rated moderate (3) to severe (4) on TCRS:  · Moderate (3): crave  · Severe (4): none    Comments: Patient presents for follow up smoking 10 cigarettes per day ,previously she smoked 20, today she started her goal of 8 per day today .   Pt remains on tobacco cessation medication of  21 mg nicotine patch QD and 2 mg nicotine gum PRN (1-2 per hour in place of cigarettes.) was added today for crave.  No adverse effects noted at this time. Pt doing very well with rate reduction and wait times prior to smoking.   Reviewed coping strategies/habitual behavior/relapse prevention with patient. Exhaled carbon monoxide level was 13 ppm per Smokerlyzer  ( non- smoker = 0-5 ppm .)  Will see pt back in office in 2 weeks      Diagnosis: F17.200    Next Visit: 2 weeks

## 2022-02-23 ENCOUNTER — TELEPHONE (OUTPATIENT)
Dept: FAMILY MEDICINE | Facility: CLINIC | Age: 72
End: 2022-02-23
Payer: MEDICARE

## 2022-02-23 NOTE — TELEPHONE ENCOUNTER
----- Message from Dre Lopez sent at 2/23/2022  2:04 PM CST -----  Contact: Self  Type:  Sooner Apoointment Request    Caller is requesting a sooner appointment.  Caller declined first available appointment listed below.  Caller will not accept being placed on the waitlist and is requesting a message be sent to doctor.    Name of Caller:  Patient  When is the first available appointment?  5/31  Symptoms:  f/u, refills  Best Call Back Number:  [phone]   Additional Information:  Scheduled labs 2/25, states Dr GUILLORY wanted to see her after.

## 2022-02-23 NOTE — TELEPHONE ENCOUNTER
Spoke with patient. Offered 3/16 at 140. Patient proceeded to schedule. Verbalized understanding.

## 2022-03-03 ENCOUNTER — LAB VISIT (OUTPATIENT)
Dept: LAB | Facility: HOSPITAL | Age: 72
End: 2022-03-03
Attending: FAMILY MEDICINE
Payer: MEDICARE

## 2022-03-03 DIAGNOSIS — R79.89 ELEVATED TSH: ICD-10-CM

## 2022-03-03 DIAGNOSIS — Z79.899 ENCOUNTER FOR LONG-TERM CURRENT USE OF MEDICATION: ICD-10-CM

## 2022-03-03 DIAGNOSIS — N18.32 STAGE 3B CHRONIC KIDNEY DISEASE: ICD-10-CM

## 2022-03-03 DIAGNOSIS — E11.8 TYPE 2 DIABETES MELLITUS WITH COMPLICATION, WITHOUT LONG-TERM CURRENT USE OF INSULIN: ICD-10-CM

## 2022-03-03 DIAGNOSIS — E11.8 TYPE 2 DIABETES MELLITUS WITH COMPLICATION, WITHOUT LONG-TERM CURRENT USE OF INSULIN: Primary | ICD-10-CM

## 2022-03-03 LAB
ALBUMIN SERPL BCP-MCNC: 4 G/DL (ref 3.5–5.2)
ALP SERPL-CCNC: 69 U/L (ref 55–135)
ALT SERPL W/O P-5'-P-CCNC: 24 U/L (ref 10–44)
ANION GAP SERPL CALC-SCNC: 10 MMOL/L (ref 8–16)
AST SERPL-CCNC: 32 U/L (ref 10–40)
BASOPHILS # BLD AUTO: 0.03 K/UL (ref 0–0.2)
BASOPHILS NFR BLD: 0.5 % (ref 0–1.9)
BILIRUB SERPL-MCNC: 0.3 MG/DL (ref 0.1–1)
BUN SERPL-MCNC: 27 MG/DL (ref 8–23)
CALCIUM SERPL-MCNC: 9.2 MG/DL (ref 8.7–10.5)
CHLORIDE SERPL-SCNC: 103 MMOL/L (ref 95–110)
CO2 SERPL-SCNC: 24 MMOL/L (ref 23–29)
CREAT SERPL-MCNC: 1.7 MG/DL (ref 0.5–1.4)
DIFFERENTIAL METHOD: ABNORMAL
EOSINOPHIL # BLD AUTO: 0.1 K/UL (ref 0–0.5)
EOSINOPHIL NFR BLD: 2.1 % (ref 0–8)
ERYTHROCYTE [DISTWIDTH] IN BLOOD BY AUTOMATED COUNT: 13.4 % (ref 11.5–14.5)
EST. GFR  (AFRICAN AMERICAN): 34.2 ML/MIN/1.73 M^2
EST. GFR  (NON AFRICAN AMERICAN): 29.7 ML/MIN/1.73 M^2
ESTIMATED AVG GLUCOSE: 217 MG/DL (ref 68–131)
GLUCOSE SERPL-MCNC: 190 MG/DL (ref 70–110)
HBA1C MFR BLD: 9.2 % (ref 4–5.6)
HCT VFR BLD AUTO: 39.7 % (ref 37–48.5)
HGB BLD-MCNC: 13.2 G/DL (ref 12–16)
IMM GRANULOCYTES # BLD AUTO: 0.02 K/UL (ref 0–0.04)
IMM GRANULOCYTES NFR BLD AUTO: 0.3 % (ref 0–0.5)
LYMPHOCYTES # BLD AUTO: 1.8 K/UL (ref 1–4.8)
LYMPHOCYTES NFR BLD: 31.3 % (ref 18–48)
MCH RBC QN AUTO: 31.7 PG (ref 27–31)
MCHC RBC AUTO-ENTMCNC: 33.2 G/DL (ref 32–36)
MCV RBC AUTO: 95 FL (ref 82–98)
MONOCYTES # BLD AUTO: 0.5 K/UL (ref 0.3–1)
MONOCYTES NFR BLD: 8 % (ref 4–15)
NEUTROPHILS # BLD AUTO: 3.3 K/UL (ref 1.8–7.7)
NEUTROPHILS NFR BLD: 57.8 % (ref 38–73)
NRBC BLD-RTO: 0 /100 WBC
PLATELET # BLD AUTO: 224 K/UL (ref 150–450)
PMV BLD AUTO: 9.4 FL (ref 9.2–12.9)
POTASSIUM SERPL-SCNC: 4.9 MMOL/L (ref 3.5–5.1)
PROT SERPL-MCNC: 6.9 G/DL (ref 6–8.4)
RBC # BLD AUTO: 4.17 M/UL (ref 4–5.4)
SODIUM SERPL-SCNC: 137 MMOL/L (ref 136–145)
T4 FREE SERPL-MCNC: 0.96 NG/DL (ref 0.71–1.51)
TSH SERPL DL<=0.005 MIU/L-ACNC: 5.73 UIU/ML (ref 0.4–4)
WBC # BLD AUTO: 5.75 K/UL (ref 3.9–12.7)

## 2022-03-03 PROCEDURE — 84439 ASSAY OF FREE THYROXINE: CPT | Performed by: FAMILY MEDICINE

## 2022-03-03 PROCEDURE — 84480 ASSAY TRIIODOTHYRONINE (T3): CPT | Performed by: FAMILY MEDICINE

## 2022-03-03 PROCEDURE — 36415 COLL VENOUS BLD VENIPUNCTURE: CPT | Performed by: FAMILY MEDICINE

## 2022-03-03 PROCEDURE — 83036 HEMOGLOBIN GLYCOSYLATED A1C: CPT | Performed by: FAMILY MEDICINE

## 2022-03-03 PROCEDURE — 84443 ASSAY THYROID STIM HORMONE: CPT | Performed by: FAMILY MEDICINE

## 2022-03-03 PROCEDURE — 80053 COMPREHEN METABOLIC PANEL: CPT | Performed by: FAMILY MEDICINE

## 2022-03-03 PROCEDURE — 85025 COMPLETE CBC W/AUTO DIFF WBC: CPT | Performed by: FAMILY MEDICINE

## 2022-03-04 LAB — T3 SERPL-MCNC: 81 NG/DL (ref 60–180)

## 2022-03-16 ENCOUNTER — OFFICE VISIT (OUTPATIENT)
Dept: FAMILY MEDICINE | Facility: CLINIC | Age: 72
End: 2022-03-16
Payer: MEDICARE

## 2022-03-16 VITALS
DIASTOLIC BLOOD PRESSURE: 72 MMHG | HEIGHT: 65 IN | HEART RATE: 55 BPM | SYSTOLIC BLOOD PRESSURE: 128 MMHG | WEIGHT: 151.81 LBS | OXYGEN SATURATION: 96 % | BODY MASS INDEX: 25.29 KG/M2

## 2022-03-16 DIAGNOSIS — Z76.0 MEDICATION REFILL: ICD-10-CM

## 2022-03-16 DIAGNOSIS — I73.9 PERIPHERAL VASCULAR DISEASE: ICD-10-CM

## 2022-03-16 DIAGNOSIS — E11.8 TYPE 2 DIABETES MELLITUS WITH COMPLICATION, WITHOUT LONG-TERM CURRENT USE OF INSULIN: ICD-10-CM

## 2022-03-16 DIAGNOSIS — I10 ESSENTIAL HYPERTENSION: ICD-10-CM

## 2022-03-16 DIAGNOSIS — J44.9 CHRONIC OBSTRUCTIVE PULMONARY DISEASE, UNSPECIFIED COPD TYPE: ICD-10-CM

## 2022-03-16 DIAGNOSIS — N18.32 STAGE 3B CHRONIC KIDNEY DISEASE: ICD-10-CM

## 2022-03-16 PROCEDURE — 1125F AMNT PAIN NOTED PAIN PRSNT: CPT | Mod: CPTII,S$GLB,, | Performed by: FAMILY MEDICINE

## 2022-03-16 PROCEDURE — 4010F PR ACE/ARB THEARPY RXD/TAKEN: ICD-10-PCS | Mod: CPTII,S$GLB,, | Performed by: FAMILY MEDICINE

## 2022-03-16 PROCEDURE — 3046F HEMOGLOBIN A1C LEVEL >9.0%: CPT | Mod: CPTII,S$GLB,, | Performed by: FAMILY MEDICINE

## 2022-03-16 PROCEDURE — 99999 PR PBB SHADOW E&M-EST. PATIENT-LVL III: ICD-10-PCS | Mod: PBBFAC,,, | Performed by: FAMILY MEDICINE

## 2022-03-16 PROCEDURE — 3078F DIAST BP <80 MM HG: CPT | Mod: CPTII,S$GLB,, | Performed by: FAMILY MEDICINE

## 2022-03-16 PROCEDURE — 3008F PR BODY MASS INDEX (BMI) DOCUMENTED: ICD-10-PCS | Mod: CPTII,S$GLB,, | Performed by: FAMILY MEDICINE

## 2022-03-16 PROCEDURE — 3078F PR MOST RECENT DIASTOLIC BLOOD PRESSURE < 80 MM HG: ICD-10-PCS | Mod: CPTII,S$GLB,, | Performed by: FAMILY MEDICINE

## 2022-03-16 PROCEDURE — 3288F FALL RISK ASSESSMENT DOCD: CPT | Mod: CPTII,S$GLB,, | Performed by: FAMILY MEDICINE

## 2022-03-16 PROCEDURE — 1125F PR PAIN SEVERITY QUANTIFIED, PAIN PRESENT: ICD-10-PCS | Mod: CPTII,S$GLB,, | Performed by: FAMILY MEDICINE

## 2022-03-16 PROCEDURE — 99999 PR PBB SHADOW E&M-EST. PATIENT-LVL III: CPT | Mod: PBBFAC,,, | Performed by: FAMILY MEDICINE

## 2022-03-16 PROCEDURE — 3008F BODY MASS INDEX DOCD: CPT | Mod: CPTII,S$GLB,, | Performed by: FAMILY MEDICINE

## 2022-03-16 PROCEDURE — 3074F PR MOST RECENT SYSTOLIC BLOOD PRESSURE < 130 MM HG: ICD-10-PCS | Mod: CPTII,S$GLB,, | Performed by: FAMILY MEDICINE

## 2022-03-16 PROCEDURE — 1159F MED LIST DOCD IN RCRD: CPT | Mod: CPTII,S$GLB,, | Performed by: FAMILY MEDICINE

## 2022-03-16 PROCEDURE — 3074F SYST BP LT 130 MM HG: CPT | Mod: CPTII,S$GLB,, | Performed by: FAMILY MEDICINE

## 2022-03-16 PROCEDURE — 99214 PR OFFICE/OUTPT VISIT, EST, LEVL IV, 30-39 MIN: ICD-10-PCS | Mod: S$GLB,,, | Performed by: FAMILY MEDICINE

## 2022-03-16 PROCEDURE — 3046F PR MOST RECENT HEMOGLOBIN A1C LEVEL > 9.0%: ICD-10-PCS | Mod: CPTII,S$GLB,, | Performed by: FAMILY MEDICINE

## 2022-03-16 PROCEDURE — 1159F PR MEDICATION LIST DOCUMENTED IN MEDICAL RECORD: ICD-10-PCS | Mod: CPTII,S$GLB,, | Performed by: FAMILY MEDICINE

## 2022-03-16 PROCEDURE — 1101F PT FALLS ASSESS-DOCD LE1/YR: CPT | Mod: CPTII,S$GLB,, | Performed by: FAMILY MEDICINE

## 2022-03-16 PROCEDURE — 1101F PR PT FALLS ASSESS DOC 0-1 FALLS W/OUT INJ PAST YR: ICD-10-PCS | Mod: CPTII,S$GLB,, | Performed by: FAMILY MEDICINE

## 2022-03-16 PROCEDURE — 4010F ACE/ARB THERAPY RXD/TAKEN: CPT | Mod: CPTII,S$GLB,, | Performed by: FAMILY MEDICINE

## 2022-03-16 PROCEDURE — 3288F PR FALLS RISK ASSESSMENT DOCUMENTED: ICD-10-PCS | Mod: CPTII,S$GLB,, | Performed by: FAMILY MEDICINE

## 2022-03-16 PROCEDURE — 99214 OFFICE O/P EST MOD 30 MIN: CPT | Mod: S$GLB,,, | Performed by: FAMILY MEDICINE

## 2022-03-16 RX ORDER — PANTOPRAZOLE SODIUM 40 MG/1
40 TABLET, DELAYED RELEASE ORAL DAILY
Qty: 90 TABLET | Refills: 1 | Status: SHIPPED | OUTPATIENT
Start: 2022-03-16

## 2022-03-16 RX ORDER — METOPROLOL TARTRATE 25 MG/1
25 TABLET, FILM COATED ORAL 2 TIMES DAILY
Qty: 180 TABLET | Refills: 1 | Status: SHIPPED | OUTPATIENT
Start: 2022-03-16

## 2022-03-16 RX ORDER — METFORMIN HYDROCHLORIDE 1000 MG/1
1000 TABLET ORAL
Qty: 180 TABLET | Refills: 1
Start: 2022-03-16 | End: 2024-03-17

## 2022-03-16 RX ORDER — PIOGLITAZONEHYDROCHLORIDE 15 MG/1
TABLET ORAL
Qty: 90 TABLET | Refills: 1 | Status: SHIPPED | OUTPATIENT
Start: 2022-03-16

## 2022-03-16 RX ORDER — LOSARTAN POTASSIUM 50 MG/1
50 TABLET ORAL 2 TIMES DAILY
Qty: 180 TABLET | Refills: 3 | Status: SHIPPED | OUTPATIENT
Start: 2022-03-16 | End: 2024-03-17 | Stop reason: DRUGHIGH

## 2022-03-16 RX ORDER — CITALOPRAM 40 MG/1
40 TABLET, FILM COATED ORAL DAILY
Qty: 90 TABLET | Refills: 1 | Status: SHIPPED | OUTPATIENT
Start: 2022-03-16

## 2022-03-16 RX ORDER — DIAZEPAM 10 MG/1
10 TABLET ORAL DAILY PRN
Qty: 30 TABLET | Refills: 1 | Status: SHIPPED | OUTPATIENT
Start: 2022-03-16

## 2022-03-16 RX ORDER — ROSUVASTATIN CALCIUM 20 MG/1
20 TABLET, COATED ORAL NIGHTLY
Qty: 90 TABLET | Refills: 1 | Status: SHIPPED | OUTPATIENT
Start: 2022-03-16 | End: 2024-03-17 | Stop reason: DRUGHIGH

## 2022-03-16 NOTE — PROGRESS NOTES
"Subjective:       Patient ID: Epi Mcintosh is a 72 y.o. female.    Chief Complaint: Follow-up (Lab review. Pt declines colon screening, Dexa and mammo. ), Leg Pain (Pt states when she walks her legs hurt.), and Anxiety (Pt requesting to increase Valium. )    HPI   Follow-up and lab review. Weight is stable. A1c has gone up, kidney function is slightly worse. States she's only taking metformin once daily, since taking it twice daily causes her to have diarrhea for hours. States her anxiety has gotten worse due to current world events. Reports her leg pain due to PAD has also gotten worse.    HM: declines colon cancer screening, dexa scan, and mammogram.    Review of Systems   Constitutional: Negative for chills, fever and unexpected weight change.   Psychiatric/Behavioral: Negative for hallucinations. The patient is nervous/anxious.        Past Medical History:   Diagnosis Date    Coronary artery disease     Depression     Diabetes mellitus     Hypertension     Pancreatitis      Past Surgical History:   Procedure Laterality Date    CARDIAC SURGERY      CORONARY ARTERY BYPASS GRAFT  2018    HYSTERECTOMY       Social History     Socioeconomic History    Marital status:    Tobacco Use    Smoking status: Former Smoker     Packs/day: 0.00     Types: Vaping w/o nicotine     Quit date: 4/15/2021     Years since quittin.9    Smokeless tobacco: Never Used   Substance and Sexual Activity    Alcohol use: Yes     Comment: occasionally    Drug use: Never     History reviewed. No pertinent family history.    Objective:      /72   Pulse (!) 55   Ht 5' 5" (1.651 m)   Wt 68.9 kg (151 lb 12.8 oz)   SpO2 96%   BMI 25.26 kg/m²   Physical Exam  Vitals reviewed.   Constitutional:       General: She is not in acute distress.     Appearance: She is normal weight. She is not toxic-appearing.   HENT:      Head: Normocephalic and atraumatic.   Eyes:      General: No scleral icterus.     " Conjunctiva/sclera: Conjunctivae normal.   Cardiovascular:      Rate and Rhythm: Normal rate.   Pulmonary:      Effort: Pulmonary effort is normal. No respiratory distress.   Neurological:      Mental Status: She is alert and oriented to person, place, and time.   Psychiatric:         Mood and Affect: Mood normal.         Behavior: Behavior normal.         Thought Content: Thought content normal.         Assessment:       1. Medication refill    2. Essential hypertension, dx 2018    3. Type 2 diabetes mellitus with complication, without long-term current use of insulin    4. Peripheral vascular disease    5. Chronic obstructive pulmonary disease, unspecified COPD type    6. Stage 3b chronic kidney disease        Plan:       Meds refilled (only taking metformin once daily), actos added. Rosuvastatin dose decreased due to worsening kidney function, but patient to schedule f/u with vascular and it may need to be increased again. Repeat labs in 4 months. Patient encouraged to increase her water intake.    Medication refill  -     citalopram (CELEXA) 40 MG tablet; Take 1 tablet (40 mg total) by mouth once daily.  Dispense: 90 tablet; Refill: 1  -     pantoprazole (PROTONIX) 40 MG tablet; Take 1 tablet (40 mg total) by mouth once daily.  Dispense: 90 tablet; Refill: 1  -     diazePAM (VALIUM) 10 MG Tab; Take 1 tablet (10 mg total) by mouth daily as needed for Anxiety.  Dispense: 30 tablet; Refill: 1    Essential hypertension, dx 2018  -     losartan (COZAAR) 50 MG tablet; Take 1 tablet (50 mg total) by mouth 2 (two) times daily.  Dispense: 180 tablet; Refill: 3  -     metoprolol tartrate (LOPRESSOR) 25 MG tablet; Take 1 tablet (25 mg total) by mouth 2 (two) times daily.  Dispense: 180 tablet; Refill: 1  -     TSH; Future; Expected date: 03/16/2022    Type 2 diabetes mellitus with complication, without long-term current use of insulin  -     rosuvastatin (CRESTOR) 20 MG tablet; Take 1 tablet (20 mg total) by mouth every  evening.  Dispense: 90 tablet; Refill: 1  -     metFORMIN (GLUCOPHAGE) 1000 MG tablet; Take 1 tablet (1,000 mg total) by mouth daily with breakfast.  Dispense: 180 tablet; Refill: 1  -     pioglitazone (ACTOS) 15 MG tablet; Take 15 mg daily with dinner  Dispense: 90 tablet; Refill: 1  -     Lipid Panel; Future; Expected date: 03/16/2022  -     CBC Auto Differential; Future; Expected date: 03/16/2022  -     Comprehensive Metabolic Panel; Future; Expected date: 03/16/2022  -     TSH; Future; Expected date: 03/16/2022  -     Hemoglobin A1C; Future; Expected date: 03/16/2022    Peripheral vascular disease  -     Lipid Panel; Future; Expected date: 03/16/2022  -     CBC Auto Differential; Future; Expected date: 03/16/2022  -     Comprehensive Metabolic Panel; Future; Expected date: 03/16/2022    Chronic obstructive pulmonary disease, unspecified COPD type    Stage 3b chronic kidney disease            Risks, benefits, and side effects were discussed with the patient. All questions were answered to the fullest satisfaction of the patient, and pt verbalized understanding and agreement to treatment plan. Pt was to call with any new or worsening symptoms, or present to the ER.

## 2022-03-21 ENCOUNTER — TELEPHONE (OUTPATIENT)
Dept: VASCULAR SURGERY | Facility: CLINIC | Age: 72
End: 2022-03-21
Payer: MEDICARE

## 2022-03-21 NOTE — TELEPHONE ENCOUNTER
----- Message from Caroline Huddleston sent at 3/21/2022 11:51 AM CDT -----  Contact: Epi  .Type:  Sooner Apoointment Request    Caller is requesting a sooner appointment.  Caller declined first available appointment listed below.  Caller will not accept being placed on the waitlist and is requesting a message be sent to doctor.  Name of Caller: Epi   When is the first available appointment?:  Unknown  Symptoms: consult for stents in leg  Would the patient rather a call back or a [facility]? call  Best Call Back Number: .[phone]  Additional Information: pleas give patient a call back as requested.  Thanks,  RP

## 2022-03-22 ENCOUNTER — TELEPHONE (OUTPATIENT)
Dept: VASCULAR SURGERY | Facility: CLINIC | Age: 72
End: 2022-03-22
Payer: MEDICARE

## 2022-03-22 DIAGNOSIS — I73.9 PERIPHERAL VASCULAR DISEASE, UNSPECIFIED: ICD-10-CM

## 2022-03-22 DIAGNOSIS — Z79.01 LONG TERM (CURRENT) USE OF ANTICOAGULANTS: ICD-10-CM

## 2022-03-22 DIAGNOSIS — I73.9 PERIPHERAL VASCULAR DISEASE: Primary | ICD-10-CM

## 2022-03-22 NOTE — TELEPHONE ENCOUNTER
----- Message from Julianna Galdamez sent at 3/22/2022  9:59 AM CDT -----  Type:  Patient Returning Call    Who Called:  Patient  Who Left Message for Patient:  Mary Jane  Does the patient know what this is regarding?:  yes  Best Call Back Number:  [phone]  Additional Information:  No response on messenger

## 2022-03-30 ENCOUNTER — HOSPITAL ENCOUNTER (OUTPATIENT)
Dept: PREADMISSION TESTING | Facility: HOSPITAL | Age: 72
Discharge: HOME | End: 2022-03-30
Attending: THORACIC SURGERY (CARDIOTHORACIC VASCULAR SURGERY)
Payer: MEDICARE

## 2022-03-30 ENCOUNTER — HOSPITAL ENCOUNTER (OUTPATIENT)
Dept: RADIOLOGY | Facility: HOSPITAL | Age: 72
Discharge: HOME | End: 2022-03-30
Attending: THORACIC SURGERY (CARDIOTHORACIC VASCULAR SURGERY)
Payer: MEDICARE

## 2022-03-30 VITALS — BODY MASS INDEX: 25.16 KG/M2 | HEIGHT: 65 IN | WEIGHT: 151 LBS

## 2022-03-30 DIAGNOSIS — I73.9 PERIPHERAL VASCULAR DISEASE: ICD-10-CM

## 2022-03-30 DIAGNOSIS — Z01.818 PRE-OP EXAM: Primary | ICD-10-CM

## 2022-03-30 DIAGNOSIS — Z79.01 LONG TERM (CURRENT) USE OF ANTICOAGULANTS: ICD-10-CM

## 2022-03-30 DIAGNOSIS — Z01.818 PRE-OP TESTING: ICD-10-CM

## 2022-03-30 LAB
ANION GAP SERPL CALC-SCNC: 12 MMOL/L (ref 8–16)
BASOPHILS # BLD AUTO: 0.02 K/UL (ref 0–0.2)
BASOPHILS NFR BLD: 0.4 % (ref 0–1.9)
BUN SERPL-MCNC: 27 MG/DL (ref 8–23)
CALCIUM SERPL-MCNC: 9.2 MG/DL (ref 8.7–10.5)
CHLORIDE SERPL-SCNC: 98 MMOL/L (ref 95–110)
CO2 SERPL-SCNC: 24 MMOL/L (ref 23–29)
CREAT SERPL-MCNC: 1.7 MG/DL (ref 0.5–1.4)
DIFFERENTIAL METHOD: ABNORMAL
EOSINOPHIL # BLD AUTO: 0.1 K/UL (ref 0–0.5)
EOSINOPHIL NFR BLD: 1.1 % (ref 0–8)
ERYTHROCYTE [DISTWIDTH] IN BLOOD BY AUTOMATED COUNT: 13.2 % (ref 11.5–14.5)
EST. GFR  (AFRICAN AMERICAN): 34 ML/MIN/1.73 M^2
EST. GFR  (NON AFRICAN AMERICAN): 29.5 ML/MIN/1.73 M^2
GLUCOSE SERPL-MCNC: 341 MG/DL (ref 70–110)
HCT VFR BLD AUTO: 40.4 % (ref 37–48.5)
HGB BLD-MCNC: 12.8 G/DL (ref 12–16)
IMM GRANULOCYTES # BLD AUTO: 0.03 K/UL (ref 0–0.04)
IMM GRANULOCYTES NFR BLD AUTO: 0.6 % (ref 0–0.5)
LYMPHOCYTES # BLD AUTO: 1.5 K/UL (ref 1–4.8)
LYMPHOCYTES NFR BLD: 27 % (ref 18–48)
MCH RBC QN AUTO: 31.1 PG (ref 27–31)
MCHC RBC AUTO-ENTMCNC: 31.7 G/DL (ref 32–36)
MCV RBC AUTO: 98 FL (ref 82–98)
MONOCYTES # BLD AUTO: 0.4 K/UL (ref 0.3–1)
MONOCYTES NFR BLD: 7.9 % (ref 4–15)
NEUTROPHILS # BLD AUTO: 3.4 K/UL (ref 1.8–7.7)
NEUTROPHILS NFR BLD: 63 % (ref 38–73)
NRBC BLD-RTO: 0 /100 WBC
PLATELET # BLD AUTO: 224 K/UL (ref 150–450)
PMV BLD AUTO: 9.4 FL (ref 9.2–12.9)
POTASSIUM SERPL-SCNC: 4.2 MMOL/L (ref 3.5–5.1)
RBC # BLD AUTO: 4.12 M/UL (ref 4–5.4)
SARS-COV-2 RDRP RESP QL NAA+PROBE: NEGATIVE
SODIUM SERPL-SCNC: 134 MMOL/L (ref 136–145)
WBC # BLD AUTO: 5.45 K/UL (ref 3.9–12.7)

## 2022-03-30 PROCEDURE — 93005 ELECTROCARDIOGRAM TRACING: CPT | Performed by: GENERAL PRACTICE

## 2022-03-30 PROCEDURE — 36415 COLL VENOUS BLD VENIPUNCTURE: CPT | Performed by: THORACIC SURGERY (CARDIOTHORACIC VASCULAR SURGERY)

## 2022-03-30 PROCEDURE — 85025 COMPLETE CBC W/AUTO DIFF WBC: CPT | Performed by: THORACIC SURGERY (CARDIOTHORACIC VASCULAR SURGERY)

## 2022-03-30 PROCEDURE — U0002 COVID-19 LAB TEST NON-CDC: HCPCS | Performed by: THORACIC SURGERY (CARDIOTHORACIC VASCULAR SURGERY)

## 2022-03-30 PROCEDURE — 93010 EKG 12-LEAD: ICD-10-PCS | Mod: ,,, | Performed by: GENERAL PRACTICE

## 2022-03-30 PROCEDURE — 71046 X-RAY EXAM CHEST 2 VIEWS: CPT | Mod: TC

## 2022-03-30 PROCEDURE — 93010 ELECTROCARDIOGRAM REPORT: CPT | Mod: ,,, | Performed by: GENERAL PRACTICE

## 2022-03-30 PROCEDURE — 80048 BASIC METABOLIC PNL TOTAL CA: CPT | Performed by: THORACIC SURGERY (CARDIOTHORACIC VASCULAR SURGERY)

## 2022-03-30 NOTE — DISCHARGE INSTRUCTIONS
Nothing to eat or drink after midnight the night before your procedure.  Do not take any medications the morning of your procedure  Bring all your medications with you in the original pill bottles from pharmacy.  If you take blood thinners, ask your doctor if you should stop taking them.  Do not take metformin 24 hours prior to your procedure.  Do your chlorhexidine wash the night before and morning of your procedure.  Make arrangements for someone you know to drive you home after your procedure. Taxi and Uber are not acceptable.

## 2022-04-01 ENCOUNTER — HOSPITAL ENCOUNTER (OUTPATIENT)
Facility: HOSPITAL | Age: 72
Discharge: HOME | End: 2022-04-01
Attending: THORACIC SURGERY (CARDIOTHORACIC VASCULAR SURGERY) | Admitting: THORACIC SURGERY (CARDIOTHORACIC VASCULAR SURGERY)
Payer: MEDICARE

## 2022-04-01 VITALS
WEIGHT: 151 LBS | BODY MASS INDEX: 25.16 KG/M2 | OXYGEN SATURATION: 99 % | DIASTOLIC BLOOD PRESSURE: 62 MMHG | HEIGHT: 65 IN | SYSTOLIC BLOOD PRESSURE: 160 MMHG | RESPIRATION RATE: 17 BRPM | HEART RATE: 63 BPM

## 2022-04-01 DIAGNOSIS — I73.9 PERIPHERAL VASCULAR DISEASE, UNSPECIFIED: ICD-10-CM

## 2022-04-01 DIAGNOSIS — I73.9 PERIPHERAL VASCULAR DISEASE: ICD-10-CM

## 2022-04-01 DIAGNOSIS — F17.298 OTHER TOBACCO PRODUCT NICOTINE DEPENDENCE WITH OTHER NICOTINE-INDUCED DISORDER: Primary | ICD-10-CM

## 2022-04-01 PROCEDURE — C1894 INTRO/SHEATH, NON-LASER: HCPCS | Performed by: THORACIC SURGERY (CARDIOTHORACIC VASCULAR SURGERY)

## 2022-04-01 PROCEDURE — 25000003 PHARM REV CODE 250: Performed by: THORACIC SURGERY (CARDIOTHORACIC VASCULAR SURGERY)

## 2022-04-01 PROCEDURE — 37226 HC FEM/POPL REVASC W/STENT: CPT | Mod: RT | Performed by: THORACIC SURGERY (CARDIOTHORACIC VASCULAR SURGERY)

## 2022-04-01 PROCEDURE — 75625 PR  ANGIO AORTOGRAM ABD SERIAL: ICD-10-PCS | Mod: 26,,, | Performed by: THORACIC SURGERY (CARDIOTHORACIC VASCULAR SURGERY)

## 2022-04-01 PROCEDURE — 99153 MOD SED SAME PHYS/QHP EA: CPT | Performed by: THORACIC SURGERY (CARDIOTHORACIC VASCULAR SURGERY)

## 2022-04-01 PROCEDURE — 99152 MOD SED SAME PHYS/QHP 5/>YRS: CPT | Mod: ,,, | Performed by: THORACIC SURGERY (CARDIOTHORACIC VASCULAR SURGERY)

## 2022-04-01 PROCEDURE — 63600175 PHARM REV CODE 636 W HCPCS: Performed by: THORACIC SURGERY (CARDIOTHORACIC VASCULAR SURGERY)

## 2022-04-01 PROCEDURE — 75710 PR  ANGIO EXTREMITY UNILAT: ICD-10-PCS | Mod: 26,59,, | Performed by: THORACIC SURGERY (CARDIOTHORACIC VASCULAR SURGERY)

## 2022-04-01 PROCEDURE — 75710 ARTERY X-RAYS ARM/LEG: CPT | Mod: 26,59,, | Performed by: THORACIC SURGERY (CARDIOTHORACIC VASCULAR SURGERY)

## 2022-04-01 PROCEDURE — 37226 PR FEM/POPL REVASC W/STENT: CPT | Mod: RT,,, | Performed by: THORACIC SURGERY (CARDIOTHORACIC VASCULAR SURGERY)

## 2022-04-01 PROCEDURE — 75625 CONTRAST EXAM ABDOMINL AORTA: CPT | Mod: 26,,, | Performed by: THORACIC SURGERY (CARDIOTHORACIC VASCULAR SURGERY)

## 2022-04-01 PROCEDURE — 99152 PR MOD CONSCIOUS SEDATION, SAME PHYS, 5+ YRS, FIRST 15 MIN: ICD-10-PCS | Mod: ,,, | Performed by: THORACIC SURGERY (CARDIOTHORACIC VASCULAR SURGERY)

## 2022-04-01 PROCEDURE — 25500020 PHARM REV CODE 255: Performed by: THORACIC SURGERY (CARDIOTHORACIC VASCULAR SURGERY)

## 2022-04-01 PROCEDURE — 37221 HC ILIAC REVASC W/STENT: CPT | Mod: RT | Performed by: THORACIC SURGERY (CARDIOTHORACIC VASCULAR SURGERY)

## 2022-04-01 PROCEDURE — C1769 GUIDE WIRE: HCPCS | Performed by: THORACIC SURGERY (CARDIOTHORACIC VASCULAR SURGERY)

## 2022-04-01 PROCEDURE — C1725 CATH, TRANSLUMIN NON-LASER: HCPCS | Performed by: THORACIC SURGERY (CARDIOTHORACIC VASCULAR SURGERY)

## 2022-04-01 PROCEDURE — 27201423 OPTIME MED/SURG SUP & DEVICES STERILE SUPPLY: Performed by: THORACIC SURGERY (CARDIOTHORACIC VASCULAR SURGERY)

## 2022-04-01 PROCEDURE — 99152 MOD SED SAME PHYS/QHP 5/>YRS: CPT | Performed by: THORACIC SURGERY (CARDIOTHORACIC VASCULAR SURGERY)

## 2022-04-01 PROCEDURE — C1887 CATHETER, GUIDING: HCPCS | Performed by: THORACIC SURGERY (CARDIOTHORACIC VASCULAR SURGERY)

## 2022-04-01 PROCEDURE — C1876 STENT, NON-COA/NON-COV W/DEL: HCPCS | Performed by: THORACIC SURGERY (CARDIOTHORACIC VASCULAR SURGERY)

## 2022-04-01 PROCEDURE — 37226 PR FEM/POPL REVASC W/STENT: ICD-10-PCS | Mod: RT,,, | Performed by: THORACIC SURGERY (CARDIOTHORACIC VASCULAR SURGERY)

## 2022-04-01 PROCEDURE — C1874 STENT, COATED/COV W/DEL SYS: HCPCS | Performed by: THORACIC SURGERY (CARDIOTHORACIC VASCULAR SURGERY)

## 2022-04-01 PROCEDURE — 75630 X-RAY AORTA LEG ARTERIES: CPT | Mod: XU | Performed by: THORACIC SURGERY (CARDIOTHORACIC VASCULAR SURGERY)

## 2022-04-01 DEVICE — PREMOUNTED STENT SYSTEM
Type: IMPLANTABLE DEVICE | Site: ARTERIAL | Status: FUNCTIONAL
Brand: EXPRESS® LD ILIAC / BILIARY

## 2022-04-01 DEVICE — DRUG-ELUTING VASCULAR STENT SYSTEM
Type: IMPLANTABLE DEVICE | Site: ARTERIAL | Status: FUNCTIONAL
Brand: ELUVIA™

## 2022-04-01 RX ORDER — FENTANYL CITRATE 50 UG/ML
INJECTION, SOLUTION INTRAMUSCULAR; INTRAVENOUS
Status: DISCONTINUED | OUTPATIENT
Start: 2022-04-01 | End: 2022-04-01 | Stop reason: HOSPADM

## 2022-04-01 RX ORDER — HYDRALAZINE HYDROCHLORIDE 20 MG/ML
INJECTION INTRAMUSCULAR; INTRAVENOUS
Status: DISCONTINUED | OUTPATIENT
Start: 2022-04-01 | End: 2022-04-01 | Stop reason: HOSPADM

## 2022-04-01 RX ORDER — HEPARIN SODIUM 1000 [USP'U]/ML
INJECTION, SOLUTION INTRAVENOUS; SUBCUTANEOUS
Status: DISCONTINUED | OUTPATIENT
Start: 2022-04-01 | End: 2022-04-01 | Stop reason: HOSPADM

## 2022-04-01 RX ORDER — OXYCODONE HYDROCHLORIDE 5 MG/1
5 TABLET ORAL EVERY 4 HOURS PRN
Status: DISCONTINUED | OUTPATIENT
Start: 2022-04-01 | End: 2022-04-01 | Stop reason: HOSPADM

## 2022-04-01 RX ORDER — SODIUM CHLORIDE 9 MG/ML
INJECTION, SOLUTION INTRAVENOUS CONTINUOUS
Status: ACTIVE | OUTPATIENT
Start: 2022-04-01

## 2022-04-01 RX ORDER — ACETAMINOPHEN 325 MG/1
650 TABLET ORAL EVERY 4 HOURS PRN
Status: DISCONTINUED | OUTPATIENT
Start: 2022-04-01 | End: 2022-04-01 | Stop reason: HOSPADM

## 2022-04-01 RX ORDER — SODIUM CHLORIDE 9 MG/ML
INJECTION, SOLUTION INTRAVENOUS CONTINUOUS
Status: DISCONTINUED | OUTPATIENT
Start: 2022-04-01 | End: 2022-04-01 | Stop reason: HOSPADM

## 2022-04-01 RX ORDER — MIDAZOLAM HYDROCHLORIDE 1 MG/ML
INJECTION INTRAMUSCULAR; INTRAVENOUS
Status: DISCONTINUED | OUTPATIENT
Start: 2022-04-01 | End: 2022-04-01 | Stop reason: HOSPADM

## 2022-04-01 RX ORDER — METOCLOPRAMIDE HYDROCHLORIDE 5 MG/ML
5 INJECTION INTRAMUSCULAR; INTRAVENOUS EVERY 6 HOURS PRN
Status: DISCONTINUED | OUTPATIENT
Start: 2022-04-01 | End: 2022-04-01 | Stop reason: HOSPADM

## 2022-04-01 RX ORDER — CLOPIDOGREL BISULFATE 75 MG/1
75 TABLET ORAL DAILY
Qty: 30 TABLET | Refills: 11 | Status: SHIPPED | OUTPATIENT
Start: 2022-04-01 | End: 2024-04-01

## 2022-04-01 RX ORDER — LIDOCAINE HYDROCHLORIDE 10 MG/ML
INJECTION, SOLUTION EPIDURAL; INFILTRATION; INTRACAUDAL; PERINEURAL
Status: DISCONTINUED | OUTPATIENT
Start: 2022-04-01 | End: 2022-04-01 | Stop reason: HOSPADM

## 2022-04-01 RX ORDER — IODIXANOL 320 MG/ML
INJECTION, SOLUTION INTRAVASCULAR
Status: DISCONTINUED | OUTPATIENT
Start: 2022-04-01 | End: 2022-04-01 | Stop reason: HOSPADM

## 2022-04-01 RX ADMIN — SODIUM CHLORIDE: 0.9 INJECTION, SOLUTION INTRAVENOUS at 10:04

## 2022-04-01 RX ADMIN — OXYCODONE HYDROCHLORIDE 5 MG: 5 TABLET ORAL at 04:04

## 2022-04-01 NOTE — OP NOTE
This is Dr. Kaufman dictating with date of service being 1st April 2022.  Preoperative diagnosis:  Peripheral arterial disease with disabling right lower extremity claudication.  Postoperative diagnosis:  Same.  Operation:  Aortography with lower extremity runoff and selective right lower extremity angiography with angioplasty and stenting of the right superficial femoral artery with 6 mm self expanding stents and angioplasty and stenting of the right common iliac artery with a 7 mm balloon expandable stent using conscious moderate sedation.  Operation in detail:  The patient was prepped and draped in usual sterile fashion.  Conscious moderate sedation was used for the procedure which consisted of fentanyl and Versed and the doses described in the nursing notes.  Continuous pulse oximetry, telemetry and blood pressure were monitored during the procedure which took about an hour.  Local anesthesia was infiltrated over the left common femoral artery which was then punctured and J-wire advanced.  A sheath was then placed.  With catheter and guidewire techniques omni Flush catheter was placed into the distal abdominal aorta where aortography was performed revealing patency of the infrarenal abdominal aorta with moderate stenosis of the distal abdominal aorta.  The right renal artery had a stenosis in the range of 60%.  The iliac arteries were patent but there was a high-grade right common iliac artery stenosis.  The external and internal iliac arteries were patent.  The catheter was brought down to the bifurcation and the runoff of the right lower extremity revealed patency of the common and deep femoral arteries.  The superficial femoral artery was chronically occluded.  The popliteal artery reconstituted above the knee and there was satisfactory 2 vessel runoff through the calf to the foot.  Based on the diagnostic findings the patient was partially heparinized.  Intervention was then performed.  With catheter and  guidewire techniques the lesion within the right superficial femoral artery was crossed with a Glidewire and a directional catheter.  The occlusion was treated 1st with angioplasty.  There was residual dissection and therefore to 5 mm self expanding stents were used to treat the diseased segment.  Angiography revealed that there was an excellent result with little to no residual stenosis.  The right common iliac artery was treated with a 7 mm balloon expandable stent.  An excellent result was achieved with little to no residual stenosis.  At this point the procedure was concluded.  The catheters and wires as well as the sheath were removed from the left common femoral artery and gentle manual pressure applied.  The patient tolerated the procedure well and there appeared to be no major obvious intraoperative complications.  The patient was brought to the recovery area in satisfactory condition.

## 2022-04-01 NOTE — Clinical Note
The catheter was removed from the  ostial right common iliac arteries. Stent deployment device removed.

## 2022-04-01 NOTE — Clinical Note
The catheter was removed from the right superficial femoral artery. Stent deployment device removed.

## 2022-04-01 NOTE — Clinical Note
The catheter was inserted into the  distal right superficial femoral artery. Self expanding deployed.

## 2022-04-01 NOTE — Clinical Note
The catheter was inserted into the right superficial femoral artery. Distal-prox. 8atm 20sec, 7atm 15sec.

## 2022-04-01 NOTE — Clinical Note
The catheter was removed from the  distal right superficial femoral artery. Stent deployment device removed.

## 2022-04-01 NOTE — Clinical Note
An angiography was performed of the mid suprarenal abdominal aorta  via power injection. Injection was performed with 10 mL contrast at 10 mL/s. The power injection PSI was 500.

## 2022-04-01 NOTE — Clinical Note
An angiography of the  right lower extremity was performed with the catheter and power injected with 10 mL contrast at at 10 mL/s.  Multiple images taken

## 2022-04-01 NOTE — Clinical Note
The catheter was inserted into the right superficial femoral artery. Distal-proximal. 8atm 15sec x2 inflations.

## 2022-04-02 ENCOUNTER — NURSE TRIAGE (OUTPATIENT)
Dept: ADMINISTRATIVE | Facility: CLINIC | Age: 72
End: 2022-04-02
Payer: MEDICARE

## 2022-04-02 NOTE — TELEPHONE ENCOUNTER
Attempted to call x 2 no answer.  LVM to call back if further assistance is needed.      Reason for Disposition   Second attempt to contact caller AND no contact made. Phone number verified.    Protocols used: NO CONTACT OR DUPLICATE CONTACT CALL-A-AH

## 2022-04-02 NOTE — NURSING TRANSFER
Nursing Transfer Note      4/1/2022     Reason patient is being transferred: hold over bed    Transfer To: [id number]    Transfer via stretcher    Transfer with cardiac monitoring    Transported by     Medicines sent: none    Any special needs or follow-up needed: assess groin for bleeding    Chart send with patient: Yes    Notified: friend    Patient reassessed at: 04/01/2022 1840    Upon arrival to floor: cardiac monitor applied, patient oriented to room, call bell in reach and bed in lowest position

## 2022-04-02 NOTE — TELEPHONE ENCOUNTER
Spoke with patient she states she had sten surgery on yesterday with Dr. Kaufman.  Patient rates current pain 12/10 at this rime.  States she had 5 stents placed in her right leg.  Patient states it feels like her leg is being hit with a hammer.  Per protocol attempted to contact on call provider.  Per Kristopher () there is no on call provider listed.  Advised patient to go to ER for further evaluation.  Patient verbalized understanding.     Reason for Disposition   [1] SEVERE post-op pain (e.g., excruciating, pain scale 8-10) AND [2] not controlled with pain medications    Additional Information   Negative: Sounds like a life-threatening emergency to the triager   Negative: [1] Widespread rash AND [2] bright red, sunburn-like   Negative: [1] SEVERE headache AND [2] after spinal (epidural) anesthesia   Negative: [1] Vomiting AND [2] persists > 4 hours   Negative: [1] Vomiting AND [2] abdomen looks much more swollen than usual   Negative: [1] Drinking very little AND [2] dehydration suspected (e.g., no urine > 12 hours, very dry mouth, very lightheaded)   Negative: Patient sounds very sick or weak to the triager   Negative: Sounds like a serious complication to the triager   Negative: Fever > 100.4 F (38.0 C)    Protocols used: POST-OP SYMPTOMS AND [id number]

## 2022-04-02 NOTE — PLAN OF CARE
04/02/22 0907   Final Note   Assessment Type Final Discharge Note   Anticipated Discharge Disposition Home   What phone number can be called within the next 1-3 days to see how you are doing after discharge? [phone]   Post-Acute Status   Discharge Delays None known at this time       Chart and discharge orders reviewed.  Patient discharged home with no further case management needs.

## 2022-04-02 NOTE — NURSING
Discharge patient education given. Pt brought downstairs via wheelchair accompanied by friend. Discharged to home.

## 2022-04-04 ENCOUNTER — TELEPHONE (OUTPATIENT)
Dept: VASCULAR SURGERY | Facility: CLINIC | Age: 72
End: 2022-04-04
Payer: MEDICARE

## 2022-04-04 ENCOUNTER — PATIENT MESSAGE (OUTPATIENT)
Dept: ADMINISTRATIVE | Facility: HOSPITAL | Age: 72
End: 2022-04-04
Payer: MEDICARE

## 2022-04-04 NOTE — H&P
This patient has peripheral arterial disease.  She is brought in for elective angiography of the extremities.  Past history is pertinent for smoking.  Other issues are part of the epic record and recent documentation.  She has no other pertinent family social history.  She has had no major recent surgeries.  On exam vital signs are stable.  Pupils are equal and round reactive to light.  Neck is supple.  Chest is equal breath sounds.  Heart is in a regular rate and rhythm.  Abdomen is benign.  Femoral and pedal pulses are diminished bilaterally.  Doppler signals are monophasic in the foot.  Recent studies were reviewed and show significant peripheral arterial disease are primarily superficial femoral arteries bilaterally.  The plan is to proceed with angiography of the extremities and intervention as necessary.

## 2022-04-04 NOTE — TELEPHONE ENCOUNTER
----- Message from Brittney Donovan sent at 4/4/2022 11:57 AM CDT -----  Pt called requesting a call back in regards to some pain medicine , please give a call back at  [phone]

## 2022-04-04 NOTE — TELEPHONE ENCOUNTER
Tramadol 50 mg #30 -one tab every 4-6 hours as needed for pain called to [facility] [phone] per . To call if no improvement or have more concerns.

## 2022-04-06 ENCOUNTER — TELEPHONE (OUTPATIENT)
Dept: VASCULAR SURGERY | Facility: CLINIC | Age: 72
End: 2022-04-06
Payer: MEDICARE

## 2022-04-06 NOTE — TELEPHONE ENCOUNTER
----- Message from Rosana Brooks sent at 4/6/2022 10:49 AM CDT -----  Contact: JAVI WRIGHT [[id number]]  .Type:  Needs Medical Advice    Who Called: JAVI WRIGHT [[id number]]  Would the patient rather a call back or a response via [facility]?call   Best Call Back Number:..[phone] (home)    Additional Information: Pt is req a call back in regards to having a follow up appointment scheduled... Thanks AW

## 2022-04-08 ENCOUNTER — TELEPHONE (OUTPATIENT)
Dept: FAMILY MEDICINE | Facility: CLINIC | Age: 72
End: 2022-04-08
Payer: MEDICARE

## 2022-04-08 DIAGNOSIS — R06.02 SHORTNESS OF BREATH: Primary | ICD-10-CM

## 2022-04-08 NOTE — TELEPHONE ENCOUNTER
Had procedure last week, and was placed on plavix. Operating provider is out of town. Patient states she feels very feels breathless, no energy. Patient would like Dr. Kendall advise. Informed patient Dr. Kendall is not in clinic today but I would send a message. Advised patient if she is breathless and feels she cannot catch her breath, she should report to local ED. Patient voiced understanding

## 2022-04-08 NOTE — TELEPHONE ENCOUNTER
----- Message from Brooke Sylvester sent at 4/8/2022 12:20 PM CDT -----  Type:  Same Day Appointment Request    Caller is requesting a same day appointment.  Caller declined first available appointment listed below.      Name of Caller:Epi     When is the first available appointment?07/21    Symptoms: Pt States that she had stents put in and she think something is wrong or she is having an reaction to the new medication she started    Best Call Back Number:[phone]

## 2022-04-11 ENCOUNTER — PATIENT MESSAGE (OUTPATIENT)
Dept: FAMILY MEDICINE | Facility: CLINIC | Age: 72
End: 2022-04-11
Payer: MEDICARE

## 2022-04-11 ENCOUNTER — PATIENT OUTREACH (OUTPATIENT)
Dept: ADMINISTRATIVE | Facility: OTHER | Age: 72
End: 2022-04-11
Payer: MEDICARE

## 2022-04-11 NOTE — TELEPHONE ENCOUNTER
Please contact patient to see how she's feeling and if she went to the ER. I'll order a cbc, bnp, and cmp for her to do today if she didn't go to the ER over the weekend. Thanks

## 2022-04-11 NOTE — PROGRESS NOTES
Chart was reviewed for overdue [facility] Encounters (NOELLE)  topics  Updates were requested from care everywhere  Health Maintenance has been updated  [place] immunization registry triggered

## 2022-04-11 NOTE — TELEPHONE ENCOUNTER
Message sent via PP   Patient with elevated TSH 22 and low norm T4 0.79 with symptoms. Will start 112 mcg of levothyroxine on patient. May be causing worsening of headaches and fatigue over the last year as 1 year ago her thyroid function was normal. Will c/w prn fiorcet and verapamil at this time and monitor for improvement with treatment of thyroid disease. Will repeat TSH in 6 weeks (second week of January). If not improvement in headache symptoms after improvement in thyroid function and prophylactic medication, will send to neurology.

## 2022-04-13 ENCOUNTER — OFFICE VISIT (OUTPATIENT)
Dept: VASCULAR SURGERY | Facility: CLINIC | Age: 72
End: 2022-04-13
Payer: MEDICARE

## 2022-04-13 VITALS — HEIGHT: 65 IN | BODY MASS INDEX: 25.13 KG/M2

## 2022-04-13 DIAGNOSIS — I73.9 PAD (PERIPHERAL ARTERY DISEASE): Primary | ICD-10-CM

## 2022-04-13 PROCEDURE — 99999 PR PBB SHADOW E&M-EST. PATIENT-LVL III: ICD-10-PCS | Mod: PBBFAC,,, | Performed by: THORACIC SURGERY (CARDIOTHORACIC VASCULAR SURGERY)

## 2022-04-13 PROCEDURE — 1125F AMNT PAIN NOTED PAIN PRSNT: CPT | Mod: CPTII,S$GLB,, | Performed by: THORACIC SURGERY (CARDIOTHORACIC VASCULAR SURGERY)

## 2022-04-13 PROCEDURE — 99024 POSTOP FOLLOW-UP VISIT: CPT | Mod: S$GLB,,, | Performed by: THORACIC SURGERY (CARDIOTHORACIC VASCULAR SURGERY)

## 2022-04-13 PROCEDURE — 3008F BODY MASS INDEX DOCD: CPT | Mod: CPTII,S$GLB,, | Performed by: THORACIC SURGERY (CARDIOTHORACIC VASCULAR SURGERY)

## 2022-04-13 PROCEDURE — 1125F PR PAIN SEVERITY QUANTIFIED, PAIN PRESENT: ICD-10-PCS | Mod: CPTII,S$GLB,, | Performed by: THORACIC SURGERY (CARDIOTHORACIC VASCULAR SURGERY)

## 2022-04-13 PROCEDURE — 3046F PR MOST RECENT HEMOGLOBIN A1C LEVEL > 9.0%: ICD-10-PCS | Mod: CPTII,S$GLB,, | Performed by: THORACIC SURGERY (CARDIOTHORACIC VASCULAR SURGERY)

## 2022-04-13 PROCEDURE — 99999 PR PBB SHADOW E&M-EST. PATIENT-LVL III: CPT | Mod: PBBFAC,,, | Performed by: THORACIC SURGERY (CARDIOTHORACIC VASCULAR SURGERY)

## 2022-04-13 PROCEDURE — 99024 PR POST-OP FOLLOW-UP VISIT: ICD-10-PCS | Mod: S$GLB,,, | Performed by: THORACIC SURGERY (CARDIOTHORACIC VASCULAR SURGERY)

## 2022-04-13 PROCEDURE — 3288F PR FALLS RISK ASSESSMENT DOCUMENTED: ICD-10-PCS | Mod: CPTII,S$GLB,, | Performed by: THORACIC SURGERY (CARDIOTHORACIC VASCULAR SURGERY)

## 2022-04-13 PROCEDURE — 4010F ACE/ARB THERAPY RXD/TAKEN: CPT | Mod: CPTII,S$GLB,, | Performed by: THORACIC SURGERY (CARDIOTHORACIC VASCULAR SURGERY)

## 2022-04-13 PROCEDURE — 3008F PR BODY MASS INDEX (BMI) DOCUMENTED: ICD-10-PCS | Mod: CPTII,S$GLB,, | Performed by: THORACIC SURGERY (CARDIOTHORACIC VASCULAR SURGERY)

## 2022-04-13 PROCEDURE — 1159F PR MEDICATION LIST DOCUMENTED IN MEDICAL RECORD: ICD-10-PCS | Mod: CPTII,S$GLB,, | Performed by: THORACIC SURGERY (CARDIOTHORACIC VASCULAR SURGERY)

## 2022-04-13 PROCEDURE — 3288F FALL RISK ASSESSMENT DOCD: CPT | Mod: CPTII,S$GLB,, | Performed by: THORACIC SURGERY (CARDIOTHORACIC VASCULAR SURGERY)

## 2022-04-13 PROCEDURE — 1101F PR PT FALLS ASSESS DOC 0-1 FALLS W/OUT INJ PAST YR: ICD-10-PCS | Mod: CPTII,S$GLB,, | Performed by: THORACIC SURGERY (CARDIOTHORACIC VASCULAR SURGERY)

## 2022-04-13 PROCEDURE — 1159F MED LIST DOCD IN RCRD: CPT | Mod: CPTII,S$GLB,, | Performed by: THORACIC SURGERY (CARDIOTHORACIC VASCULAR SURGERY)

## 2022-04-13 PROCEDURE — 1101F PT FALLS ASSESS-DOCD LE1/YR: CPT | Mod: CPTII,S$GLB,, | Performed by: THORACIC SURGERY (CARDIOTHORACIC VASCULAR SURGERY)

## 2022-04-13 PROCEDURE — 3046F HEMOGLOBIN A1C LEVEL >9.0%: CPT | Mod: CPTII,S$GLB,, | Performed by: THORACIC SURGERY (CARDIOTHORACIC VASCULAR SURGERY)

## 2022-04-13 PROCEDURE — 4010F PR ACE/ARB THEARPY RXD/TAKEN: ICD-10-PCS | Mod: CPTII,S$GLB,, | Performed by: THORACIC SURGERY (CARDIOTHORACIC VASCULAR SURGERY)

## 2022-04-13 RX ORDER — ASPIRIN 81 MG/1
81 TABLET ORAL DAILY
COMMUNITY
End: 2024-03-17

## 2022-04-13 NOTE — PROGRESS NOTES
This patient has peripheral arterial disease.  She is status post angiography with angioplasty stenting of the right superficial femoral and right iliac arteries.  She comes back to the office today in follow-up.  She had pain after the procedure that lasted for 24-48 hours.  This largely has resolved.  She still has some discomfort in the right thigh.  Medicines are noted and are part of the epic record.  Problem list was reviewed.  On exam vital signs are stable.  The puncture site in the left groin is healing satisfactorily.  Perfusion to the right leg and foot is satisfactory.  Recommendations for regular exercise and increase in activity.  I gave her a prescription for Percocet given the discomfort in the right groin and thigh.  Recommendation is for arterial duplex of the extremities in 2-3 months.  Based on this further recommendations can be made.

## 2022-04-27 ENCOUNTER — TELEPHONE (OUTPATIENT)
Dept: VASCULAR SURGERY | Facility: CLINIC | Age: 72
End: 2022-04-27
Payer: MEDICARE

## 2022-04-27 DIAGNOSIS — I73.9 PAD (PERIPHERAL ARTERY DISEASE): Primary | ICD-10-CM

## 2022-04-27 NOTE — TELEPHONE ENCOUNTER
----- Message from Clyde Murillo MA sent at 4/27/2022  1:51 PM CDT -----  Contact: JAVI WRIGHT [[id number]]  Type: Needs Medical Advice    Who CalledJAVI WRIGHT [[id number]]  Best Call Back Number: [phone]  Inquiry/Question: Would you kindly call JAVI WRIGHT [[id number]] regarding referral for ultrasound  Thank you~

## 2022-04-27 NOTE — TELEPHONE ENCOUNTER
----- Message from Guerad Rao sent at 4/27/2022  2:15 PM CDT -----  Contact: pt [phone]  Type:  Patient Returning Call    Who Called:  Pt  Who Left Message for Patient:  NA  Does the patient know what this is regarding?: set up appt for ultrasound   Best Call Back Number:  [phone]      Additional Information:  Pls call back and advise

## 2022-05-12 ENCOUNTER — HOSPITAL ENCOUNTER (OUTPATIENT)
Dept: RADIOLOGY | Facility: HOSPITAL | Age: 72
Discharge: HOME | End: 2022-05-12
Attending: THORACIC SURGERY (CARDIOTHORACIC VASCULAR SURGERY)
Payer: MEDICARE

## 2022-05-12 DIAGNOSIS — I73.9 PAD (PERIPHERAL ARTERY DISEASE): ICD-10-CM

## 2022-05-12 PROCEDURE — 93925 LOWER EXTREMITY STUDY: CPT | Mod: TC

## 2022-05-12 PROCEDURE — 93922 US ARTERIAL LOWER EXTREMITY BILAT WITH ABI (XPD): ICD-10-PCS | Mod: 26,,, | Performed by: RADIOLOGY

## 2022-05-12 PROCEDURE — 93922 UPR/L XTREMITY ART 2 LEVELS: CPT | Mod: 26,,, | Performed by: RADIOLOGY

## 2022-05-12 PROCEDURE — 93925 LOWER EXTREMITY STUDY: CPT | Mod: 26,,, | Performed by: RADIOLOGY

## 2022-05-12 PROCEDURE — 93925 US ARTERIAL LOWER EXTREMITY BILAT WITH ABI (XPD): ICD-10-PCS | Mod: 26,,, | Performed by: RADIOLOGY

## 2022-05-31 ENCOUNTER — PATIENT MESSAGE (OUTPATIENT)
Dept: ADMINISTRATIVE | Facility: HOSPITAL | Age: 72
End: 2022-05-31
Payer: MEDICARE

## 2022-06-29 DIAGNOSIS — Z12.31 ENCOUNTER FOR SCREENING MAMMOGRAM FOR MALIGNANT NEOPLASM OF BREAST: Primary | ICD-10-CM

## 2022-07-27 DIAGNOSIS — Z12.11 COLON CANCER SCREENING: ICD-10-CM

## 2022-08-01 ENCOUNTER — PATIENT MESSAGE (OUTPATIENT)
Dept: ADMINISTRATIVE | Facility: HOSPITAL | Age: 72
End: 2022-08-01
Payer: MEDICARE

## 2022-08-03 ENCOUNTER — PATIENT MESSAGE (OUTPATIENT)
Dept: ADMINISTRATIVE | Facility: HOSPITAL | Age: 72
End: 2022-08-03
Payer: MEDICARE

## 2022-08-04 ENCOUNTER — TELEPHONE (OUTPATIENT)
Dept: ADMINISTRATIVE | Facility: HOSPITAL | Age: 72
End: 2022-08-04
Payer: MEDICARE

## 2022-08-04 ENCOUNTER — PATIENT MESSAGE (OUTPATIENT)
Dept: FAMILY MEDICINE | Facility: CLINIC | Age: 72
End: 2022-08-04
Payer: MEDICARE

## 2022-08-04 DIAGNOSIS — E11.8 TYPE 2 DIABETES MELLITUS WITH COMPLICATION, WITHOUT LONG-TERM CURRENT USE OF INSULIN: ICD-10-CM

## 2022-08-04 RX ORDER — GLIMEPIRIDE 4 MG/1
TABLET ORAL
Qty: 90 TABLET | Refills: 1 | Status: SHIPPED | OUTPATIENT
Start: 2022-08-04 | End: 2024-03-17 | Stop reason: DRUGHIGH

## 2022-08-04 NOTE — TELEPHONE ENCOUNTER
Refill sent. Please schedule patient's labs and mammo and a f/u with Dr. Herrera or Dr. Collier afterwards. Thanks

## 2022-08-10 DIAGNOSIS — E11.9 TYPE 2 DIABETES MELLITUS WITHOUT COMPLICATION: ICD-10-CM

## 2022-08-24 ENCOUNTER — PATIENT MESSAGE (OUTPATIENT)
Dept: ADMINISTRATIVE | Facility: HOSPITAL | Age: 72
End: 2022-08-24
Payer: MEDICARE

## 2022-09-01 ENCOUNTER — PATIENT MESSAGE (OUTPATIENT)
Dept: ADMINISTRATIVE | Facility: HOSPITAL | Age: 72
End: 2022-09-01
Payer: MEDICARE

## 2022-09-02 DIAGNOSIS — E11.9 TYPE 2 DIABETES MELLITUS WITHOUT COMPLICATION, UNSPECIFIED WHETHER LONG TERM INSULIN USE: ICD-10-CM

## 2022-09-08 DIAGNOSIS — Z78.0 MENOPAUSE: ICD-10-CM

## 2022-09-15 ENCOUNTER — PATIENT MESSAGE (OUTPATIENT)
Dept: ADMINISTRATIVE | Facility: HOSPITAL | Age: 72
End: 2022-09-15
Payer: MEDICARE

## 2022-11-13 ENCOUNTER — PATIENT MESSAGE (OUTPATIENT)
Dept: FAMILY MEDICINE | Facility: CLINIC | Age: 72
End: 2022-11-13
Payer: MEDICARE

## 2022-12-13 ENCOUNTER — PATIENT OUTREACH (OUTPATIENT)
Dept: ADMINISTRATIVE | Facility: HOSPITAL | Age: 72
End: 2022-12-13
Payer: MEDICARE

## 2022-12-13 NOTE — PROGRESS NOTES
Scheduled for 3/29/22 Working A1c report for [place], chart reviewed at this time. Attempted to reach pt by phone, no answer, no VM. After reviewing chart in care everywhere noted that pt is using Dr kinga Parker for PCP needs, Last A1 was from 12/08/2022 and was 11.0. Updated care team at this time.

## 2024-03-17 ENCOUNTER — HOSPITAL ENCOUNTER (OUTPATIENT)
Dept: RADIOLOGY | Facility: HOSPITAL | Age: 74
Discharge: HOME | DRG: 689 | End: 2024-03-17
Attending: STUDENT IN AN ORGANIZED HEALTH CARE EDUCATION/TRAINING PROGRAM | Admitting: INTERNAL MEDICINE
Payer: MEDICARE

## 2024-03-17 ENCOUNTER — HOSPITAL ENCOUNTER (INPATIENT)
Facility: HOSPITAL | Age: 74
LOS: 2 days | Discharge: HOME HEALTH | DRG: 689 | End: 2024-03-20
Attending: STUDENT IN AN ORGANIZED HEALTH CARE EDUCATION/TRAINING PROGRAM | Admitting: INTERNAL MEDICINE
Payer: MEDICARE

## 2024-03-17 DIAGNOSIS — N39.0 URINARY TRACT INFECTION WITHOUT HEMATURIA, SITE UNSPECIFIED: Primary | ICD-10-CM

## 2024-03-17 DIAGNOSIS — R05.9 COUGH: ICD-10-CM

## 2024-03-17 DIAGNOSIS — I26.99 OTHER ACUTE PULMONARY EMBOLISM WITHOUT ACUTE COR PULMONALE: ICD-10-CM

## 2024-03-17 DIAGNOSIS — R06.02 SOB (SHORTNESS OF BREATH): ICD-10-CM

## 2024-03-17 DIAGNOSIS — R07.9 CHEST PAIN: ICD-10-CM

## 2024-03-17 DIAGNOSIS — I26.99 PULMONARY EMBOLISM: ICD-10-CM

## 2024-03-17 DIAGNOSIS — N39.0 UTI (URINARY TRACT INFECTION): ICD-10-CM

## 2024-03-17 DIAGNOSIS — I26.99 ACUTE PULMONARY EMBOLISM: ICD-10-CM

## 2024-03-17 PROBLEM — E87.1 HYPONATREMIA: Status: ACTIVE | Noted: 2024-03-17

## 2024-03-17 PROBLEM — I95.9 HYPOTENSIVE EPISODE: Status: ACTIVE | Noted: 2024-03-17

## 2024-03-17 PROBLEM — R79.89 POSITIVE D DIMER: Status: ACTIVE | Noted: 2024-03-17

## 2024-03-17 PROBLEM — N17.9 AKI (ACUTE KIDNEY INJURY): Status: ACTIVE | Noted: 2024-03-17

## 2024-03-17 PROBLEM — R10.9 ACUTE ABDOMINAL PAIN: Status: ACTIVE | Noted: 2024-03-17

## 2024-03-17 LAB
ALBUMIN SERPL BCP-MCNC: 3 G/DL (ref 3.5–5.2)
ALP SERPL-CCNC: 71 U/L (ref 55–135)
ALT SERPL W/O P-5'-P-CCNC: 12 U/L (ref 10–44)
ANION GAP SERPL CALC-SCNC: 8 MMOL/L (ref 8–16)
APTT PPP: 25.8 SEC (ref 21–32)
AST SERPL-CCNC: 10 U/L (ref 10–40)
BACTERIA #/AREA URNS HPF: ABNORMAL /HPF
BASOPHILS # BLD AUTO: 0.03 K/UL (ref 0–0.2)
BASOPHILS # BLD AUTO: 0.04 K/UL (ref 0–0.2)
BASOPHILS NFR BLD: 0.2 % (ref 0–1.9)
BASOPHILS NFR BLD: 0.3 % (ref 0–1.9)
BILIRUB SERPL-MCNC: 0.2 MG/DL (ref 0.1–1)
BILIRUB UR QL STRIP: NEGATIVE
BNP SERPL-MCNC: 273 PG/ML (ref 0–99)
BUN SERPL-MCNC: 31 MG/DL (ref 8–23)
CALCIUM SERPL-MCNC: 8.2 MG/DL (ref 8.7–10.5)
CHLORIDE SERPL-SCNC: 99 MMOL/L (ref 95–110)
CLARITY UR: ABNORMAL
CO2 SERPL-SCNC: 23 MMOL/L (ref 23–29)
COLOR UR: COLORLESS
CORTIS SERPL-MCNC: 14.4 UG/DL
CREAT SERPL-MCNC: 2.6 MG/DL (ref 0.5–1.4)
D DIMER PPP IA.FEU-MCNC: 1.67 MG/L FEU (ref 0–0.49)
DIFFERENTIAL METHOD BLD: ABNORMAL
DIFFERENTIAL METHOD BLD: ABNORMAL
EOSINOPHIL # BLD AUTO: 0.1 K/UL (ref 0–0.5)
EOSINOPHIL # BLD AUTO: 0.1 K/UL (ref 0–0.5)
EOSINOPHIL NFR BLD: 0.3 % (ref 0–8)
EOSINOPHIL NFR BLD: 0.6 % (ref 0–8)
ERYTHROCYTE [DISTWIDTH] IN BLOOD BY AUTOMATED COUNT: 13.9 % (ref 11.5–14.5)
ERYTHROCYTE [DISTWIDTH] IN BLOOD BY AUTOMATED COUNT: 13.9 % (ref 11.5–14.5)
EST. GFR  (NO RACE VARIABLE): 18.7 ML/MIN/1.73 M^2
GLUCOSE SERPL-MCNC: 143 MG/DL (ref 70–110)
GLUCOSE SERPL-MCNC: 350 MG/DL (ref 70–110)
GLUCOSE UR QL STRIP: ABNORMAL
HCT VFR BLD AUTO: 30.2 % (ref 37–48.5)
HCT VFR BLD AUTO: 30.8 % (ref 37–48.5)
HGB BLD-MCNC: 10.2 G/DL (ref 12–16)
HGB BLD-MCNC: 9.9 G/DL (ref 12–16)
HGB UR QL STRIP: ABNORMAL
HYALINE CASTS #/AREA URNS LPF: 0 /LPF
IMM GRANULOCYTES # BLD AUTO: 0.23 K/UL (ref 0–0.04)
IMM GRANULOCYTES # BLD AUTO: 0.28 K/UL (ref 0–0.04)
IMM GRANULOCYTES NFR BLD AUTO: 1.7 % (ref 0–0.5)
IMM GRANULOCYTES NFR BLD AUTO: 1.9 % (ref 0–0.5)
INFLUENZA A, MOLECULAR: NEGATIVE
INFLUENZA B, MOLECULAR: NEGATIVE
INR PPP: 1 (ref 0.8–1.2)
KETONES UR QL STRIP: NEGATIVE
LEUKOCYTE ESTERASE UR QL STRIP: ABNORMAL
LIPASE SERPL-CCNC: 57 U/L (ref 4–60)
LYMPHOCYTES # BLD AUTO: 1.2 K/UL (ref 1–4.8)
LYMPHOCYTES # BLD AUTO: 1.5 K/UL (ref 1–4.8)
LYMPHOCYTES NFR BLD: 10.6 % (ref 18–48)
LYMPHOCYTES NFR BLD: 7.8 % (ref 18–48)
MAGNESIUM SERPL-MCNC: 1.7 MG/DL (ref 1.6–2.6)
MCH RBC QN AUTO: 31.5 PG (ref 27–31)
MCH RBC QN AUTO: 31.9 PG (ref 27–31)
MCHC RBC AUTO-ENTMCNC: 32.8 G/DL (ref 32–36)
MCHC RBC AUTO-ENTMCNC: 33.1 G/DL (ref 32–36)
MCV RBC AUTO: 96 FL (ref 82–98)
MCV RBC AUTO: 96 FL (ref 82–98)
MICROSCOPIC COMMENT: ABNORMAL
MONOCYTES # BLD AUTO: 0.8 K/UL (ref 0.3–1)
MONOCYTES # BLD AUTO: 0.9 K/UL (ref 0.3–1)
MONOCYTES NFR BLD: 5.7 % (ref 4–15)
MONOCYTES NFR BLD: 6 % (ref 4–15)
NEUTROPHILS # BLD AUTO: 11.3 K/UL (ref 1.8–7.7)
NEUTROPHILS # BLD AUTO: 12.6 K/UL (ref 1.8–7.7)
NEUTROPHILS NFR BLD: 80.9 % (ref 38–73)
NEUTROPHILS NFR BLD: 84 % (ref 38–73)
NITRITE UR QL STRIP: NEGATIVE
NRBC BLD-RTO: 0 /100 WBC
NRBC BLD-RTO: 0 /100 WBC
OSMOLALITY SERPL: 294 MOSM/KG (ref 275–295)
PH UR STRIP: 6 [PH] (ref 5–8)
PLATELET # BLD AUTO: 326 K/UL (ref 150–450)
PLATELET # BLD AUTO: 342 K/UL (ref 150–450)
PMV BLD AUTO: 8.2 FL (ref 9.2–12.9)
PMV BLD AUTO: 8.3 FL (ref 9.2–12.9)
POTASSIUM SERPL-SCNC: 4.6 MMOL/L (ref 3.5–5.1)
PROT SERPL-MCNC: 6.1 G/DL (ref 6–8.4)
PROT UR QL STRIP: ABNORMAL
PROTHROMBIN TIME: 11.1 SEC (ref 9–12.5)
RBC # BLD AUTO: 3.14 M/UL (ref 4–5.4)
RBC # BLD AUTO: 3.2 M/UL (ref 4–5.4)
RBC #/AREA URNS HPF: 3 /HPF (ref 0–4)
SARS-COV-2 RDRP RESP QL NAA+PROBE: NEGATIVE
SODIUM SERPL-SCNC: 130 MMOL/L (ref 136–145)
SODIUM SERPL-SCNC: 134 MMOL/L (ref 136–145)
SP GR UR STRIP: 1 (ref 1–1.03)
SPECIMEN SOURCE: NORMAL
SQUAMOUS #/AREA URNS HPF: 0 /HPF
TROPONIN I SERPL HS-MCNC: 19.1 PG/ML (ref 0–14.9)
TSH SERPL DL<=0.005 MIU/L-ACNC: 0.56 UIU/ML (ref 0.34–5.6)
UNIDENT CRYS URNS QL MICRO: 2
URN SPEC COLLECT METH UR: ABNORMAL
UROBILINOGEN UR STRIP-ACNC: NEGATIVE EU/DL
WBC # BLD AUTO: 13.93 K/UL (ref 3.9–12.7)
WBC # BLD AUTO: 14.95 K/UL (ref 3.9–12.7)
WBC #/AREA URNS HPF: >100 /HPF (ref 0–5)
WBC CLUMPS URNS QL MICRO: ABNORMAL

## 2024-03-17 PROCEDURE — 84295 ASSAY OF SERUM SODIUM: CPT | Performed by: NURSE PRACTITIONER

## 2024-03-17 PROCEDURE — 63600175 PHARM REV CODE 636 W HCPCS: Performed by: STUDENT IN AN ORGANIZED HEALTH CARE EDUCATION/TRAINING PROGRAM

## 2024-03-17 PROCEDURE — 82533 TOTAL CORTISOL: CPT | Performed by: NURSE PRACTITIONER

## 2024-03-17 PROCEDURE — U0002 COVID-19 LAB TEST NON-CDC: HCPCS | Performed by: STUDENT IN AN ORGANIZED HEALTH CARE EDUCATION/TRAINING PROGRAM

## 2024-03-17 PROCEDURE — 87502 INFLUENZA DNA AMP PROBE: CPT | Performed by: STUDENT IN AN ORGANIZED HEALTH CARE EDUCATION/TRAINING PROGRAM

## 2024-03-17 PROCEDURE — 87086 URINE CULTURE/COLONY COUNT: CPT | Performed by: STUDENT IN AN ORGANIZED HEALTH CARE EDUCATION/TRAINING PROGRAM

## 2024-03-17 PROCEDURE — 63600175 PHARM REV CODE 636 W HCPCS: Performed by: INTERNAL MEDICINE

## 2024-03-17 PROCEDURE — 85610 PROTHROMBIN TIME: CPT | Performed by: NURSE PRACTITIONER

## 2024-03-17 PROCEDURE — 83880 ASSAY OF NATRIURETIC PEPTIDE: CPT | Performed by: STUDENT IN AN ORGANIZED HEALTH CARE EDUCATION/TRAINING PROGRAM

## 2024-03-17 PROCEDURE — 25000003 PHARM REV CODE 250: Performed by: STUDENT IN AN ORGANIZED HEALTH CARE EDUCATION/TRAINING PROGRAM

## 2024-03-17 PROCEDURE — 87077 CULTURE AEROBIC IDENTIFY: CPT | Performed by: STUDENT IN AN ORGANIZED HEALTH CARE EDUCATION/TRAINING PROGRAM

## 2024-03-17 PROCEDURE — 96375 TX/PRO/DX INJ NEW DRUG ADDON: CPT

## 2024-03-17 PROCEDURE — 85730 THROMBOPLASTIN TIME PARTIAL: CPT | Performed by: NURSE PRACTITIONER

## 2024-03-17 PROCEDURE — G0378 HOSPITAL OBSERVATION PER HR: HCPCS

## 2024-03-17 PROCEDURE — 84484 ASSAY OF TROPONIN QUANT: CPT | Performed by: STUDENT IN AN ORGANIZED HEALTH CARE EDUCATION/TRAINING PROGRAM

## 2024-03-17 PROCEDURE — 85025 COMPLETE CBC W/AUTO DIFF WBC: CPT | Performed by: NURSE PRACTITIONER

## 2024-03-17 PROCEDURE — 83735 ASSAY OF MAGNESIUM: CPT | Performed by: STUDENT IN AN ORGANIZED HEALTH CARE EDUCATION/TRAINING PROGRAM

## 2024-03-17 PROCEDURE — 25000003 PHARM REV CODE 250: Performed by: INTERNAL MEDICINE

## 2024-03-17 PROCEDURE — 83690 ASSAY OF LIPASE: CPT | Performed by: STUDENT IN AN ORGANIZED HEALTH CARE EDUCATION/TRAINING PROGRAM

## 2024-03-17 PROCEDURE — 96361 HYDRATE IV INFUSION ADD-ON: CPT

## 2024-03-17 PROCEDURE — 80053 COMPREHEN METABOLIC PANEL: CPT | Performed by: STUDENT IN AN ORGANIZED HEALTH CARE EDUCATION/TRAINING PROGRAM

## 2024-03-17 PROCEDURE — 25000003 PHARM REV CODE 250: Performed by: NURSE PRACTITIONER

## 2024-03-17 PROCEDURE — 85025 COMPLETE CBC W/AUTO DIFF WBC: CPT | Mod: 91 | Performed by: STUDENT IN AN ORGANIZED HEALTH CARE EDUCATION/TRAINING PROGRAM

## 2024-03-17 PROCEDURE — 84443 ASSAY THYROID STIM HORMONE: CPT | Performed by: STUDENT IN AN ORGANIZED HEALTH CARE EDUCATION/TRAINING PROGRAM

## 2024-03-17 PROCEDURE — 96367 TX/PROPH/DG ADDL SEQ IV INF: CPT

## 2024-03-17 PROCEDURE — 83930 ASSAY OF BLOOD OSMOLALITY: CPT | Performed by: NURSE PRACTITIONER

## 2024-03-17 PROCEDURE — 93010 ELECTROCARDIOGRAM REPORT: CPT | Mod: ,,, | Performed by: GENERAL PRACTICE

## 2024-03-17 PROCEDURE — 96365 THER/PROPH/DIAG IV INF INIT: CPT

## 2024-03-17 PROCEDURE — 85379 FIBRIN DEGRADATION QUANT: CPT | Performed by: STUDENT IN AN ORGANIZED HEALTH CARE EDUCATION/TRAINING PROGRAM

## 2024-03-17 PROCEDURE — 93005 ELECTROCARDIOGRAM TRACING: CPT | Performed by: GENERAL PRACTICE

## 2024-03-17 PROCEDURE — 99285 EMERGENCY DEPT VISIT HI MDM: CPT | Mod: 25

## 2024-03-17 PROCEDURE — 78580 LUNG PERFUSION IMAGING: CPT | Mod: TC

## 2024-03-17 PROCEDURE — 87186 SC STD MICRODIL/AGAR DIL: CPT | Performed by: STUDENT IN AN ORGANIZED HEALTH CARE EDUCATION/TRAINING PROGRAM

## 2024-03-17 PROCEDURE — 81001 URINALYSIS AUTO W/SCOPE: CPT | Performed by: STUDENT IN AN ORGANIZED HEALTH CARE EDUCATION/TRAINING PROGRAM

## 2024-03-17 PROCEDURE — A9540 TC99M MAA: HCPCS | Performed by: INTERNAL MEDICINE

## 2024-03-17 RX ORDER — GLIPIZIDE 10 MG/1
10 TABLET, FILM COATED, EXTENDED RELEASE ORAL DAILY
COMMUNITY
Start: 2024-02-19 | End: 2024-04-01

## 2024-03-17 RX ORDER — GLUCAGON 1 MG
1 KIT INJECTION
Status: DISCONTINUED | OUTPATIENT
Start: 2024-03-17 | End: 2024-03-20 | Stop reason: HOSPADM

## 2024-03-17 RX ORDER — SODIUM CHLORIDE 9 MG/ML
INJECTION, SOLUTION INTRAVENOUS CONTINUOUS
Status: DISCONTINUED | OUTPATIENT
Start: 2024-03-17 | End: 2024-03-20 | Stop reason: HOSPADM

## 2024-03-17 RX ORDER — DIAZEPAM 5 MG/1
10 TABLET ORAL DAILY PRN
Status: DISCONTINUED | OUTPATIENT
Start: 2024-03-17 | End: 2024-03-20 | Stop reason: HOSPADM

## 2024-03-17 RX ORDER — NALOXONE HCL 0.4 MG/ML
0.02 VIAL (ML) INJECTION
Status: DISCONTINUED | OUTPATIENT
Start: 2024-03-17 | End: 2024-03-20 | Stop reason: HOSPADM

## 2024-03-17 RX ORDER — ROSUVASTATIN CALCIUM 40 MG/1
40 TABLET, COATED ORAL DAILY
COMMUNITY
Start: 2024-02-19

## 2024-03-17 RX ORDER — ATORVASTATIN CALCIUM 40 MG/1
80 TABLET, FILM COATED ORAL DAILY
Status: DISCONTINUED | OUTPATIENT
Start: 2024-03-18 | End: 2024-03-20 | Stop reason: HOSPADM

## 2024-03-17 RX ORDER — MIRABEGRON 50 MG/1
1 TABLET, FILM COATED, EXTENDED RELEASE ORAL DAILY
COMMUNITY
Start: 2024-03-05 | End: 2024-04-01

## 2024-03-17 RX ORDER — METOPROLOL TARTRATE 25 MG/1
25 TABLET, FILM COATED ORAL 2 TIMES DAILY
Status: DISCONTINUED | OUTPATIENT
Start: 2024-03-17 | End: 2024-03-20 | Stop reason: HOSPADM

## 2024-03-17 RX ORDER — PANTOPRAZOLE SODIUM 40 MG/1
40 TABLET, DELAYED RELEASE ORAL
Status: DISCONTINUED | OUTPATIENT
Start: 2024-03-18 | End: 2024-03-20 | Stop reason: HOSPADM

## 2024-03-17 RX ORDER — LOSARTAN POTASSIUM 25 MG/1
100 TABLET ORAL DAILY
Status: DISCONTINUED | OUTPATIENT
Start: 2024-03-18 | End: 2024-03-17

## 2024-03-17 RX ORDER — NITROFURANTOIN 25; 75 MG/1; MG/1
100 CAPSULE ORAL 2 TIMES DAILY
COMMUNITY
Start: 2024-03-11 | End: 2024-04-01

## 2024-03-17 RX ORDER — ALBUTEROL SULFATE 90 UG/1
2 AEROSOL, METERED RESPIRATORY (INHALATION) 4 TIMES DAILY PRN
COMMUNITY
Start: 2024-02-02

## 2024-03-17 RX ORDER — TALC
9 POWDER (GRAM) TOPICAL NIGHTLY PRN
Status: DISCONTINUED | OUTPATIENT
Start: 2024-03-17 | End: 2024-03-20 | Stop reason: HOSPADM

## 2024-03-17 RX ORDER — IBUPROFEN 200 MG
16 TABLET ORAL
Status: DISCONTINUED | OUTPATIENT
Start: 2024-03-17 | End: 2024-03-20 | Stop reason: HOSPADM

## 2024-03-17 RX ORDER — INSULIN ASPART 100 [IU]/ML
0-10 INJECTION, SOLUTION INTRAVENOUS; SUBCUTANEOUS
Status: DISCONTINUED | OUTPATIENT
Start: 2024-03-17 | End: 2024-03-20 | Stop reason: HOSPADM

## 2024-03-17 RX ORDER — SODIUM CHLORIDE 0.9 % (FLUSH) 0.9 %
10 SYRINGE (ML) INJECTION
Status: DISCONTINUED | OUTPATIENT
Start: 2024-03-17 | End: 2024-03-20 | Stop reason: HOSPADM

## 2024-03-17 RX ORDER — ONDANSETRON HYDROCHLORIDE 2 MG/ML
4 INJECTION, SOLUTION INTRAVENOUS EVERY 6 HOURS PRN
Status: DISCONTINUED | OUTPATIENT
Start: 2024-03-17 | End: 2024-03-20 | Stop reason: HOSPADM

## 2024-03-17 RX ORDER — IPRATROPIUM BROMIDE AND ALBUTEROL SULFATE 2.5; .5 MG/3ML; MG/3ML
3 SOLUTION RESPIRATORY (INHALATION) EVERY 6 HOURS PRN
Status: DISCONTINUED | OUTPATIENT
Start: 2024-03-17 | End: 2024-03-20 | Stop reason: HOSPADM

## 2024-03-17 RX ORDER — LEVOTHYROXINE SODIUM 88 UG/1
88 TABLET ORAL DAILY
COMMUNITY
Start: 2024-02-19 | End: 2024-05-01 | Stop reason: SDUPTHER

## 2024-03-17 RX ORDER — HEPARIN SODIUM,PORCINE/D5W 25000/250
0-40 INTRAVENOUS SOLUTION INTRAVENOUS CONTINUOUS
Status: DISPENSED | OUTPATIENT
Start: 2024-03-17 | End: 2024-03-18

## 2024-03-17 RX ORDER — LEVOTHYROXINE SODIUM 88 UG/1
88 TABLET ORAL
Status: DISCONTINUED | OUTPATIENT
Start: 2024-03-18 | End: 2024-03-20 | Stop reason: HOSPADM

## 2024-03-17 RX ORDER — CITALOPRAM 20 MG/1
40 TABLET, FILM COATED ORAL DAILY
Status: DISCONTINUED | OUTPATIENT
Start: 2024-03-18 | End: 2024-03-20 | Stop reason: HOSPADM

## 2024-03-17 RX ORDER — ACETAMINOPHEN 325 MG/1
650 TABLET ORAL EVERY 4 HOURS PRN
Status: DISCONTINUED | OUTPATIENT
Start: 2024-03-17 | End: 2024-03-20 | Stop reason: HOSPADM

## 2024-03-17 RX ORDER — IBUPROFEN 200 MG
24 TABLET ORAL
Status: DISCONTINUED | OUTPATIENT
Start: 2024-03-17 | End: 2024-03-20 | Stop reason: HOSPADM

## 2024-03-17 RX ORDER — HEPARIN SODIUM 5000 [USP'U]/ML
5000 INJECTION, SOLUTION INTRAVENOUS; SUBCUTANEOUS EVERY 8 HOURS
Status: DISCONTINUED | OUTPATIENT
Start: 2024-03-17 | End: 2024-03-17

## 2024-03-17 RX ORDER — LOSARTAN POTASSIUM 100 MG/1
100 TABLET ORAL DAILY
Status: ON HOLD | COMMUNITY
End: 2024-03-20 | Stop reason: HOSPADM

## 2024-03-17 RX ORDER — SODIUM CHLORIDE 0.9 % (FLUSH) 0.9 %
2 SYRINGE (ML) INJECTION EVERY 12 HOURS PRN
Status: DISCONTINUED | OUTPATIENT
Start: 2024-03-17 | End: 2024-03-20 | Stop reason: HOSPADM

## 2024-03-17 RX ORDER — HYDROCODONE BITARTRATE AND ACETAMINOPHEN 5; 325 MG/1; MG/1
1 TABLET ORAL EVERY 6 HOURS PRN
Status: DISCONTINUED | OUTPATIENT
Start: 2024-03-17 | End: 2024-03-20 | Stop reason: HOSPADM

## 2024-03-17 RX ORDER — HEPARIN SODIUM,PORCINE/D5W 25000/250
0-40 INTRAVENOUS SOLUTION INTRAVENOUS CONTINUOUS
Status: DISCONTINUED | OUTPATIENT
Start: 2024-03-17 | End: 2024-03-17

## 2024-03-17 RX ADMIN — SODIUM CHLORIDE, POTASSIUM CHLORIDE, SODIUM LACTATE AND CALCIUM CHLORIDE 1000 ML: 600; 310; 30; 20 INJECTION, SOLUTION INTRAVENOUS at 04:03

## 2024-03-17 RX ADMIN — CEFTRIAXONE SODIUM 2 G: 2 INJECTION, POWDER, FOR SOLUTION INTRAMUSCULAR; INTRAVENOUS at 06:03

## 2024-03-17 RX ADMIN — METOPROLOL TARTRATE 25 MG: 25 TABLET, FILM COATED ORAL at 09:03

## 2024-03-17 RX ADMIN — SODIUM CHLORIDE: 9 INJECTION, SOLUTION INTRAVENOUS at 11:03

## 2024-03-17 RX ADMIN — HEPARIN SODIUM 18 UNITS/KG/HR: 10000 INJECTION, SOLUTION INTRAVENOUS at 10:03

## 2024-03-17 RX ADMIN — KIT FOR THE PREPARATION OF TECHNETIUM TC 99M ALBUMIN AGGREGATED 5.2 MILLICURIE: 2.5 INJECTION, POWDER, FOR SOLUTION INTRAVENOUS at 07:03

## 2024-03-17 NOTE — ED PROVIDER NOTES
Encounter Date: 3/17/2024       History     Chief Complaint   Patient presents with    Shortness of Breath    Chest Pain     HPI  74-year-old presenting with multiple complaints.  Patient reports that she was had all the symptoms that she was discussing today for at least the last 3 months but that she was sick of dealing with them at home and that is why she came in today.  She reports that there has been no worsening, no new symptoms recently.  Nothing acute that triggered her visit today except that she was feeling tired of being sick at home and tired of working with her primary care doctor.  Reports that she does not necessarily want to stay in the hospital wants test done and wants to know what is wrong her.    Patient has smoked for a long time, COPD, CAD, depression, diabetes, hypertension, pancreatitis, peripheral vascular disease /pad status post stent in the right leg with angioplasty stenting of the right superficial femoral and right iliac arteries.    Patient says that she has been feeling short of breath for at least 3 months.  She usually coughs because she has smoked for a long time but says that the cough has actually gone down because she stopped smoking as she was so tired.  She says she was being treated for a urine infection and has been on Macrobid.  She says that she had nausea and vomiting before she was diagnosed with a urine and kidney infection but does not have this any longer but says that she barely eats at home because she was so tired.  She says she has fallen when she gets off of balance and has hit her head but the falling is always when she is climbing off tall bed or when she has but down to pick something up and then feels off balance.  No chest pain.    She says she does have some lower abdominal pain and it was bad last night, worse than usual.  Review of patient's allergies indicates:   Allergen Reactions    Plavix [clopidogrel] Rash    Zolpidem      Other reaction(s): Other  (See Comments)  Seeing people who were not in the room     Codeine      Past Medical History:   Diagnosis Date    COPD (chronic obstructive pulmonary disease)     Coronary artery disease     Depression     Diabetes mellitus     Hypertension     Pancreatitis     PVD (peripheral vascular disease)      Past Surgical History:   Procedure Laterality Date    AORTOGRAPHY WITH SERIALOGRAPHY N/A 2022    Procedure: AORTOGRAM, WITH SERIALOGRAPHY;  Surgeon: Petr Kaufman MD;  Location: [facility] CATH/EP LAB;  Service: Peripheral Vascular;  Laterality: N/A;    CARDIAC SURGERY      CORONARY ARTERY BYPASS GRAFT  2018    HYSTERECTOMY       No family history on file.  Social History     Tobacco Use    Smoking status: Former     Current packs/day: 0.00     Types: Vaping w/o nicotine, Cigarettes     Quit date: 4/15/2021     Years since quittin.9    Smokeless tobacco: Never   Substance Use Topics    Alcohol use: Yes     Comment: occasionally    Drug use: Never     Review of Systems   Constitutional:  Positive for appetite change and fatigue. Negative for chills and fever.   HENT:  Negative for congestion and sore throat.    Eyes:  Negative for redness and visual disturbance.   Respiratory:  Positive for cough (improved) and shortness of breath (unchanged recently).    Cardiovascular:  Negative for chest pain, palpitations and leg swelling.   Gastrointestinal:  Negative for abdominal pain, blood in stool, constipation, diarrhea, nausea and vomiting.   Genitourinary:  Negative for dysuria, frequency and hematuria.   Musculoskeletal:  Negative for back pain, joint swelling, neck pain and neck stiffness.   Skin:  Negative for rash and wound.   Neurological:  Negative for weakness and numbness.   Psychiatric/Behavioral:  Negative for confusion.        Physical Exam     Initial Vitals [24 1417]   BP Pulse Resp Temp SpO2   (!) 82/49 87 16 97.7 °F (36.5 °C) 96 %      MAP       --         Physical Exam    Nursing note and  vitals reviewed.  Constitutional: She appears well-developed. She is not diaphoretic.   HENT:   Head: Normocephalic.   Eyes: Right eye exhibits no discharge. Left eye exhibits no discharge. No scleral icterus.   Neck: Neck supple. No tracheal deviation present.   Cardiovascular:  Normal rate and regular rhythm.           Pulmonary/Chest: Breath sounds normal. No stridor. No respiratory distress. She has no wheezes. She has no rhonchi. She has no rales.   Abdominal: Abdomen is soft. She exhibits no distension. There is abdominal tenderness (mild, RUQ, suprapubic). There is no rebound and no guarding.   Musculoskeletal:         General: No edema.      Cervical back: Neck supple.     Neurological: She is alert and oriented to person, place, and time.   Skin: Skin is warm and dry.         ED Course   Procedures  Labs Reviewed   CBC W/ AUTO DIFFERENTIAL - Abnormal; Notable for the following components:       Result Value    WBC 14.95 (*)     RBC 3.20 (*)     Hemoglobin 10.2 (*)     Hematocrit 30.8 (*)     MCH 31.9 (*)     MPV 8.3 (*)     Immature Granulocytes 1.9 (*)     Gran # (ANC) 12.6 (*)     Immature Grans (Abs) 0.28 (*)     Gran % 84.0 (*)     Lymph % 7.8 (*)     All other components within normal limits   COMPREHENSIVE METABOLIC PANEL - Abnormal; Notable for the following components:    Sodium 130 (*)     Glucose 350 (*)     BUN 31 (*)     Creatinine 2.6 (*)     Calcium 8.2 (*)     Albumin 3.0 (*)     eGFR 18.7 (*)     All other components within normal limits   TROPONIN I HIGH SENSITIVITY - Abnormal; Notable for the following components:    Troponin I High Sensitivity 19.1 (*)     All other components within normal limits   B-TYPE NATRIURETIC PEPTIDE - Abnormal; Notable for the following components:     (*)     All other components within normal limits   URINALYSIS, REFLEX TO URINE CULTURE - Abnormal; Notable for the following components:    Color, UA Colorless (*)     Appearance, UA Hazy (*)     Protein,  UA 1+ (*)     Glucose, UA 1+ (*)     Occult Blood UA 2+ (*)     Leukocytes, UA 3+ (*)     All other components within normal limits    Narrative:     Specimen Source->Urine   D DIMER, QUANTITATIVE - Abnormal; Notable for the following components:    D-Dimer 1.67 (*)     All other components within normal limits    Narrative:       DDimer  critical result(s) called and verbal readback obtained from   Cristine Fraire RN ED  by MP4 03/17/2024 17:13   URINALYSIS MICROSCOPIC - Abnormal; Notable for the following components:    WBC, UA >100 (*)     WBC Clumps, UA Many (*)     Bacteria Many (*)     All other components within normal limits    Narrative:     Specimen Source->Urine   CULTURE, URINE   LIPASE   MAGNESIUM   TSH   INFLUENZA A AND B ANTIGEN    Narrative:     Specimen Source->Nasopharyngeal Swab   SARS-COV-2 RNA AMPLIFICATION, QUAL   SODIUM   SODIUM, URINE, RANDOM   OSMOLALITY, URINE RANDOM   OSMOLALITY, SERUM   CORTISOL, RANDOM   APTT   PROTIME-INR   CBC W/ AUTO DIFFERENTIAL   POCT GLUCOSE, HAND-HELD DEVICE        ECG Results              EKG 12-lead (In process)        Collection Time Result Time QRS Duration [facility] QTC Calculation    03/17/24 15:37:34 03/17/24 16:09:59 86 475                     In process by Interface, [facility] (03/17/24 16:10:05)                   Narrative:    Test Reason : R06.02,    Vent. Rate : 066 BPM     Atrial Rate : 066 BPM     P-R Int : 142 ms          QRS Dur : 086 ms      QT Int : 454 ms       P-R-T Axes : 064 -48 070 degrees     QTc Int : 475 ms    Normal sinus rhythm  Left anterior fascicular block  Nonspecific T wave abnormality  Prolonged QT  Abnormal ECG  When compared with ECG of 30-MAR-2022 09:10,  T wave inversion now evident in Anterior leads    Referred By: [email or web address]   SELF           Confirmed By:                                   Imaging Results              US Lower Extremity Veins Bilateral (Final result)  Result time 03/17/24 21:25:31      Final result by  Marya Izquierdo MD (03/17/24 21:25:31)                   Narrative:    PROCEDURE:  US Duplex Bilateral Lower Extremities Veins    CLINICAL INDICATION:  The patient is 74 years old and is Female; elevated d dimer, r/o dvt    TECHNIQUE:  Real-time duplex ultrasound scan of the bilateral lower extremity veins integrating B-mode two-dimensional vascular structure, Doppler spectral analysis, color flow Doppler imaging and compression.    COMPARISON:  No relevant prior studies available.    FINDINGS:  RIGHT DEEP VEINS:  Unremarkable.  No DVT in the right common femoral, femoral, proximal deep femoral or popliteal veins or peroneal vein or anterior tibial veins.  The veins demonstrate normal color flow, are normally compressible, with normal phasic flow and/or augmentation response.  RIGHT SUPERFICIAL VEINS:  Unremarkable.  No thrombus in the visualized right great saphenous vein.    LEFT DEEP VEINS:  Unremarkable.  No DVT in the left common femoral, femoral, proximal deep femoral or popliteal veins or peroneal vein or anterior tibial veins.  The veins demonstrate normal color flow, are normally compressible, with normal phasic flow and/or augmentation response.  LEFT SUPERFICIAL VEINS:  Unremarkable.  No thrombus in the visualized left great saphenous vein.    SOFT TISSUES:  No acute findings.  No popliteal cyst.    IMPRESSION:  No DVT of the bilateral lower extremities.    Electronically signed by:  Marya Dawson MD  3/17/2024 9:25 PM CDT Workstation: [id number]                                     CT Abdomen Pelvis  Without Contrast (Final result)  Result time 03/17/24 20:57:44      Final result by Isac Connell III, MD (03/17/24 20:57:44)                   Narrative:    EXAM:  CT Abdomen and Pelvis Without Intravenous Contrast    CLINICAL HISTORY:  The patient is 74 years old and is Female; Abdominal pain, acute, nonlocalized    TECHNIQUE:  Axial computed tomography images of the abdomen and pelvis  without intravenous contrast.  Sagittal and coronal reformatted images were created and reviewed.  This CT exam was performed using one or more of the following dose reduction techniques:  automated exposure control, adjustment of the mA and/or kV according to patient size, and/or use of iterative reconstruction technique.    COMPARISON:  No relevant prior studies available.    FINDINGS:  LUNG BASES:  Unremarkable.  No consolidation or acute findings.    ABDOMEN:  LIVER:  Unremarkable for noncontrast exam.  GALLBLADDER AND BILE DUCTS:  There appear to be small gallstones without obvious complication.  PANCREAS:  Unremarkable.  No ductal dilation.  SPLEEN:  Unremarkable without enlargement.  ADRENALS:  Unremarkable.  KIDNEYS AND URETERS:  Left kidney is mildly enlarged and there is mild left perinephric stranding. Several punctate bilateral renal calcifications may be within the collecting systems. No obvious ureteral stones but there does appear to be mild left periureteral stranding.  STOMACH AND BOWEL:  Mild diverticulosis distal colon. Unopacified bowel is otherwise unremarkable.    PELVIS:  APPENDIX:  The appendix appears normal.  BLADDER:  Unremarkable.  No stones.    ABDOMEN and PELVIS:  INTRAPERITONEAL SPACE:  Unremarkable.  No free air.  No significant fluid collection.  BONES/JOINTS: Old healing bilateral inferior ramus fractures. No acute findings.  SOFT TISSUES:  Unremarkable.  VASCULATURE:  Aorta and iliac vessels are heavily atherosclerotic with evidence of high-grade stenosis just above the aortic bifurcation and extending into bilateral common iliac arteries.  LYMPH NODES:  No suspicious adenopathy.    IMPRESSION:  1.  Left renal findings as described are not well characterized on noncontrast exam but suspicious for upper urinary tract infection. Cannot exclude a few punctate bilateral nonobstructive renal calculi.  2.  Severe vascular disease with evidence of high-grade stenosis distal aorta and  bilateral common iliac arteries.  3.  Incidental gallstones    Electronically signed by:  Isac Connell MD  03/17/2024 08:57 PM CDT Workstation: [id number]                                     NM Lung Scan Perfusion Particulate (Final result)  Result time 03/17/24 20:16:33   Procedure changed from NM Lung Scan Ventilation Perfusion     Final result by Cipriano Del Rio DO (03/17/24 20:16:33)                   Narrative:    EXAM:  NM Lung Perfusion Scan    CLINICAL HISTORY:  Pulmonary embolism (PE) suspected, positive D-dimer; pos d dimer    TECHNIQUE:  Nuclear Medicine perfusion images of the lungs were obtained in multiple projections following injection of 5.2 mCi Tc99m MAA.    COMPARISON:  Chest radiograph from March 17, 2024.    FINDINGS:  Perfusion:  There are several segmental sized wedge-shaped areas of photopenia demonstrated.    IMPRESSION:  High probability of pulmonary embolism.    Thank you for allowing us to participate in the care of this patient.    Electronically signed by:  Cipriano Del Rio DO  03/17/2024 08:16 PM CDT Workstation: [id number]                                     X-Ray Chest AP Portable (Final result)  Result time 03/17/24 15:11:12      Final result by Prasanth Lomax MD (03/17/24 15:11:12)                   Narrative:    XR CHEST 1 VIEW    CLINICAL HISTORY:  74 years Female shortness of breath, chest pain    COMPARISON: March 30, 2022    FINDINGS: Cardiac silhouette size is within normal limits. Status post median sternotomy. Atherosclerotic calcification of the aorta. No confluent airspace disease. No large pleural effusion or pneumothorax. No acute osseous abnormality.    IMPRESSION: No acute pulmonary process.    Electronically signed by:  Prasanth Lomax MD  03/17/2024 03:11 PM CDT Workstation: [id number]                                     Medications   sodium chloride 0.9% flush 10 mL (has no administration in time range)   cefTRIAXone (ROCEPHIN) 1 g in dextrose 5 %  100 mL IVPB (ready to mix) (has no administration in time range)   acetaminophen tablet 650 mg (has no administration in time range)   HYDROcodone-acetaminophen 5-325 mg per tablet 1 tablet (has no administration in time range)   ondansetron injection 4 mg (has no administration in time range)   sodium chloride 0.9% flush 2 mL (has no administration in time range)   albuterol-ipratropium 2.5 mg-0.5 mg/3 mL nebulizer solution 3 mL (has no administration in time range)   melatonin tablet 9 mg (has no administration in time range)   naloxone 0.4 mg/mL injection 0.02 mg (has no administration in time range)   glucose chewable tablet 16 g (has no administration in time range)   glucose chewable tablet 24 g (has no administration in time range)   dextrose 50% injection 12.5 g (has no administration in time range)   dextrose 50% injection 25 g (has no administration in time range)   glucagon (human recombinant) injection 1 mg (has no administration in time range)   insulin aspart U-100 pen 0-10 Units (has no administration in time range)   citalopram tablet 40 mg (has no administration in time range)   diazePAM tablet 10 mg (has no administration in time range)   levothyroxine tablet 88 mcg (has no administration in time range)   metoprolol tartrate (LOPRESSOR) tablet 25 mg (has no administration in time range)   pantoprazole EC tablet 40 mg (has no administration in time range)   atorvastatin tablet 80 mg (has no administration in time range)   0.9%  NaCl infusion (has no administration in time range)   heparin 25,000 units in dextrose 5% (100 units/ml) IV bolus from bag HIGH INTENSITY nomogram - [facility] (has no administration in time range)   heparin 25,000 units in dextrose 5% (100 units/ml) IV bolus from bag HIGH INTENSITY nomogram - [facility] (has no administration in time range)   heparin 25,000 units in dextrose 5% 250 mL (100 units/mL) infusion HIGH INTENSITY nomogram - [facility] (has no administration in time range)   heparin  25,000 units in dextrose 5% (100 units/ml) IV bolus from bag HIGH INTENSITY nomogram - [facility] (has no administration in time range)   lactated ringers bolus 1,000 mL (0 mLs Intravenous Stopped 3/17/24 1703)   cefTRIAXone (ROCEPHIN) 2 g in dextrose 5 % 100 mL IVPB (ready to mix) (0 g Intravenous Stopped 3/17/24 1911)     Medical Decision Making  Amount and/or Complexity of Data Reviewed  Labs: ordered.  Radiology: ordered.    Vitals within acceptable limits except for initial blood pressure of 82/49 therefore patient was immediately brought back to a room but on repeat blood pressure check and since patient has been normotensive and slowly becoming hypertensive.    Differential includes KAIT, PE, pneumonia, UTI,    In his 74-year-old woman with multiple comorbidities and multiple symptoms a broad workup was completed with CBC, CMP, COVID, flu, EKG, chest x-ray, urinalysis, tsh, d-dimer, trop, bnp, mag, lipase, within acceptable limits except for UTI, KAIT, WBC 14, troponin 19, D-dimer 1.67.    Patient's vitals stable, patient reporting shortness of breath and cough has improved she stopped smoking.  Therefore D-dimer likely elevated in setting of infection, less likely PE therefore no CT PE at this time especially with patient's KAIT.  Patient's symptoms were monitored and if need be we will have further imaging for PE.  At this time treating patient for UTI and KAIT with fluids and ceftriaxone.  Admitted to medicine.  Cathi Simmons MD  Emergency Medicine Staff Physician                                      Clinical Impression:  Final diagnoses:  [R05.9] Cough  [R06.02] SOB (shortness of breath)  [N39.0] UTI (urinary tract infection)          ED Disposition Condition    Observation                 Cathi Simmons MD  03/17/24 [id number]

## 2024-03-17 NOTE — HPI
74-year-old female with a past medical history of tobacco use, COPD, CAD, depression, diabetes, hypertension, pancreatitis, peripheral vascular disease /pad status post stent in the right leg with angioplasty stenting of the right superficial femoral and right iliac arteries whom presents to the emergency room with  reports of shortness of breath and chest pain for the last 3 months. She reports today shortness of breath and cough. Recently diagnoses with UTI and completed Macrobid this week. She reports nausea, vomiting earlier this week but none today. Decrease appetite and feeling week. Reports feeling off balance and a couple of months ago fell, striking head. Reports abdominal pain, generalized non radiating since the fall. She says she does have some lower abdominal pain and it was bad last night, worse than usual. In ER, elvated D Dimer 1.167, , troponin I high sensativity 19.1, Na 130, Sr Cr 2.6 baseline Cr 1.6, UA with 1+ protein, 1+ glucose, 3+ leukocytes, WBC > 100, urine culture and blood culture pending. Patient given Rocephin 2gm IV and 1 L LR in ER. CXR nonacute, EKG with prolonged QT no ST elevation.   NM Lung scan with high probability of pulmonary embolism. Perfusion:  There are several segmental sized wedge-shaped areas of photopenia demonstrated.

## 2024-03-18 PROBLEM — R53.81 DEBILITY: Status: ACTIVE | Noted: 2024-03-18

## 2024-03-18 PROBLEM — E87.1 HYPONATREMIA: Status: RESOLVED | Noted: 2024-03-17 | Resolved: 2024-03-18

## 2024-03-18 PROBLEM — R79.89 POSITIVE D DIMER: Status: RESOLVED | Noted: 2024-03-17 | Resolved: 2024-03-18

## 2024-03-18 PROBLEM — N12 PYELONEPHRITIS: Status: ACTIVE | Noted: 2024-03-17

## 2024-03-18 PROBLEM — N18.30 ACUTE RENAL FAILURE SUPERIMPOSED ON STAGE 3 CHRONIC KIDNEY DISEASE: Status: ACTIVE | Noted: 2024-03-17

## 2024-03-18 PROBLEM — R10.9 ACUTE ABDOMINAL PAIN: Status: RESOLVED | Noted: 2024-03-17 | Resolved: 2024-03-18

## 2024-03-18 PROBLEM — I95.9 HYPOTENSIVE EPISODE: Status: RESOLVED | Noted: 2024-03-17 | Resolved: 2024-03-18

## 2024-03-18 PROBLEM — R06.02 SOB (SHORTNESS OF BREATH): Status: ACTIVE | Noted: 2021-06-15

## 2024-03-18 PROBLEM — R06.02 SOB (SHORTNESS OF BREATH): Status: RESOLVED | Noted: 2021-06-15 | Resolved: 2024-03-18

## 2024-03-18 LAB
ANION GAP SERPL CALC-SCNC: 8 MMOL/L (ref 8–16)
APTT PPP: 43.9 SEC (ref 21–32)
APTT PPP: 67.9 SEC (ref 21–32)
APTT PPP: 80.6 SEC (ref 21–32)
BASOPHILS # BLD AUTO: 0.05 K/UL (ref 0–0.2)
BASOPHILS # BLD AUTO: 0.05 K/UL (ref 0–0.2)
BASOPHILS NFR BLD: 0.4 % (ref 0–1.9)
BASOPHILS NFR BLD: 0.4 % (ref 0–1.9)
BUN SERPL-MCNC: 25 MG/DL (ref 8–23)
CALCIUM SERPL-MCNC: 8 MG/DL (ref 8.7–10.5)
CHLORIDE SERPL-SCNC: 108 MMOL/L (ref 95–110)
CHOLEST SERPL-MCNC: 73 MG/DL (ref 120–199)
CHOLEST/HDLC SERPL: 3.3 {RATIO} (ref 2–5)
CO2 SERPL-SCNC: 22 MMOL/L (ref 23–29)
CREAT SERPL-MCNC: 2.4 MG/DL (ref 0.5–1.4)
DIFFERENTIAL METHOD BLD: ABNORMAL
DIFFERENTIAL METHOD BLD: ABNORMAL
EOSINOPHIL # BLD AUTO: 0.1 K/UL (ref 0–0.5)
EOSINOPHIL # BLD AUTO: 0.1 K/UL (ref 0–0.5)
EOSINOPHIL NFR BLD: 0.7 % (ref 0–8)
EOSINOPHIL NFR BLD: 0.7 % (ref 0–8)
ERYTHROCYTE [DISTWIDTH] IN BLOOD BY AUTOMATED COUNT: 14 % (ref 11.5–14.5)
ERYTHROCYTE [DISTWIDTH] IN BLOOD BY AUTOMATED COUNT: 14 % (ref 11.5–14.5)
EST. GFR  (NO RACE VARIABLE): 20.5 ML/MIN/1.73 M^2
GLUCOSE SERPL-MCNC: 148 MG/DL (ref 70–110)
GLUCOSE SERPL-MCNC: 311 MG/DL (ref 70–110)
GLUCOSE SERPL-MCNC: 58 MG/DL (ref 70–110)
GLUCOSE SERPL-MCNC: 73 MG/DL (ref 70–110)
HCT VFR BLD AUTO: 28 % (ref 37–48.5)
HCT VFR BLD AUTO: 28 % (ref 37–48.5)
HDLC SERPL-MCNC: 22 MG/DL (ref 40–75)
HDLC SERPL: 30.1 % (ref 20–50)
HGB BLD-MCNC: 9.2 G/DL (ref 12–16)
HGB BLD-MCNC: 9.2 G/DL (ref 12–16)
IMM GRANULOCYTES # BLD AUTO: 0.23 K/UL (ref 0–0.04)
IMM GRANULOCYTES # BLD AUTO: 0.23 K/UL (ref 0–0.04)
IMM GRANULOCYTES NFR BLD AUTO: 1.7 % (ref 0–0.5)
IMM GRANULOCYTES NFR BLD AUTO: 1.7 % (ref 0–0.5)
LDLC SERPL CALC-MCNC: 25.6 MG/DL (ref 63–159)
LYMPHOCYTES # BLD AUTO: 1.5 K/UL (ref 1–4.8)
LYMPHOCYTES # BLD AUTO: 1.5 K/UL (ref 1–4.8)
LYMPHOCYTES NFR BLD: 11.2 % (ref 18–48)
LYMPHOCYTES NFR BLD: 11.2 % (ref 18–48)
MAGNESIUM SERPL-MCNC: 1.6 MG/DL (ref 1.6–2.6)
MCH RBC QN AUTO: 31.6 PG (ref 27–31)
MCH RBC QN AUTO: 31.6 PG (ref 27–31)
MCHC RBC AUTO-ENTMCNC: 32.9 G/DL (ref 32–36)
MCHC RBC AUTO-ENTMCNC: 32.9 G/DL (ref 32–36)
MCV RBC AUTO: 96 FL (ref 82–98)
MCV RBC AUTO: 96 FL (ref 82–98)
MONOCYTES # BLD AUTO: 0.9 K/UL (ref 0.3–1)
MONOCYTES # BLD AUTO: 0.9 K/UL (ref 0.3–1)
MONOCYTES NFR BLD: 6.7 % (ref 4–15)
MONOCYTES NFR BLD: 6.7 % (ref 4–15)
NEUTROPHILS # BLD AUTO: 10.7 K/UL (ref 1.8–7.7)
NEUTROPHILS # BLD AUTO: 10.7 K/UL (ref 1.8–7.7)
NEUTROPHILS NFR BLD: 79.3 % (ref 38–73)
NEUTROPHILS NFR BLD: 79.3 % (ref 38–73)
NONHDLC SERPL-MCNC: 51 MG/DL
NRBC BLD-RTO: 0 /100 WBC
NRBC BLD-RTO: 0 /100 WBC
PHOSPHATE SERPL-MCNC: 3.5 MG/DL (ref 2.7–4.5)
PLATELET # BLD AUTO: 298 K/UL (ref 150–450)
PLATELET # BLD AUTO: 298 K/UL (ref 150–450)
PMV BLD AUTO: 8.3 FL (ref 9.2–12.9)
PMV BLD AUTO: 8.3 FL (ref 9.2–12.9)
POTASSIUM SERPL-SCNC: 4.2 MMOL/L (ref 3.5–5.1)
RBC # BLD AUTO: 2.91 M/UL (ref 4–5.4)
RBC # BLD AUTO: 2.91 M/UL (ref 4–5.4)
SODIUM SERPL-SCNC: 138 MMOL/L (ref 136–145)
TRIGL SERPL-MCNC: 127 MG/DL (ref 30–150)
TROPONIN I SERPL HS-MCNC: 18.8 PG/ML (ref 0–14.9)
WBC # BLD AUTO: 13.53 K/UL (ref 3.9–12.7)
WBC # BLD AUTO: 13.53 K/UL (ref 3.9–12.7)

## 2024-03-18 PROCEDURE — 85730 THROMBOPLASTIN TIME PARTIAL: CPT | Performed by: NURSE PRACTITIONER

## 2024-03-18 PROCEDURE — 63600175 PHARM REV CODE 636 W HCPCS: Performed by: NURSE PRACTITIONER

## 2024-03-18 PROCEDURE — 21400001 HC TELEMETRY ROOM

## 2024-03-18 PROCEDURE — 83735 ASSAY OF MAGNESIUM: CPT | Performed by: NURSE PRACTITIONER

## 2024-03-18 PROCEDURE — 85025 COMPLETE CBC W/AUTO DIFF WBC: CPT | Performed by: NURSE PRACTITIONER

## 2024-03-18 PROCEDURE — 96366 THER/PROPH/DIAG IV INF ADDON: CPT

## 2024-03-18 PROCEDURE — 25000003 PHARM REV CODE 250: Performed by: NURSE PRACTITIONER

## 2024-03-18 PROCEDURE — 85730 THROMBOPLASTIN TIME PARTIAL: CPT | Mod: 91 | Performed by: INTERNAL MEDICINE

## 2024-03-18 PROCEDURE — 96361 HYDRATE IV INFUSION ADD-ON: CPT

## 2024-03-18 PROCEDURE — 99223 1ST HOSP IP/OBS HIGH 75: CPT | Mod: ,,, | Performed by: INTERNAL MEDICINE

## 2024-03-18 PROCEDURE — 80048 BASIC METABOLIC PNL TOTAL CA: CPT | Performed by: NURSE PRACTITIONER

## 2024-03-18 PROCEDURE — 99900035 HC TECH TIME PER 15 MIN (STAT)

## 2024-03-18 PROCEDURE — 84484 ASSAY OF TROPONIN QUANT: CPT | Performed by: NURSE PRACTITIONER

## 2024-03-18 PROCEDURE — 94761 N-INVAS EAR/PLS OXIMETRY MLT: CPT

## 2024-03-18 PROCEDURE — 36415 COLL VENOUS BLD VENIPUNCTURE: CPT | Performed by: INTERNAL MEDICINE

## 2024-03-18 PROCEDURE — 80061 LIPID PANEL: CPT | Performed by: NURSE PRACTITIONER

## 2024-03-18 PROCEDURE — 84100 ASSAY OF PHOSPHORUS: CPT | Performed by: NURSE PRACTITIONER

## 2024-03-18 RX ADMIN — METOPROLOL TARTRATE 25 MG: 25 TABLET, FILM COATED ORAL at 09:03

## 2024-03-18 RX ADMIN — CEFTRIAXONE SODIUM 1 G: 1 INJECTION, POWDER, FOR SOLUTION INTRAMUSCULAR; INTRAVENOUS at 11:03

## 2024-03-18 RX ADMIN — APIXABAN 10 MG: 5 TABLET, FILM COATED ORAL at 11:03

## 2024-03-18 RX ADMIN — PANTOPRAZOLE SODIUM 40 MG: 40 TABLET, DELAYED RELEASE ORAL at 05:03

## 2024-03-18 RX ADMIN — HEPARIN SODIUM 15 UNITS/KG/HR: 10000 INJECTION, SOLUTION INTRAVENOUS at 07:03

## 2024-03-18 RX ADMIN — LEVOTHYROXINE SODIUM 88 MCG: 88 TABLET ORAL at 05:03

## 2024-03-18 NOTE — PROGRESS NOTES
[facility] Medicine  Progress Note    Patient Name: Epi Mcintosh  MRN: [id number]  Patient Class: OP- Observation   Admission Date: 3/17/2024  Length of Stay: 0 days  Attending Physician: Maxine Issa MD  Primary Care Provider: Flaco Parker MD        Subjective:     Principal Problem:Acute pulmonary embolism        HPI:  74-year-old female with a past medical history of tobacco use, COPD, CAD, depression, diabetes, hypertension, pancreatitis, peripheral vascular disease /pad status post stent in the right leg with angioplasty stenting of the right superficial femoral and right iliac arteries whom presents to the emergency room with  reports of shortness of breath and chest pain for the last 3 months. She reports today shortness of breath and cough. Recently diagnoses with UTI and completed Macrobid this week. She reports nausea, vomiting earlier this week but none today. Decrease appetite and feeling week. Reports feeling off balance and a couple of months ago fell, striking head. Reports abdominal pain, generalized non radiating since the fall. She says she does have some lower abdominal pain and it was bad last night, worse than usual. In ER, elvated D Dimer 1.167, , troponin I high sensativity 19.1, Na 130, Sr Cr 2.6 baseline Cr 1.6, UA with 1+ protein, 1+ glucose, 3+ leukocytes, WBC > 100, urine culture and blood culture pending. Patient given Rocephin 2gm IV and 1 L LR in ER. CXR nonacute, EKG with prolonged QT no ST elevation.   NM Lung scan with high probability of pulmonary embolism. Perfusion:  There are several segmental sized wedge-shaped areas of photopenia demonstrated.     Overview/Hospital Course:  Ms. Mcintosh has been monitored closely during her hospitalization. She was admitted on 3/17/24 with sepsis d/t left pyelonephritis, KAIT on CKDIII, and VQ scan with high probability of PE. She was started on a heparin gtt. Bilat LE US are negative for DVT.  "Pt reports that she went on a trip to [place] for Thanksgiving, and she has been feeling bad for the last 3 months. In the ED she was hypotensive down to 82/49 and became normotensive after a 1L bolus. Baseline creatinine from 1 year ago was 1.7 and today is 2.4. she states she gets [id number] labs with her pcp Dr. Armendariz who has never mentioned her renal function, so she is unsure what her last creatinine was. Losartan has been on hold since admission. She's had left flank pain for atleast 2 weeks and finished 7 days of macrobid. UA is growing GNR but C&S is pending and CT abd/pelvis: "Left kidney is mildly enlarged and there is mild left perinephric stranding" and is on Rocephin. Sister is in the room and reports minimal activity d/t weakness and fatigue over the last 3 months and is concerned about this. PT/OT consult ordered for eval. Eliquis ordered to start tonight and transition off of heparin gtt. I have sent eliquis initial 10 day order to in hospital pharmacy and will ensure it is cost effective for her. Echo ordered and pending to evaluate for right heart strain.    Interval History: BP has normalized, renal function may be at new normal vs. KAIT, she will need outpt nephrology referral. Continue to hold losartan and transition heparin gtt to eliquis. PE may be provoked, but will need outpt referral to heme/onc for hypercoag work up. Likely d/c in AM with HH with PT/OT pending eval.    Review of Systems   Constitutional:  Positive for fatigue.   Neurological:  Positive for weakness.     Objective:     Vital Signs (Most Recent):  Temp: 97.7 °F (36.5 °C) (03/17/24 1417)  Pulse: 75 (03/18/24 1212)  Resp: 19 (03/18/24 1212)  BP: (!) 121/58 (03/18/24 1202)  SpO2: 99 % (03/18/24 1212) Vital Signs (24h Range):  Pulse:  [64-75] 75  Resp:  [18-19] 19  SpO2:  [97 %-100 %] 99 %  BP: (120-180)/(50-72) 121/58     Weight: 68 kg (150 lb)  Body mass index is 24.96 kg/m².    Intake/Output Summary (Last 24 hours) at " 3/18/2024 1439  Last data filed at 3/18/2024 1351  Gross per 24 hour   Intake 100 ml   Output 1200 ml   Net -1100 ml         Physical Exam  Vitals and nursing note reviewed.   Constitutional:       Appearance: Normal appearance.   HENT:      Head: Normocephalic and atraumatic.      Nose: Nose normal.      Mouth/Throat:      Mouth: Mucous membranes are moist.      Pharynx: Oropharynx is clear.   Eyes:      Extraocular Movements: Extraocular movements intact.      Pupils: Pupils are equal, round, and reactive to light.   Cardiovascular:      Rate and Rhythm: Normal rate and regular rhythm.      Pulses: Normal pulses.   Pulmonary:      Effort: Pulmonary effort is normal.      Breath sounds: Wheezing present.   Abdominal:      General: Bowel sounds are normal.      Palpations: Abdomen is soft.      Tenderness: There is left CVA tenderness.   Musculoskeletal:         General: Normal range of motion.      Cervical back: Normal range of motion and neck supple.   Skin:     General: Skin is warm and dry.      Capillary Refill: Capillary refill takes less than 2 seconds.   Neurological:      General: No focal deficit present.      Mental Status: She is alert and oriented to person, place, and time.   Psychiatric:         Mood and Affect: Mood normal.         Behavior: Behavior normal.             Significant Labs: All pertinent labs within the past 24 hours have been reviewed.  CBC:   Recent Labs   Lab 03/17/24  1602 03/17/24  2143 03/18/24  0522   WBC 14.95* 13.93* 13.53*  13.53*   HGB 10.2* 9.9* 9.2*  9.2*   HCT 30.8* 30.2* 28.0*  28.0*    326 298  298     CMP:   Recent Labs   Lab 03/17/24  1602 03/17/24  2143 03/18/24  0522   * 134* 138   K 4.6  --  4.2   CL 99  --  108   CO2 23  --  22*   *  --  58*   BUN 31*  --  25*   CREATININE 2.6*  --  2.4*   CALCIUM 8.2*  --  8.0*   PROT 6.1  --   --    ALBUMIN 3.0*  --   --    BILITOT 0.2  --   --    ALKPHOS 71  --   --    AST 10  --   --    ALT 12  --   --     ANIONGAP 8  --  8     Urine Culture:   Recent Labs   Lab 03/17/24  1613   LABURIN GRAM NEGATIVE ANGEL, NON-LACTOSE   >100,000 cfu/ml  Identification and susceptibility pending  *     Urine Studies:   Recent Labs   Lab 03/17/24  1613   COLORU Colorless*   APPEARANCEUA Hazy*   PHUR 6.0   SPECGRAV 1.005   PROTEINUA 1+*   GLUCUA 1+*   KETONESU Negative   BILIRUBINUA Negative   OCCULTUA 2+*   NITRITE Negative   UROBILINOGEN Negative   LEUKOCYTESUR 3+*   RBCUA 3   WBCUA >100*   BACTERIA Many*   SQUAMEPITHEL 0   HYALINECASTS 0       Significant Imaging: I have reviewed all pertinent imaging results/findings within the past 24 hours.    US Lower Extremity Veins Bilateral    Result Date: 3/17/2024  PROCEDURE: US Duplex Bilateral Lower Extremities Veins CLINICAL INDICATION: The patient is 74 years old and is Female; elevated d dimer, r/o dvt TECHNIQUE: Real-time duplex ultrasound scan of the bilateral lower extremity veins integrating B-mode two-dimensional vascular structure, Doppler spectral analysis, color flow Doppler imaging and compression. COMPARISON: No relevant prior studies available. FINDINGS: RIGHT DEEP VEINS:  Unremarkable.  No DVT in the right common femoral, femoral, proximal deep femoral or popliteal veins or peroneal vein or anterior tibial veins.  The veins demonstrate normal color flow, are normally compressible, with normal phasic flow and/or augmentation response. RIGHT SUPERFICIAL VEINS:  Unremarkable.  No thrombus in the visualized right great saphenous vein. LEFT DEEP VEINS:  Unremarkable.  No DVT in the left common femoral, femoral, proximal deep femoral or popliteal veins or peroneal vein or anterior tibial veins.  The veins demonstrate normal color flow, are normally compressible, with normal phasic flow and/or augmentation response. LEFT SUPERFICIAL VEINS:  Unremarkable.  No thrombus in the visualized left great saphenous vein. SOFT TISSUES:  No acute findings.  No popliteal cyst.  IMPRESSION: No DVT of the bilateral lower extremities. Electronically signed by:  Marya Dawson MD  3/17/2024 9:25 PM CDT Workstation: [id number]    CT Abdomen Pelvis  Without Contrast    Result Date: 3/17/2024  EXAM: CT Abdomen and Pelvis Without Intravenous Contrast CLINICAL HISTORY: The patient is 74 years old and is Female; Abdominal pain, acute, nonlocalized TECHNIQUE: Axial computed tomography images of the abdomen and pelvis without intravenous contrast.  Sagittal and coronal reformatted images were created and reviewed.  This CT exam was performed using one or more of the following dose reduction techniques:  automated exposure control, adjustment of the mA and/or kV according to patient size, and/or use of iterative reconstruction technique. COMPARISON: No relevant prior studies available. FINDINGS: LUNG BASES:  Unremarkable.  No consolidation or acute findings. ABDOMEN: LIVER:  Unremarkable for noncontrast exam. GALLBLADDER AND BILE DUCTS:  There appear to be small gallstones without obvious complication. PANCREAS:  Unremarkable.  No ductal dilation. SPLEEN:  Unremarkable without enlargement. ADRENALS:  Unremarkable. KIDNEYS AND URETERS:  Left kidney is mildly enlarged and there is mild left perinephric stranding. Several punctate bilateral renal calcifications may be within the collecting systems. No obvious ureteral stones but there does appear to be mild left periureteral stranding. STOMACH AND BOWEL:  Mild diverticulosis distal colon. Unopacified bowel is otherwise unremarkable. PELVIS: APPENDIX:  The appendix appears normal. BLADDER:  Unremarkable.  No stones. ABDOMEN and PELVIS: INTRAPERITONEAL SPACE:  Unremarkable.  No free air.  No significant fluid collection. BONES/JOINTS: Old healing bilateral inferior ramus fractures. No acute findings. SOFT TISSUES:  Unremarkable. VASCULATURE:  Aorta and iliac vessels are heavily atherosclerotic with evidence of high-grade stenosis just above the  aortic bifurcation and extending into bilateral common iliac arteries. LYMPH NODES:  No suspicious adenopathy. IMPRESSION: 1.  Left renal findings as described are not well characterized on noncontrast exam but suspicious for upper urinary tract infection. Cannot exclude a few punctate bilateral nonobstructive renal calculi. 2.  Severe vascular disease with evidence of high-grade stenosis distal aorta and bilateral common iliac arteries. 3.  Incidental gallstones Electronically signed by:  Isac Connell MD  03/17/2024 08:57 PM CDT Workstation: [id number]    NM Lung Scan Perfusion Particulate    Result Date: 3/17/2024  EXAM:  NM Lung Perfusion Scan CLINICAL HISTORY:  Pulmonary embolism (PE) suspected, positive D-dimer; pos d dimer TECHNIQUE:  Nuclear Medicine perfusion images of the lungs were obtained in multiple projections following injection of 5.2 mCi Tc99m MAA. COMPARISON:  Chest radiograph from March 17, 2024. FINDINGS: Perfusion:  There are several segmental sized wedge-shaped areas of photopenia demonstrated. IMPRESSION: High probability of pulmonary embolism. Thank you for allowing us to participate in the care of this patient. Electronically signed by:  Cipriano Del Rio DO  03/17/2024 08:16 PM CDT Workstation: [id number]    X-Ray Chest AP Portable    Result Date: 3/17/2024  XR CHEST 1 VIEW CLINICAL HISTORY: 74 years Female shortness of breath, chest pain COMPARISON: March 30, 2022 FINDINGS: Cardiac silhouette size is within normal limits. Status post median sternotomy. Atherosclerotic calcification of the aorta. No confluent airspace disease. No large pleural effusion or pneumothorax. No acute osseous abnormality. IMPRESSION: No acute pulmonary process. Electronically signed by:  Prasanth Lomax MD  03/17/2024 03:11 PM CDT Workstation: [id number]       Assessment/Plan:      * Acute pulmonary embolism  NM Lung scan with high probability of pulmonary embolism. Perfusion:  There are several segmental  "sized wedge-shaped areas of photopenia demonstrated.   Elevated D dimer 1.67  CXR: no acute pulmonary process  On heparin gtt   Transition to PO eliquis tonight   Echo to evaluate for Rt heart strain        Debility  PT/OT eval and treat    Acute renal failure superimposed on stage 3 chronic kidney disease    History of St 3b CKD.   Patient with acute kidney injury/acute renal failure likely due to pre-renal azotemia due to dehydration KAIT is currently worsening. Baseline creatinine  1.7  - Labs reviewed- Renal function/electrolytes with Estimated Creatinine Clearance: 18.2 mL/min (A) (based on SCr of 2.4 mg/dL (H)). according to latest data. Monitor urine output and serial BMP and adjust therapy as needed. Avoid nephrotoxins and renally dose meds for GFR listed above.    IV hydration  Monitor renal function daily  Daily wt/accurate I&O  Continue to hold losartan   Avoid nephrotoxic meds  Renally dose all meds    Pyelonephritis  Urine culture GNR  Continue rocephin  IV hydration      Type 2 diabetes mellitus with complication, without long-term current use of insulin  Patient's FSGs are uncontrolled due to hyperglycemia on current medication regimen.  Last A1c reviewed-   Lab Results   Component Value Date    HGBA1C 9.2 (H) 03/03/2022     Most recent fingerstick glucose reviewed- No results for input(s): "POCTGLUCOSE" in the last 24 hours.  Current correctional scale  High  Maintain anti-hyperglycemic dose as follows-   Antihyperglycemics (From admission, onward)      Start     Stop Route Frequency Ordered    03/17/24 1932  insulin aspart U-100 pen 0-10 Units         -- SubQ Before meals & nightly PRN 03/17/24 1833          Hold Oral hypoglycemics while patient is in the hospital.         VTE Risk Mitigation (From admission, onward)           Ordered     apixaban tablet 10 mg  2 times daily         03/18/24 1408     heparin 25,000 units in dextrose 5% 250 mL (100 units/mL) infusion HIGH INTENSITY nomogram - [facility]  " Continuous        Question:  Begin at (units/kg/hr)  Answer:  18    03/17/24 2055     heparin 25,000 units in dextrose 5% (100 units/ml) IV bolus from bag HIGH INTENSITY nomogram - [facility]  As needed (PRN)        Question:  Heparin Infusion Adjustment (DO NOT MODIFY ANSWER)  Answer:  [email or web address]\epic\Images\Pharmacy\HeparinInfusions\heparin HIGH INTENSITY nomogram for [facility] [id number]    03/17/24 2053     heparin 25,000 units in dextrose 5% (100 units/ml) IV bolus from bag HIGH INTENSITY nomogram - [facility]  As needed (PRN)        Question:  Heparin Infusion Adjustment (DO NOT MODIFY ANSWER)  Answer:  [email or web address]\epic\Images\Pharmacy\HeparinInfusions\heparin HIGH INTENSITY nomogram for [facility] [id number]    03/17/24 2053     IP VTE HIGH RISK PATIENT  Once         03/17/24 1831     Place sequential compression device  Until discontinued         03/17/24 1831                    Discharge Planning   TALITA:      Code Status: Full Code   Is the patient medically ready for discharge?:     Reason for patient still in hospital (select all that apply): Patient trending condition and Imaging                     Stacey Olmos NP  Department of Hospital Medicine   [facility]

## 2024-03-18 NOTE — CONSULTS
"Pulmonary/Critical Care Consult      Patient name: Epi Mcintosh  MRN: [id number]  Date: 03/18/2024    Admit Date: 3/17/2024  Consult Requested By: Maxine Issa MD    Reason for Consult: Pulmonary emboli, abdominal pain    HPI:    3/18/2024 - Pt presented to ER with about 1 month h/o not feeling well with abdominal pain (LLQ), + chills (no fever), + nausea, vomitting and diarrhea.  Because of her illness she has not been very active.  She remembers having some increased SOB but denies any cough, sputum or chest pain.  She came to ER and had elevated d-dimer and a high probability nuclear perfusion scan and has been started on ELIQUIS.  She is on room air, is hemodynamically stable and still has  her other symptoms and "just wants to feel better".  Dopplers negative, no S1Q3T3 on EKG.    Review of Systems    Review of Systems   Constitutional:  Positive for chills, malaise/fatigue and weight loss. Negative for diaphoresis and fever.   HENT:  Negative for congestion.    Eyes:  Negative for pain.   Respiratory:  Positive for shortness of breath. Negative for cough, hemoptysis, sputum production, wheezing and stridor.    Cardiovascular:  Negative for chest pain, palpitations, orthopnea, claudication, leg swelling and PND.   Gastrointestinal:  Positive for abdominal pain, diarrhea, nausea and vomiting. Negative for constipation and heartburn.   Genitourinary:  Negative for dysuria, frequency and urgency.   Musculoskeletal:  Negative for falls and myalgias.   Neurological:  Positive for weakness. Negative for sensory change and focal weakness.   Psychiatric/Behavioral:  Positive for suicidal ideas (she now denies to me). Negative for depression and substance abuse. The patient is not nervous/anxious.        Past Medical History    Past Medical History:   Diagnosis Date    COPD (chronic obstructive pulmonary disease)     Coronary artery disease     Depression     Diabetes mellitus     Hypertension     " Pancreatitis     PVD (peripheral vascular disease)        Past Surgical History    Past Surgical History:   Procedure Laterality Date    AORTOGRAPHY WITH SERIALOGRAPHY N/A 2022    Procedure: AORTOGRAM, WITH SERIALOGRAPHY;  Surgeon: Petr Kaufman MD;  Location: [facility] CATH/EP LAB;  Service: Peripheral Vascular;  Laterality: N/A;    CARDIAC SURGERY      CORONARY ARTERY BYPASS GRAFT  2018    HYSTERECTOMY         Medications (scheduled):      apixaban  10 mg Oral BID    atorvastatin  80 mg Oral Daily    cefTRIAXone (Rocephin) IV (PEDS and ADULTS)  1 g Intravenous Q24H    citalopram  40 mg Oral Daily    levothyroxine  88 mcg Oral Before breakfast    metoprolol tartrate  25 mg Oral BID    pantoprazole  40 mg Oral Before breakfast       Medications (infusions):      sodium chloride 0.9% 150 mL/hr at 24 2304    heparin (porcine) in D5W Stopped (24 1338)       Medications (prn):     acetaminophen, albuterol-ipratropium, dextrose 50% in water (D50W), dextrose 50% in water (D50W), diazePAM, glucagon (human recombinant), glucose, glucose, heparin (PORCINE), heparin (PORCINE), HYDROcodone-acetaminophen, insulin aspart U-100, melatonin, naloxone, ondansetron, sodium chloride 0.9%, sodium chloride 0.9%    Family History: No family history on file.    Social History: Tobacco:   Social History     Tobacco Use   Smoking Status Former    Current packs/day: 0.00    Types: Vaping w/o nicotine, Cigarettes    Quit date: 4/15/2021    Years since quittin.9   Smokeless Tobacco Never                                EtOH:   Social History     Substance and Sexual Activity   Alcohol Use Yes    Comment: occasionally                                Drugs:   Social History     Substance and Sexual Activity   Drug Use Never       Physical Exam    Vital signs:  Pulse:  [64-75]   Resp:  [18-19]   BP: (120-180)/(50-72)   SpO2:  [97 %-100 %]     Intake/Output:   Intake/Output Summary (Last 24 hours) at 3/18/2024 1428  Last  "data filed at 3/18/2024 1351  Gross per 24 hour   Intake 100 ml   Output 1200 ml   Net -1100 ml        BMI: Estimated body mass index is 24.96 kg/m² as calculated from the following:    Height as of this encounter: 5' 5" (1.651 m).    Weight as of this encounter: 68 kg (150 lb).    Physical Exam  Vitals and nursing note reviewed.   Constitutional:       General: She is not in acute distress.     Appearance: Normal appearance. She is ill-appearing. She is not toxic-appearing or diaphoretic.   HENT:      Head: Normocephalic and atraumatic.      Right Ear: External ear normal.      Left Ear: External ear normal.      Nose: Nose normal. No congestion or rhinorrhea.      Mouth/Throat:      Mouth: Mucous membranes are moist.      Pharynx: Oropharynx is clear. No oropharyngeal exudate or posterior oropharyngeal erythema.   Eyes:      General: No scleral icterus.        Right eye: No discharge.         Left eye: No discharge.      Extraocular Movements: Extraocular movements intact.      Conjunctiva/sclera: Conjunctivae normal.      Pupils: Pupils are equal, round, and reactive to light.   Neck:      Vascular: No carotid bruit.   Cardiovascular:      Rate and Rhythm: Normal rate and regular rhythm.      Pulses: Normal pulses.      Heart sounds: No murmur heard.     No friction rub. No gallop.   Pulmonary:      Effort: Pulmonary effort is normal. No respiratory distress.      Breath sounds: No stridor. No wheezing, rhonchi or rales.   Chest:      Chest wall: No tenderness.   Abdominal:      General: Bowel sounds are normal. There is no distension.      Tenderness: There is abdominal tenderness (some on deep palpation to LLQ). There is no guarding.   Musculoskeletal:         General: No swelling. Normal range of motion.      Cervical back: Normal range of motion and neck supple. No rigidity or tenderness.      Right lower leg: No edema.      Left lower leg: No edema.   Lymphadenopathy:      Cervical: No cervical adenopathy. " "  Skin:     General: Skin is warm and dry.      Capillary Refill: Capillary refill takes less than 2 seconds.      Coloration: Skin is not jaundiced.      Findings: No bruising.   Neurological:      General: No focal deficit present.      Mental Status: She is alert and oriented to person, place, and time. Mental status is at baseline.      Cranial Nerves: No cranial nerve deficit.      Sensory: No sensory deficit.      Motor: No weakness.   Psychiatric:         Mood and Affect: Mood normal.         Behavior: Behavior normal.         Thought Content: Thought content normal.         Judgment: Judgment normal.         Laboratory    Recent Labs   Lab 03/18/24  0522   WBC 13.53*  13.53*   RBC 2.91*  2.91*   HGB 9.2*  9.2*   HCT 28.0*  28.0*     298   MCV 96  96   MCH 31.6*  31.6*   MCHC 32.9  32.9       Recent Labs   Lab 03/17/24  1602 03/17/24 2143 03/18/24  0522   CALCIUM 8.2*  --  8.0*   ALBUMIN 3.0*  --   --    PROT 6.1  --   --    *   < > 138   K 4.6  --  4.2   CO2 23  --  22*   CL 99  --  108   BUN 31*  --  25*   CREATININE 2.6*  --  2.4*   ALKPHOS 71  --   --    ALT 12  --   --    AST 10  --   --    BILITOT 0.2  --   --     < > = values in this interval not displayed.       Recent Labs   Lab 03/17/24  2143 03/18/24  0522 03/18/24  1212   INR 1.0  --   --    APTT 25.8   < > 80.6*    < > = values in this interval not displayed.       No results for input(s): "CPK", "CPKMB", "TROPONINI", "MB" in the last 24 hours.    Additional labs: reviewed    Microbiology:       Microbiology Results (last 7 days)       Procedure Component Value Units Date/Time    Urine culture [[id number]]  (Abnormal) Collected: 03/17/24 1613    Order Status: Completed Specimen: Urine Updated: 03/18/24 0700     Urine Culture, Routine GRAM NEGATIVE ANGEL, NON-LACTOSE   >100,000 cfu/ml  Identification and susceptibility pending      Narrative:      Specimen Source->Urine            Radiology    US Lower Extremity Veins " Bilateral  PROCEDURE:  US Duplex Bilateral Lower Extremities Veins    CLINICAL INDICATION:  The patient is 74 years old and is Female; elevated d dimer, r/o dvt    TECHNIQUE:  Real-time duplex ultrasound scan of the bilateral lower extremity veins integrating B-mode two-dimensional vascular structure, Doppler spectral analysis, color flow Doppler imaging and compression.    COMPARISON:  No relevant prior studies available.    FINDINGS:  RIGHT DEEP VEINS:  Unremarkable.  No DVT in the right common femoral, femoral, proximal deep femoral or popliteal veins or peroneal vein or anterior tibial veins.  The veins demonstrate normal color flow, are normally compressible, with normal phasic flow and/or augmentation response.  RIGHT SUPERFICIAL VEINS:  Unremarkable.  No thrombus in the visualized right great saphenous vein.    LEFT DEEP VEINS:  Unremarkable.  No DVT in the left common femoral, femoral, proximal deep femoral or popliteal veins or peroneal vein or anterior tibial veins.  The veins demonstrate normal color flow, are normally compressible, with normal phasic flow and/or augmentation response.  LEFT SUPERFICIAL VEINS:  Unremarkable.  No thrombus in the visualized left great saphenous vein.    SOFT TISSUES:  No acute findings.  No popliteal cyst.    IMPRESSION:  No DVT of the bilateral lower extremities.    Electronically signed by:  Marya Dawson MD  3/17/2024 9:25 PM CDT Workstation: [id number]  CT Abdomen Pelvis  Without Contrast  EXAM:  CT Abdomen and Pelvis Without Intravenous Contrast    CLINICAL HISTORY:  The patient is 74 years old and is Female; Abdominal pain, acute, nonlocalized    TECHNIQUE:  Axial computed tomography images of the abdomen and pelvis without intravenous contrast.  Sagittal and coronal reformatted images were created and reviewed.  This CT exam was performed using one or more of the following dose reduction techniques:  automated exposure control, adjustment of the mA and/or kV  according to patient size, and/or use of iterative reconstruction technique.    COMPARISON:  No relevant prior studies available.    FINDINGS:  LUNG BASES:  Unremarkable.  No consolidation or acute findings.    ABDOMEN:  LIVER:  Unremarkable for noncontrast exam.  GALLBLADDER AND BILE DUCTS:  There appear to be small gallstones without obvious complication.  PANCREAS:  Unremarkable.  No ductal dilation.  SPLEEN:  Unremarkable without enlargement.  ADRENALS:  Unremarkable.  KIDNEYS AND URETERS:  Left kidney is mildly enlarged and there is mild left perinephric stranding. Several punctate bilateral renal calcifications may be within the collecting systems. No obvious ureteral stones but there does appear to be mild left periureteral stranding.  STOMACH AND BOWEL:  Mild diverticulosis distal colon. Unopacified bowel is otherwise unremarkable.    PELVIS:  APPENDIX:  The appendix appears normal.  BLADDER:  Unremarkable.  No stones.    ABDOMEN and PELVIS:  INTRAPERITONEAL SPACE:  Unremarkable.  No free air.  No significant fluid collection.  BONES/JOINTS: Old healing bilateral inferior ramus fractures. No acute findings.  SOFT TISSUES:  Unremarkable.  VASCULATURE:  Aorta and iliac vessels are heavily atherosclerotic with evidence of high-grade stenosis just above the aortic bifurcation and extending into bilateral common iliac arteries.  LYMPH NODES:  No suspicious adenopathy.    IMPRESSION:  1.  Left renal findings as described are not well characterized on noncontrast exam but suspicious for upper urinary tract infection. Cannot exclude a few punctate bilateral nonobstructive renal calculi.  2.  Severe vascular disease with evidence of high-grade stenosis distal aorta and bilateral common iliac arteries.  3.  Incidental gallstones    Electronically signed by:  Isac Connell MD  03/17/2024 08:57 PM CDT Workstation: [id number]  NM Lung Scan Perfusion Particulate  EXAM:  NM Lung Perfusion Scan    CLINICAL HISTORY:   "Pulmonary embolism (PE) suspected, positive D-dimer; pos d dimer    TECHNIQUE:  Nuclear Medicine perfusion images of the lungs were obtained in multiple projections following injection of 5.2 mCi Tc99m MAA.    COMPARISON:  Chest radiograph from March 17, 2024.    FINDINGS:  Perfusion:  There are several segmental sized wedge-shaped areas of photopenia demonstrated.    IMPRESSION:  High probability of pulmonary embolism.    Thank you for allowing us to participate in the care of this patient.    Electronically signed by:  Cipriano Del Rio DO  03/17/2024 08:16 PM CDT Workstation: [id number]  X-Ray Chest AP Portable  XR CHEST 1 VIEW    CLINICAL HISTORY:  74 years Female shortness of breath, chest pain    COMPARISON: March 30, 2022    FINDINGS: Cardiac silhouette size is within normal limits. Status post median sternotomy. Atherosclerotic calcification of the aorta. No confluent airspace disease. No large pleural effusion or pneumothorax. No acute osseous abnormality.    IMPRESSION: No acute pulmonary process.    Electronically signed by:  Prasanth Lomax MD  03/17/2024 03:11 PM CDT Workstation: [id number]        Additional Studies    reviewed    Ventilator Information                  No results for input(s): "PH", "PCO2", "PO2", "HCO3", "POCSATURATED", "BE" in the last 72 hours.      Impression    Active Hospital Problems    Diagnosis  POA    *UTI (urinary tract infection) [N39.0]  Yes    KAIT (acute kidney injury) [N17.9]  Yes    Positive D dimer [R79.89]  Yes    Hyponatremia [E87.1]  Yes    Acute abdominal pain [R10.9]  Yes    Hypotensive episode [I95.9]  Yes    Acute pulmonary embolism [I26.99]  Yes    Type 2 diabetes mellitus with complication, without long-term current use of insulin [E11.8]  Yes      Resolved Hospital Problems   No resolved problems to display.       Plan    Would treat as if there is a PE though we only have a perfusion scan (no ventilation scan done)  Cannot do CTA because of " KAIT/CKD  Continue treatment of UTI  Await cultures  Await ECHO report  Follow sodium  Follow abdominal pain - hopefully will improve with treatment of UTI    Thank you for this consult.  I will follow with you while the patient is hospitalized.        Carlitos Pope MD  [facility] Pulmonary/Critical Care  03/18/2024

## 2024-03-18 NOTE — ASSESSMENT & PLAN NOTE
On arrival to ER SBP 80s. Fluid bolus given in ER.   Continue IV hydration  Trend.   Hold HTN meds if SBP < 110

## 2024-03-18 NOTE — ASSESSMENT & PLAN NOTE
History of St 3b CKD.   Elevated troponin and BNP in ER    Patient with acute kidney injury/acute renal failure likely due to pre-renal azotemia due to dehydration KAIT is currently worsening. Baseline creatinine  1.5  - Labs reviewed- Renal function/electrolytes with CrCl cannot be calculated (Unknown ideal weight.). according to latest data. Monitor urine output and serial BMP and adjust therapy as needed. Avoid nephrotoxins and renally dose meds for GFR listed above.    IV hydration, avoid insults, trend renal functions

## 2024-03-18 NOTE — SUBJECTIVE & OBJECTIVE
Interval History: BP has normalized, renal function may be at new normal vs. KAIT, she will need outpt nephrology referral. Continue to hold losartan and transition heparin gtt to eliquis. PE may be provoked, but will need outpt referral to heme/onc for hypercoag work up. Likely d/c in AM with HH with PT/OT pending eval.    Review of Systems   Constitutional:  Positive for fatigue.   Neurological:  Positive for weakness.     Objective:     Vital Signs (Most Recent):  Temp: 97.7 °F (36.5 °C) (03/17/24 1417)  Pulse: 75 (03/18/24 1212)  Resp: 19 (03/18/24 1212)  BP: (!) 121/58 (03/18/24 1202)  SpO2: 99 % (03/18/24 1212) Vital Signs (24h Range):  Pulse:  [64-75] 75  Resp:  [18-19] 19  SpO2:  [97 %-100 %] 99 %  BP: (120-180)/(50-72) 121/58     Weight: 68 kg (150 lb)  Body mass index is 24.96 kg/m².    Intake/Output Summary (Last 24 hours) at 3/18/2024 1439  Last data filed at 3/18/2024 1351  Gross per 24 hour   Intake 100 ml   Output 1200 ml   Net -1100 ml         Physical Exam  Vitals and nursing note reviewed.   Constitutional:       Appearance: Normal appearance.   HENT:      Head: Normocephalic and atraumatic.      Nose: Nose normal.      Mouth/Throat:      Mouth: Mucous membranes are moist.      Pharynx: Oropharynx is clear.   Eyes:      Extraocular Movements: Extraocular movements intact.      Pupils: Pupils are equal, round, and reactive to light.   Cardiovascular:      Rate and Rhythm: Normal rate and regular rhythm.      Pulses: Normal pulses.   Pulmonary:      Effort: Pulmonary effort is normal.      Breath sounds: Wheezing present.   Abdominal:      General: Bowel sounds are normal.      Palpations: Abdomen is soft.      Tenderness: There is left CVA tenderness.   Musculoskeletal:         General: Normal range of motion.      Cervical back: Normal range of motion and neck supple.   Skin:     General: Skin is warm and dry.      Capillary Refill: Capillary refill takes less than 2 seconds.   Neurological:       General: No focal deficit present.      Mental Status: She is alert and oriented to person, place, and time.   Psychiatric:         Mood and Affect: Mood normal.         Behavior: Behavior normal.             Significant Labs: All pertinent labs within the past 24 hours have been reviewed.  CBC:   Recent Labs   Lab 03/17/24  1602 03/17/24 2143 03/18/24  0522   WBC 14.95* 13.93* 13.53*  13.53*   HGB 10.2* 9.9* 9.2*  9.2*   HCT 30.8* 30.2* 28.0*  28.0*    326 298  298     CMP:   Recent Labs   Lab 03/17/24  1602 03/17/24 2143 03/18/24  0522   * 134* 138   K 4.6  --  4.2   CL 99  --  108   CO2 23  --  22*   *  --  58*   BUN 31*  --  25*   CREATININE 2.6*  --  2.4*   CALCIUM 8.2*  --  8.0*   PROT 6.1  --   --    ALBUMIN 3.0*  --   --    BILITOT 0.2  --   --    ALKPHOS 71  --   --    AST 10  --   --    ALT 12  --   --    ANIONGAP 8  --  8     Urine Culture:   Recent Labs   Lab 03/17/24  1613   LABURIN GRAM NEGATIVE ANGEL, NON-LACTOSE   >100,000 cfu/ml  Identification and susceptibility pending  *     Urine Studies:   Recent Labs   Lab 03/17/24  1613   COLORU Colorless*   APPEARANCEUA Hazy*   PHUR 6.0   SPECGRAV 1.005   PROTEINUA 1+*   GLUCUA 1+*   KETONESU Negative   BILIRUBINUA Negative   OCCULTUA 2+*   NITRITE Negative   UROBILINOGEN Negative   LEUKOCYTESUR 3+*   RBCUA 3   WBCUA >100*   BACTERIA Many*   SQUAMEPITHEL 0   HYALINECASTS 0       Significant Imaging: I have reviewed all pertinent imaging results/findings within the past 24 hours.    US Lower Extremity Veins Bilateral    Result Date: 3/17/2024  PROCEDURE: US Duplex Bilateral Lower Extremities Veins CLINICAL INDICATION: The patient is 74 years old and is Female; elevated d dimer, r/o dvt TECHNIQUE: Real-time duplex ultrasound scan of the bilateral lower extremity veins integrating B-mode two-dimensional vascular structure, Doppler spectral analysis, color flow Doppler imaging and compression. COMPARISON: No relevant prior  studies available. FINDINGS: RIGHT DEEP VEINS:  Unremarkable.  No DVT in the right common femoral, femoral, proximal deep femoral or popliteal veins or peroneal vein or anterior tibial veins.  The veins demonstrate normal color flow, are normally compressible, with normal phasic flow and/or augmentation response. RIGHT SUPERFICIAL VEINS:  Unremarkable.  No thrombus in the visualized right great saphenous vein. LEFT DEEP VEINS:  Unremarkable.  No DVT in the left common femoral, femoral, proximal deep femoral or popliteal veins or peroneal vein or anterior tibial veins.  The veins demonstrate normal color flow, are normally compressible, with normal phasic flow and/or augmentation response. LEFT SUPERFICIAL VEINS:  Unremarkable.  No thrombus in the visualized left great saphenous vein. SOFT TISSUES:  No acute findings.  No popliteal cyst. IMPRESSION: No DVT of the bilateral lower extremities. Electronically signed by:  Marya Dawson MD  3/17/2024 9:25 PM CDT Workstation: [id number]    CT Abdomen Pelvis  Without Contrast    Result Date: 3/17/2024  EXAM: CT Abdomen and Pelvis Without Intravenous Contrast CLINICAL HISTORY: The patient is 74 years old and is Female; Abdominal pain, acute, nonlocalized TECHNIQUE: Axial computed tomography images of the abdomen and pelvis without intravenous contrast.  Sagittal and coronal reformatted images were created and reviewed.  This CT exam was performed using one or more of the following dose reduction techniques:  automated exposure control, adjustment of the mA and/or kV according to patient size, and/or use of iterative reconstruction technique. COMPARISON: No relevant prior studies available. FINDINGS: LUNG BASES:  Unremarkable.  No consolidation or acute findings. ABDOMEN: LIVER:  Unremarkable for noncontrast exam. GALLBLADDER AND BILE DUCTS:  There appear to be small gallstones without obvious complication. PANCREAS:  Unremarkable.  No ductal dilation. SPLEEN:   Unremarkable without enlargement. ADRENALS:  Unremarkable. KIDNEYS AND URETERS:  Left kidney is mildly enlarged and there is mild left perinephric stranding. Several punctate bilateral renal calcifications may be within the collecting systems. No obvious ureteral stones but there does appear to be mild left periureteral stranding. STOMACH AND BOWEL:  Mild diverticulosis distal colon. Unopacified bowel is otherwise unremarkable. PELVIS: APPENDIX:  The appendix appears normal. BLADDER:  Unremarkable.  No stones. ABDOMEN and PELVIS: INTRAPERITONEAL SPACE:  Unremarkable.  No free air.  No significant fluid collection. BONES/JOINTS: Old healing bilateral inferior ramus fractures. No acute findings. SOFT TISSUES:  Unremarkable. VASCULATURE:  Aorta and iliac vessels are heavily atherosclerotic with evidence of high-grade stenosis just above the aortic bifurcation and extending into bilateral common iliac arteries. LYMPH NODES:  No suspicious adenopathy. IMPRESSION: 1.  Left renal findings as described are not well characterized on noncontrast exam but suspicious for upper urinary tract infection. Cannot exclude a few punctate bilateral nonobstructive renal calculi. 2.  Severe vascular disease with evidence of high-grade stenosis distal aorta and bilateral common iliac arteries. 3.  Incidental gallstones Electronically signed by:  Isac Connell MD  03/17/2024 08:57 PM CDT Workstation: [id number]    NM Lung Scan Perfusion Particulate    Result Date: 3/17/2024  EXAM:  NM Lung Perfusion Scan CLINICAL HISTORY:  Pulmonary embolism (PE) suspected, positive D-dimer; pos d dimer TECHNIQUE:  Nuclear Medicine perfusion images of the lungs were obtained in multiple projections following injection of 5.2 mCi Tc99m MAA. COMPARISON:  Chest radiograph from March 17, 2024. FINDINGS: Perfusion:  There are several segmental sized wedge-shaped areas of photopenia demonstrated. IMPRESSION: High probability of pulmonary embolism. Thank you for  allowing us to participate in the care of this patient. Electronically signed by:  Cipriano Del Rio DO  03/17/2024 08:16 PM CDT Workstation: [id number]    X-Ray Chest AP Portable    Result Date: 3/17/2024  XR CHEST 1 VIEW CLINICAL HISTORY: 74 years Female shortness of breath, chest pain COMPARISON: March 30, 2022 FINDINGS: Cardiac silhouette size is within normal limits. Status post median sternotomy. Atherosclerotic calcification of the aorta. No confluent airspace disease. No large pleural effusion or pneumothorax. No acute osseous abnormality. IMPRESSION: No acute pulmonary process. Electronically signed by:  Prasanth Lomax MD  03/17/2024 03:11 PM CDT Workstation: [id number]

## 2024-03-18 NOTE — NURSING
Nurses Note -- 4 Eyes      3/18/2024   3:03 PM      Skin assessed during: Admit      [x] No Altered Skin Integrity Present    []Prevention Measures Documented      [] Yes- Altered Skin Integrity Present or Discovered   [] LDA Added if Not in Epic (Describe Wound)   [] New Altered Skin Integrity was Present on Admit and Documented in LDA   [] Wound Image Taken    Wound Care Consulted? No    Attending Nurse:  Zoe Chua RN/Staff Member:   ALEXANDER Ruiz

## 2024-03-18 NOTE — SUBJECTIVE & OBJECTIVE
Past Medical History:   Diagnosis Date    COPD (chronic obstructive pulmonary disease)     Coronary artery disease     Depression     Diabetes mellitus     Hypertension     Pancreatitis     PVD (peripheral vascular disease)        Past Surgical History:   Procedure Laterality Date    AORTOGRAPHY WITH SERIALOGRAPHY N/A 4/1/2022    Procedure: AORTOGRAM, WITH SERIALOGRAPHY;  Surgeon: Petr Kaufman MD;  Location: [facility] CATH/EP LAB;  Service: Peripheral Vascular;  Laterality: N/A;    CARDIAC SURGERY      CORONARY ARTERY BYPASS GRAFT  12/2018    HYSTERECTOMY         Review of patient's allergies indicates:   Allergen Reactions    Plavix [clopidogrel] Rash    Zolpidem      Other reaction(s): Other (See Comments)  Seeing people who were not in the room     Codeine        Current Facility-Administered Medications on File Prior to Encounter   Medication    0.9%  NaCl infusion     Current Outpatient Medications on File Prior to Encounter   Medication Sig    albuterol (PROVENTIL/VENTOLIN HFA) 90 mcg/actuation inhaler Inhale 2 puffs into the lungs 4 (four) times daily as needed.    citalopram (CELEXA) 40 MG tablet Take 1 tablet (40 mg total) by mouth once daily.    diazePAM (VALIUM) 10 MG Tab Take 1 tablet (10 mg total) by mouth daily as needed for Anxiety.    glipiZIDE (GLUCOTROL) 10 MG TR24 Take 10 mg by mouth once daily.    levothyroxine (SYNTHROID) 88 MCG tablet Take 88 mcg by mouth once daily.    losartan (COZAAR) 100 MG tablet Take 100 mg by mouth once daily.    metoprolol tartrate (LOPRESSOR) 25 MG tablet Take 1 tablet (25 mg total) by mouth 2 (two) times daily.    MYRBETRIQ 50 mg Tb24 Take 1 tablet by mouth once daily.    pantoprazole (PROTONIX) 40 MG tablet Take 1 tablet (40 mg total) by mouth once daily.    pioglitazone (ACTOS) 15 MG tablet Take 15 mg daily with dinner (Patient taking differently: Take 15 mg by mouth daily with dinner or evening meal. Take 15 mg daily with dinner)    rosuvastatin (CRESTOR) 40 MG  Tab Take 40 mg by mouth once daily.    ACCU-CHEK ALE CONTROL SOLN Soln     ACCU-CHEK ALE PLUS METER Misc     ACCU-CHEK ALE PLUS TEST STRP Strp     ACCU-CHEK SOFTCLIX LANCETS [facility]     BD ALCOHOL SWABS PadM     clopidogreL (PLAVIX) 75 mg tablet Take 1 tablet (75 mg total) by mouth once daily.    nitrofurantoin, macrocrystal-monohydrate, (MACROBID) 100 MG capsule Take 100 mg by mouth 2 (two) times daily.    [DISCONTINUED] aspirin (ECOTRIN) 81 MG EC tablet Take 81 mg by mouth once daily.    [DISCONTINUED] glimepiride (AMARYL) 4 MG tablet TAKE 1 TABLET EVERY DAY    [DISCONTINUED] losartan (COZAAR) 50 MG tablet Take 1 tablet (50 mg total) by mouth 2 (two) times daily.    [DISCONTINUED] metFORMIN (GLUCOPHAGE) 1000 MG tablet Take 1 tablet (1,000 mg total) by mouth daily with breakfast.    [DISCONTINUED] multivitamin with minerals tablet Take 1 tablet by mouth once daily.    [DISCONTINUED] rosuvastatin (CRESTOR) 20 MG tablet Take 1 tablet (20 mg total) by mouth every evening.     Family History    None       Tobacco Use    Smoking status: Former     Current packs/day: 0.00     Types: Vaping w/o nicotine, Cigarettes     Quit date: 4/15/2021     Years since quittin.9    Smokeless tobacco: Never   Substance and Sexual Activity    Alcohol use: Yes     Comment: occasionally    Drug use: Never    Sexual activity: Not on file     Review of Systems   Constitutional:  Positive for activity change and appetite change.   Respiratory:  Positive for cough and shortness of breath. Negative for wheezing.    Cardiovascular:  Positive for chest pain. Negative for palpitations and leg swelling.   Gastrointestinal:  Positive for abdominal pain. Negative for diarrhea, nausea and vomiting.   Psychiatric/Behavioral:  Negative for confusion and suicidal ideas.    All other systems reviewed and are negative.    Objective:     Vital Signs (Most Recent):  Temp: 97.7 °F (36.5 °C) (24 1417)  Pulse: 75 (24 1624)  Resp: 18 (24  1624)  BP: (!) 120/50 (03/17/24 1624)  SpO2: 100 % (03/17/24 1624) Vital Signs (24h Range):  Temp:  [97.7 °F (36.5 °C)] 97.7 °F (36.5 °C)  Pulse:  [64-87] 75  Resp:  [16-18] 18  SpO2:  [96 %-100 %] 100 %  BP: ()/(49-65) 120/50     Weight: 68 kg (150 lb)  Body mass index is 24.96 kg/m².     Physical Exam  Vitals reviewed.   Constitutional:       General: She is not in acute distress.     Appearance: Normal appearance. She is not ill-appearing.   HENT:      Head: Normocephalic and atraumatic.      Mouth/Throat:      Mouth: Mucous membranes are moist.   Eyes:      Extraocular Movements: Extraocular movements intact.      Pupils: Pupils are equal, round, and reactive to light.   Cardiovascular:      Rate and Rhythm: Normal rate and regular rhythm.      Pulses: Normal pulses.      Heart sounds: Normal heart sounds. No murmur heard.  Pulmonary:      Effort: Pulmonary effort is normal.      Breath sounds: Normal breath sounds.   Abdominal:      General: Bowel sounds are normal.      Palpations: Abdomen is soft.      Tenderness: There is generalized abdominal tenderness and tenderness in the right lower quadrant and left lower quadrant. There is no guarding.   Genitourinary:     Comments: Suprapubic pain  Musculoskeletal:         General: No swelling. Normal range of motion.      Cervical back: Normal range of motion and neck supple.   Skin:     General: Skin is warm and dry.   Neurological:      General: No focal deficit present.      Mental Status: She is alert. Mental status is at baseline.      GCS: GCS eye subscore is 4. GCS verbal subscore is 5. GCS motor subscore is 6.      Cranial Nerves: Cranial nerves 2-12 are intact.      Sensory: Sensation is intact.      Motor: No weakness.   Psychiatric:         Mood and Affect: Mood and affect normal.         Speech: Speech normal.              CRANIAL NERVES     CN III, IV, VI   Pupils are equal, round, and reactive to light.       Significant Labs: All pertinent labs  within the past 24 hours have been reviewed.  Recent Lab Results         03/17/24  1618   03/17/24  1613   03/17/24  1602        Influenza A, Molecular Negative           Influenza B, Molecular Negative           Albumin     3.0       ALP     71       ALT     12       Anion Gap     8       Appearance, UA   Hazy         AST     10       Bacteria, UA   Many         Baso #     0.04       Basophil %     0.3       Bilirubin (UA)   Negative         BILIRUBIN TOTAL     0.2  Comment: For infants and newborns, interpretation of results should be based  on gestational age, weight and in agreement with clinical  observations.    Premature Infant recommended reference ranges:  Up to 24 hours.............<8.0 mg/dL  Up to 48 hours............<12.0 mg/dL  3-5 days..................<15.0 mg/dL  6-29 days.................<15.0 mg/dL         BNP     273  Comment: Values of less than 100 pg/ml are consistent with non-CHF populations.       BUN     31       Calcium     8.2       Chloride     99       CO2     23       Color, UA   Colorless         Creatinine     2.6       D-Dimer     1.67  Comment: The quantitative D-dimer assay should be used as an aid in   the diagnosis of deep vein thrombosis and pulmonary embolism  in patients with the appropriate presentation and clinical  history. The upper limit of the reference interval and the clinical   cut off   point are identical. Causes of a positive (>0.50 mg/L FEU) D-Dimer   test  include, but are not limited to: DVT, PE, DIC, thrombolytic   therapy, anticoagulant therapy, recent surgery, trauma, or   pregnancy, disseminated malignancy, aortic aneurysm, cirrhosis,  and severe infection. False negative results may occur in   patients with distal DVT.  DDimer  critical result(s) called and verbal readback obtained from   Cristine Fraire RN ED  by MP4 03/17/2024 17:13         Differential Method     Automated       eGFR     18.7       Eos #     0.1       Eos %     0.3       Flu A & B Source  NP           Glucose     350       Glucose, UA   1+         Gran # (ANC)     12.6       Gran %     84.0       Hematocrit     30.8       Hemoglobin     10.2       Hyaline Casts, UA   0         Immature Grans (Abs)     0.28  Comment: Mild elevation in immature granulocytes is non specific and   can be seen in a variety of conditions including stress response,   acute inflammation, trauma and pregnancy. Correlation with other   laboratory and clinical findings is essential.         Immature Granulocytes     1.9       Ketones, UA   Negative         Leukocyte Esterase, UA   3+         Lipase     57       Lymph #     1.2       Lymph %     7.8       Magnesium      1.7       MCH     31.9       MCHC     33.1       MCV     96       Microscopic Comment   SEE COMMENT  Comment: Other formed elements not mentioned in the report are not   present in the microscopic examination.            Mono #     0.9       Mono %     5.7       MPV     8.3       NITRITE UA   Negative         nRBC     0       Blood, UA   2+         pH, UA   6.0         Platelet Count     342       Potassium     4.6       PROTEIN TOTAL     6.1       Protein, UA   1+  Comment: Recommend a 24 hour urine protein or a urine   protein/creatinine ratio if globulin induced proteinuria is  clinically suspected.           RBC     3.20       RBC, UA   3         RDW     13.9       SARS-CoV-2 RNA, Amplification, Qual Negative  Comment: This test utilizes isothermal nucleic acid amplification technology   to   detect the SARS-CoV-2 RdRp nucleic acid segment. The analytical   sensitivity   (limit of detection) is 500 copies/swab.     A POSITIVE result is indicative of the presence of SARS-CoV-2 RNA;   clinical   correlation with patient history and other diagnostic information is   necessary to determine patient infection status.    A NEGATIVE result means that SARS-CoV-2 nucleic acids are not present   above   the limit of detection. A NEGATIVE result should be treated as    presumptive.   It does not rule out the possibility of COVID-19 and should not be   the sole   basis for treatment decisions. If COVID-19 is strongly suspected   based on   clinical and exposure history, re-testing using an alternate   molecular assay   should be considered.     This test is only for use under the Food and Drug Administration s   Emergency   Use Authorization (EUA).     Commercial kits are provided by [employer]. Performance   characteristics of the EUA have been independently verified by   [facility]  _________________________________________________________________   The authorized Fact Sheet for Healthcare Providers and the authorized   Fact   Sheet for Patients of the ID NOW COVID-19 are available on the FDA   website:   https://www.fda.gov/media/648037/download   https://www.fda.gov/media/099254/download             Sodium     130       Specific [place], UA   1.005         Specimen UA   Urine, Clean Catch         Squam Epithel, UA   0         Troponin I High Sensitivity     19.1  Comment: Troponin results differ between methods. Do not use   results between Troponin methods interchangeably.    Alkaline Phospatase levels above 400 U/L may   cause false positive results.    Access hsTnI should not be used for patients taking   Asfotase ricky (Strensiq).         TSH     0.565       Unclassified Crystals   2         UROBILINOGEN UA   Negative         WBC Clumps, UA   Many         WBC, UA   >100         WBC     14.95               Significant Imaging: I have reviewed all pertinent imaging results/findings within the past 24 hours.  Imaging Results              US Lower Extremity Veins Bilateral (In process)                      CT Abdomen Pelvis  Without Contrast (Final result)  Result time 03/17/24 20:57:44      Final result by Isac Connell III, MD (03/17/24 20:57:44)                   Narrative:    EXAM:  CT Abdomen and Pelvis Without Intravenous  Contrast    CLINICAL HISTORY:  The patient is 74 years old and is Female; Abdominal pain, acute, nonlocalized    TECHNIQUE:  Axial computed tomography images of the abdomen and pelvis without intravenous contrast.  Sagittal and coronal reformatted images were created and reviewed.  This CT exam was performed using one or more of the following dose reduction techniques:  automated exposure control, adjustment of the mA and/or kV according to patient size, and/or use of iterative reconstruction technique.    COMPARISON:  No relevant prior studies available.    FINDINGS:  LUNG BASES:  Unremarkable.  No consolidation or acute findings.    ABDOMEN:  LIVER:  Unremarkable for noncontrast exam.  GALLBLADDER AND BILE DUCTS:  There appear to be small gallstones without obvious complication.  PANCREAS:  Unremarkable.  No ductal dilation.  SPLEEN:  Unremarkable without enlargement.  ADRENALS:  Unremarkable.  KIDNEYS AND URETERS:  Left kidney is mildly enlarged and there is mild left perinephric stranding. Several punctate bilateral renal calcifications may be within the collecting systems. No obvious ureteral stones but there does appear to be mild left periureteral stranding.  STOMACH AND BOWEL:  Mild diverticulosis distal colon. Unopacified bowel is otherwise unremarkable.    PELVIS:  APPENDIX:  The appendix appears normal.  BLADDER:  Unremarkable.  No stones.    ABDOMEN and PELVIS:  INTRAPERITONEAL SPACE:  Unremarkable.  No free air.  No significant fluid collection.  BONES/JOINTS: Old healing bilateral inferior ramus fractures. No acute findings.  SOFT TISSUES:  Unremarkable.  VASCULATURE:  Aorta and iliac vessels are heavily atherosclerotic with evidence of high-grade stenosis just above the aortic bifurcation and extending into bilateral common iliac arteries.  LYMPH NODES:  No suspicious adenopathy.    IMPRESSION:  1.  Left renal findings as described are not well characterized on noncontrast exam but suspicious for  upper urinary tract infection. Cannot exclude a few punctate bilateral nonobstructive renal calculi.  2.  Severe vascular disease with evidence of high-grade stenosis distal aorta and bilateral common iliac arteries.  3.  Incidental gallstones    Electronically signed by:  Isac Connell MD  03/17/2024 08:57 PM CDT Workstation: [id number]                                     NM Lung Scan Perfusion Particulate (Final result)  Result time 03/17/24 20:16:33   Procedure changed from NM Lung Scan Ventilation Perfusion     Final result by Cipriano Del Rio DO (03/17/24 20:16:33)                   Narrative:    EXAM:  NM Lung Perfusion Scan    CLINICAL HISTORY:  Pulmonary embolism (PE) suspected, positive D-dimer; pos d dimer    TECHNIQUE:  Nuclear Medicine perfusion images of the lungs were obtained in multiple projections following injection of 5.2 mCi Tc99m MAA.    COMPARISON:  Chest radiograph from March 17, 2024.    FINDINGS:  Perfusion:  There are several segmental sized wedge-shaped areas of photopenia demonstrated.    IMPRESSION:  High probability of pulmonary embolism.    Thank you for allowing us to participate in the care of this patient.    Electronically signed by:  Cipriano Del Rio DO  03/17/2024 08:16 PM CDT Workstation: [id number]                                     X-Ray Chest AP Portable (Final result)  Result time 03/17/24 15:11:12      Final result by Prasanth Lomax MD (03/17/24 15:11:12)                   Narrative:    XR CHEST 1 VIEW    CLINICAL HISTORY:  74 years Female shortness of breath, chest pain    COMPARISON: March 30, 2022    FINDINGS: Cardiac silhouette size is within normal limits. Status post median sternotomy. Atherosclerotic calcification of the aorta. No confluent airspace disease. No large pleural effusion or pneumothorax. No acute osseous abnormality.    IMPRESSION: No acute pulmonary process.    Electronically signed by:  Prasanth Lomax MD  03/17/2024 03:11 PM CDT  Workstation: [id number]

## 2024-03-18 NOTE — HOSPITAL COURSE
"Ms. Mcintosh has been monitored closely during her hospitalization. She was admitted on 3/17/24 with sepsis d/t left pyelonephritis, KAIT on CKDIII, and VQ scan with high probability of PE. She was started on a heparin gtt. Bilat LE US are negative for DVT. Pt reports that she went on a trip to [place] for Thanksgiving, and she has been feeling bad for the last 3 months. In the ED she was hypotensive down to 82/49 and became normotensive after a 1L bolus. Baseline creatinine from 1 year ago was 1.7 and today is 2.4. she states she gets [id number] labs with her pcp Dr. Armendariz who has never mentioned her renal function, so she is unsure what her last creatinine was. Losartan has been on hold since admission. She's had left flank pain for atleast 2 weeks and finished 7 days of macrobid. UA is growing GNR but C&S is pending and CT abd/pelvis: "Left kidney is mildly enlarged and there is mild left perinephric stranding" and is on Rocephin. Sister is in the room and reports minimal activity d/t weakness and fatigue over the last 3 months and is concerned about this. PT/OT consult ordered for eval. She was transitioned to Eliquis. TTE without evidence of right heart strain. Kidney function improved. She was discharged on doxy to complete a course for complicated UTI. Will follow up with PCP. Home health ordered on discharge.  "

## 2024-03-18 NOTE — PLAN OF CARE
[facility] $[facility] copayment     03/18/24 2673   Post-Acute Status   Post-Acute Authorization Medications

## 2024-03-18 NOTE — ASSESSMENT & PLAN NOTE
NM Lung scan with high probability of pulmonary embolism. Perfusion:  There are several segmental sized wedge-shaped areas of photopenia demonstrated.   Elevated D dimer 1.67  CXR: no acute pulmonary process  On heparin gtt   Transition to PO eliquis tonight   Echo to evaluate for Rt heart strain

## 2024-03-18 NOTE — ASSESSMENT & PLAN NOTE
UA Leukocyte 3+, glucose 1+, Protein 1+, blood 2+.   WBC 14.95, afebrile,   Rocephin given in ER  Continue rocephin  Urine culture pending  IV hydration

## 2024-03-18 NOTE — ASSESSMENT & PLAN NOTE
NM Lung scan with high probability of pulmonary embolism. Perfusion:  There are several segmental sized wedge-shaped areas of photopenia demonstrated.   Elevated D dimer 1.67  CXR: no acute pulmonary process  Start heparin drip per protocol

## 2024-03-18 NOTE — ASSESSMENT & PLAN NOTE
Reports lower abdominal generalized abdominal pain. No alleviating factors. Reports recent fall and began having persistent abdominal pain since her fall. Denies vomiting or diarrhea.     CT abdomen noncontrast:   Left renal findings as described are not well characterized on noncontrast exam but suspicious for upper urinary tract infection. Cannot exclude a few punctate bilateral nonobstructive renal calculi.   2.  Severe vascular disease with evidence of high-grade stenosis distal aorta and bilateral common iliac arteries.   3.  Incidental gallstones

## 2024-03-18 NOTE — ASSESSMENT & PLAN NOTE
Patient has hyponatremia which is uncontrolled,We will aim to correct the sodium by 4-6mEq in 24 hours. We will monitor sodium Every 6 hours. The hyponatremia is due to Dehydration/hypovolemia. We will obtain the following studies: Urine sodium, urine osmolality, serum osmolality, AM cortisol, or TSH, T4. We will treat the hyponatremia with IV fluids as follows: 0.9 NS. The patient's sodium results have been reviewed and are listed below.  Recent Labs   Lab 03/17/24  1602   *       TSH 0.565

## 2024-03-18 NOTE — ASSESSMENT & PLAN NOTE
"Patient's FSGs are uncontrolled due to hyperglycemia on current medication regimen.  Last A1c reviewed-   Lab Results   Component Value Date    HGBA1C 9.2 (H) 03/03/2022     Most recent fingerstick glucose reviewed- No results for input(s): "POCTGLUCOSE" in the last 24 hours.  Current correctional scale  High  Maintain anti-hyperglycemic dose as follows-   Antihyperglycemics (From admission, onward)    Start     Stop Route Frequency Ordered    03/17/24 1932  insulin aspart U-100 pen 0-10 Units         -- SubQ Before meals & nightly PRN 03/17/24 1833        Hold Oral hypoglycemics while patient is in the hospital.     "

## 2024-03-18 NOTE — ASSESSMENT & PLAN NOTE
Elevated D dimer of 1.67, reports feeling short of breath for at least 3 months. She usually coughs because she has smoked for a long time but says that the cough has actually gone down because she stopped smoking as she was so tired     Unable to order CTA thorax due to KAIT on CKD. Will order VQ perfusion scan and US BLE to rule out PE/DVT.     Start Heparin 5000U q8 hours.   Monitor for overt bleeding.

## 2024-03-18 NOTE — H&P
[facility] - Emergency Dept  Hospital Medicine  History & Physical    Patient Name: Epi Mcintosh  MRN: [id number]  Patient Class: OP- Observation  Admission Date: 3/17/2024  Attending Physician: Hero Vasquez MD   Primary Care Provider: Flaco Parker MD         Patient information was obtained from patient and ER records.     Subjective:     Principal Problem:UTI (urinary tract infection)    Chief Complaint:   Chief Complaint   Patient presents with    Shortness of Breath    Chest Pain        HPI: 74-year-old female with a past medical history of tobacco use, COPD, CAD, depression, diabetes, hypertension, pancreatitis, peripheral vascular disease /pad status post stent in the right leg with angioplasty stenting of the right superficial femoral and right iliac arteries whom presents to the emergency room with  reports of shortness of breath and chest pain for the last 3 months. She reports today shortness of breath and cough. Recently diagnoses with UTI and completed Macrobid this week. She reports nausea, vomiting earlier this week but none today. Decrease appetite and feeling week. Reports feeling off balance and a couple of months ago fell, striking head. Reports abdominal pain, generalized non radiating since the fall. She says she does have some lower abdominal pain and it was bad last night, worse than usual. In ER, elvated D Dimer 1.167, , troponin I high sensativity 19.1, Na 130, Sr Cr 2.6 baseline Cr 1.6, UA with 1+ protein, 1+ glucose, 3+ leukocytes, WBC > 100, urine culture and blood culture pending. Patient given Rocephin 2gm IV and 1 L LR in ER. CXR nonacute, EKG with prolonged QT no ST elevation.   NM Lung scan with high probability of pulmonary embolism. Perfusion:  There are several segmental sized wedge-shaped areas of photopenia demonstrated.     Past Medical History:   Diagnosis Date    COPD (chronic obstructive pulmonary disease)     Coronary artery disease      Depression     Diabetes mellitus     Hypertension     Pancreatitis     PVD (peripheral vascular disease)        Past Surgical History:   Procedure Laterality Date    AORTOGRAPHY WITH SERIALOGRAPHY N/A 4/1/2022    Procedure: AORTOGRAM, WITH SERIALOGRAPHY;  Surgeon: Petr Kaufman MD;  [facility] CATH/EP LAB;  Service: Peripheral Vascular;  Laterality: N/A;    CARDIAC SURGERY      CORONARY ARTERY BYPASS GRAFT  12/2018    HYSTERECTOMY         Review of patient's allergies indicates:   Allergen Reactions    Plavix [clopidogrel] Rash    Zolpidem      Other reaction(s): Other (See Comments)  Seeing people who were not in the room     Codeine        Current Facility-Administered Medications on File Prior to Encounter   Medication    0.9%  NaCl infusion     Current Outpatient Medications on File Prior to Encounter   Medication Sig    albuterol (PROVENTIL/VENTOLIN HFA) 90 mcg/actuation inhaler Inhale 2 puffs into the lungs 4 (four) times daily as needed.    citalopram (CELEXA) 40 MG tablet Take 1 tablet (40 mg total) by mouth once daily.    diazePAM (VALIUM) 10 MG Tab Take 1 tablet (10 mg total) by mouth daily as needed for Anxiety.    glipiZIDE (GLUCOTROL) 10 MG TR24 Take 10 mg by mouth once daily.    levothyroxine (SYNTHROID) 88 MCG tablet Take 88 mcg by mouth once daily.    losartan (COZAAR) 100 MG tablet Take 100 mg by mouth once daily.    metoprolol tartrate (LOPRESSOR) 25 MG tablet Take 1 tablet (25 mg total) by mouth 2 (two) times daily.    MYRBETRIQ 50 mg Tb24 Take 1 tablet by mouth once daily.    pantoprazole (PROTONIX) 40 MG tablet Take 1 tablet (40 mg total) by mouth once daily.    pioglitazone (ACTOS) 15 MG tablet Take 15 mg daily with dinner (Patient taking differently: Take 15 mg by mouth daily with dinner or evening meal. Take 15 mg daily with dinner)    rosuvastatin (CRESTOR) 40 MG Tab Take 40 mg by mouth once daily.    ACCU-CHEK ALE CONTROL SOLN Soln     ACCU-CHEK ALE PLUS METER [facility]      ACCU-CHEK ALE PLUS TEST STRP Strp     ACCU-CHEK SOFTCLIX LANCETS Misc     BD ALCOHOL SWABS PadM     clopidogreL (PLAVIX) 75 mg tablet Take 1 tablet (75 mg total) by mouth once daily.    nitrofurantoin, macrocrystal-monohydrate, (MACROBID) 100 MG capsule Take 100 mg by mouth 2 (two) times daily.    [DISCONTINUED] aspirin (ECOTRIN) 81 MG EC tablet Take 81 mg by mouth once daily.    [DISCONTINUED] glimepiride (AMARYL) 4 MG tablet TAKE 1 TABLET EVERY DAY    [DISCONTINUED] losartan (COZAAR) 50 MG tablet Take 1 tablet (50 mg total) by mouth 2 (two) times daily.    [DISCONTINUED] metFORMIN (GLUCOPHAGE) 1000 MG tablet Take 1 tablet (1,000 mg total) by mouth daily with breakfast.    [DISCONTINUED] multivitamin with minerals tablet Take 1 tablet by mouth once daily.    [DISCONTINUED] rosuvastatin (CRESTOR) 20 MG tablet Take 1 tablet (20 mg total) by mouth every evening.     Family History    None       Tobacco Use    Smoking status: Former     Current packs/day: 0.00     Types: Vaping w/o nicotine, Cigarettes     Quit date: 4/15/2021     Years since quittin.9    Smokeless tobacco: Never   Substance and Sexual Activity    Alcohol use: Yes     Comment: occasionally    Drug use: Never    Sexual activity: Not on file     Review of Systems   Constitutional:  Positive for activity change and appetite change.   Respiratory:  Positive for cough and shortness of breath. Negative for wheezing.    Cardiovascular:  Positive for chest pain. Negative for palpitations and leg swelling.   Gastrointestinal:  Positive for abdominal pain. Negative for diarrhea, nausea and vomiting.   Psychiatric/Behavioral:  Negative for confusion and suicidal ideas.    All other systems reviewed and are negative.    Objective:     Vital Signs (Most Recent):  Temp: 97.7 °F (36.5 °C) (24 1417)  Pulse: 75 (24 1624)  Resp: 18 (24)  BP: (!) 120/50 (24 162)  SpO2: 100 % (24) Vital Signs (24h Range):  Temp:  [97.7 °F  (36.5 °C)] 97.7 °F (36.5 °C)  Pulse:  [64-87] 75  Resp:  [16-18] 18  SpO2:  [96 %-100 %] 100 %  BP: ()/(49-65) 120/50     Weight: 68 kg (150 lb)  Body mass index is 24.96 kg/m².     Physical Exam  Vitals reviewed.   Constitutional:       General: She is not in acute distress.     Appearance: Normal appearance. She is not ill-appearing.   HENT:      Head: Normocephalic and atraumatic.      Mouth/Throat:      Mouth: Mucous membranes are moist.   Eyes:      Extraocular Movements: Extraocular movements intact.      Pupils: Pupils are equal, round, and reactive to light.   Cardiovascular:      Rate and Rhythm: Normal rate and regular rhythm.      Pulses: Normal pulses.      Heart sounds: Normal heart sounds. No murmur heard.  Pulmonary:      Effort: Pulmonary effort is normal.      Breath sounds: Normal breath sounds.   Abdominal:      General: Bowel sounds are normal.      Palpations: Abdomen is soft.      Tenderness: There is generalized abdominal tenderness and tenderness in the right lower quadrant and left lower quadrant. There is no guarding.   Genitourinary:     Comments: Suprapubic pain  Musculoskeletal:         General: No swelling. Normal range of motion.      Cervical back: Normal range of motion and neck supple.   Skin:     General: Skin is warm and dry.   Neurological:      General: No focal deficit present.      Mental Status: She is alert. Mental status is at baseline.      GCS: GCS eye subscore is 4. GCS verbal subscore is 5. GCS motor subscore is 6.      Cranial Nerves: Cranial nerves 2-12 are intact.      Sensory: Sensation is intact.      Motor: No weakness.   Psychiatric:         Mood and Affect: Mood and affect normal.         Speech: Speech normal.              CRANIAL NERVES     CN III, IV, VI   Pupils are equal, round, and reactive to light.       Significant Labs: All pertinent labs within the past 24 hours have been reviewed.  Recent Lab Results         03/17/24  1618   03/17/24  1613    03/17/24  1602        Influenza A, Molecular Negative           Influenza B, Molecular Negative           Albumin     3.0       ALP     71       ALT     12       Anion Gap     8       Appearance, UA   Hazy         AST     10       Bacteria, UA   Many         Baso #     0.04       Basophil %     0.3       Bilirubin (UA)   Negative         BILIRUBIN TOTAL     0.2  Comment: For infants and newborns, interpretation of results should be based  on gestational age, weight and in agreement with clinical  observations.    Premature Infant recommended reference ranges:  Up to 24 hours.............<8.0 mg/dL  Up to 48 hours............<12.0 mg/dL  3-5 days..................<15.0 mg/dL  6-29 days.................<15.0 mg/dL         BNP     273  Comment: Values of less than 100 pg/ml are consistent with non-CHF populations.       BUN     31       Calcium     8.2       Chloride     99       CO2     23       Color, UA   Colorless         Creatinine     2.6       D-Dimer     1.67  Comment: The quantitative D-dimer assay should be used as an aid in   the diagnosis of deep vein thrombosis and pulmonary embolism  in patients with the appropriate presentation and clinical  history. The upper limit of the reference interval and the clinical   cut off   point are identical. Causes of a positive (>0.50 mg/L FEU) D-Dimer   test  include, but are not limited to: DVT, PE, DIC, thrombolytic   therapy, anticoagulant therapy, recent surgery, trauma, or   pregnancy, disseminated malignancy, aortic aneurysm, cirrhosis,  and severe infection. False negative results may occur in   patients with distal DVT.  DDimer  critical result(s) called and verbal readback obtained from   Cristine Fraire RN ED  by MP4 03/17/2024 17:13         Differential Method     Automated       eGFR     18.7       Eos #     0.1       Eos %     0.3       Flu A & B Source NP           Glucose     350       Glucose, UA   1+         Gran # (ANC)     12.6       Gran %     84.0        Hematocrit     30.8       Hemoglobin     10.2       Hyaline Casts, UA   0         Immature Grans (Abs)     0.28  Comment: Mild elevation in immature granulocytes is non specific and   can be seen in a variety of conditions including stress response,   acute inflammation, trauma and pregnancy. Correlation with other   laboratory and clinical findings is essential.         Immature Granulocytes     1.9       Ketones, UA   Negative         Leukocyte Esterase, UA   3+         Lipase     57       Lymph #     1.2       Lymph %     7.8       Magnesium      1.7       MCH     31.9       MCHC     33.1       MCV     96       Microscopic Comment   SEE COMMENT  Comment: Other formed elements not mentioned in the report are not   present in the microscopic examination.            Mono #     0.9       Mono %     5.7       MPV     8.3       NITRITE UA   Negative         nRBC     0       Blood, UA   2+         pH, UA   6.0         Platelet Count     342       Potassium     4.6       PROTEIN TOTAL     6.1       Protein, UA   1+  Comment: Recommend a 24 hour urine protein or a urine   protein/creatinine ratio if globulin induced proteinuria is  clinically suspected.           RBC     3.20       RBC, UA   3         RDW     13.9       SARS-CoV-2 RNA, Amplification, Qual Negative  Comment: This test utilizes isothermal nucleic acid amplification technology   to   detect the SARS-CoV-2 RdRp nucleic acid segment. The analytical   sensitivity   (limit of detection) is 500 copies/swab.     A POSITIVE result is indicative of the presence of SARS-CoV-2 RNA;   clinical   correlation with patient history and other diagnostic information is   necessary to determine patient infection status.    A NEGATIVE result means that SARS-CoV-2 nucleic acids are not present   above   the limit of detection. A NEGATIVE result should be treated as   presumptive.   It does not rule out the possibility of COVID-19 and should not be   the sole   basis for  treatment decisions. If COVID-19 is strongly suspected   based on   clinical and exposure history, re-testing using an alternate   molecular assay   should be considered.     This test is only for use under the Food and Drug Administration s   Emergency   Use Authorization (EUA).     Commercial kits are provided by [employer]. Performance   characteristics of the EUA have been independently verified by   [facility]  _________________________________________________________________   The authorized Fact Sheet for Healthcare Providers and the authorized   Fact   Sheet for Patients of the ID NOW COVID-19 are available on the FDA   website:   https://www.fda.gov/media/471849/download   https://www.fda.gov/media/589305/download             Sodium     130       Specific [place], UA   1.005         Specimen UA   Urine, Clean Catch         Squam Epithel, UA   0         Troponin I High Sensitivity     19.1  Comment: Troponin results differ between methods. Do not use   results between Troponin methods interchangeably.    Alkaline Phospatase levels above 400 U/L may   cause false positive results.    Access hsTnI should not be used for patients taking   Asfotase ricky (Strensiq).         TSH     0.565       Unclassified Crystals   2         UROBILINOGEN UA   Negative         WBC Clumps, UA   Many         WBC, UA   >100         WBC     14.95               Significant Imaging: I have reviewed all pertinent imaging results/findings within the past 24 hours.  Imaging Results              US Lower Extremity Veins Bilateral (In process)                      CT Abdomen Pelvis  Without Contrast (Final result)  Result time 03/17/24 20:57:44      Final result by Isac Connell III, MD (03/17/24 20:57:44)                   Narrative:    EXAM:  CT Abdomen and Pelvis Without Intravenous Contrast    CLINICAL HISTORY:  The patient is 74 years old and is Female; Abdominal pain, acute,  nonlocalized    TECHNIQUE:  Axial computed tomography images of the abdomen and pelvis without intravenous contrast.  Sagittal and coronal reformatted images were created and reviewed.  This CT exam was performed using one or more of the following dose reduction techniques:  automated exposure control, adjustment of the mA and/or kV according to patient size, and/or use of iterative reconstruction technique.    COMPARISON:  No relevant prior studies available.    FINDINGS:  LUNG BASES:  Unremarkable.  No consolidation or acute findings.    ABDOMEN:  LIVER:  Unremarkable for noncontrast exam.  GALLBLADDER AND BILE DUCTS:  There appear to be small gallstones without obvious complication.  PANCREAS:  Unremarkable.  No ductal dilation.  SPLEEN:  Unremarkable without enlargement.  ADRENALS:  Unremarkable.  KIDNEYS AND URETERS:  Left kidney is mildly enlarged and there is mild left perinephric stranding. Several punctate bilateral renal calcifications may be within the collecting systems. No obvious ureteral stones but there does appear to be mild left periureteral stranding.  STOMACH AND BOWEL:  Mild diverticulosis distal colon. Unopacified bowel is otherwise unremarkable.    PELVIS:  APPENDIX:  The appendix appears normal.  BLADDER:  Unremarkable.  No stones.    ABDOMEN and PELVIS:  INTRAPERITONEAL SPACE:  Unremarkable.  No free air.  No significant fluid collection.  BONES/JOINTS: Old healing bilateral inferior ramus fractures. No acute findings.  SOFT TISSUES:  Unremarkable.  VASCULATURE:  Aorta and iliac vessels are heavily atherosclerotic with evidence of high-grade stenosis just above the aortic bifurcation and extending into bilateral common iliac arteries.  LYMPH NODES:  No suspicious adenopathy.    IMPRESSION:  1.  Left renal findings as described are not well characterized on noncontrast exam but suspicious for upper urinary tract infection. Cannot exclude a few punctate bilateral nonobstructive renal  calculi.  2.  Severe vascular disease with evidence of high-grade stenosis distal aorta and bilateral common iliac arteries.  3.  Incidental gallstones    Electronically signed by:  Isac Connell MD  03/17/2024 08:57 PM CDT Workstation: [id number]                                     NM Lung Scan Perfusion Particulate (Final result)  Result time 03/17/24 20:16:33   Procedure changed from NM Lung Scan Ventilation Perfusion     Final result by Cipriano Del Rio DO (03/17/24 20:16:33)                   Narrative:    EXAM:  NM Lung Perfusion Scan    CLINICAL HISTORY:  Pulmonary embolism (PE) suspected, positive D-dimer; pos d dimer    TECHNIQUE:  Nuclear Medicine perfusion images of the lungs were obtained in multiple projections following injection of 5.2 mCi Tc99m MAA.    COMPARISON:  Chest radiograph from March 17, 2024.    FINDINGS:  Perfusion:  There are several segmental sized wedge-shaped areas of photopenia demonstrated.    IMPRESSION:  High probability of pulmonary embolism.    Thank you for allowing us to participate in the care of this patient.    Electronically signed by:  Cipriano Del Rio DO  03/17/2024 08:16 PM CDT Workstation: [id number]                                     X-Ray Chest AP Portable (Final result)  Result time 03/17/24 15:11:12      Final result by Prasanth Lomax MD (03/17/24 15:11:12)                   Narrative:    XR CHEST 1 VIEW    CLINICAL HISTORY:  74 years Female shortness of breath, chest pain    COMPARISON: March 30, 2022    FINDINGS: Cardiac silhouette size is within normal limits. Status post median sternotomy. Atherosclerotic calcification of the aorta. No confluent airspace disease. No large pleural effusion or pneumothorax. No acute osseous abnormality.    IMPRESSION: No acute pulmonary process.    Electronically signed by:  Prasanth Lomax MD  03/17/2024 03:11 PM CDT Workstation: [id number]                                   Assessment/Plan:     * UTI (urinary tract  infection)  UA Leukocyte 3+, glucose 1+, Protein 1+, blood 2+.   WBC 14.95, afebrile,   Rocephin given in ER  Continue rocephin  Urine culture pending  IV hydration      KAIT (acute kidney injury)    History of St 3b CKD.   Elevated troponin and BNP in ER    Patient with acute kidney injury/acute renal failure likely due to pre-renal azotemia due to dehydration KAIT is currently worsening. Baseline creatinine  1.5  - Labs reviewed- Renal function/electrolytes with CrCl cannot be calculated (Unknown ideal weight.). according to latest data. Monitor urine output and serial BMP and adjust therapy as needed. Avoid nephrotoxins and renally dose meds for GFR listed above.    IV hydration, avoid insults, trend renal functions       Positive D dimer  Elevated D dimer of 1.67, reports feeling short of breath for at least 3 months. She usually coughs because she has smoked for a long time but says that the cough has actually gone down because she stopped smoking as she was so tired     Unable to order CTA thorax due to KAIT on CKD. Will order VQ perfusion scan and US BLE to rule out PE/DVT.     Start Heparin 5000U q8 hours.   Monitor for overt bleeding.     Hyponatremia  Patient has hyponatremia which is uncontrolled,We will aim to correct the sodium by 4-6mEq in 24 hours. We will monitor sodium Every 6 hours. The hyponatremia is due to Dehydration/hypovolemia. We will obtain the following studies: Urine sodium, urine osmolality, serum osmolality, AM cortisol, or TSH, T4. We will treat the hyponatremia with IV fluids as follows: 0.9 NS. The patient's sodium results have been reviewed and are listed below.  Recent Labs   Lab 03/17/24  1602   *       TSH 0.565    Acute pulmonary embolism  NM Lung scan with high probability of pulmonary embolism. Perfusion:  There are several segmental sized wedge-shaped areas of photopenia demonstrated.   Elevated D dimer 1.67  CXR: no acute pulmonary process  Start heparin drip per  "protocol        Hypotensive episode  On arrival to ER SBP 80s. Fluid bolus given in ER.   Continue IV hydration  Trend.   Hold HTN meds if SBP < 110      Acute abdominal pain  Reports lower abdominal generalized abdominal pain. No alleviating factors. Reports recent fall and began having persistent abdominal pain since her fall. Denies vomiting or diarrhea.     CT abdomen noncontrast:   Left renal findings as described are not well characterized on noncontrast exam but suspicious for upper urinary tract infection. Cannot exclude a few punctate bilateral nonobstructive renal calculi.   2.  Severe vascular disease with evidence of high-grade stenosis distal aorta and bilateral common iliac arteries.   3.  Incidental gallstones       Type 2 diabetes mellitus with complication, without long-term current use of insulin  Patient's FSGs are uncontrolled due to hyperglycemia on current medication regimen.  Last A1c reviewed-   Lab Results   Component Value Date    HGBA1C 9.2 (H) 03/03/2022     Most recent fingerstick glucose reviewed- No results for input(s): "POCTGLUCOSE" in the last 24 hours.  Current correctional scale  High  Maintain anti-hyperglycemic dose as follows-   Antihyperglycemics (From admission, onward)      Start     Stop Route Frequency Ordered    03/17/24 1932  insulin aspart U-100 pen 0-10 Units         -- SubQ Before meals & nightly PRN 03/17/24 1833          Hold Oral hypoglycemics while patient is in the hospital.         VTE Risk Mitigation (From admission, onward)           Ordered     heparin 25,000 units in dextrose 5% 250 mL (100 units/mL) infusion HIGH INTENSITY nomogram - [facility]  Continuous        Question:  Begin at (units/kg/hr)  Answer:  18    03/17/24 2055     heparin 25,000 units in dextrose 5% (100 units/ml) IV bolus from bag HIGH INTENSITY nomogram - [facility]  Once        Question:  Heparin Infusion Adjustment (DO NOT MODIFY ANSWER)  Answer:  " [email or web address]\epic\Images\Pharmacy\HeparinInfusions\heparin HIGH INTENSITY nomogram for [facility] [id number]    03/17/24 2055     heparin 25,000 units in dextrose 5% (100 units/ml) IV bolus from bag HIGH INTENSITY nomogram - [facility]  As needed (PRN)        Question:  Heparin Infusion Adjustment (DO NOT MODIFY ANSWER)  Answer:  \\[email or web address]\epic\Images\Pharmacy\HeparinInfusions\heparin HIGH INTENSITY nomogram for [facility] [id number].pdf    03/17/24 2053     heparin 25,000 units in dextrose 5% (100 units/ml) IV bolus from bag HIGH INTENSITY nomogram - [facility]  As needed (PRN)        Question:  Heparin Infusion Adjustment (DO NOT MODIFY ANSWER)  Answer:  \\[email or web address]\epic\Images\Pharmacy\HeparinInfusions\heparin HIGH INTENSITY nomogram for [facility] [id number].pdf    03/17/24 2053     IP VTE HIGH RISK PATIENT  Once         03/17/24 1831     Place sequential compression device  Until discontinued         03/17/24 1831                         On 03/17/2024, patient should be placed in hospital observation services under my care in collaboration with Dr. Vasquez.    Follow up with  Severe vascular disease with evidence of high-grade stenosis distal aorta and bilateral common iliac arteries. Found on CT abdomen.        Gabrielle Landin NP  Department of Hospital Medicine  Formerly [facility] - Emergency Dept

## 2024-03-19 ENCOUNTER — CLINICAL SUPPORT (OUTPATIENT)
Dept: CARDIOLOGY | Facility: HOSPITAL | Age: 74
DRG: 689 | End: 2024-03-19
Attending: STUDENT IN AN ORGANIZED HEALTH CARE EDUCATION/TRAINING PROGRAM
Payer: MEDICARE

## 2024-03-19 VITALS — SYSTOLIC BLOOD PRESSURE: 150 MMHG | DIASTOLIC BLOOD PRESSURE: 68 MMHG

## 2024-03-19 PROBLEM — N39.0 URINARY TRACT INFECTION WITHOUT HEMATURIA: Status: ACTIVE | Noted: 2024-03-19

## 2024-03-19 LAB
ANION GAP SERPL CALC-SCNC: 7 MMOL/L (ref 8–16)
AORTIC ROOT ANNULUS: 2.6 CM
AORTIC VALVE CUSP SEPERATION: 1.8 CM
APTT PPP: 30.4 SEC (ref 21–32)
AV INDEX (PROSTH): 0.72
AV MEAN GRADIENT: 6 MMHG
AV PEAK GRADIENT: 11 MMHG
AV REGURGITATION PRESSURE HALF TIME: 440 MS
AV VALVE AREA BY VELOCITY RATIO: 2.1 CM²
AV VALVE AREA: 2.25 CM²
AV VELOCITY RATIO: 0.67
BACTERIA UR CULT: ABNORMAL
BASOPHILS # BLD AUTO: 0.03 K/UL (ref 0–0.2)
BASOPHILS # BLD AUTO: 0.03 K/UL (ref 0–0.2)
BASOPHILS NFR BLD: 0.3 % (ref 0–1.9)
BASOPHILS NFR BLD: 0.3 % (ref 0–1.9)
BSA FOR ECHO PROCEDURE: 1.83 M2
BUN SERPL-MCNC: 21 MG/DL (ref 8–23)
C DIFF GDH STL QL: NEGATIVE
C DIFF TOX A+B STL QL IA: NEGATIVE
CALCIUM SERPL-MCNC: 7.6 MG/DL (ref 8.7–10.5)
CHLORIDE SERPL-SCNC: 107 MMOL/L (ref 95–110)
CO2 SERPL-SCNC: 21 MMOL/L (ref 23–29)
CREAT SERPL-MCNC: 2.2 MG/DL (ref 0.5–1.4)
CV ECHO LV RWT: 0.49 CM
DIFFERENTIAL METHOD BLD: ABNORMAL
DIFFERENTIAL METHOD BLD: ABNORMAL
DOP CALC AO PEAK VEL: 1.66 M/S
DOP CALC AO VTI: 36.1 CM
DOP CALC LVOT AREA: 3.1 CM2
DOP CALC LVOT DIAMETER: 2 CM
DOP CALC LVOT PEAK VEL: 1.11 M/S
DOP CALC LVOT STROKE VOLUME: 81.33 CM3
DOP CALC MV VTI: 41.1 CM
DOP CALCLVOT PEAK VEL VTI: 25.9 CM
E WAVE DECELERATION TIME: 227 MSEC
E/A RATIO: 1.14
E/E' RATIO: 13.47 M/S
ECHO LV POSTERIOR WALL: 1.04 CM (ref 0.6–1.1)
EOSINOPHIL # BLD AUTO: 0.1 K/UL (ref 0–0.5)
EOSINOPHIL # BLD AUTO: 0.1 K/UL (ref 0–0.5)
EOSINOPHIL NFR BLD: 0.8 % (ref 0–8)
EOSINOPHIL NFR BLD: 0.8 % (ref 0–8)
ERYTHROCYTE [DISTWIDTH] IN BLOOD BY AUTOMATED COUNT: 13.8 % (ref 11.5–14.5)
ERYTHROCYTE [DISTWIDTH] IN BLOOD BY AUTOMATED COUNT: 13.8 % (ref 11.5–14.5)
EST. GFR  (NO RACE VARIABLE): 22.8 ML/MIN/1.73 M^2
FRACTIONAL SHORTENING: 35 % (ref 28–44)
GLUCOSE SERPL-MCNC: 132 MG/DL (ref 70–110)
GLUCOSE SERPL-MCNC: 134 MG/DL (ref 70–110)
GLUCOSE SERPL-MCNC: 230 MG/DL (ref 70–110)
GLUCOSE SERPL-MCNC: 261 MG/DL (ref 70–110)
GLUCOSE SERPL-MCNC: 292 MG/DL (ref 70–110)
HCT VFR BLD AUTO: 27.7 % (ref 37–48.5)
HCT VFR BLD AUTO: 27.7 % (ref 37–48.5)
HGB BLD-MCNC: 8.8 G/DL (ref 12–16)
HGB BLD-MCNC: 8.8 G/DL (ref 12–16)
IMM GRANULOCYTES # BLD AUTO: 0.21 K/UL (ref 0–0.04)
IMM GRANULOCYTES # BLD AUTO: 0.21 K/UL (ref 0–0.04)
IMM GRANULOCYTES NFR BLD AUTO: 1.8 % (ref 0–0.5)
IMM GRANULOCYTES NFR BLD AUTO: 1.8 % (ref 0–0.5)
INTERVENTRICULAR SEPTUM: 1.14 CM (ref 0.6–1.1)
IVC DIAMETER: 1.49 CM
LEFT ATRIUM SIZE: 3.3 CM
LEFT ATRIUM VOLUME INDEX MOD: 16.8 ML/M2
LEFT ATRIUM VOLUME MOD: 30.4 CM3
LEFT INTERNAL DIMENSION IN SYSTOLE: 2.74 CM (ref 2.1–4)
LEFT VENTRICLE DIASTOLIC VOLUME INDEX: 43.92 ML/M2
LEFT VENTRICLE DIASTOLIC VOLUME: 79.5 ML
LEFT VENTRICLE MASS INDEX: 86 G/M2
LEFT VENTRICLE SYSTOLIC VOLUME INDEX: 15.5 ML/M2
LEFT VENTRICLE SYSTOLIC VOLUME: 28 ML
LEFT VENTRICULAR INTERNAL DIMENSION IN DIASTOLE: 4.22 CM (ref 3.5–6)
LEFT VENTRICULAR MASS: 156.18 G
LV LATERAL E/E' RATIO: 12.8 M/S
LV SEPTAL E/E' RATIO: 14.22 M/S
LVOT MG: 3 MMHG
LVOT MV: 0.76 CM/S
LYMPHOCYTES # BLD AUTO: 1.4 K/UL (ref 1–4.8)
LYMPHOCYTES # BLD AUTO: 1.4 K/UL (ref 1–4.8)
LYMPHOCYTES NFR BLD: 11.9 % (ref 18–48)
LYMPHOCYTES NFR BLD: 11.9 % (ref 18–48)
MAGNESIUM SERPL-MCNC: 1.4 MG/DL (ref 1.6–2.6)
MCH RBC QN AUTO: 30.9 PG (ref 27–31)
MCH RBC QN AUTO: 30.9 PG (ref 27–31)
MCHC RBC AUTO-ENTMCNC: 31.8 G/DL (ref 32–36)
MCHC RBC AUTO-ENTMCNC: 31.8 G/DL (ref 32–36)
MCV RBC AUTO: 97 FL (ref 82–98)
MCV RBC AUTO: 97 FL (ref 82–98)
MONOCYTES # BLD AUTO: 0.8 K/UL (ref 0.3–1)
MONOCYTES # BLD AUTO: 0.8 K/UL (ref 0.3–1)
MONOCYTES NFR BLD: 7 % (ref 4–15)
MONOCYTES NFR BLD: 7 % (ref 4–15)
MV MEAN GRADIENT: 3 MMHG
MV PEAK A VEL: 1.12 M/S
MV PEAK E VEL: 1.28 M/S
MV PEAK GRADIENT: 6 MMHG
MV STENOSIS PRESSURE HALF TIME: 68 MS
MV VALVE AREA BY CONTINUITY EQUATION: 1.98 CM2
MV VALVE AREA P 1/2 METHOD: 3.24 CM2
NEUTROPHILS # BLD AUTO: 9 K/UL (ref 1.8–7.7)
NEUTROPHILS # BLD AUTO: 9 K/UL (ref 1.8–7.7)
NEUTROPHILS NFR BLD: 78.2 % (ref 38–73)
NEUTROPHILS NFR BLD: 78.2 % (ref 38–73)
NRBC BLD-RTO: 0 /100 WBC
NRBC BLD-RTO: 0 /100 WBC
OHS LV EJECTION FRACTION SIMPSONS BIPLANE MOD: 62 %
OSMOLALITY UR: 256 MOSM/KG (ref 50–1200)
PHOSPHATE SERPL-MCNC: 3.2 MG/DL (ref 2.7–4.5)
PISA AR MAX VEL: 3.66 M/S
PISA TR MAX VEL: 2.16 M/S
PLATELET # BLD AUTO: 297 K/UL (ref 150–450)
PLATELET # BLD AUTO: 297 K/UL (ref 150–450)
PMV BLD AUTO: 8.3 FL (ref 9.2–12.9)
PMV BLD AUTO: 8.3 FL (ref 9.2–12.9)
POTASSIUM SERPL-SCNC: 4.3 MMOL/L (ref 3.5–5.1)
PROCALCITONIN SERPL IA-MCNC: 0.17 NG/ML (ref 0–0.5)
PV MV: 0.82 M/S
PV PEAK GRADIENT: 6 MMHG
PV PEAK VELOCITY: 1.2 M/S
RA PRESSURE ESTIMATED: 3 MMHG
RBC # BLD AUTO: 2.85 M/UL (ref 4–5.4)
RBC # BLD AUTO: 2.85 M/UL (ref 4–5.4)
RV TB RVSP: 5 MMHG
RV TISSUE DOPPLER FREE WALL SYSTOLIC VELOCITY 1 (APICAL 4 CHAMBER VIEW): 11.4 CM/S
SODIUM SERPL-SCNC: 135 MMOL/L (ref 136–145)
SODIUM UR-SCNC: 69 MMOL/L (ref 20–250)
TDI LATERAL: 0.1 M/S
TDI SEPTAL: 0.09 M/S
TDI: 0.1 M/S
TR MAX PG: 19 MMHG
TRICUSPID ANNULAR PLANE SYSTOLIC EXCURSION: 1.33 CM
TV REST PULMONARY ARTERY PRESSURE: 22 MMHG
WBC # BLD AUTO: 11.49 K/UL (ref 3.9–12.7)
WBC # BLD AUTO: 11.49 K/UL (ref 3.9–12.7)
Z-SCORE OF LEFT VENTRICULAR DIMENSION IN END DIASTOLE: -1.71
Z-SCORE OF LEFT VENTRICULAR DIMENSION IN END SYSTOLE: -0.96

## 2024-03-19 PROCEDURE — 25000003 PHARM REV CODE 250: Performed by: NURSE PRACTITIONER

## 2024-03-19 PROCEDURE — 21400001 HC TELEMETRY ROOM

## 2024-03-19 PROCEDURE — 87449 NOS EACH ORGANISM AG IA: CPT | Performed by: HOSPITALIST

## 2024-03-19 PROCEDURE — 36415 COLL VENOUS BLD VENIPUNCTURE: CPT | Performed by: NURSE PRACTITIONER

## 2024-03-19 PROCEDURE — 63600175 PHARM REV CODE 636 W HCPCS: Performed by: HOSPITALIST

## 2024-03-19 PROCEDURE — 97165 OT EVAL LOW COMPLEX 30 MIN: CPT

## 2024-03-19 PROCEDURE — 99232 SBSQ HOSP IP/OBS MODERATE 35: CPT | Mod: ,,, | Performed by: INTERNAL MEDICINE

## 2024-03-19 PROCEDURE — 83935 ASSAY OF URINE OSMOLALITY: CPT | Performed by: NURSE PRACTITIONER

## 2024-03-19 PROCEDURE — 99900035 HC TECH TIME PER 15 MIN (STAT)

## 2024-03-19 PROCEDURE — 97161 PT EVAL LOW COMPLEX 20 MIN: CPT

## 2024-03-19 PROCEDURE — 94760 N-INVAS EAR/PLS OXIMETRY 1: CPT

## 2024-03-19 PROCEDURE — 93306 TTE W/DOPPLER COMPLETE: CPT

## 2024-03-19 PROCEDURE — 83735 ASSAY OF MAGNESIUM: CPT | Performed by: NURSE PRACTITIONER

## 2024-03-19 PROCEDURE — 84100 ASSAY OF PHOSPHORUS: CPT | Performed by: NURSE PRACTITIONER

## 2024-03-19 PROCEDURE — 85730 THROMBOPLASTIN TIME PARTIAL: CPT | Performed by: NURSE PRACTITIONER

## 2024-03-19 PROCEDURE — 97535 SELF CARE MNGMENT TRAINING: CPT

## 2024-03-19 PROCEDURE — 84145 PROCALCITONIN (PCT): CPT | Performed by: NURSE PRACTITIONER

## 2024-03-19 PROCEDURE — 93306 TTE W/DOPPLER COMPLETE: CPT | Mod: 26,,, | Performed by: INTERNAL MEDICINE

## 2024-03-19 PROCEDURE — 63600175 PHARM REV CODE 636 W HCPCS: Performed by: NURSE PRACTITIONER

## 2024-03-19 PROCEDURE — 85025 COMPLETE CBC W/AUTO DIFF WBC: CPT | Performed by: NURSE PRACTITIONER

## 2024-03-19 PROCEDURE — 84300 ASSAY OF URINE SODIUM: CPT | Performed by: NURSE PRACTITIONER

## 2024-03-19 PROCEDURE — 82962 GLUCOSE BLOOD TEST: CPT

## 2024-03-19 PROCEDURE — 80048 BASIC METABOLIC PNL TOTAL CA: CPT | Performed by: NURSE PRACTITIONER

## 2024-03-19 RX ORDER — MAGNESIUM SULFATE HEPTAHYDRATE 40 MG/ML
2 INJECTION, SOLUTION INTRAVENOUS ONCE
Status: COMPLETED | OUTPATIENT
Start: 2024-03-19 | End: 2024-03-19

## 2024-03-19 RX ADMIN — ATORVASTATIN CALCIUM 80 MG: 40 TABLET, FILM COATED ORAL at 08:03

## 2024-03-19 RX ADMIN — SODIUM CHLORIDE: 9 INJECTION, SOLUTION INTRAVENOUS at 05:03

## 2024-03-19 RX ADMIN — SODIUM CHLORIDE: 9 INJECTION, SOLUTION INTRAVENOUS at 03:03

## 2024-03-19 RX ADMIN — PANTOPRAZOLE SODIUM 40 MG: 40 TABLET, DELAYED RELEASE ORAL at 05:03

## 2024-03-19 RX ADMIN — APIXABAN 10 MG: 5 TABLET, FILM COATED ORAL at 09:03

## 2024-03-19 RX ADMIN — APIXABAN 10 MG: 5 TABLET, FILM COATED ORAL at 08:03

## 2024-03-19 RX ADMIN — INSULIN ASPART 4 UNITS: 100 INJECTION, SOLUTION INTRAVENOUS; SUBCUTANEOUS at 04:03

## 2024-03-19 RX ADMIN — LEVOTHYROXINE SODIUM 88 MCG: 88 TABLET ORAL at 05:03

## 2024-03-19 RX ADMIN — CITALOPRAM HYDROBROMIDE 40 MG: 20 TABLET ORAL at 09:03

## 2024-03-19 RX ADMIN — CEFTRIAXONE SODIUM 1 G: 1 INJECTION, POWDER, FOR SOLUTION INTRAMUSCULAR; INTRAVENOUS at 07:03

## 2024-03-19 RX ADMIN — METOPROLOL TARTRATE 25 MG: 25 TABLET, FILM COATED ORAL at 09:03

## 2024-03-19 RX ADMIN — INSULIN ASPART 6 UNITS: 100 INJECTION, SOLUTION INTRAVENOUS; SUBCUTANEOUS at 11:03

## 2024-03-19 RX ADMIN — MAGNESIUM SULFATE HEPTAHYDRATE 2 G: 40 INJECTION, SOLUTION INTRAVENOUS at 08:03

## 2024-03-19 RX ADMIN — INSULIN ASPART 3 UNITS: 100 INJECTION, SOLUTION INTRAVENOUS; SUBCUTANEOUS at 09:03

## 2024-03-19 RX ADMIN — METOPROLOL TARTRATE 25 MG: 25 TABLET, FILM COATED ORAL at 08:03

## 2024-03-19 NOTE — ASSESSMENT & PLAN NOTE
NM Lung scan with high probability of pulmonary embolism. Perfusion:  There are several segmental sized wedge-shaped areas of photopenia demonstrated.   Elevated D dimer 1.67  CXR: no acute pulmonary process  P.o. Eliquis  Echo to evaluate for Rt heart strain pending  pulmonology following

## 2024-03-19 NOTE — ASSESSMENT & PLAN NOTE
History of St 3b CKD.   Patient with acute kidney injury/acute renal failure likely due to pre-renal azotemia due to dehydration KAIT is currently worsening. Baseline creatinine  1.7  - Labs reviewed- Renal function/electrolytes with Estimated Creatinine Clearance: 22.1 mL/min (A) (based on SCr of 2.2 mg/dL (H)). according to latest data. Monitor urine output and serial BMP and adjust therapy as needed. Avoid nephrotoxins and renally dose meds for GFR listed above.    IV hydration  Monitor renal function daily  Daily wt/accurate I&O  Continue to hold losartan   Avoid nephrotoxic meds  Renally dose all meds

## 2024-03-19 NOTE — PROGRESS NOTES
"Pulmonary/Critical Care  Progress Note      Patient name: Epi Mcintosh  MRN: [id number]  Date: 03/19/2024    Admit Date: 3/17/2024  Consult Requested By: Yuly Valdovinos MD    Reason for Consult: Pulmonary emboli, abdominal pain    HPI:    3/18/2024 - Pt presented to ER with about 1 month h/o not feeling well with abdominal pain (LLQ), + chills (no fever), + nausea, vomitting and diarrhea.  Because of her illness she has not been very active.  She remembers having some increased SOB but denies any cough, sputum or chest pain.  She came to ER and had elevated d-dimer and a high probability nuclear perfusion scan and has been started on ELIQUIS.  She is on room air, is hemodynamically stable and still has  her other symptoms and "just wants to feel better".  Dopplers negative, no S1Q3T3 on EKG.    3/19/2024 - Stable overnight, still with some abdominal pain but seems better, no new respiratory complaints.  UC + E coli, creatinine is better    Review of Systems    Review of Systems   Constitutional:  Positive for chills, malaise/fatigue and weight loss. Negative for diaphoresis and fever.   HENT:  Negative for congestion.    Eyes:  Negative for pain.   Respiratory:  Positive for shortness of breath. Negative for cough, hemoptysis, sputum production, wheezing and stridor.    Cardiovascular:  Negative for chest pain, palpitations, orthopnea, claudication, leg swelling and PND.   Gastrointestinal:  Positive for abdominal pain, diarrhea, nausea and vomiting. Negative for constipation and heartburn.   Genitourinary:  Negative for dysuria, frequency and urgency.   Musculoskeletal:  Negative for falls and myalgias.   Neurological:  Positive for weakness. Negative for sensory change and focal weakness.   Psychiatric/Behavioral:  Positive for suicidal ideas (she now denies to me). Negative for depression and substance abuse. The patient is not nervous/anxious.        Past Medical History    Past Medical History:   Diagnosis " Date    COPD (chronic obstructive pulmonary disease)     Coronary artery disease     Depression     Diabetes mellitus     Hypertension     Pancreatitis     PVD (peripheral vascular disease)        Past Surgical History    Past Surgical History:   Procedure Laterality Date    AORTOGRAPHY WITH SERIALOGRAPHY N/A 2022    Procedure: AORTOGRAM, WITH SERIALOGRAPHY;  Surgeon: Petr Kaufman MD;  Location: [facility] CATH/EP LAB;  Service: Peripheral Vascular;  Laterality: N/A;    CARDIAC SURGERY      CORONARY ARTERY BYPASS GRAFT  2018    HYSTERECTOMY         Medications (scheduled):      apixaban  10 mg Oral BID    atorvastatin  80 mg Oral Daily    cefTRIAXone (Rocephin) IV (PEDS and ADULTS)  1 g Intravenous Q24H    citalopram  40 mg Oral Daily    levothyroxine  88 mcg Oral Before breakfast    magnesium sulfate IVPB  2 g Intravenous Once    metoprolol tartrate  25 mg Oral BID    pantoprazole  40 mg Oral Before breakfast       Medications (infusions):      sodium chloride 0.9% 100 mL/hr at 24 0509       Medications (prn):     acetaminophen, albuterol-ipratropium, dextrose 50% in water (D50W), dextrose 50% in water (D50W), diazePAM, glucagon (human recombinant), glucose, glucose, heparin (PORCINE), heparin (PORCINE), HYDROcodone-acetaminophen, insulin aspart U-100, melatonin, naloxone, ondansetron, sodium chloride 0.9%, sodium chloride 0.9%    Family History: No family history on file.    Social History: Tobacco:   Social History     Tobacco Use   Smoking Status Former    Current packs/day: 0.00    Types: Vaping w/o nicotine, Cigarettes    Quit date: 4/15/2021    Years since quittin.9   Smokeless Tobacco Never                                EtOH:   Social History     Substance and Sexual Activity   Alcohol Use Yes    Comment: occasionally                                Drugs:   Social History     Substance and Sexual Activity   Drug Use Never       Physical Exam    Vital signs:  Temp:  [98 °F (36.7 °C)-98.4  "°F (36.9 °C)]   Pulse:  [66-76]   Resp:  [16-20]   BP: (121-163)/(54-71)   SpO2:  [95 %-99 %]     Intake/Output:   Intake/Output Summary (Last 24 hours) at 3/19/2024 0974  Last data filed at 3/19/2024 0929  Gross per 24 hour   Intake 1020 ml   Output 2920 ml   Net -1900 ml          BMI: Estimated body mass index is 26.88 kg/m² as calculated from the following:    Height as of this encounter: 5' 5" (1.651 m).    Weight as of this encounter: 73.3 kg (161 lb 8 oz).    Physical Exam  Vitals and nursing note reviewed.   Constitutional:       General: She is not in acute distress.     Appearance: Normal appearance. She is ill-appearing. She is not toxic-appearing or diaphoretic.   HENT:      Head: Normocephalic and atraumatic.      Right Ear: External ear normal.      Left Ear: External ear normal.      Nose: Nose normal. No congestion or rhinorrhea.      Mouth/Throat:      Mouth: Mucous membranes are moist.      Pharynx: Oropharynx is clear. No oropharyngeal exudate or posterior oropharyngeal erythema.   Eyes:      General: No scleral icterus.        Right eye: No discharge.         Left eye: No discharge.      Extraocular Movements: Extraocular movements intact.      Conjunctiva/sclera: Conjunctivae normal.      Pupils: Pupils are equal, round, and reactive to light.   Neck:      Vascular: No carotid bruit.   Cardiovascular:      Rate and Rhythm: Normal rate and regular rhythm.      Pulses: Normal pulses.      Heart sounds: No murmur heard.     No friction rub. No gallop.   Pulmonary:      Effort: Pulmonary effort is normal. No respiratory distress.      Breath sounds: No stridor. No wheezing, rhonchi or rales.   Chest:      Chest wall: No tenderness.   Abdominal:      General: Bowel sounds are normal. There is no distension.      Tenderness: There is abdominal tenderness (some on deep palpation to LLQ). There is no guarding.   Musculoskeletal:         General: No swelling. Normal range of motion.      Cervical back: " "Normal range of motion and neck supple. No rigidity or tenderness.      Right lower leg: No edema.      Left lower leg: No edema.   Lymphadenopathy:      Cervical: No cervical adenopathy.   Skin:     General: Skin is warm and dry.      Capillary Refill: Capillary refill takes less than 2 seconds.      Coloration: Skin is not jaundiced.      Findings: No bruising.   Neurological:      General: No focal deficit present.      Mental Status: She is alert and oriented to person, place, and time. Mental status is at baseline.      Cranial Nerves: No cranial nerve deficit.      Sensory: No sensory deficit.      Motor: No weakness.   Psychiatric:         Mood and Affect: Mood normal.         Behavior: Behavior normal.         Thought Content: Thought content normal.         Judgment: Judgment normal.         Laboratory    Recent Labs   Lab 03/19/24 0423   WBC 11.49  11.49   RBC 2.85*  2.85*   HGB 8.8*  8.8*   HCT 27.7*  27.7*     297   MCV 97  97   MCH 30.9  30.9   MCHC 31.8*  31.8*         Recent Labs   Lab 03/19/24 0423   CALCIUM 7.6*   *   K 4.3   CO2 21*      BUN 21   CREATININE 2.2*         Recent Labs   Lab 03/19/24 0423   APTT 30.4         No results for input(s): "CPK", "CPKMB", "TROPONINI", "MB" in the last 24 hours.    Additional labs: reviewed    Microbiology:       Microbiology Results (last 7 days)       Procedure Component Value Units Date/Time    Clostridium difficile EIA [[id number]] Collected: 03/19/24 0932    Order Status: Sent Specimen: Stool Updated: 03/19/24 0932    Urine culture [[id number]]  (Abnormal)  (Susceptibility) Collected: 03/17/24 1613    Order Status: Completed Specimen: Urine Updated: 03/19/24 0642     Urine Culture, Routine ESCHERICHIA COLI  >100,000 cfu/ml      Narrative:      Specimen Source->Urine            Radiology    US Lower Extremity Veins Bilateral  PROCEDURE:  US Duplex Bilateral Lower Extremities Veins    CLINICAL INDICATION:  The patient is 74 " years old and is Female; elevated d dimer, r/o dvt    TECHNIQUE:  Real-time duplex ultrasound scan of the bilateral lower extremity veins integrating B-mode two-dimensional vascular structure, Doppler spectral analysis, color flow Doppler imaging and compression.    COMPARISON:  No relevant prior studies available.    FINDINGS:  RIGHT DEEP VEINS:  Unremarkable.  No DVT in the right common femoral, femoral, proximal deep femoral or popliteal veins or peroneal vein or anterior tibial veins.  The veins demonstrate normal color flow, are normally compressible, with normal phasic flow and/or augmentation response.  RIGHT SUPERFICIAL VEINS:  Unremarkable.  No thrombus in the visualized right great saphenous vein.    LEFT DEEP VEINS:  Unremarkable.  No DVT in the left common femoral, femoral, proximal deep femoral or popliteal veins or peroneal vein or anterior tibial veins.  The veins demonstrate normal color flow, are normally compressible, with normal phasic flow and/or augmentation response.  LEFT SUPERFICIAL VEINS:  Unremarkable.  No thrombus in the visualized left great saphenous vein.    SOFT TISSUES:  No acute findings.  No popliteal cyst.    IMPRESSION:  No DVT of the bilateral lower extremities.    Electronically signed by:  Marya Dawson MD  3/17/2024 9:25 PM CDT Workstation: [id number]  CT Abdomen Pelvis  Without Contrast  EXAM:  CT Abdomen and Pelvis Without Intravenous Contrast    CLINICAL HISTORY:  The patient is 74 years old and is Female; Abdominal pain, acute, nonlocalized    TECHNIQUE:  Axial computed tomography images of the abdomen and pelvis without intravenous contrast.  Sagittal and coronal reformatted images were created and reviewed.  This CT exam was performed using one or more of the following dose reduction techniques:  automated exposure control, adjustment of the mA and/or kV according to patient size, and/or use of iterative reconstruction technique.    COMPARISON:  No relevant prior  studies available.    FINDINGS:  LUNG BASES:  Unremarkable.  No consolidation or acute findings.    ABDOMEN:  LIVER:  Unremarkable for noncontrast exam.  GALLBLADDER AND BILE DUCTS:  There appear to be small gallstones without obvious complication.  PANCREAS:  Unremarkable.  No ductal dilation.  SPLEEN:  Unremarkable without enlargement.  ADRENALS:  Unremarkable.  KIDNEYS AND URETERS:  Left kidney is mildly enlarged and there is mild left perinephric stranding. Several punctate bilateral renal calcifications may be within the collecting systems. No obvious ureteral stones but there does appear to be mild left periureteral stranding.  STOMACH AND BOWEL:  Mild diverticulosis distal colon. Unopacified bowel is otherwise unremarkable.    PELVIS:  APPENDIX:  The appendix appears normal.  BLADDER:  Unremarkable.  No stones.    ABDOMEN and PELVIS:  INTRAPERITONEAL SPACE:  Unremarkable.  No free air.  No significant fluid collection.  BONES/JOINTS: Old healing bilateral inferior ramus fractures. No acute findings.  SOFT TISSUES:  Unremarkable.  VASCULATURE:  Aorta and iliac vessels are heavily atherosclerotic with evidence of high-grade stenosis just above the aortic bifurcation and extending into bilateral common iliac arteries.  LYMPH NODES:  No suspicious adenopathy.    IMPRESSION:  1.  Left renal findings as described are not well characterized on noncontrast exam but suspicious for upper urinary tract infection. Cannot exclude a few punctate bilateral nonobstructive renal calculi.  2.  Severe vascular disease with evidence of high-grade stenosis distal aorta and bilateral common iliac arteries.  3.  Incidental gallstones    Electronically signed by:  Isac Connell MD  03/17/2024 08:57 PM CDT Workstation: [id number]  NM Lung Scan Perfusion Particulate  EXAM:  NM Lung Perfusion Scan    CLINICAL HISTORY:  Pulmonary embolism (PE) suspected, positive D-dimer; pos d dimer    TECHNIQUE:  Nuclear Medicine perfusion images of  "the lungs were obtained in multiple projections following injection of 5.2 mCi Tc99m MAA.    COMPARISON:  Chest radiograph from March 17, 2024.    FINDINGS:  Perfusion:  There are several segmental sized wedge-shaped areas of photopenia demonstrated.    IMPRESSION:  High probability of pulmonary embolism.    Thank you for allowing us to participate in the care of this patient.    Electronically signed by:  Cipriano Del Rio DO  03/17/2024 08:16 PM CDT Workstation: [id number]  X-Ray Chest AP Portable  XR CHEST 1 VIEW    CLINICAL HISTORY:  74 years Female shortness of breath, chest pain    COMPARISON: March 30, 2022    FINDINGS: Cardiac silhouette size is within normal limits. Status post median sternotomy. Atherosclerotic calcification of the aorta. No confluent airspace disease. No large pleural effusion or pneumothorax. No acute osseous abnormality.    IMPRESSION: No acute pulmonary process.    Electronically signed by:  Prasanth Lomax MD  03/17/2024 03:11 PM CDT Workstation: [id number]        Additional Studies    reviewed    Ventilator Information                  No results for input(s): "PH", "PCO2", "PO2", "HCO3", "POCSATURATED", "BE" in the last 72 hours.      Impression    Active Hospital Problems    Diagnosis  POA    *Acute pulmonary embolism [I26.99]  Yes    Debility [R53.81]  Yes    Pyelonephritis [N12]  Yes    Acute renal failure superimposed on stage 3 chronic kidney disease [N17.9, N18.30]  Yes    Type 2 diabetes mellitus with complication, without long-term current use of insulin [E11.8]  Yes      Resolved Hospital Problems    Diagnosis Date Resolved POA    Positive D dimer [R79.89] 03/18/2024 Yes    Hyponatremia [E87.1] 03/18/2024 Yes    Acute abdominal pain [R10.9] 03/18/2024 Yes    Hypotensive episode [I95.9] 03/18/2024 Yes    SOB (shortness of breath) [R06.02] 03/18/2024 Yes       Plan    Would treat as if there is a PE though we only have a perfusion scan (no ventilation scan done)  Cannot do " CTA because of KAIT/CKD  Continue treatment of UTI  Follow sodium  Follow abdominal pain - better  Increase activity as able    Thank you for this consult.  I will follow with you while the patient is hospitalized.        Carlitos Pope MD  [facility] Pulmonary/Critical Care  03/19/2024

## 2024-03-19 NOTE — PROGRESS NOTES
[facility] Medicine  Progress Note    Patient Name: Epi Mcintosh  MRN: [id number]  Patient Class: IP- Inpatient   Admission Date: 3/17/2024  Length of Stay: 1 days  Attending Physician: Yuly Valdovinos MD  Primary Care Provider: Flaco Parker MD        Subjective:     Principal Problem:Acute pulmonary embolism        HPI:  74-year-old female with a past medical history of tobacco use, COPD, CAD, depression, diabetes, hypertension, pancreatitis, peripheral vascular disease /pad status post stent in the right leg with angioplasty stenting of the right superficial femoral and right iliac arteries whom presents to the emergency room with  reports of shortness of breath and chest pain for the last 3 months. She reports today shortness of breath and cough. Recently diagnoses with UTI and completed Macrobid this week. She reports nausea, vomiting earlier this week but none today. Decrease appetite and feeling week. Reports feeling off balance and a couple of months ago fell, striking head. Reports abdominal pain, generalized non radiating since the fall. She says she does have some lower abdominal pain and it was bad last night, worse than usual. In ER, elvated D Dimer 1.167, , troponin I high sensativity 19.1, Na 130, Sr Cr 2.6 baseline Cr 1.6, UA with 1+ protein, 1+ glucose, 3+ leukocytes, WBC > 100, urine culture and blood culture pending. Patient given Rocephin 2gm IV and 1 L LR in ER. CXR nonacute, EKG with prolonged QT no ST elevation.   NM Lung scan with high probability of pulmonary embolism. Perfusion:  There are several segmental sized wedge-shaped areas of photopenia demonstrated.     Overview/Hospital Course:  Ms. Mcintosh has been monitored closely during her hospitalization. She was admitted on 3/17/24 with sepsis d/t left pyelonephritis, KAIT on CKDIII, and VQ scan with high probability of PE. She was started on a heparin gtt. Bilat LE US are negative for DVT. Pt  "reports that she went on a trip to [place] for Thanksgiving, and she has been feeling bad for the last 3 months. In the ED she was hypotensive down to 82/49 and became normotensive after a 1L bolus. Baseline creatinine from 1 year ago was 1.7 and today is 2.4. she states she gets [id number] labs with her pcp Dr. Armendariz who has never mentioned her renal function, so she is unsure what her last creatinine was. Losartan has been on hold since admission. She's had left flank pain for atleast 2 weeks and finished 7 days of macrobid. UA is growing GNR but C&S is pending and CT abd/pelvis: "Left kidney is mildly enlarged and there is mild left perinephric stranding" and is on Rocephin. Sister is in the room and reports minimal activity d/t weakness and fatigue over the last 3 months and is concerned about this. PT/OT consult ordered for eval. Eliquis ordered to start tonight and transition off of heparin gtt. I have sent eliquis initial 10 day order to in hospital pharmacy and will ensure it is cost effective for her. Echo ordered and pending to evaluate for right heart strain.    Interval History:  Patient seen and examined.  Denies shortness of breath.  PT and OT evaluation pending.  TTE read pending.  Magnesium was low and is getting replaced.  Urine culture growing E coli.  Continue IV antibiotics.  Pulmonology following, appreciate recommendations.  Review of Systems   Constitutional:  Positive for fatigue.   Neurological:  Positive for weakness.     Objective:     Vital Signs (Most Recent):  Temp: 97.4 °F (36.3 °C) (03/19/24 1102)  Pulse: 61 (03/19/24 1102)  Resp: 17 (03/19/24 1102)  BP: (!) 163/68 (03/19/24 1102)  SpO2: 98 % (03/19/24 1102) Vital Signs (24h Range):  Temp:  [97.4 °F (36.3 °C)-98.4 °F (36.9 °C)] 97.4 °F (36.3 °C)  Pulse:  [61-76] 61  Resp:  [16-20] 17  SpO2:  [95 %-99 %] 98 %  BP: (134-163)/(54-71) 163/68     Weight: 73.3 kg (161 lb 8 oz)  Body mass index is 26.88 kg/m².    Intake/Output Summary (Last " 24 hours) at 3/19/2024 1205  Last data filed at 3/19/2024 0929  Gross per 24 hour   Intake 1020 ml   Output 2920 ml   Net -1900 ml           Physical Exam  Vitals and nursing note reviewed.   Constitutional:       Appearance: Normal appearance.   HENT:      Head: Normocephalic and atraumatic.      Nose: Nose normal.      Mouth/Throat:      Mouth: Mucous membranes are moist.      Pharynx: Oropharynx is clear.   Eyes:      Extraocular Movements: Extraocular movements intact.      Pupils: Pupils are equal, round, and reactive to light.   Cardiovascular:      Rate and Rhythm: Normal rate and regular rhythm.      Pulses: Normal pulses.   Pulmonary:      Effort: Pulmonary effort is normal.      Breath sounds: Wheezing present.   Abdominal:      General: Bowel sounds are normal.      Palpations: Abdomen is soft.      Tenderness: There is no right CVA tenderness or left CVA tenderness.   Musculoskeletal:         General: Normal range of motion.      Cervical back: Normal range of motion and neck supple.   Skin:     General: Skin is warm and dry.      Capillary Refill: Capillary refill takes less than 2 seconds.   Neurological:      General: No focal deficit present.      Mental Status: She is alert and oriented to person, place, and time.   Psychiatric:         Mood and Affect: Mood normal.         Behavior: Behavior normal.             Significant Labs: All pertinent labs within the past 24 hours have been reviewed.  CBC:   Recent Labs   Lab 03/17/24 2143 03/18/24  0522 03/19/24  0423   WBC 13.93* 13.53*  13.53* 11.49  11.49   HGB 9.9* 9.2*  9.2* 8.8*  8.8*   HCT 30.2* 28.0*  28.0* 27.7*  27.7*    298  298 297  297       CMP:   Recent Labs   Lab 03/17/24  1602 03/17/24  2143 03/18/24  0522 03/19/24  0423   * 134* 138 135*   K 4.6  --  4.2 4.3   CL 99  --  108 107   CO2 23  --  22* 21*   *  --  58* 132*   BUN 31*  --  25* 21   CREATININE 2.6*  --  2.4* 2.2*   CALCIUM 8.2*  --  8.0* 7.6*   PROT  6.1  --   --   --    ALBUMIN 3.0*  --   --   --    BILITOT 0.2  --   --   --    ALKPHOS 71  --   --   --    AST 10  --   --   --    ALT 12  --   --   --    ANIONGAP 8  --  8 7*       Urine Culture:   Recent Labs   Lab 03/17/24  1613   LABURIN ESCHERICHIA COLI  >100,000 cfu/ml  *       Urine Studies:   Recent Labs   Lab 03/17/24  1613   COLORU Colorless*   APPEARANCEUA Hazy*   PHUR 6.0   SPECGRAV 1.005   PROTEINUA 1+*   GLUCUA 1+*   KETONESU Negative   BILIRUBINUA Negative   OCCULTUA 2+*   NITRITE Negative   UROBILINOGEN Negative   LEUKOCYTESUR 3+*   RBCUA 3   WBCUA >100*   BACTERIA Many*   SQUAMEPITHEL 0   HYALINECASTS 0         Significant Imaging: I have reviewed all pertinent imaging results/findings within the past 24 hours.    US Lower Extremity Veins Bilateral    Result Date: 3/17/2024  PROCEDURE: US Duplex Bilateral Lower Extremities Veins CLINICAL INDICATION: The patient is 74 years old and is Female; elevated d dimer, r/o dvt TECHNIQUE: Real-time duplex ultrasound scan of the bilateral lower extremity veins integrating B-mode two-dimensional vascular structure, Doppler spectral analysis, color flow Doppler imaging and compression. COMPARISON: No relevant prior studies available. FINDINGS: RIGHT DEEP VEINS:  Unremarkable.  No DVT in the right common femoral, femoral, proximal deep femoral or popliteal veins or peroneal vein or anterior tibial veins.  The veins demonstrate normal color flow, are normally compressible, with normal phasic flow and/or augmentation response. RIGHT SUPERFICIAL VEINS:  Unremarkable.  No thrombus in the visualized right great saphenous vein. LEFT DEEP VEINS:  Unremarkable.  No DVT in the left common femoral, femoral, proximal deep femoral or popliteal veins or peroneal vein or anterior tibial veins.  The veins demonstrate normal color flow, are normally compressible, with normal phasic flow and/or augmentation response. LEFT SUPERFICIAL VEINS:  Unremarkable.  No thrombus in the  visualized left great saphenous vein. SOFT TISSUES:  No acute findings.  No popliteal cyst. IMPRESSION: No DVT of the bilateral lower extremities. Electronically signed by:  Marya Dawson MD  3/17/2024 9:25 PM CDT Workstation: [id number]    CT Abdomen Pelvis  Without Contrast    Result Date: 3/17/2024  EXAM: CT Abdomen and Pelvis Without Intravenous Contrast CLINICAL HISTORY: The patient is 74 years old and is Female; Abdominal pain, acute, nonlocalized TECHNIQUE: Axial computed tomography images of the abdomen and pelvis without intravenous contrast.  Sagittal and coronal reformatted images were created and reviewed.  This CT exam was performed using one or more of the following dose reduction techniques:  automated exposure control, adjustment of the mA and/or kV according to patient size, and/or use of iterative reconstruction technique. COMPARISON: No relevant prior studies available. FINDINGS: LUNG BASES:  Unremarkable.  No consolidation or acute findings. ABDOMEN: LIVER:  Unremarkable for noncontrast exam. GALLBLADDER AND BILE DUCTS:  There appear to be small gallstones without obvious complication. PANCREAS:  Unremarkable.  No ductal dilation. SPLEEN:  Unremarkable without enlargement. ADRENALS:  Unremarkable. KIDNEYS AND URETERS:  Left kidney is mildly enlarged and there is mild left perinephric stranding. Several punctate bilateral renal calcifications may be within the collecting systems. No obvious ureteral stones but there does appear to be mild left periureteral stranding. STOMACH AND BOWEL:  Mild diverticulosis distal colon. Unopacified bowel is otherwise unremarkable. PELVIS: APPENDIX:  The appendix appears normal. BLADDER:  Unremarkable.  No stones. ABDOMEN and PELVIS: INTRAPERITONEAL SPACE:  Unremarkable.  No free air.  No significant fluid collection. BONES/JOINTS: Old healing bilateral inferior ramus fractures. No acute findings. SOFT TISSUES:  Unremarkable. VASCULATURE:  Aorta and iliac  vessels are heavily atherosclerotic with evidence of high-grade stenosis just above the aortic bifurcation and extending into bilateral common iliac arteries. LYMPH NODES:  No suspicious adenopathy. IMPRESSION: 1.  Left renal findings as described are not well characterized on noncontrast exam but suspicious for upper urinary tract infection. Cannot exclude a few punctate bilateral nonobstructive renal calculi. 2.  Severe vascular disease with evidence of high-grade stenosis distal aorta and bilateral common iliac arteries. 3.  Incidental gallstones Electronically signed by:  Isac Connell MD  03/17/2024 08:57 PM CDT Workstation: [id number]    NM Lung Scan Perfusion Particulate    Result Date: 3/17/2024  EXAM:  NM Lung Perfusion Scan CLINICAL HISTORY:  Pulmonary embolism (PE) suspected, positive D-dimer; pos d dimer TECHNIQUE:  Nuclear Medicine perfusion images of the lungs were obtained in multiple projections following injection of 5.2 mCi Tc99m MAA. COMPARISON:  Chest radiograph from March 17, 2024. FINDINGS: Perfusion:  There are several segmental sized wedge-shaped areas of photopenia demonstrated. IMPRESSION: High probability of pulmonary embolism. Thank you for allowing us to participate in the care of this patient. Electronically signed by:  Cipriano Del Rio DO  03/17/2024 08:16 PM CDT Workstation: [id number]    X-Ray Chest AP Portable    Result Date: 3/17/2024  XR CHEST 1 VIEW CLINICAL HISTORY: 74 years Female shortness of breath, chest pain COMPARISON: March 30, 2022 FINDINGS: Cardiac silhouette size is within normal limits. Status post median sternotomy. Atherosclerotic calcification of the aorta. No confluent airspace disease. No large pleural effusion or pneumothorax. No acute osseous abnormality. IMPRESSION: No acute pulmonary process. Electronically signed by:  Prasanth Lomax MD  03/17/2024 03:11 PM CDT Workstation: [id number]       Assessment/Plan:      * Acute pulmonary embolism  NM Lung scan  "with high probability of pulmonary embolism. Perfusion:  There are several segmental sized wedge-shaped areas of photopenia demonstrated.   Elevated D dimer 1.67  CXR: no acute pulmonary process  P.o. Eliquis  Echo to evaluate for Rt heart strain pending  pulmonology following        Debility  PT/OT eval and treat    Acute renal failure superimposed on stage 3 chronic kidney disease    History of St 3b CKD.   Patient with acute kidney injury/acute renal failure likely due to pre-renal azotemia due to dehydration KAIT is currently worsening. Baseline creatinine  1.7  - Labs reviewed- Renal function/electrolytes with Estimated Creatinine Clearance: 22.1 mL/min (A) (based on SCr of 2.2 mg/dL (H)). according to latest data. Monitor urine output and serial BMP and adjust therapy as needed. Avoid nephrotoxins and renally dose meds for GFR listed above.    IV hydration  Monitor renal function daily  Daily wt/accurate I&O  Continue to hold losartan   Avoid nephrotoxic meds  Renally dose all meds    Pyelonephritis  Urine culture E coli  Continue rocephin  IV hydration      Type 2 diabetes mellitus with complication, without long-term current use of insulin  Patient's FSGs are uncontrolled due to hyperglycemia on current medication regimen.  Last A1c reviewed-   Lab Results   Component Value Date    HGBA1C 9.2 (H) 03/03/2022     Most recent fingerstick glucose reviewed- No results for input(s): "POCTGLUCOSE" in the last 24 hours.  Current correctional scale  High  Maintain anti-hyperglycemic dose as follows-   Antihyperglycemics (From admission, onward)      Start     Stop Route Frequency Ordered    03/17/24 1932  insulin aspart U-100 pen 0-10 Units         -- SubQ Before meals & nightly PRN 03/17/24 1833          Hold Oral hypoglycemics while patient is in the hospital.         VTE Risk Mitigation (From admission, onward)           Ordered     apixaban tablet 10 mg  2 times daily         03/18/24 1408     heparin 25,000 units " in dextrose 5% 250 mL (100 units/mL) infusion HIGH INTENSITY nomogram - [facility]  Continuous        Question:  Begin at (units/kg/hr)  Answer:  18    03/17/24 2055     heparin 25,000 units in dextrose 5% (100 units/ml) IV bolus from bag HIGH INTENSITY nomogram - [facility]  As needed (PRN)        Question:  Heparin Infusion Adjustment (DO NOT MODIFY ANSWER)  Answer:  \\[email or web address]\epic\Images\Pharmacy\HeparinInfusions\heparin HIGH INTENSITY nomogram for [facility] [id number].pdf    03/17/24 2053     heparin 25,000 units in dextrose 5% (100 units/ml) IV bolus from bag HIGH INTENSITY nomogram - [facility]  As needed (PRN)        Question:  Heparin Infusion Adjustment (DO NOT MODIFY ANSWER)  Answer:  \\[email or web address]\epic\Images\Pharmacy\HeparinInfusions\heparin HIGH INTENSITY nomogram for [facility] [id number].pdf    03/17/24 2053     IP VTE HIGH RISK PATIENT  Once         03/17/24 1831     Place sequential compression device  Until discontinued         03/17/24 1831                    Discharge Planning   TALITA: 3/21/2024     Code Status: Full Code   Is the patient medically ready for discharge?:     Reason for patient still in hospital (select all that apply): Patient trending condition and Treatment  Discharge Plan A: Home                  Yuly Valdovinos MD  Department of Hospital Medicine   [facility]

## 2024-03-19 NOTE — PT/OT/SLP EVAL
Physical Therapy Evaluation    Patient Name:  Epi Mcintosh   MRN:  [id number]    Recommendations:     Discharge Recommendations: Low Intensity Therapy   Discharge Equipment Recommendations: none   Barriers to discharge: None    Assessment:     Epi Mcintosh is a 74 y.o. female admitted with a medical diagnosis of Acute pulmonary embolism.  She presents with the following impairments/functional limitations: weakness, impaired endurance, impaired functional mobility, gait instability, impaired balance, pain. Patient sitting up in chair and is agreeable to participation with PT evaluation. She lives alone and has been using a rollator as needed for ambulation. She requires CGA for sit to stand transfer. She ambulated 5' with RW and CGA and then requested to ambulate without AD and ambulated 15' with no AD and CGA. She requests to return to bed with all needs met.     Rehab Prognosis: Good; patient would benefit from acute skilled PT services to address these deficits and reach maximum level of function.    Recent Surgery: * No surgery found *      Plan:     During this hospitalization, patient to be seen 5 x/week to address the identified rehab impairments via gait training, therapeutic activities, therapeutic exercises and progress toward the following goals:    Plan of Care Expires:  04/19/24    Subjective     Chief Complaint: abdominal discomfort   Patient/Family Comments/goals: lie down   Pain/Comfort:  Pain Rating 1:  (not rated)  Location 1: abdomen  Pain Addressed 1: Reposition, Distraction    Patients cultural, spiritual, Tenriism conflicts given the current situation:      Living Environment:  Patient lives alone in a 2SH with bedroom on second floor and lift  Prior to admission, patients level of function was prn use of rollator for ambulation. She endorses driving at baseline. She had 1 fall in the past year. She denies any recent PT.  Equipment used at home: rollator, shower chair.  DME owned (not  currently used): none.  Upon discharge, patient will have assistance from HHPT.    Objective:     Communicated with MARY Enriquez prior to session.  Patient found up in chair with peripheral IV, telemetry  upon PT entry to room.    General Precautions: Standard, fall, special contact  Orthopedic Precautions:N/A   Braces: N/A  Respiratory Status: Room air    Exams:  RLE Strength: WFL  LLE Strength: WFL    Functional Mobility:  Transfers:     Sit to Stand:  contact guard assistance with rolling walker  Gait: 5' with RW and CGA and then requested to ambulate without AD and ambulated 15' with no AD and CGA      AM-PAC 6 CLICK MOBILITY  Total Score:23       Treatment & Education:  Patient was educated on the importance of OOB activity and functional mobility to negate negative effects of prolonged bed rest during hospitalization, safe transfers and ambulation, and D/C planning     Patient left HOB elevated with all lines intact and call button in reach.    GOALS:   Multidisciplinary Problems       Physical Therapy Goals          Problem: Physical Therapy    Goal Priority Disciplines Outcome Goal Variances Interventions   Physical Therapy Goal     PT, PT/OT      Description: Goals to be met by: 24    Patient will increase functional independence with mobility by performin. Supine to sit with Supervision  2. Sit to stand transfer with Supervision  3. Bed to chair transfer with Supervision using Rolling Walker or no AD  4. Gait  x 250 feet with Supervision using Rolling Walker or no AD.   5. Lower extremity exercise program x20 reps per handout, with supervision                       History:     Past Medical History:   Diagnosis Date    COPD (chronic obstructive pulmonary disease)     Coronary artery disease     Depression     Diabetes mellitus     Hypertension     Pancreatitis     PVD (peripheral vascular disease)        Past Surgical History:   Procedure Laterality Date    AORTOGRAPHY WITH SERIALOGRAPHY N/A  4/1/2022    Procedure: AORTOGRAM, WITH SERIALOGRAPHY;  Surgeon: Petr Kaufman MD;  Location: [facility] CATH/EP LAB;  Service: Peripheral Vascular;  Laterality: N/A;    CARDIAC SURGERY      CORONARY ARTERY BYPASS GRAFT  12/2018    HYSTERECTOMY         Time Tracking:     PT Received On: 03/19/24  PT Start Time: 1112     PT Stop Time: 1124  PT Total Time (min): 12 min     Billable Minutes: Evaluation 12      03/19/2024

## 2024-03-19 NOTE — PLAN OF CARE
[facility]  Initial Discharge Assessment       Primary Care Provider: Flaco Parker MD    Admission Diagnosis: UTI (urinary tract infection) [N39.0]  Cough [R05.9]    Admission Date: 3/17/2024  Expected Discharge Date: 3/21/2024    met with Pt at bedside to complete discharge assessment. Pt AAOx4s. Demographics, PCP, and insurance verified. No home health. No dialysis. Pt reports ability to complete ADLs without assistance. Pt verbalized plan to discharge home via family transport. Pt has no other needs to be addressed at this time.      Transition of Care Barriers: None    Payor: [employer] MEDICARE / Plan: [employer] MEDICARE HMO / Product Type: Capitation /     Extended Emergency Contact Information  Primary Emergency Contact: Scar Pugh  Mobile Phone: [phone]  Relation: Son  Secondary Emergency Contact: TyshawnShreya  Mobile Phone: [phone]  Relation: Friend    Discharge Plan A: Home  Discharge Plan B: Home      [id number] [place] - [address]  [address] [place] [address]  Phone: [phone] Fax: [phone]    [facility] - [facility] [address]  [address] [address]  Phone: [phone] Fax: [phone]    [facility]  [address]  [place] [address]  Phone: [phone] Fax: [phone]      Initial Assessment (most recent)       Adult Discharge Assessment - 03/19/24 1026          Discharge Assessment    Assessment Type Discharge Planning Assessment     Confirmed/corrected address, phone number and insurance Yes     Confirmed Demographics Correct on Facesheet     Source of Information patient     Communicated TALITA with patient/caregiver Date not available/Unable to determine     Reason For Admission Acute pulmonary embolism     People in Home alone     Do you expect to return to your current living situation? Yes     Prior to hospitilization  cognitive status: Alert/Oriented     Current cognitive status: Alert/Oriented     Walking or Climbing Stairs Difficulty no     Dressing/Bathing Difficulty no     Home Accessibility wheelchair accessible     Home Layout Bedroom on 2nd floor;Bathroom on 2nd floor     Equipment Currently Used at Home walker, rolling     Readmission within 30 days? No     Patient currently being followed by outpatient case management? No     Do you currently have service(s) that help you manage your care at home? No     Do you take prescription medications? Yes     Do you have prescription coverage? Yes     Coverage Payor: [employer] MANAGED MEDICARE - [employer] MEDICARE O -     Do you have any problems affording any of your prescribed medications? No     Is the patient taking medications as prescribed? yes     How do you get to doctors appointments? car, drives self     Are you on dialysis? No     Do you take coumadin? No     Discharge Plan A Home     Discharge Plan B Home     DME Needed Upon Discharge  none     Discharge Plan discussed with: Patient     Transition of Care Barriers None

## 2024-03-19 NOTE — SUBJECTIVE & OBJECTIVE
Interval History:  Patient seen and examined.  Denies shortness of breath.  PT and OT evaluation pending.  TTE read pending.  Magnesium was low and is getting replaced.  Urine culture growing E coli.  Continue IV antibiotics.  Pulmonology following, appreciate recommendations.  Review of Systems   Constitutional:  Positive for fatigue.   Neurological:  Positive for weakness.     Objective:     Vital Signs (Most Recent):  Temp: 97.4 °F (36.3 °C) (03/19/24 1102)  Pulse: 61 (03/19/24 1102)  Resp: 17 (03/19/24 1102)  BP: (!) 163/68 (03/19/24 1102)  SpO2: 98 % (03/19/24 1102) Vital Signs (24h Range):  Temp:  [97.4 °F (36.3 °C)-98.4 °F (36.9 °C)] 97.4 °F (36.3 °C)  Pulse:  [61-76] 61  Resp:  [16-20] 17  SpO2:  [95 %-99 %] 98 %  BP: (134-163)/(54-71) 163/68     Weight: 73.3 kg (161 lb 8 oz)  Body mass index is 26.88 kg/m².    Intake/Output Summary (Last 24 hours) at 3/19/2024 1205  Last data filed at 3/19/2024 0929  Gross per 24 hour   Intake 1020 ml   Output 2920 ml   Net -1900 ml           Physical Exam  Vitals and nursing note reviewed.   Constitutional:       Appearance: Normal appearance.   HENT:      Head: Normocephalic and atraumatic.      Nose: Nose normal.      Mouth/Throat:      Mouth: Mucous membranes are moist.      Pharynx: Oropharynx is clear.   Eyes:      Extraocular Movements: Extraocular movements intact.      Pupils: Pupils are equal, round, and reactive to light.   Cardiovascular:      Rate and Rhythm: Normal rate and regular rhythm.      Pulses: Normal pulses.   Pulmonary:      Effort: Pulmonary effort is normal.      Breath sounds: Wheezing present.   Abdominal:      General: Bowel sounds are normal.      Palpations: Abdomen is soft.      Tenderness: There is no right CVA tenderness or left CVA tenderness.   Musculoskeletal:         General: Normal range of motion.      Cervical back: Normal range of motion and neck supple.   Skin:     General: Skin is warm and dry.      Capillary Refill: Capillary  refill takes less than 2 seconds.   Neurological:      General: No focal deficit present.      Mental Status: She is alert and oriented to person, place, and time.   Psychiatric:         Mood and Affect: Mood normal.         Behavior: Behavior normal.             Significant Labs: All pertinent labs within the past 24 hours have been reviewed.  CBC:   Recent Labs   Lab 03/17/24 2143 03/18/24 0522 03/19/24  0423   WBC 13.93* 13.53*  13.53* 11.49  11.49   HGB 9.9* 9.2*  9.2* 8.8*  8.8*   HCT 30.2* 28.0*  28.0* 27.7*  27.7*    298  298 297  297       CMP:   Recent Labs   Lab 03/17/24  1602 03/17/24 2143 03/18/24 0522 03/19/24  0423   * 134* 138 135*   K 4.6  --  4.2 4.3   CL 99  --  108 107   CO2 23  --  22* 21*   *  --  58* 132*   BUN 31*  --  25* 21   CREATININE 2.6*  --  2.4* 2.2*   CALCIUM 8.2*  --  8.0* 7.6*   PROT 6.1  --   --   --    ALBUMIN 3.0*  --   --   --    BILITOT 0.2  --   --   --    ALKPHOS 71  --   --   --    AST 10  --   --   --    ALT 12  --   --   --    ANIONGAP 8  --  8 7*       Urine Culture:   Recent Labs   Lab 03/17/24  1613   LABURIN ESCHERICHIA COLI  >100,000 cfu/ml  *       Urine Studies:   Recent Labs   Lab 03/17/24  1613   COLORU Colorless*   APPEARANCEUA Hazy*   PHUR 6.0   SPECGRAV 1.005   PROTEINUA 1+*   GLUCUA 1+*   KETONESU Negative   BILIRUBINUA Negative   OCCULTUA 2+*   NITRITE Negative   UROBILINOGEN Negative   LEUKOCYTESUR 3+*   RBCUA 3   WBCUA >100*   BACTERIA Many*   SQUAMEPITHEL 0   HYALINECASTS 0         Significant Imaging: I have reviewed all pertinent imaging results/findings within the past 24 hours.    US Lower Extremity Veins Bilateral    Result Date: 3/17/2024  PROCEDURE: US Duplex Bilateral Lower Extremities Veins CLINICAL INDICATION: The patient is 74 years old and is Female; elevated d dimer, r/o dvt TECHNIQUE: Real-time duplex ultrasound scan of the bilateral lower extremity veins integrating B-mode two-dimensional vascular structure,  Doppler spectral analysis, color flow Doppler imaging and compression. COMPARISON: No relevant prior studies available. FINDINGS: RIGHT DEEP VEINS:  Unremarkable.  No DVT in the right common femoral, femoral, proximal deep femoral or popliteal veins or peroneal vein or anterior tibial veins.  The veins demonstrate normal color flow, are normally compressible, with normal phasic flow and/or augmentation response. RIGHT SUPERFICIAL VEINS:  Unremarkable.  No thrombus in the visualized right great saphenous vein. LEFT DEEP VEINS:  Unremarkable.  No DVT in the left common femoral, femoral, proximal deep femoral or popliteal veins or peroneal vein or anterior tibial veins.  The veins demonstrate normal color flow, are normally compressible, with normal phasic flow and/or augmentation response. LEFT SUPERFICIAL VEINS:  Unremarkable.  No thrombus in the visualized left great saphenous vein. SOFT TISSUES:  No acute findings.  No popliteal cyst. IMPRESSION: No DVT of the bilateral lower extremities. Electronically signed by:  Marya Dawson MD  3/17/2024 9:25 PM CDT Workstation: [id number]    CT Abdomen Pelvis  Without Contrast    Result Date: 3/17/2024  EXAM: CT Abdomen and Pelvis Without Intravenous Contrast CLINICAL HISTORY: The patient is 74 years old and is Female; Abdominal pain, acute, nonlocalized TECHNIQUE: Axial computed tomography images of the abdomen and pelvis without intravenous contrast.  Sagittal and coronal reformatted images were created and reviewed.  This CT exam was performed using one or more of the following dose reduction techniques:  automated exposure control, adjustment of the mA and/or kV according to patient size, and/or use of iterative reconstruction technique. COMPARISON: No relevant prior studies available. FINDINGS: LUNG BASES:  Unremarkable.  No consolidation or acute findings. ABDOMEN: LIVER:  Unremarkable for noncontrast exam. GALLBLADDER AND BILE DUCTS:  There appear to be small  gallstones without obvious complication. PANCREAS:  Unremarkable.  No ductal dilation. SPLEEN:  Unremarkable without enlargement. ADRENALS:  Unremarkable. KIDNEYS AND URETERS:  Left kidney is mildly enlarged and there is mild left perinephric stranding. Several punctate bilateral renal calcifications may be within the collecting systems. No obvious ureteral stones but there does appear to be mild left periureteral stranding. STOMACH AND BOWEL:  Mild diverticulosis distal colon. Unopacified bowel is otherwise unremarkable. PELVIS: APPENDIX:  The appendix appears normal. BLADDER:  Unremarkable.  No stones. ABDOMEN and PELVIS: INTRAPERITONEAL SPACE:  Unremarkable.  No free air.  No significant fluid collection. BONES/JOINTS: Old healing bilateral inferior ramus fractures. No acute findings. SOFT TISSUES:  Unremarkable. VASCULATURE:  Aorta and iliac vessels are heavily atherosclerotic with evidence of high-grade stenosis just above the aortic bifurcation and extending into bilateral common iliac arteries. LYMPH NODES:  No suspicious adenopathy. IMPRESSION: 1.  Left renal findings as described are not well characterized on noncontrast exam but suspicious for upper urinary tract infection. Cannot exclude a few punctate bilateral nonobstructive renal calculi. 2.  Severe vascular disease with evidence of high-grade stenosis distal aorta and bilateral common iliac arteries. 3.  Incidental gallstones Electronically signed by:  Isac Connell MD  03/17/2024 08:57 PM CDT Workstation: [id number]    NM Lung Scan Perfusion Particulate    Result Date: 3/17/2024  EXAM:  NM Lung Perfusion Scan CLINICAL HISTORY:  Pulmonary embolism (PE) suspected, positive D-dimer; pos d dimer TECHNIQUE:  Nuclear Medicine perfusion images of the lungs were obtained in multiple projections following injection of 5.2 mCi Tc99m MAA. COMPARISON:  Chest radiograph from March 17, 2024. FINDINGS: Perfusion:  There are several segmental sized wedge-shaped  areas of photopenia demonstrated. IMPRESSION: High probability of pulmonary embolism. Thank you for allowing us to participate in the care of this patient. Electronically signed by:  Cirpiano Del Rio DO  03/17/2024 08:16 PM CDT Workstation: [id number]    X-Ray Chest AP Portable    Result Date: 3/17/2024  XR CHEST 1 VIEW CLINICAL HISTORY: 74 years Female shortness of breath, chest pain COMPARISON: March 30, 2022 FINDINGS: Cardiac silhouette size is within normal limits. Status post median sternotomy. Atherosclerotic calcification of the aorta. No confluent airspace disease. No large pleural effusion or pneumothorax. No acute osseous abnormality. IMPRESSION: No acute pulmonary process. Electronically signed by:  Prasanth Lomax MD  03/17/2024 03:11 PM CDT Workstation: [id number]

## 2024-03-20 VITALS
HEART RATE: 62 BPM | BODY MASS INDEX: 26.91 KG/M2 | SYSTOLIC BLOOD PRESSURE: 139 MMHG | RESPIRATION RATE: 16 BRPM | WEIGHT: 161.5 LBS | HEIGHT: 65 IN | DIASTOLIC BLOOD PRESSURE: 74 MMHG | TEMPERATURE: 97 F | OXYGEN SATURATION: 96 %

## 2024-03-20 LAB
ANION GAP SERPL CALC-SCNC: 6 MMOL/L (ref 8–16)
BASOPHILS # BLD AUTO: 0.04 K/UL (ref 0–0.2)
BASOPHILS NFR BLD: 0.3 % (ref 0–1.9)
BUN SERPL-MCNC: 17 MG/DL (ref 8–23)
CALCIUM SERPL-MCNC: 7.9 MG/DL (ref 8.7–10.5)
CHLORIDE SERPL-SCNC: 110 MMOL/L (ref 95–110)
CO2 SERPL-SCNC: 21 MMOL/L (ref 23–29)
CREAT SERPL-MCNC: 2.2 MG/DL (ref 0.5–1.4)
DIFFERENTIAL METHOD BLD: ABNORMAL
EOSINOPHIL # BLD AUTO: 0.1 K/UL (ref 0–0.5)
EOSINOPHIL NFR BLD: 0.8 % (ref 0–8)
ERYTHROCYTE [DISTWIDTH] IN BLOOD BY AUTOMATED COUNT: 13.8 % (ref 11.5–14.5)
EST. GFR  (NO RACE VARIABLE): 22.8 ML/MIN/1.73 M^2
GLUCOSE SERPL-MCNC: 104 MG/DL (ref 70–110)
GLUCOSE SERPL-MCNC: 224 MG/DL (ref 70–110)
GLUCOSE SERPL-MCNC: 87 MG/DL (ref 70–110)
HCT VFR BLD AUTO: 28.4 % (ref 37–48.5)
HGB BLD-MCNC: 9.2 G/DL (ref 12–16)
IMM GRANULOCYTES # BLD AUTO: 0.21 K/UL (ref 0–0.04)
IMM GRANULOCYTES NFR BLD AUTO: 1.7 % (ref 0–0.5)
LYMPHOCYTES # BLD AUTO: 1.3 K/UL (ref 1–4.8)
LYMPHOCYTES NFR BLD: 10.4 % (ref 18–48)
MAGNESIUM SERPL-MCNC: 1.6 MG/DL (ref 1.6–2.6)
MCH RBC QN AUTO: 31.5 PG (ref 27–31)
MCHC RBC AUTO-ENTMCNC: 32.4 G/DL (ref 32–36)
MCV RBC AUTO: 97 FL (ref 82–98)
MONOCYTES # BLD AUTO: 0.9 K/UL (ref 0.3–1)
MONOCYTES NFR BLD: 7.5 % (ref 4–15)
NEUTROPHILS # BLD AUTO: 9.8 K/UL (ref 1.8–7.7)
NEUTROPHILS NFR BLD: 79.3 % (ref 38–73)
NRBC BLD-RTO: 0 /100 WBC
PHOSPHATE SERPL-MCNC: 3.3 MG/DL (ref 2.7–4.5)
PLATELET # BLD AUTO: 282 K/UL (ref 150–450)
PMV BLD AUTO: 8.4 FL (ref 9.2–12.9)
POTASSIUM SERPL-SCNC: 5.1 MMOL/L (ref 3.5–5.1)
RBC # BLD AUTO: 2.92 M/UL (ref 4–5.4)
SODIUM SERPL-SCNC: 137 MMOL/L (ref 136–145)
WBC # BLD AUTO: 12.41 K/UL (ref 3.9–12.7)

## 2024-03-20 PROCEDURE — 84100 ASSAY OF PHOSPHORUS: CPT | Performed by: NURSE PRACTITIONER

## 2024-03-20 PROCEDURE — 99900035 HC TECH TIME PER 15 MIN (STAT)

## 2024-03-20 PROCEDURE — 36415 COLL VENOUS BLD VENIPUNCTURE: CPT | Performed by: NURSE PRACTITIONER

## 2024-03-20 PROCEDURE — 85025 COMPLETE CBC W/AUTO DIFF WBC: CPT | Performed by: NURSE PRACTITIONER

## 2024-03-20 PROCEDURE — 83735 ASSAY OF MAGNESIUM: CPT | Performed by: NURSE PRACTITIONER

## 2024-03-20 PROCEDURE — 80048 BASIC METABOLIC PNL TOTAL CA: CPT | Performed by: NURSE PRACTITIONER

## 2024-03-20 PROCEDURE — 25000003 PHARM REV CODE 250: Performed by: NURSE PRACTITIONER

## 2024-03-20 PROCEDURE — 99232 SBSQ HOSP IP/OBS MODERATE 35: CPT | Mod: ,,, | Performed by: INTERNAL MEDICINE

## 2024-03-20 RX ORDER — CIPROFLOXACIN 500 MG/1
500 TABLET ORAL 2 TIMES DAILY
Qty: 10 TABLET | Refills: 0 | Status: SHIPPED | OUTPATIENT
Start: 2024-03-20 | End: 2024-03-20 | Stop reason: HOSPADM

## 2024-03-20 RX ORDER — IBUPROFEN 200 MG
1 TABLET ORAL DAILY
Qty: 30 PATCH | Refills: 0 | Status: SHIPPED | OUTPATIENT
Start: 2024-03-20 | End: 2024-04-19

## 2024-03-20 RX ORDER — DOXYCYCLINE 100 MG/1
100 CAPSULE ORAL EVERY 12 HOURS
Qty: 10 CAPSULE | Refills: 0 | Status: SHIPPED | OUTPATIENT
Start: 2024-03-20 | End: 2024-03-26

## 2024-03-20 RX ADMIN — PANTOPRAZOLE SODIUM 40 MG: 40 TABLET, DELAYED RELEASE ORAL at 05:03

## 2024-03-20 RX ADMIN — APIXABAN 10 MG: 5 TABLET, FILM COATED ORAL at 09:03

## 2024-03-20 RX ADMIN — CITALOPRAM HYDROBROMIDE 40 MG: 20 TABLET ORAL at 09:03

## 2024-03-20 RX ADMIN — LEVOTHYROXINE SODIUM 88 MCG: 88 TABLET ORAL at 05:03

## 2024-03-20 RX ADMIN — ATORVASTATIN CALCIUM 80 MG: 40 TABLET, FILM COATED ORAL at 09:03

## 2024-03-20 RX ADMIN — METOPROLOL TARTRATE 25 MG: 25 TABLET, FILM COATED ORAL at 09:03

## 2024-03-20 NOTE — PROGRESS NOTES
"Pulmonary/Critical Care  Progress Note      Patient name: Epi Mcintosh  MRN: [id number]  Date: 03/20/2024    Admit Date: 3/17/2024  Consult Requested By: Yuly Valdovinos MD    Reason for Consult: Pulmonary emboli, abdominal pain    HPI:    3/18/2024 - Pt presented to ER with about 1 month h/o not feeling well with abdominal pain (LLQ), + chills (no fever), + nausea, vomitting and diarrhea.  Because of her illness she has not been very active.  She remembers having some increased SOB but denies any cough, sputum or chest pain.  She came to ER and had elevated d-dimer and a high probability nuclear perfusion scan and has been started on ELIQUIS.  She is on room air, is hemodynamically stable and still has  her other symptoms and "just wants to feel better".  Dopplers negative, no S1Q3T3 on EKG.    3/19/2024 - Stable overnight, still with some abdominal pain but seems better, no new respiratory complaints.  UC + E coli, creatinine is better    3/20/2024 - Feels better this AM and no new complaints, breathing is better and abdominal pain is better.  BP has been elevated.  ECHO noted, nothing to suggest right heart strain    Review of Systems    Review of Systems   Constitutional:  Positive for chills, malaise/fatigue and weight loss. Negative for diaphoresis and fever.   HENT:  Negative for congestion.    Eyes:  Negative for pain.   Respiratory:  Positive for shortness of breath. Negative for cough, hemoptysis, sputum production, wheezing and stridor.    Cardiovascular:  Negative for chest pain, palpitations, orthopnea, claudication, leg swelling and PND.   Gastrointestinal:  Positive for abdominal pain, diarrhea, nausea and vomiting. Negative for constipation and heartburn.   Genitourinary:  Negative for dysuria, frequency and urgency.   Musculoskeletal:  Negative for falls and myalgias.   Neurological:  Positive for weakness. Negative for sensory change and focal weakness.   Psychiatric/Behavioral:  Positive for " suicidal ideas (she now denies to me). Negative for depression and substance abuse. The patient is not nervous/anxious.        Past Medical History    Past Medical History:   Diagnosis Date    COPD (chronic obstructive pulmonary disease)     Coronary artery disease     Depression     Diabetes mellitus     Hypertension     Pancreatitis     PVD (peripheral vascular disease)        Past Surgical History    Past Surgical History:   Procedure Laterality Date    AORTOGRAPHY WITH SERIALOGRAPHY N/A 2022    Procedure: AORTOGRAM, WITH SERIALOGRAPHY;  Surgeon: Petr Kaufman MD;  Location: [facility] CATH/EP LAB;  Service: Peripheral Vascular;  Laterality: N/A;    CARDIAC SURGERY      CORONARY ARTERY BYPASS GRAFT  2018    HYSTERECTOMY         Medications (scheduled):      apixaban  10 mg Oral BID    atorvastatin  80 mg Oral Daily    cefTRIAXone (Rocephin) IV (PEDS and ADULTS)  1 g Intravenous Q24H    citalopram  40 mg Oral Daily    levothyroxine  88 mcg Oral Before breakfast    metoprolol tartrate  25 mg Oral BID    pantoprazole  40 mg Oral Before breakfast       Medications (infusions):      sodium chloride 0.9% Stopped (24 1204)       Medications (prn):     acetaminophen, albuterol-ipratropium, dextrose 50% in water (D50W), dextrose 50% in water (D50W), diazePAM, glucagon (human recombinant), glucose, glucose, HYDROcodone-acetaminophen, insulin aspart U-100, melatonin, naloxone, ondansetron, sodium chloride 0.9%, sodium chloride 0.9%    Family History: No family history on file.    Social History: Tobacco:   Social History     Tobacco Use   Smoking Status Former    Current packs/day: 0.00    Types: Vaping w/o nicotine, Cigarettes    Quit date: 4/15/2021    Years since quittin.9   Smokeless Tobacco Never                                EtOH:   Social History     Substance and Sexual Activity   Alcohol Use Yes    Comment: occasionally                                Drugs:   Social History     Substance and  "Sexual Activity   Drug Use Never       Physical Exam    Vital signs:  Temp:  [97.4 °F (36.3 °C)-98.7 °F (37.1 °C)]   Pulse:  [62-67]   Resp:  [16-17]   BP: (139-177)/()   SpO2:  [94 %-100 %]     Intake/Output:   Intake/Output Summary (Last 24 hours) at 3/20/2024 1310  Last data filed at 3/20/2024 1214  Gross per 24 hour   Intake 1400 ml   Output 2850 ml   Net -1450 ml          BMI: Estimated body mass index is 26.88 kg/m² as calculated from the following:    Height as of this encounter: 5' 5" (1.651 m).    Weight as of this encounter: 73.3 kg (161 lb 8 oz).    Physical Exam  Vitals and nursing note reviewed.   Constitutional:       General: She is not in acute distress.     Appearance: Normal appearance. She is ill-appearing. She is not toxic-appearing or diaphoretic.   HENT:      Head: Normocephalic and atraumatic.      Right Ear: External ear normal.      Left Ear: External ear normal.      Nose: Nose normal. No congestion or rhinorrhea.      Mouth/Throat:      Mouth: Mucous membranes are moist.      Pharynx: Oropharynx is clear. No oropharyngeal exudate or posterior oropharyngeal erythema.   Eyes:      General: No scleral icterus.        Right eye: No discharge.         Left eye: No discharge.      Extraocular Movements: Extraocular movements intact.      Conjunctiva/sclera: Conjunctivae normal.      Pupils: Pupils are equal, round, and reactive to light.   Neck:      Vascular: No carotid bruit.   Cardiovascular:      Rate and Rhythm: Normal rate and regular rhythm.      Pulses: Normal pulses.      Heart sounds: No murmur heard.     No friction rub. No gallop.   Pulmonary:      Effort: Pulmonary effort is normal. No respiratory distress.      Breath sounds: No stridor. No wheezing, rhonchi or rales.   Chest:      Chest wall: No tenderness.   Abdominal:      General: Bowel sounds are normal. There is no distension.      Tenderness: There is abdominal tenderness (some on deep palpation to LLQ). There is no " "guarding.   Musculoskeletal:         General: No swelling. Normal range of motion.      Cervical back: Normal range of motion and neck supple. No rigidity or tenderness.      Right lower leg: No edema.      Left lower leg: No edema.   Lymphadenopathy:      Cervical: No cervical adenopathy.   Skin:     General: Skin is warm and dry.      Capillary Refill: Capillary refill takes less than 2 seconds.      Coloration: Skin is not jaundiced.      Findings: No bruising.   Neurological:      General: No focal deficit present.      Mental Status: She is alert and oriented to person, place, and time. Mental status is at baseline.      Cranial Nerves: No cranial nerve deficit.      Sensory: No sensory deficit.      Motor: No weakness.   Psychiatric:         Mood and Affect: Mood normal.         Behavior: Behavior normal.         Thought Content: Thought content normal.         Judgment: Judgment normal.         Laboratory    Recent Labs   Lab 03/20/24  0454   WBC 12.41   RBC 2.92*   HGB 9.2*   HCT 28.4*      MCV 97   MCH 31.5*   MCHC 32.4         Recent Labs   Lab 03/20/24  0454   CALCIUM 7.9*      K 5.1   CO2 21*      BUN 17   CREATININE 2.2*         No results for input(s): "PT", "INR", "APTT" in the last 24 hours.      No results for input(s): "CPK", "CPKMB", "TROPONINI", "MB" in the last 24 hours.    Additional labs: reviewed    Microbiology:       Microbiology Results (last 7 days)       Procedure Component Value Units Date/Time    Clostridium difficile EIA [[id number]] Collected: 03/19/24 0932    Order Status: Completed Specimen: Stool Updated: 03/19/24 1221     C. diff Antigen Negative     C difficile Toxins A+B, EIA Negative     Comment: Testing not recommended for children <24 months old.       Urine culture [[id number]]  (Abnormal)  (Susceptibility) Collected: 03/17/24 1613    Order Status: Completed Specimen: Urine Updated: 03/19/24 0642     Urine Culture, Routine ESCHERICHIA COLI  >100,000 " "cfu/ml      Narrative:      Specimen Source->Urine            Radiology    Echo    Left Ventricle: The left ventricle is normal in size. Mildly increased   wall thickness. There is mild concentric hypertrophy. Normal wall motion.   There is normal systolic function with a visually estimated ejection   fraction of 60 - 65%. There is normal diastolic function.    Right Ventricle: Normal right ventricular cavity size. Wall thickness   is normal. Right ventricle wall motion  is normal. Systolic function is   normal.    Aortic Valve: The aortic valve is a trileaflet valve. There is mild   aortic valve sclerosis. There is mild to moderate aortic regurgitation   with a centrally directed jet.    IVC/SVC: Normal venous pressure at 3 mmHg.        Additional Studies    reviewed    Ventilator Information                  No results for input(s): "PH", "PCO2", "PO2", "HCO3", "POCSATURATED", "BE" in the last 72 hours.      Impression    Active Hospital Problems    Diagnosis  POA    *Acute pulmonary embolism [I26.99]  Yes    Urinary tract infection without hematuria [N39.0]  Yes    Debility [R53.81]  Yes    Pyelonephritis [N12]  Yes    Acute renal failure superimposed on stage 3 chronic kidney disease [N17.9, N18.30]  Yes    Type 2 diabetes mellitus with complication, without long-term current use of insulin [E11.8]  Yes      Resolved Hospital Problems    Diagnosis Date Resolved POA    Positive D dimer [R79.89] 03/18/2024 Yes    Hyponatremia [E87.1] 03/18/2024 Yes    Acute abdominal pain [R10.9] 03/18/2024 Yes    Hypotensive episode [I95.9] 03/18/2024 Yes    SOB (shortness of breath) [R06.02] 03/18/2024 Yes       Plan    Treat with ELIQUIS and plan 6 months therapy  Cannot do CTA 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  I will follow with you while the patient " is hospitalized.        Carlitos Pope MD  [facility] Pulmonary/Critical Care  03/20/2024

## 2024-03-20 NOTE — PLAN OF CARE
Charts and orders reviewed. Pt to discharge home with home health with [facility].  called Pt's PCP office to schedule Pt follow-up appointment scheduled and PCP details added to AVS. Pt has no other needs to be addressed by case management. Pt cleared to discharge from case management standpoint.       03/20/24 1019   Final Note   Assessment Type Final Discharge Note   Anticipated Discharge Disposition Home-Health   What phone number can be called within the next 1-3 days to see how you are doing after discharge? [phone]   Hospital Resources/Appts/Education Provided Provided patient/caregiver with written discharge plan information;Appointments scheduled and added to AVS   Post-Acute Status   Post-Acute Authorization Home Health   Home Health Status Set-up Complete/Auth obtained   Coverage Payor: [employer] MEDICARE - [employer] MEDICARE HMO -   Discharge Delays None known at this time

## 2024-03-20 NOTE — PLAN OF CARE
Problem: Occupational Therapy  Goal: Occupational Therapy Goal  Description: Goals to be met by: 4/4/2024     Patient will increase functional independence with ADLs by performing:    UE Dressing with [place].  LE Dressing with Modified [place].  Grooming while standing at sink with Modified [place].  Toileting from toilet with Modified [place] for hygiene and clothing management.   Toilet transfer to toilet with Modified [place].    Outcome: Ongoing, Progressing

## 2024-03-20 NOTE — DISCHARGE SUMMARY
Critical access hospital Medicine  Discharge Summary      Patient Name: Epi Mcintosh  MRN: [id number]  VENKATESH: [id number]  Patient Class: IP- Inpatient  Admission Date: 3/17/2024  Hospital Length of Stay: 2 days  Discharge Date and Time: 3/20/2024  2:43 PM  Attending Physician: No att. providers found   Discharging Provider: Yuly Valdovinos MD  Primary Care Provider: Flaco Parker MD    Primary Care Team: Networked reference to record PCT     HPI:   74-year-old female with a past medical history of tobacco use, COPD, CAD, depression, diabetes, hypertension, pancreatitis, peripheral vascular disease /pad status post stent in the right leg with angioplasty stenting of the right superficial femoral and right iliac arteries whom presents to the emergency room with  reports of shortness of breath and chest pain for the last 3 months. She reports today shortness of breath and cough. Recently diagnoses with UTI and completed Macrobid this week. She reports nausea, vomiting earlier this week but none today. Decrease appetite and feeling week. Reports feeling off balance and a couple of months ago fell, striking head. Reports abdominal pain, generalized non radiating since the fall. She says she does have some lower abdominal pain and it was bad last night, worse than usual. In ER, elvated D Dimer 1.167, , troponin I high sensativity 19.1, Na 130, Sr Cr 2.6 baseline Cr 1.6, UA with 1+ protein, 1+ glucose, 3+ leukocytes, WBC > 100, urine culture and blood culture pending. Patient given Rocephin 2gm IV and 1 L LR in ER. CXR nonacute, EKG with prolonged QT no ST elevation.   NM Lung scan with high probability of pulmonary embolism. Perfusion:  There are several segmental sized wedge-shaped areas of photopenia demonstrated.     * No surgery found *      Hospital Course:   Ms. Mcintosh has been monitored closely during her hospitalization. She was admitted on 3/17/24 with sepsis d/t left pyelonephritis,  "KAIT on CKDIII, and VQ scan with high probability of PE. She was started on a heparin gtt. Bilat LE US are negative for DVT. Pt reports that she went on a trip to [place] for Thanksgiving, and she has been feeling bad for the last 3 months. In the ED she was hypotensive down to 82/49 and became normotensive after a 1L bolus. Baseline creatinine from 1 year ago was 1.7 and today is 2.4. she states she gets [id number] labs with her pcp Dr. Armendariz who has never mentioned her renal function, so she is unsure what her last creatinine was. Losartan has been on hold since admission. She's had left flank pain for atleast 2 weeks and finished 7 days of macrobid. UA is growing GNR but C&S is pending and CT abd/pelvis: "Left kidney is mildly enlarged and there is mild left perinephric stranding" and is on Rocephin. Sister is in the room and reports minimal activity d/t weakness and fatigue over the last 3 months and is concerned about this. PT/OT consult ordered for eval. She was transitioned to Eliquis. TTE without evidence of right heart strain. Kidney function improved. She was discharged on doxy to complete a course for complicated UTI. Will follow up with PCP. Home health ordered on discharge.     Goals of Care Treatment Preferences:  Code Status: Full Code      Consults:   Consults (From admission, onward)          Status Ordering Provider     Inpatient consult to Pulmonology  Once        Provider:  Fabian Khanna MD    Completed FREDERICK SHIPLEY            No new Assessment & Plan notes have been filed under this hospital service since the last note was generated.  Service: Hospital Medicine    Final Active Diagnoses:    Diagnosis Date Noted POA    PRINCIPAL PROBLEM:  Acute pulmonary embolism [I26.99] 03/17/2024 Yes    Urinary tract infection without hematuria [N39.0] 03/19/2024 Yes    Debility [R53.81] 03/18/2024 Yes    Pyelonephritis [N12] 03/17/2024 Yes    Acute renal failure superimposed on stage 3 chronic kidney " disease [N17.9, N18.30] 03/17/2024 Yes    Type 2 diabetes mellitus with complication, without long-term current use of insulin [E11.8] 12/03/2018 Yes      Problems Resolved During this Admission:    Diagnosis Date Noted Date Resolved POA    Positive D dimer [R79.89] 03/17/2024 03/18/2024 Yes    Hyponatremia [E87.1] 03/17/2024 03/18/2024 Yes    Acute abdominal pain [R10.9] 03/17/2024 03/18/2024 Yes    Hypotensive episode [I95.9] 03/17/2024 03/18/2024 Yes    SOB (shortness of breath) [R06.02] 06/15/2021 03/18/2024 Yes       Discharged Condition: good    Disposition: Home or Self Care    Follow Up:   Follow-up Information       Flaco Parker MD Follow up.    Specialties: Hospitalist, Family Medicine, Internal Medicine, Cardiology  Contact information:  [address]  [facility] [address]  [phone]               Flaco Parker MD. Go on 3/27/2024.    Specialties: Hospitalist, Family Medicine, Internal Medicine, Cardiology  Why: Hospital Follow-Up on 3/27/24 at 1:30pm.  Contact information:  [address]  [facility] [address]  [phone]                           Patient Instructions:      Ambulatory referral/consult to Home Health   Standing Status: Future   Referral Priority: Routine Referral Type: Home Health Care   Referral Reason: Specialty Services Required   Requested Specialty: Home Health Services   Number of Visits Requested: 1     Ambulatory referral/consult to Smoking Cessation Program   Standing Status: Future   Referral Priority: Routine Referral Type: Consultation   Referral Reason: Specialty Services Required   Requested Specialty: CTTS   Number of Visits Requested: 1     Ambulatory referral/consult to Family Practice   Standing Status: Future   Referral Priority: Routine Referral Type: Consultation   Referral Reason: Specialty Services Required   Requested Specialty: Family Medicine   Number of Visits Requested: 1     Ambulatory referral/consult to Outpatient Case Management    Referral Priority: Routine Referral Type: Consultation   Referral Reason: Specialty Services Required   Number of Visits Requested: 1     Notify your health care provider if you experience any of the following:  temperature >100.4     Notify your health care provider if you experience any of the following:  severe uncontrolled pain     Notify your health care provider if you experience any of the following:  difficulty breathing or increased cough     Activity as tolerated       Significant Diagnostic Studies: Labs: BMP:   Recent Labs   Lab 03/19/24 0423 03/20/24 0454   * 87   * 137   K 4.3 5.1    110   CO2 21* 21*   BUN 21 17   CREATININE 2.2* 2.2*   CALCIUM 7.6* 7.9*   MG 1.4* 1.6    and CBC   Recent Labs   Lab 03/19/24 0423 03/20/24 0454   WBC 11.49  11.49 12.41   HGB 8.8*  8.8* 9.2*   HCT 27.7*  27.7* 28.4*     297 282   Radiology Results (last 7 days)    Procedure Component Value Units Date/Time   US Lower Extremity Veins Bilateral [[id number]] Collected: 03/17/24 1956   Order Status: Completed Updated: 03/17/24 2127   Narrative:     PROCEDURE:  US Duplex Bilateral Lower Extremities Veins    CLINICAL INDICATION:  The patient is 74 years old and is Female; elevated d dimer, r/o dvt    TECHNIQUE:  Real-time duplex ultrasound scan of the bilateral lower extremity veins integrating B-mode two-dimensional vascular structure, Doppler spectral analysis, color flow Doppler imaging and compression.    COMPARISON:  No relevant prior studies available.    FINDINGS:  RIGHT DEEP VEINS:  Unremarkable.  No DVT in the right common femoral, femoral, proximal deep femoral or popliteal veins or peroneal vein or anterior tibial veins.  The veins demonstrate normal color flow, are normally compressible, with normal phasic flow and/or augmentation response.  RIGHT SUPERFICIAL VEINS:  Unremarkable.  No thrombus in the visualized right great saphenous vein.    LEFT DEEP VEINS:  Unremarkable.  No DVT  in the left common femoral, femoral, proximal deep femoral or popliteal veins or peroneal vein or anterior tibial veins.  The veins demonstrate normal color flow, are normally compressible, with normal phasic flow and/or augmentation response.  LEFT SUPERFICIAL VEINS:  Unremarkable.  No thrombus in the visualized left great saphenous vein.    SOFT TISSUES:  No acute findings.  No popliteal cyst.    IMPRESSION:  No DVT of the bilateral lower extremities.    Electronically signed by:  Marya Dawson MD  3/17/2024 9:25 PM CDT Workstation: [id number]   CT Abdomen Pelvis Without Contrast [[id number]] Collected: 03/17/24 2011   Order Status: Completed Updated: 03/17/24 2059   Narrative:     EXAM:  CT Abdomen and Pelvis Without Intravenous Contrast    CLINICAL HISTORY:  The patient is 74 years old and is Female; Abdominal pain, acute, nonlocalized    TECHNIQUE:  Axial computed tomography images of the abdomen and pelvis without intravenous contrast.  Sagittal and coronal reformatted images were created and reviewed.  This CT exam was performed using one or more of the following dose reduction techniques:  automated exposure control, adjustment of the mA and/or kV according to patient size, and/or use of iterative reconstruction technique.    COMPARISON:  No relevant prior studies available.    FINDINGS:  LUNG BASES:  Unremarkable.  No consolidation or acute findings.    ABDOMEN:  LIVER:  Unremarkable for noncontrast exam.  GALLBLADDER AND BILE DUCTS:  There appear to be small gallstones without obvious complication.  PANCREAS:  Unremarkable.  No ductal dilation.  SPLEEN:  Unremarkable without enlargement.  ADRENALS:  Unremarkable.  KIDNEYS AND URETERS:  Left kidney is mildly enlarged and there is mild left perinephric stranding. Several punctate bilateral renal calcifications may be within the collecting systems. No obvious ureteral stones but there does appear to be mild left periureteral stranding.  STOMACH AND  BOWEL:  Mild diverticulosis distal colon. Unopacified bowel is otherwise unremarkable.    PELVIS:  APPENDIX:  The appendix appears normal.  BLADDER:  Unremarkable.  No stones.    ABDOMEN and PELVIS:  INTRAPERITONEAL SPACE:  Unremarkable.  No free air.  No significant fluid collection.  BONES/JOINTS: Old healing bilateral inferior ramus fractures. No acute findings.  SOFT TISSUES:  Unremarkable.  VASCULATURE:  Aorta and iliac vessels are heavily atherosclerotic with evidence of high-grade stenosis just above the aortic bifurcation and extending into bilateral common iliac arteries.  LYMPH NODES:  No suspicious adenopathy.    IMPRESSION:  1.  Left renal findings as described are not well characterized on noncontrast exam but suspicious for upper urinary tract infection. Cannot exclude a few punctate bilateral nonobstructive renal calculi.  2.  Severe vascular disease with evidence of high-grade stenosis distal aorta and bilateral common iliac arteries.  3.  Incidental gallstones    Electronically signed by:  Isac Connell MD  03/17/2024 08:57 PM CDT Workstation: [id number]   NM Lung Scan Perfusion Particulate [[id number]] Collected: 03/17/24 1852   Order Status: Completed Updated: 03/17/24 2018   Narrative:     EXAM:  NM Lung Perfusion Scan    CLINICAL HISTORY:  Pulmonary embolism (PE) suspected, positive D-dimer; pos d dimer    TECHNIQUE:  Nuclear Medicine perfusion images of the lungs were obtained in multiple projections following injection of 5.2 mCi Tc99m MAA.    COMPARISON:  Chest radiograph from March 17, 2024.    FINDINGS:  Perfusion:  There are several segmental sized wedge-shaped areas of photopenia demonstrated.    IMPRESSION:  High probability of pulmonary embolism.    Thank you for allowing us to participate in the care of this patient.    Electronically signed by:  Cipriano Del Rio DO  03/17/2024 08:16 PM CDT Workstation: [id number]   X-Ray Chest AP Portable [[id number]] Collected: 03/17/24 5767    Order Status: Completed Updated: 03/17/24 1513   Narrative:     XR CHEST 1 VIEW    CLINICAL HISTORY:  74 years Female shortness of breath, chest pain    COMPARISON: March 30, 2022    FINDINGS: Cardiac silhouette size is within normal limits. Status post median sternotomy. Atherosclerotic calcification of the aorta. No confluent airspace disease. No large pleural effusion or pneumothorax. No acute osseous abnormality.    IMPRESSION: No acute pulmonary process.    Electronically signed by:  Prasanth Lomax MD  03/17/2024 03:11 PM CDT Workstation: [id number]       Pending Diagnostic Studies:       None           Medications:  Reconciled Home Medications:      Medication List        START taking these medications      apixaban 5 mg Tab  Commonly known as: ELIQUIS  Take 2 tablets (10 mg total) by mouth 2 (two) times daily for 5 days, THEN 1 tablet (5 mg total) 2 (two) times daily.  Start taking on: March 20, 2024     doxycycline 100 MG Cap  Commonly known as: VIBRAMYCIN  Take 1 capsule (100 mg total) by mouth every 12 (twelve) hours. for 5 days     nicotine 21 mg/24 hr  Commonly known as: NICODERM CQ  Place 1 patch onto the skin once daily.            CHANGE how you take these medications      pioglitazone 15 MG tablet  Commonly known as: ACTOS  Take 15 mg daily with dinner  What changed:   how much to take  how to take this  when to take this            CONTINUE taking these medications      ACCU-CHEK ALE CONTROL SOLN Soln  Generic drug: blood glucose control high,low     ACCU-CHEK ALE PLUS METER Misc  Generic drug: blood-glucose meter     ACCU-CHEK ALE PLUS TEST STRP Strp  Generic drug: blood sugar diagnostic     ACCU-CHEK SOFTCLIX LANCETS Misc  Generic drug: lancets     albuterol 90 mcg/actuation inhaler  Commonly known as: PROVENTIL/VENTOLIN HFA  Inhale 2 puffs into the lungs 4 (four) times daily as needed.     BD ALCOHOL SWABS Padm  Generic drug: alcohol swabs     citalopram 40 MG tablet  Commonly known as:  CeleXA  Take 1 tablet (40 mg total) by mouth once daily.     clopidogreL 75 mg tablet  Commonly known as: PLAVIX  Take 1 tablet (75 mg total) by mouth once daily.     diazePAM 10 MG Tab  Commonly known as: VALIUM  Take 1 tablet (10 mg total) by mouth daily as needed for Anxiety.     glipiZIDE 10 MG Tr24  Commonly known as: GLUCOTROL  Take 10 mg by mouth once daily.     levothyroxine 88 MCG tablet  Commonly known as: SYNTHROID  Take 88 mcg by mouth once daily.     metoprolol tartrate 25 MG tablet  Commonly known as: LOPRESSOR  Take 1 tablet (25 mg total) by mouth 2 (two) times daily.     MYRBETRIQ 50 mg Tb24  Generic drug: mirabegron  Take 1 tablet by mouth once daily.     nitrofurantoin (macrocrystal-monohydrate) 100 MG capsule  Commonly known as: MACROBID  Take 100 mg by mouth 2 (two) times daily.     pantoprazole 40 MG tablet  Commonly known as: PROTONIX  Take 1 tablet (40 mg total) by mouth once daily.     rosuvastatin 40 MG Tab  Commonly known as: CRESTOR  Take 40 mg by mouth once daily.            STOP taking these medications      losartan 100 MG tablet  Commonly known as: COZAAR              Indwelling Lines/Drains at time of discharge:   Lines/Drains/Airways       None                   Time spent on the discharge of patient: 35 minutes         Yuly Valdovinos MD  Department of Hospital Medicine  [facility]

## 2024-03-20 NOTE — PT/OT/SLP PROGRESS
Physical Therapy      Patient Name:  Epi Mcintosh   MRN:  [id number]    Patient not seen today secondary to patient pending D/C with discharge orders in. Will follow-up 03/21/24 if D/C falls through.

## 2024-03-20 NOTE — CARE UPDATE
03/19/24 2151   Patient Assessment/Suction   Level of Consciousness (AVPU) alert   Respiratory Effort Normal;Unlabored   Expansion/Accessory Muscles/Retractions no use of accessory muscles;expansion symmetric;no retractions   All Lung Fields Breath Sounds clear;diminished   Rhythm/Pattern, Respiratory unlabored;pattern regular;no shortness of breath reported  (ONLY BREATHLESS UPON EXERTION.)   Cough Frequency infrequent   PRE-TX-O2   Device (Oxygen Therapy) room air   SpO2 96 %   Pulse Oximetry Type Intermittent   $ Pulse Oximetry - Single Charge Pulse Oximetry - Single   Pulse 64   Resp 17   Aerosol Therapy   $ Aerosol Therapy Charges PRN treatment not required

## 2024-03-20 NOTE — PLAN OF CARE
HH referral sent pending acceptance and soc date. [facility]  accepted      03/20/24 0901   Post-Acute Status   Post-Acute Authorization Home Health   Home Health Status Referrals Sent   Discharge Plan   Discharge Plan A Home Health   Discharge Plan B Home Health

## 2024-03-20 NOTE — PT/OT/SLP EVAL
Occupational Therapy   Evaluation, tx    Name: Epi Mcintosh  MRN: [id number]  Admitting Diagnosis: Acute pulmonary embolism  Recent Surgery: * No surgery found *    The primary encounter diagnosis was Urinary tract infection without hematuria, site unspecified. Diagnoses of Cough, SOB (shortness of breath), UTI (urinary tract infection), Chest pain, Pulmonary embolism, Other acute pulmonary embolism without acute cor pulmonale, and Acute pulmonary embolism were also pertinent to this visit.    Recommendations:     Discharge Recommendations: Low Intensity Therapy  Discharge Equipment Recommendations:  none  Barriers to discharge:  None    Assessment:     Epi Mcintosh is a 74 y.o. female with a medical diagnosis of Acute pulmonary embolism.  She presents with Performance deficits affecting function: weakness, impaired endurance, impaired self care skills, impaired functional mobility, gait instability, impaired balance, decreased lower extremity function.      Rehab Prognosis: Good; patient would benefit from acute skilled OT services to address these deficits and reach maximum level of function.       Plan:     Patient to be seen 5 x/week to address the above listed problems via self-care/home management, therapeutic exercises, therapeutic activities  Plan of Care Expires: 04/19/24  Plan of Care Reviewed with: patient    Subjective     Chief Complaint: abd pain 5/10  Patient/Family Comments/goals: pt agreeable    Occupational Profile:  Living Environment: pt lives alone in a 2S town house with incline entrance,1 flight of stairs to second floor bed and bathroom-pt has stair chair lift. Pt has a t/s combo with SC.  Previous level of function: Indep-Mod I ADLS and fx mobility with rollator; drives, does meal prep  Roles and Routines: active homemaker, mother  Equipment Used at Home: rollator, shower chair, raised toilet  Assistance upon Discharge: neighbors, son    Pain/Comfort:  Pain Rating 1: 5/10  Location -  Side 1: Bilateral  Location - Orientation 1: generalized  Location 1: abdomen  Pain Addressed 1: Reposition, Distraction, Cessation of Activity, Nurse notified  Pain Rating Post-Intervention 1: 5/10    Patients cultural, spiritual, Anabaptism conflicts given the current situation: no    Objective:     Communicated with: nurse prior to session.  Patient found HOB elevated with bed alarm, telemetry, peripheral IV upon OT entry to room.    General Precautions: Standard, fall, special contact, diabetic  Orthopedic Precautions: N/A  Braces: N/A  Respiratory Status: Room air    Occupational Performance:    Bed Mobility:    Patient completed Rolling/Turning to Left with  stand by assistance and with side rail  Patient completed Scooting/Bridging with contact guard assistance and with side rail  Patient completed Supine to Sit with contact guard assistance and with side rail    Functional Mobility/Transfers:  Patient completed Sit <> Stand Transfer with minimum assistance  with  rolling walker and Grab bar from low toilet seat  Patient completed Bed <> Chair Transfer using Step Transfer technique with contact guard assistance with rolling walker  Patient completed Toilet Transfer Step Transfer technique with contact guard assistance with  rolling walker  Functional Mobility: CGA with RW in room to bathroom, sink and BS chair    Activities of Daily Living:  Feeding:  independence .  Grooming: contact guard assistance washed face, hands, brushed teeth and combed hair standing inside R at sink  Lower Body Dressing: stand by assistance socks seated EOB  Toileting: contact guard assistance clothes management standing inside RW; vc for safe RW use when standing    Cognitive/Visual Perceptual:  Cognitive/Psychosocial Skills:     -       Oriented to: Person, Place, and Time   -       Follows Commands/attention:Follows one-step commands  -       Communication: clear/fluent  -       Memory: No Deficits noted  -       Safety  awareness/insight to disability: impaired   -       Mood/Affect/Coping skills/emotional control: Appropriate to situation  Visual/Perceptual:      -Intact .    Physical Exam:  Balance:    -       sitting: good  dynamic: good-  standing; fair   dynamic: poor plus  Postural examination/scapula alignment:    -       Rounded shoulders  -       Forward head  Dominant hand:    -       right  Upper Extremity Range of Motion:     -       Right Upper Extremity: WFL  -       Left Upper Extremity: WFL  Upper Extremity Strength:    -       Right Upper Extremity: Deficits: 4-/5  -       Left Upper Extremity: Deficits: 4-/5   Strength:    -       Right Upper Extremity: WFL  -       Left Upper Extremity: WFL    AMPAC 6 Click ADL:  AMPAC Total Score: 21    Treatment & Education:  Purpose of OT and POC  Pt. seen for self care retraining and functional mobility retraining with adapted techniques and modifications as stated above.  Pt instructed in fall prevention strategies.  All questions and concerns addressed within scope.  Call dont fall explained.       Patient left up in chair with all lines intact, call button in reach, and nurse notified    GOALS:   Multidisciplinary Problems       Occupational Therapy Goals          Problem: Occupational Therapy    Goal Priority Disciplines Outcome Interventions   Occupational Therapy Goal     OT, PT/OT Ongoing, Progressing    Description: Goals to be met by: 4/4/2024     Patient will increase functional independence with ADLs by performing:    UE Dressing with Modified Reubens.  LE Dressing with Modified Reubens.  Grooming while standing at sink with Modified Reubens.  Toileting from toilet with Modified Reubens for hygiene and clothing management.   Toilet transfer to toilet with Modified Reubens.                         History:     Past Medical History:   Diagnosis Date    COPD (chronic obstructive pulmonary disease)     Coronary artery disease     Depression      Diabetes mellitus     Hypertension     Pancreatitis     PVD (peripheral vascular disease)          Past Surgical History:   Procedure Laterality Date    AORTOGRAPHY WITH SERIALOGRAPHY N/A 4/1/2022    Procedure: AORTOGRAM, WITH SERIALOGRAPHY;  Surgeon: Petr Kaufman MD;  [facility]ATH[facility]EP LAB;  Service: Peripheral Vascular;  Laterality: N/A;    CARDIAC SURGERY      CORONARY ARTERY BYPASS GRAFT  12/2018    HYSTERECTOMY         Time Tracking:     OT Date of Treatment: 03/19/24  OT Start Time: 1026  OT Stop Time: 1051  OT Total Time (min): 25 min    Billable Minutes:Evaluation 10  Self Care/Home Management 15  Total Time 25    3/19/2024

## 2024-03-20 NOTE — PLAN OF CARE
Awaiting return call from [facility] regarding discharging medications - CM following     03/20/24 1125   Post-Acute Status   Post-Acute Authorization Medications   Medication Status Pending bedside delivery

## 2024-03-20 NOTE — PLAN OF CARE
Case Management confirmed with [facility] regarding $0 copayment for eliquis one month supply    Patient cleared for discharge from  after medications delivered to bedside   03/20/24 1143   Post-Acute Status   Post-Acute Authorization Medications   Medication Status Pending bedside delivery

## 2024-03-23 LAB
OHS QRS DURATION: 86 MS
OHS QTC CALCULATION: 475 MS

## 2024-03-26 ENCOUNTER — PATIENT OUTREACH (OUTPATIENT)
Dept: ADMINISTRATIVE | Facility: CLINIC | Age: 74
End: 2024-03-26
Payer: MEDICARE

## 2024-03-26 NOTE — PROGRESS NOTES
C3 nurse attempted to contact Epi Mcintosh for a TCC post hospital discharge follow up call. No answer. Left voicemail with callback information. The patient has a scheduled HOSFU appointment with Flaco Parker On 03/27/24 @ 8431.

## 2024-03-27 NOTE — PROGRESS NOTES
2nd attempt to make TCC Call. Left voicemail. Please call [phone] leave your first and last name and date of birth and ask for Sheron. I will return your call as soon as possible.

## 2024-03-27 NOTE — PROGRESS NOTES
3rd attempt for TCC Call; Left voicemail. Please call [phone] leave first and last name and  and ask for Sheron. I will return your call as soon as possible.   3rd attempt to contact patient. Manually enrolled in Post Discharge Tracker.

## 2024-04-01 ENCOUNTER — OFFICE VISIT (OUTPATIENT)
Dept: FAMILY MEDICINE | Facility: CLINIC | Age: 74
End: 2024-04-01
Payer: MEDICARE

## 2024-04-01 VITALS
HEIGHT: 65 IN | RESPIRATION RATE: 16 BRPM | SYSTOLIC BLOOD PRESSURE: 115 MMHG | DIASTOLIC BLOOD PRESSURE: 60 MMHG | WEIGHT: 152 LBS | TEMPERATURE: 98 F | HEART RATE: 52 BPM | BODY MASS INDEX: 25.33 KG/M2 | OXYGEN SATURATION: 98 %

## 2024-04-01 DIAGNOSIS — Z87.440 HISTORY OF UTI: ICD-10-CM

## 2024-04-01 DIAGNOSIS — I73.9 PERIPHERAL VASCULAR DISEASE, UNSPECIFIED: ICD-10-CM

## 2024-04-01 DIAGNOSIS — I70.0 AORTIC CALCIFICATION: ICD-10-CM

## 2024-04-01 DIAGNOSIS — E11.22 TYPE 2 DIABETES MELLITUS WITH STAGE 4 CHRONIC KIDNEY DISEASE, WITHOUT LONG-TERM CURRENT USE OF INSULIN: ICD-10-CM

## 2024-04-01 DIAGNOSIS — E03.9 HYPOTHYROIDISM, UNSPECIFIED TYPE: ICD-10-CM

## 2024-04-01 DIAGNOSIS — I10 ESSENTIAL HYPERTENSION: ICD-10-CM

## 2024-04-01 DIAGNOSIS — I26.99 ACUTE PULMONARY EMBOLISM, UNSPECIFIED PULMONARY EMBOLISM TYPE, UNSPECIFIED WHETHER ACUTE COR PULMONALE PRESENT: Primary | ICD-10-CM

## 2024-04-01 DIAGNOSIS — N18.4 TYPE 2 DIABETES MELLITUS WITH STAGE 4 CHRONIC KIDNEY DISEASE, WITHOUT LONG-TERM CURRENT USE OF INSULIN: ICD-10-CM

## 2024-04-01 PROBLEM — I16.0 HYPERTENSIVE URGENCY: Status: RESOLVED | Noted: 2019-06-24 | Resolved: 2024-04-01

## 2024-04-01 PROBLEM — R79.89 ELEVATED TSH: Status: RESOLVED | Noted: 2021-06-15 | Resolved: 2024-04-01

## 2024-04-01 PROCEDURE — 1126F AMNT PAIN NOTED NONE PRSNT: CPT | Mod: CPTII,S$GLB,, | Performed by: NURSE PRACTITIONER

## 2024-04-01 PROCEDURE — 3078F DIAST BP <80 MM HG: CPT | Mod: CPTII,S$GLB,, | Performed by: NURSE PRACTITIONER

## 2024-04-01 PROCEDURE — 3288F FALL RISK ASSESSMENT DOCD: CPT | Mod: CPTII,S$GLB,, | Performed by: NURSE PRACTITIONER

## 2024-04-01 PROCEDURE — 1101F PT FALLS ASSESS-DOCD LE1/YR: CPT | Mod: CPTII,S$GLB,, | Performed by: NURSE PRACTITIONER

## 2024-04-01 PROCEDURE — 1159F MED LIST DOCD IN RCRD: CPT | Mod: CPTII,S$GLB,, | Performed by: NURSE PRACTITIONER

## 2024-04-01 PROCEDURE — 99999 PR PBB SHADOW E&M-EST. PATIENT-LVL V: CPT | Mod: PBBFAC,,, | Performed by: NURSE PRACTITIONER

## 2024-04-01 PROCEDURE — 3074F SYST BP LT 130 MM HG: CPT | Mod: CPTII,S$GLB,, | Performed by: NURSE PRACTITIONER

## 2024-04-01 PROCEDURE — 1160F RVW MEDS BY RX/DR IN RCRD: CPT | Mod: CPTII,S$GLB,, | Performed by: NURSE PRACTITIONER

## 2024-04-01 PROCEDURE — 99215 OFFICE O/P EST HI 40 MIN: CPT | Mod: S$GLB,,, | Performed by: NURSE PRACTITIONER

## 2024-04-01 PROCEDURE — 1111F DSCHRG MED/CURRENT MED MERGE: CPT | Mod: CPTII,S$GLB,, | Performed by: NURSE PRACTITIONER

## 2024-04-01 PROCEDURE — 4010F ACE/ARB THERAPY RXD/TAKEN: CPT | Mod: CPTII,S$GLB,, | Performed by: NURSE PRACTITIONER

## 2024-04-01 RX ORDER — GLIPIZIDE 5 MG/1
5 TABLET, FILM COATED, EXTENDED RELEASE ORAL
Qty: 90 TABLET | Refills: 1 | Status: SHIPPED | OUTPATIENT
Start: 2024-04-01 | End: 2025-04-01

## 2024-04-01 RX ORDER — GLIPIZIDE 5 MG/1
5 TABLET, FILM COATED, EXTENDED RELEASE ORAL
Qty: 90 TABLET | Refills: 1 | Status: SHIPPED | OUTPATIENT
Start: 2024-04-01 | End: 2024-04-01 | Stop reason: SDUPTHER

## 2024-04-01 NOTE — PROGRESS NOTES
SUBJECTIVE:      Patient ID: Epi Mcintosh is a 74 y.o. female.    Chief Complaint: Establish Care    Epi is a new patient here to establish care with me today. PMH includes tobacco use, COPD, CAD, depression, diabetes, hypertension, hypothyroidism, pancreatitis, peripheral vascular disease /pad status post stent in the right leg with angioplasty stenting of the right superficial femoral and right iliac arteries. Here for hosp follow up today- discharge summary reviewed.     Discharge summary as below:    HPI:   74-year-old female with a past medical history of tobacco use, COPD, CAD, depression, diabetes, hypertension, pancreatitis, peripheral vascular disease /pad status post stent in the right leg with angioplasty stenting of the right superficial femoral and right iliac arteries whom presents to the emergency room with  reports of shortness of breath and chest pain for the last 3 months. She reports today shortness of breath and cough. Recently diagnoses with UTI and completed Macrobid this week. She reports nausea, vomiting earlier this week but none today. Decrease appetite and feeling week. Reports feeling off balance and a couple of months ago fell, striking head. Reports abdominal pain, generalized non radiating since the fall. She says she does have some lower abdominal pain and it was bad last night, worse than usual. In ER, elvated D Dimer 1.167, , troponin I high sensativity 19.1, Na 130, Sr Cr 2.6 baseline Cr 1.6, UA with 1+ protein, 1+ glucose, 3+ leukocytes, WBC > 100, urine culture and blood culture pending. Patient given Rocephin 2gm IV and 1 L LR in ER. CXR nonacute, EKG with prolonged QT no ST elevation.   NM Lung scan with high probability of pulmonary embolism. Perfusion:  There are several segmental sized wedge-shaped areas of photopenia demonstrated.      * No surgery found *       Hospital Course:   Ms. Mcintosh has been monitored closely during her hospitalization. She was  "admitted on 3/17/24 with sepsis d/t left pyelonephritis, KAIT on CKDIII, and VQ scan with high probability of PE. She was started on a heparin gtt. Bilat LE US are negative for DVT. Pt reports that she went on a trip to [place] for [employer], and she has been feeling bad for the last 3 months. In the ED she was hypotensive down to 82/49 and became normotensive after a 1L bolus. Baseline creatinine from 1 year ago was 1.7 and today is 2.4. she states she gets [id number] labs with her pcp Dr. Armendariz who has never mentioned her renal function, so she is unsure what her last creatinine was. Losartan has been on hold since admission. She's had left flank pain for atleast 2 weeks and finished 7 days of macrobid. UA is growing GNR but C&S is pending and CT abd/pelvis: "Left kidney is mildly enlarged and there is mild left perinephric stranding" and is on Rocephin. Sister is in the room and reports minimal activity d/t weakness and fatigue over the last 3 months and is concerned about this. PT/OT consult ordered for eval. She was transitioned to Eliquis. TTE without evidence of right heart strain. Kidney function improved. She was discharged on doxy to complete a course for complicated UTI. Will follow up with PCP. Home health ordered on discharge.     Feeling better since discharge- completed antibiotics as prescribed. Taking all meds as per instructions. Unk last A1C- will check labs. Cholesterol well-controlled on statin therapy. Kidney function low but improved on discharge slightly. No longer planning on seeing former PCP in [place].           History reviewed. No pertinent family history.   Social History     Socioeconomic History    Marital status:    Tobacco Use    Smoking status: Former     Current packs/day: 0.00     Types: Vaping w/o nicotine, Cigarettes     Quit date: 4/15/2021     Years since quittin.9    Smokeless tobacco: Never   Substance and Sexual Activity    Alcohol use: Yes     Comment: " occasionally    Drug use: Never     Current Outpatient Medications   Medication Sig Dispense Refill    ACCU-CHEK ALE CONTROL SOLN Soln       ACCU-CHEK ALE PLUS METER Misc       ACCU-CHEK ALE PLUS TEST STRP Strp       ACCU-CHEK SOFTCLIX LANCETS Misc       albuterol (PROVENTIL/VENTOLIN HFA) 90 mcg/actuation inhaler Inhale 2 puffs into the lungs 4 (four) times daily as needed.      apixaban (ELIQUIS) 5 mg Tab Take 2 tablets (10 mg total) by mouth 2 (two) times daily for 5 days, THEN 1 tablet (5 mg total) 2 (two) times daily. 200 tablet 0    BD ALCOHOL SWABS PadM       citalopram (CELEXA) 40 MG tablet Take 1 tablet (40 mg total) by mouth once daily. 90 tablet 1    diazePAM (VALIUM) 10 MG Tab Take 1 tablet (10 mg total) by mouth daily as needed for Anxiety. 30 tablet 1    levothyroxine (SYNTHROID) 88 MCG tablet Take 88 mcg by mouth once daily.      metoprolol tartrate (LOPRESSOR) 25 MG tablet Take 1 tablet (25 mg total) by mouth 2 (two) times daily. 180 tablet 1    nicotine (NICODERM CQ) 21 mg/24 hr Place 1 patch onto the skin once daily. 30 patch 0    pantoprazole (PROTONIX) 40 MG tablet Take 1 tablet (40 mg total) by mouth once daily. 90 tablet 1    pioglitazone (ACTOS) 15 MG tablet Take 15 mg daily with dinner (Patient taking differently: Take 15 mg by mouth daily with dinner or evening meal. Take 15 mg daily with dinner) 90 tablet 1    rosuvastatin (CRESTOR) 40 MG Tab Take 40 mg by mouth once daily.      glipiZIDE 5 MG TR24 Take 1 tablet (5 mg total) by mouth daily with breakfast. 90 tablet 1     No current facility-administered medications for this visit.     Facility-Administered Medications Ordered in Other Visits   Medication Dose Route Frequency Provider Last Rate Last Admin    0.9%  NaCl infusion   Intravenous Continuous Petr Kaufman  mL/hr at 04/01/22 1019 New Bag at 04/01/22 1019     Review of patient's allergies indicates:   Allergen Reactions    Plavix [clopidogrel] Rash    Zolpidem       Other reaction(s): Other (See Comments)  Seeing people who were not in the room     Codeine       Past Medical History:   Diagnosis Date    COPD (chronic obstructive pulmonary disease)     Coronary artery disease     Depression     Diabetes mellitus     Hypertension     Pancreatitis     PVD (peripheral vascular disease)      Past Surgical History:   Procedure Laterality Date    AORTOGRAPHY WITH SERIALOGRAPHY N/A 4/1/2022    Procedure: AORTOGRAM, WITH SERIALOGRAPHY;  Surgeon: Petr Kaufman MD;  Location: [facility] CATH/EP LAB;  Service: Peripheral Vascular;  Laterality: N/A;    CARDIAC SURGERY      CORONARY ARTERY BYPASS GRAFT  12/2018    HYSTERECTOMY         Review of Systems   Constitutional:  Positive for fatigue. Negative for activity change, appetite change, chills, fever and unexpected weight change.   HENT:  Negative for congestion, ear discharge, ear pain, sore throat, trouble swallowing and voice change.    Eyes:  Negative for photophobia, pain, discharge and visual disturbance.   Respiratory:  Negative for cough, chest tightness and shortness of breath.    Cardiovascular:  Positive for leg swelling. Negative for chest pain and palpitations.   Gastrointestinal:  Negative for abdominal pain, blood in stool, diarrhea, nausea and vomiting.   Endocrine: Negative for cold intolerance, heat intolerance and polyuria.   Genitourinary:  Negative for decreased urine volume, difficulty urinating, dysuria, flank pain, frequency and hematuria.   Musculoskeletal:  Negative for arthralgias and gait problem.   Skin:  Negative for rash and wound.   Allergic/Immunologic: Negative for immunocompromised state.   Neurological:  Negative for speech difficulty and headaches.   Hematological:  Negative for adenopathy. Bruises/bleeds easily.   Psychiatric/Behavioral:  Negative for confusion, self-injury and suicidal ideas.       OBJECTIVE:      Vitals:    04/01/24 1311   BP: 115/60   Pulse: (!) 52   Resp: 16   Temp: 98.4 °F (36.9  "°C)   SpO2: 98%   Weight: 68.9 kg (152 lb)   Height: 5' 5" (1.651 m)     Physical Exam  Vitals and nursing note reviewed.   Constitutional:       Appearance: Normal appearance. She is normal weight.   HENT:      Nose: Nose normal.      Mouth/Throat:      Mouth: Mucous membranes are moist.   Eyes:      Pupils: Pupils are equal, round, and reactive to light.   Neck:      Thyroid: No thyroid mass or thyromegaly.   Cardiovascular:      Rate and Rhythm: Regular rhythm. Bradycardia present.      Heart sounds: Normal heart sounds. No murmur heard.  Pulmonary:      Effort: Pulmonary effort is normal. No respiratory distress.      Breath sounds: Normal breath sounds. No wheezing, rhonchi or rales.   Abdominal:      Palpations: Abdomen is soft.      Tenderness: There is no abdominal tenderness. There is no right CVA tenderness or left CVA tenderness.   Musculoskeletal:      Cervical back: Normal range of motion and neck supple.      Right lower leg: Edema (trace) present.      Left lower leg: Edema (trace) present.   Lymphadenopathy:      Cervical: No cervical adenopathy.   Skin:     General: Skin is warm and dry.      Coloration: Skin is not jaundiced or pale.   Neurological:      Mental Status: She is alert.   Psychiatric:         Mood and Affect: Mood normal.         Behavior: Behavior normal.         Thought Content: Thought content normal.         Judgment: Judgment normal.        Assessment:       1. Acute pulmonary embolism, unspecified pulmonary embolism type, unspecified whether acute cor pulmonale present    2. Essential hypertension, dx 2018    3. Type 2 diabetes mellitus with stage 4 chronic kidney disease, without long-term current use of insulin    4. History of UTI    5. Hypothyroidism, unspecified type    6. Aortic calcification    7. Peripheral vascular disease, unspecified        Plan:       Acute pulmonary embolism, unspecified pulmonary embolism type, unspecified whether acute cor pulmonale present  -     " Ambulatory referral/consult to Pulmonology; Future; Expected date: 04/08/2024  -cont Eliquis, US BLE neg for DVT in hospital; see pulm for follow up as OP     Essential hypertension, dx 2018  -     Comprehensive Metabolic Panel; Future; Expected date: 04/01/2024   -stable, cont medications as prescribed    Type 2 diabetes mellitus with stage 4 chronic kidney disease, without long-term current use of insulin  -     Ambulatory referral/consult to Nephrology; Future; Expected date: 04/08/2024  -     Urinalysis; Future; Expected date: 04/01/2024  -     Hemoglobin A1C; Future; Expected date: 04/01/2024  -     Comprehensive Metabolic Panel; Future; Expected date: 04/01/2024  -     Microalbumin/Creatinine Ratio, Urine; Future; Expected date: 04/01/2024  -     glipiZIDE 5 MG TR24; Take 1 tablet (5 mg total) by mouth daily with breakfast.  Dispense: 90 tablet; Refill: 1  -lowered dose of Glipizide to 5 mg daily due to low CrCl; recheck labs in 1 week; cont actos and statin daily; check BS and bring log to f/u appt ; nephrology appt/referral sent     History of UTI  -     Urinalysis; Future; Expected date: 04/01/2024  -     Urine culture; Future; Expected date: 04/01/2024  -check culture to ensure infection resolved    Hypothyroidism, unspecified type  -     TSH; Future; Expected date: 04/01/2024  -     T4, FREE; Future; Expected date: 04/01/2024  -check labs, TSH low normal 2 weeks ago    Aortic calcification  -     Ambulatory referral/consult to Vascular Surgery; Future; Expected date: 04/08/2024    Peripheral vascular disease, unspecified  -     Ambulatory referral/consult to Vascular Surgery; Future; Expected date: 04/08/2024   -check with Vascular d/t CT findings       I spent a total of 41 minutes on the day of the visit.This includes face to face time and non-face to face time preparing to see the patient (eg, review of tests), obtaining and/or reviewing separately obtained history, documenting clinical information in  the electronic or other health record, independently interpreting results and communicating results to the patient/family/caregiver, or care coordinator.       Follow up in about 1 month (around 5/1/2024) for f/u DM, labs .      4/1/2024 ASTER Mccabe, FNP    This note was created using [employer] voice recognition software that occasionally misinterprets phrases or words.

## 2024-04-05 ENCOUNTER — PATIENT OUTREACH (OUTPATIENT)
Dept: ADMINISTRATIVE | Facility: OTHER | Age: 74
End: 2024-04-05
Payer: MEDICARE

## 2024-04-05 ENCOUNTER — TELEPHONE (OUTPATIENT)
Dept: VASCULAR SURGERY | Facility: CLINIC | Age: 74
End: 2024-04-05
Payer: MEDICARE

## 2024-04-05 ENCOUNTER — TELEPHONE (OUTPATIENT)
Dept: ADMINISTRATIVE | Facility: CLINIC | Age: 74
End: 2024-04-05
Payer: MEDICARE

## 2024-04-05 ENCOUNTER — TELEPHONE (OUTPATIENT)
Dept: PHARMACY | Facility: CLINIC | Age: 74
End: 2024-04-05
Payer: MEDICARE

## 2024-04-05 NOTE — LETTER
April 5, 2024    Epi Mcintosh  [address]  [place] [address]             [facility]  [address]  [facility] [address]  Fax: [phone] Dear Ms. Epi Mcintosh     It was a pleasure speaking with you. To follow up on our conversation on 4/5/2024, the Pharmacy Patient Assistance Program needs more information from you before we can submit your Eliquis application to the [facility]  Program. Please return the following documents to the Pharmacy Patient Assistance office ASAP.  Fax requested documentation to [phone] or email [email or web address]. Documentation can also be mailed to address listed below       Proof of household Income( such as social security statement, 1099 form, pension statement or 3 consecutive pay stubs  Copy of all Insurance cards( front and back)  Signed and dated HIPAA /Patient Information Forms  Printout from your Insurance or Pharmacy that shows the cost of (requested medication) under your Plan         Whats Next:     Once I receive your documentation and authorization from your Provider, your application will be submitted to the [facility] Program for review. Please be advised it will take 2 to 4 weeks for your application to be processed so you may have to purchase a month's supply of medication from your pharmacy to hold you over during the waiting period. You will be notified of approval or denial by The Program(mail) or myself.      If you have any questions or concerns, please give me a call       Sincerely   Goldie Lu   Pharmacy Patient Assistance  [address] [facility] [id number]  [place] [address]  Phone: [phone]  Fax: [phone]  Email: [email or web address]

## 2024-04-05 NOTE — PROGRESS NOTES
Pt requesting financial assistance, food insecurity help and medication affordability for Eliquis. Sending financial assistance application, pantry food locations and in basket message to PCP and Pharmacy PT Assistance Team for medication affordability. I will follow on [facility] platform.

## 2024-04-05 NOTE — TELEPHONE ENCOUNTER
I have spoken with Epi Mcintosh and informed her of the [employer]  application process for Eliquis and what's required to apply.  Epi Mcintosh will provide the following documents: Proof of household Income( such as social security statement, 1099 form, pension statement or 3 consecutive pay stubs, Copy of all Insurance cards( front and back), Signed and dated HIPAA /Patient Information Forms  , and Printout from your Insurance or Pharmacy that shows the cost of (requested medication) under your Plan       I will follow up with the patient in 5 business days.

## 2024-04-05 NOTE — PROGRESS NOTES
CHW - Initial Contact    This Community Health Worker completed OR updated the Social Determinant of Health questionnaire with patient via telephone today.    Pt identified barriers of most importance are: Pt requesting financial assistance, food insecurity help and medication affordability for Eliquis. Sending financial assistance application, pantry food locations and in basket message to PCP and Pharmacy PT Assistance Team for medication affordability.    Referrals to community agencies completed with patient/caregiver consent outside of [facility] include: yes  Referrals were put through [facility] - no:   Support and Services: Financial Aid/Education, Food Pantry  Other information discussed the patient needs / wants help with: SDOH   Follow up required: no  Follow-up Outreach - Due: 4/11/2024

## 2024-04-09 ENCOUNTER — LAB VISIT (OUTPATIENT)
Dept: LAB | Facility: HOSPITAL | Age: 74
End: 2024-04-09
Attending: NURSE PRACTITIONER
Payer: MEDICARE

## 2024-04-09 DIAGNOSIS — Z87.440 HISTORY OF UTI: ICD-10-CM

## 2024-04-09 DIAGNOSIS — E03.9 HYPOTHYROIDISM, UNSPECIFIED TYPE: ICD-10-CM

## 2024-04-09 DIAGNOSIS — N18.4 TYPE 2 DIABETES MELLITUS WITH STAGE 4 CHRONIC KIDNEY DISEASE, WITHOUT LONG-TERM CURRENT USE OF INSULIN: ICD-10-CM

## 2024-04-09 DIAGNOSIS — E11.22 TYPE 2 DIABETES MELLITUS WITH STAGE 4 CHRONIC KIDNEY DISEASE, WITHOUT LONG-TERM CURRENT USE OF INSULIN: ICD-10-CM

## 2024-04-09 DIAGNOSIS — I10 ESSENTIAL HYPERTENSION: ICD-10-CM

## 2024-04-09 LAB
ALBUMIN SERPL BCP-MCNC: 3.3 G/DL (ref 3.5–5.2)
ALBUMIN/CREAT UR: 1105.6 UG/MG (ref 0–30)
ALP SERPL-CCNC: 93 U/L (ref 55–135)
ALT SERPL W/O P-5'-P-CCNC: 13 U/L (ref 10–44)
ANION GAP SERPL CALC-SCNC: 12 MMOL/L (ref 8–16)
AST SERPL-CCNC: 19 U/L (ref 10–40)
BACTERIA #/AREA URNS AUTO: ABNORMAL /HPF
BILIRUB SERPL-MCNC: 0.3 MG/DL (ref 0.1–1)
BILIRUB UR QL STRIP: NEGATIVE
BUN SERPL-MCNC: 33 MG/DL (ref 8–23)
CALCIUM SERPL-MCNC: 9.3 MG/DL (ref 8.7–10.5)
CHLORIDE SERPL-SCNC: 101 MMOL/L (ref 95–110)
CLARITY UR REFRACT.AUTO: ABNORMAL
CO2 SERPL-SCNC: 22 MMOL/L (ref 23–29)
COLOR UR AUTO: YELLOW
CREAT SERPL-MCNC: 2.4 MG/DL (ref 0.5–1.4)
CREAT UR-MCNC: 36 MG/DL (ref 15–325)
EST. GFR  (NO RACE VARIABLE): 20.5 ML/MIN/1.73 M^2
ESTIMATED AVG GLUCOSE: 189 MG/DL (ref 68–131)
GLUCOSE SERPL-MCNC: 185 MG/DL (ref 70–110)
GLUCOSE UR QL STRIP: NEGATIVE
HBA1C MFR BLD: 8.2 % (ref 4–5.6)
HGB UR QL STRIP: ABNORMAL
HYALINE CASTS UR QL AUTO: 0 /LPF
KETONES UR QL STRIP: NEGATIVE
LEUKOCYTE ESTERASE UR QL STRIP: ABNORMAL
MICROALBUMIN UR DL<=1MG/L-MCNC: 398 UG/ML
MICROSCOPIC COMMENT: ABNORMAL
NITRITE UR QL STRIP: NEGATIVE
PH UR STRIP: 6 [PH] (ref 5–8)
POTASSIUM SERPL-SCNC: 5 MMOL/L (ref 3.5–5.1)
PROT SERPL-MCNC: 6.9 G/DL (ref 6–8.4)
PROT UR QL STRIP: ABNORMAL
RBC #/AREA URNS AUTO: 15 /HPF (ref 0–4)
SODIUM SERPL-SCNC: 135 MMOL/L (ref 136–145)
SP GR UR STRIP: 1.01 (ref 1–1.03)
SQUAMOUS #/AREA URNS AUTO: 3 /HPF
T4 FREE SERPL-MCNC: 1.16 NG/DL (ref 0.71–1.51)
TSH SERPL DL<=0.005 MIU/L-ACNC: 0.85 UIU/ML (ref 0.4–4)
URN SPEC COLLECT METH UR: ABNORMAL
WBC #/AREA URNS AUTO: >100 /HPF (ref 0–5)
WBC CLUMPS UR QL AUTO: ABNORMAL

## 2024-04-09 PROCEDURE — 84443 ASSAY THYROID STIM HORMONE: CPT | Performed by: NURSE PRACTITIONER

## 2024-04-09 PROCEDURE — 83036 HEMOGLOBIN GLYCOSYLATED A1C: CPT | Performed by: NURSE PRACTITIONER

## 2024-04-09 PROCEDURE — 81001 URINALYSIS AUTO W/SCOPE: CPT | Performed by: NURSE PRACTITIONER

## 2024-04-09 PROCEDURE — 36415 COLL VENOUS BLD VENIPUNCTURE: CPT | Performed by: NURSE PRACTITIONER

## 2024-04-09 PROCEDURE — 82043 UR ALBUMIN QUANTITATIVE: CPT | Performed by: NURSE PRACTITIONER

## 2024-04-09 PROCEDURE — 80053 COMPREHEN METABOLIC PANEL: CPT | Performed by: NURSE PRACTITIONER

## 2024-04-09 PROCEDURE — 84439 ASSAY OF FREE THYROXINE: CPT | Performed by: NURSE PRACTITIONER

## 2024-04-11 ENCOUNTER — PATIENT OUTREACH (OUTPATIENT)
Dept: ADMINISTRATIVE | Facility: OTHER | Age: 74
End: 2024-04-11
Payer: MEDICARE

## 2024-04-11 ENCOUNTER — TELEPHONE (OUTPATIENT)
Dept: FAMILY MEDICINE | Facility: CLINIC | Age: 74
End: 2024-04-11
Payer: MEDICARE

## 2024-04-11 DIAGNOSIS — R82.90 ABNORMAL URINE: Primary | ICD-10-CM

## 2024-04-11 NOTE — TELEPHONE ENCOUNTER
----- Message from FRANCISCO Monteiro sent at 4/11/2024 10:58 AM CDT -----  Please notify patient that her results are abnormal.   her kidney function is still elevated, looks to be her urine is still infected, please contact lab asap to see if they are running her urine culture which was supposed to be collected at her lab appointment/visit; her thyroid is normal- continuing current medication; A1c up to 8.2-will need to discuss medications and adjustments in treatment; if the lab does not have a urine culture sample, we will need her to go back and provide 1 as soon as possible

## 2024-04-11 NOTE — PROGRESS NOTES
Please notify patient that her results are abnormal.   her kidney function is still elevated, looks to be her urine is still infected, please contact lab asap to see if they are running her urine culture which was supposed to be collected at her lab appointment/visit; her thyroid is normal- continuing current medication; A1c up to 8.2-will need to discuss medications and adjustments in treatment; if the lab does not have a urine culture sample, we will need her to go back and provide 1 as soon as possible

## 2024-04-11 NOTE — PROGRESS NOTES
CHW - Follow Up    This Community Health Worker completed a follow up visit with patient via telephone today.  Pt/Caregiver reported: Pt states she had not received any paperwork from Pharmacy Pt Assistance Team. Stated she remebered talking to a member of the team.    Community Health Worker provided:  I asked to check her my [facility] portal. Stated she would. Sending pt a copy in case she doesn't find it it my chart.   Follow up required: yes  Follow-up Outreach - Due: 4/22/2024

## 2024-04-12 ENCOUNTER — TELEPHONE (OUTPATIENT)
Dept: FAMILY MEDICINE | Facility: CLINIC | Age: 74
End: 2024-04-12
Payer: MEDICARE

## 2024-04-12 ENCOUNTER — APPOINTMENT (OUTPATIENT)
Dept: LAB | Facility: HOSPITAL | Age: 74
End: 2024-04-12
Attending: INTERNAL MEDICINE
Payer: MEDICARE

## 2024-04-12 ENCOUNTER — PATIENT OUTREACH (OUTPATIENT)
Dept: ADMINISTRATIVE | Facility: OTHER | Age: 74
End: 2024-04-12
Payer: MEDICARE

## 2024-04-12 ENCOUNTER — OFFICE VISIT (OUTPATIENT)
Dept: PULMONOLOGY | Facility: CLINIC | Age: 74
End: 2024-04-12
Payer: MEDICARE

## 2024-04-12 VITALS
WEIGHT: 147.5 LBS | HEART RATE: 83 BPM | OXYGEN SATURATION: 99 % | SYSTOLIC BLOOD PRESSURE: 122 MMHG | DIASTOLIC BLOOD PRESSURE: 78 MMHG | BODY MASS INDEX: 24.55 KG/M2

## 2024-04-12 DIAGNOSIS — N39.0 URINARY TRACT INFECTION WITHOUT HEMATURIA, SITE UNSPECIFIED: ICD-10-CM

## 2024-04-12 DIAGNOSIS — I26.99 ACUTE PULMONARY EMBOLISM, UNSPECIFIED PULMONARY EMBOLISM TYPE, UNSPECIFIED WHETHER ACUTE COR PULMONALE PRESENT: Primary | ICD-10-CM

## 2024-04-12 DIAGNOSIS — J44.9 CHRONIC OBSTRUCTIVE PULMONARY DISEASE, UNSPECIFIED COPD TYPE: ICD-10-CM

## 2024-04-12 DIAGNOSIS — N39.0 URINARY TRACT INFECTION, SITE NOT SPECIFIED: Primary | ICD-10-CM

## 2024-04-12 DIAGNOSIS — G47.9 SLEEP DISORDER: ICD-10-CM

## 2024-04-12 PROCEDURE — 3288F FALL RISK ASSESSMENT DOCD: CPT | Mod: CPTII,S$GLB,, | Performed by: INTERNAL MEDICINE

## 2024-04-12 PROCEDURE — 4010F ACE/ARB THERAPY RXD/TAKEN: CPT | Mod: CPTII,S$GLB,, | Performed by: INTERNAL MEDICINE

## 2024-04-12 PROCEDURE — 87086 URINE CULTURE/COLONY COUNT: CPT | Performed by: INTERNAL MEDICINE

## 2024-04-12 PROCEDURE — 1125F AMNT PAIN NOTED PAIN PRSNT: CPT | Mod: CPTII,S$GLB,, | Performed by: INTERNAL MEDICINE

## 2024-04-12 PROCEDURE — 1101F PT FALLS ASSESS-DOCD LE1/YR: CPT | Mod: CPTII,S$GLB,, | Performed by: INTERNAL MEDICINE

## 2024-04-12 PROCEDURE — 87077 CULTURE AEROBIC IDENTIFY: CPT | Performed by: INTERNAL MEDICINE

## 2024-04-12 PROCEDURE — 3062F POS MACROALBUMINURIA REV: CPT | Mod: CPTII,S$GLB,, | Performed by: INTERNAL MEDICINE

## 2024-04-12 PROCEDURE — 1111F DSCHRG MED/CURRENT MED MERGE: CPT | Mod: CPTII,S$GLB,, | Performed by: INTERNAL MEDICINE

## 2024-04-12 PROCEDURE — 3066F NEPHROPATHY DOC TX: CPT | Mod: CPTII,S$GLB,, | Performed by: INTERNAL MEDICINE

## 2024-04-12 PROCEDURE — 3074F SYST BP LT 130 MM HG: CPT | Mod: CPTII,S$GLB,, | Performed by: INTERNAL MEDICINE

## 2024-04-12 PROCEDURE — 87186 SC STD MICRODIL/AGAR DIL: CPT | Performed by: INTERNAL MEDICINE

## 2024-04-12 PROCEDURE — 1160F RVW MEDS BY RX/DR IN RCRD: CPT | Mod: CPTII,S$GLB,, | Performed by: INTERNAL MEDICINE

## 2024-04-12 PROCEDURE — 3078F DIAST BP <80 MM HG: CPT | Mod: CPTII,S$GLB,, | Performed by: INTERNAL MEDICINE

## 2024-04-12 PROCEDURE — 1159F MED LIST DOCD IN RCRD: CPT | Mod: CPTII,S$GLB,, | Performed by: INTERNAL MEDICINE

## 2024-04-12 PROCEDURE — 3052F HG A1C>EQUAL 8.0%<EQUAL 9.0%: CPT | Mod: CPTII,S$GLB,, | Performed by: INTERNAL MEDICINE

## 2024-04-12 PROCEDURE — 99214 OFFICE O/P EST MOD 30 MIN: CPT | Mod: S$GLB,,, | Performed by: INTERNAL MEDICINE

## 2024-04-12 RX ORDER — LOSARTAN POTASSIUM 100 MG/1
TABLET ORAL
COMMUNITY
Start: 2023-12-05 | End: 2024-05-01

## 2024-04-12 RX ORDER — MIRABEGRON 50 MG/1
50 TABLET, FILM COATED, EXTENDED RELEASE ORAL
COMMUNITY
Start: 2024-03-05 | End: 2024-05-01

## 2024-04-12 NOTE — TELEPHONE ENCOUNTER
Tried calling the patient to see if she was taking any antibiotics for the UTI. I received voicemail and left message with call back number.

## 2024-04-12 NOTE — PROGRESS NOTES
Office Visit Note *    Patient Name: Epi Mcintosh  MRN: [id number]  : 1950      Reason for visit: PE    HPI:     2024 - Here for follow up after hospitalization where she was found to have a PE.   On ELIQUIS And tolerating it well.  Still having issues with kidney infections and we reviewed her recent lab work.  UA looks infected and she has macrodantin RX which she can  start.    Past Medical History    Past Medical History:   Diagnosis Date    COPD (chronic obstructive pulmonary disease)     Coronary artery disease     Depression     Diabetes mellitus     Hypertension     Pancreatitis     PVD (peripheral vascular disease)        Past Surgical History    Past Surgical History:   Procedure Laterality Date    AORTOGRAPHY WITH SERIALOGRAPHY N/A 2022    Procedure: AORTOGRAM, WITH SERIALOGRAPHY;  Surgeon: Petr Kaufman MD;  Location: [facility] CATH/EP LAB;  Service: Peripheral Vascular;  Laterality: N/A;    CARDIAC SURGERY      CORONARY ARTERY BYPASS GRAFT  2018    HYSTERECTOMY         Medications      Current Outpatient Medications:     ACCU-CHEK ALE CONTROL SOLN Soln, , Disp: , Rfl:     ACCU-CHEK ALE PLUS METER Misc, , Disp: , Rfl:     ACCU-CHEK ALE PLUS TEST STRP Strp, , Disp: , Rfl:     ACCU-CHEK SOFTCLIX LANCETS Misc, , Disp: , Rfl:     albuterol (PROVENTIL/VENTOLIN HFA) 90 mcg/actuation inhaler, Inhale 2 puffs into the lungs 4 (four) times daily as needed., Disp: , Rfl:     BD ALCOHOL SWABS PadM, , Disp: , Rfl:     citalopram (CELEXA) 40 MG tablet, Take 1 tablet (40 mg total) by mouth once daily., Disp: 90 tablet, Rfl: 1    diazePAM (VALIUM) 10 MG Tab, Take 1 tablet (10 mg total) by mouth daily as needed for Anxiety., Disp: 30 tablet, Rfl: 1    glipiZIDE 5 MG TR24, Take 1 tablet (5 mg total) by mouth daily with breakfast., Disp: 90 tablet, Rfl: 1    levothyroxine (SYNTHROID) 88 MCG tablet, Take 88 mcg by mouth once daily., Disp: , Rfl:     metoprolol tartrate (LOPRESSOR) 25 MG tablet,  Take 1 tablet (25 mg total) by mouth 2 (two) times daily., Disp: 180 tablet, Rfl: 1    mirabegron (MYRBETRIQ) 50 mg Tb24, 50 mg., Disp: , Rfl:     pantoprazole (PROTONIX) 40 MG tablet, Take 1 tablet (40 mg total) by mouth once daily., Disp: 90 tablet, Rfl: 1    pioglitazone (ACTOS) 15 MG tablet, Take 15 mg daily with dinner (Patient taking differently: Take 15 mg by mouth daily with dinner or evening meal. Take 15 mg daily with dinner), Disp: 90 tablet, Rfl: 1    rosuvastatin (CRESTOR) 40 MG Tab, Take 40 mg by mouth once daily., Disp: , Rfl:     apixaban (ELIQUIS) 5 mg Tab, Take 2 tablets (10 mg total) by mouth 2 (two) times daily for 5 days, THEN 1 tablet (5 mg total) 2 (two) times daily., Disp: 200 tablet, Rfl: 0    losartan (COZAAR) 100 MG tablet, = 1 tab, Oral, Daily, # 90 tab, 3 Refill(s), Maintenance, Pharmacy: [facility], 165, cm, 23 12:59:00 CDT, Height/Length Measured, 65.8, kg, 23 12:59:00 CDT, Weight Dosing (Patient not taking: Reported on 2024), Disp: , Rfl:     nicotine (NICODERM CQ) 21 mg/24 hr, Place 1 patch onto the skin once daily. (Patient not taking: Reported on 2024), Disp: 30 patch, Rfl: 0  No current facility-administered medications for this visit.    Facility-Administered Medications Ordered in Other Visits:     0.9%  NaCl infusion, , Intravenous, Continuous, Petr Kaufman MD, Last Rate: 125 mL/hr at 22 1019, New Bag at 22 1019    Allergies    Review of patient's allergies indicates:   Allergen Reactions    Plavix [clopidogrel] Rash    Zolpidem      Other reaction(s): Other (See Comments)  Seeing people who were not in the room     Codeine        SocHcompa    Social History     Tobacco Use   Smoking Status Former    Current packs/day: 0.00    Types: Vaping w/o nicotine, Cigarettes    Quit date: 4/15/2021    Years since quittin.9   Smokeless Tobacco Never       Social History     Substance and Sexual Activity   Alcohol Use Yes     Comment: occasionally       FMHx    History reviewed. No pertinent family history.      Review of Systems  Review of Systems   Constitutional:  Negative for chills, diaphoresis, fever, malaise/fatigue and weight loss.   HENT:  Negative for congestion.    Eyes:  Negative for pain.   Respiratory:  Negative for cough, hemoptysis, sputum production, shortness of breath, wheezing and stridor.    Cardiovascular:  Negative for chest pain, palpitations, orthopnea, claudication, leg swelling and PND.   Gastrointestinal:  Positive for vomiting. Negative for abdominal pain, constipation, diarrhea, heartburn and nausea.   Genitourinary:  Positive for dysuria, frequency and urgency.   Musculoskeletal:  Negative for falls and myalgias.   Neurological:  Negative for sensory change, focal weakness and weakness.   Psychiatric/Behavioral:  Negative for depression, substance abuse and suicidal ideas. The patient is not nervous/anxious.        Physical Exam    Vitals:    04/12/24 0933   BP: 122/78   BP Location: Right arm   Patient Position: Sitting   BP Method: Medium (Manual)   Pulse: 83   SpO2: 99%   Weight: 66.9 kg (147 lb 8 oz)       Physical Exam  Vitals and nursing note reviewed.   Constitutional:       General: She is not in acute distress.     Appearance: Normal appearance. She is well-developed. She is not ill-appearing, toxic-appearing or diaphoretic.   HENT:      Head: Normocephalic and atraumatic.      Right Ear: External ear normal.      Left Ear: External ear normal.      Nose: Nose normal.      Mouth/Throat:      Mouth: Mucous membranes are moist.      Pharynx: Oropharynx is clear. No oropharyngeal exudate or posterior oropharyngeal erythema.   Eyes:      General: No scleral icterus.        Right eye: No discharge.         Left eye: No discharge.      Extraocular Movements: Extraocular movements intact.      Conjunctiva/sclera: Conjunctivae normal.      Pupils: Pupils are equal, round, and reactive to light.   Neck:       Thyroid: No thyromegaly.      Vascular: No carotid bruit or JVD.      Trachea: No tracheal deviation.   Cardiovascular:      Rate and Rhythm: Normal rate and regular rhythm.      Heart sounds: Normal heart sounds. No murmur heard.     No friction rub. No gallop.   Pulmonary:      Effort: Pulmonary effort is normal. No respiratory distress.      Breath sounds: Normal breath sounds. No stridor. No wheezing, rhonchi or rales.   Chest:      Chest wall: No tenderness.   Abdominal:      General: Bowel sounds are normal. There is no distension.      Palpations: Abdomen is soft.      Tenderness: There is no abdominal tenderness. There is no guarding.   Musculoskeletal:         General: No tenderness. Normal range of motion.      Cervical back: Normal range of motion and neck supple. No rigidity or tenderness.      Right lower leg: No edema.      Left lower leg: No edema.   Lymphadenopathy:      Cervical: No cervical adenopathy.   Skin:     General: Skin is warm and dry.   Neurological:      General: No focal deficit present.      Mental Status: She is alert and oriented to person, place, and time. Mental status is at baseline.      Cranial Nerves: No cranial nerve deficit.      Motor: No weakness.      Gait: Gait normal.   Psychiatric:         Mood and Affect: Mood normal.         Behavior: Behavior normal.         Thought Content: Thought content normal.         Judgment: Judgment normal.         Labs    Lab Results   Component Value Date    WBC 12.41 03/20/2024    HGB 9.2 (L) 03/20/2024    HCT 28.4 (L) 03/20/2024     03/20/2024       Sodium   Date Value Ref Range Status   04/09/2024 135 (L) 136 - 145 mmol/L Final     Potassium   Date Value Ref Range Status   04/09/2024 5.0 3.5 - 5.1 mmol/L Final     Chloride   Date Value Ref Range Status   04/09/2024 101 95 - 110 mmol/L Final     CO2   Date Value Ref Range Status   04/09/2024 22 (L) 23 - 29 mmol/L Final     Glucose   Date Value Ref Range Status   04/09/2024 185  (H) 70 - 110 mg/dL Final     BUN   Date Value Ref Range Status   04/09/2024 33 (H) 8 - 23 mg/dL Final     Creatinine   Date Value Ref Range Status   04/09/2024 2.4 (H) 0.5 - 1.4 mg/dL Final     Calcium   Date Value Ref Range Status   04/09/2024 9.3 8.7 - 10.5 mg/dL Final     Total Protein   Date Value Ref Range Status   04/09/2024 6.9 6.0 - 8.4 g/dL Final     Albumin   Date Value Ref Range Status   04/09/2024 3.3 (L) 3.5 - 5.2 g/dL Final     Total Bilirubin   Date Value Ref Range Status   04/09/2024 0.3 0.1 - 1.0 mg/dL Final     Comment:     For infants and newborns, interpretation of results should be based  on gestational age, weight and in agreement with clinical  observations.    Premature Infant recommended reference ranges:  Up to 24 hours.............<8.0 mg/dL  Up to 48 hours............<12.0 mg/dL  3-5 days..................<15.0 mg/dL  6-29 days.................<15.0 mg/dL       Alkaline Phosphatase   Date Value Ref Range Status   04/09/2024 93 55 - 135 U/L Final     AST   Date Value Ref Range Status   04/09/2024 19 10 - 40 U/L Final     ALT   Date Value Ref Range Status   04/09/2024 13 10 - 44 U/L Final     Anion Gap   Date Value Ref Range Status   04/09/2024 12 8 - 16 mmol/L Final       Xrays        Impression/Plan    Problem List Items Addressed This Visit          Pulmonary    COPD (chronic obstructive pulmonary disease)     Clinically stable            Renal/    Urinary tract infection without hematuria    Relevant Orders    Urine culture       Hematology    Acute pulmonary embolism - Primary     Continue ELIQUIS  RTC 3 month            Other    Sleep disorder     Will follow with pt                  Carlitos Pope MD

## 2024-04-12 NOTE — TELEPHONE ENCOUNTER
Epi Mcintosh was scheduled on 04/12/2024 to provide the following documentation to initiate the application process.          Proof of household Income( such as social security statement, 1099 form, pension statement or 3 consecutive pay stubs  Copy of all Insurance cards( front and back)  Printout from your Insurance or Pharmacy that shows how much you have spent on prescriptions this year  Signed and dated HIPAA /Patient Information Forms          As of today, the documents have not been received.  Please instruct the patient to email requested documentation to [email or web address]  or reach out to Goldie Lu [phone] with any follow-up questions.       Pharmacy Patient Assistance  [address] [place] [address]  [id number] [place] [id number]  Fax: [phone]  Email: [email or web address]

## 2024-04-12 NOTE — TELEPHONE ENCOUNTER
I placed the order, does she have antibiotics? Pulmonary wrote in her note today from her visit she has abx

## 2024-04-12 NOTE — TELEPHONE ENCOUNTER
Patient called stating she just picked up a full 7 day script of Macrobid from her previous kidney infection that she did not take. She just started that. Patient just did another UA for  at the hospital.   Please advise.

## 2024-04-12 NOTE — TELEPHONE ENCOUNTER
A 2nd attempt has been made to establish contact with Epi Mcintosh  via MY CHART. The final contact attempt will be made in 5 business days

## 2024-04-12 NOTE — PROGRESS NOTES
CHW - Follow Up    This Community Health Worker completed a follow up visit with patient via telephone today.  Pt/Caregiver reported: Pt states she got paperwork from doctor visit today to assist her with medication affordability with Eliquis. States she is going home now to fill it out.   Community Health Worker provided: I will follow up to make sure she does get application submitted.  Follow up required: yes  Follow-up Outreach - Due: 4/22/2024

## 2024-04-14 LAB — BACTERIA UR CULT: ABNORMAL

## 2024-04-15 ENCOUNTER — TELEPHONE (OUTPATIENT)
Dept: FAMILY MEDICINE | Facility: CLINIC | Age: 74
End: 2024-04-15
Payer: MEDICARE

## 2024-04-15 NOTE — TELEPHONE ENCOUNTER
Called patient and discussed urine results and advised again to keep blood pressure log. Patient expressed verbal understanding.

## 2024-04-15 NOTE — TELEPHONE ENCOUNTER
Davis Lomax OT for patient called to advise the Blood pressure was 181/85. The patient has not been feeling well but according to his log all other visits the blood pressure has been normal with the exception of 4/5/24 150/82.  The patient stated to him that it has been running high. Patient was advise to keep a log of the pressure and let us know what it is running. She did state that she was on blood pressure medication in the past but did not have the name of it.   Please advise.

## 2024-04-17 ENCOUNTER — TELEPHONE (OUTPATIENT)
Dept: PULMONOLOGY | Facility: CLINIC | Age: 74
End: 2024-04-17

## 2024-04-17 NOTE — TELEPHONE ENCOUNTER
----- Message from Wilbur Hoang sent at 4/17/2024  1:16 PM CDT -----  Regarding: Prescription Correction  The patient stated the Eliquis prescription was sent to the [facility] pharmacy in [place] however, it should be sent to [facility] [place] [facility].  Please resend the prescription to the [facility] pharmacy. Thanks in advance for your assistance.

## 2024-04-22 ENCOUNTER — PATIENT OUTREACH (OUTPATIENT)
Dept: ADMINISTRATIVE | Facility: OTHER | Age: 74
End: 2024-04-22
Payer: MEDICARE

## 2024-04-22 NOTE — PROGRESS NOTES
CHW - Follow Up    This Community Health Worker completed a follow up visit with patient via telephone today.  Pt/Caregiver reported: Pt has not completed her paperwork for financial assistance or medication affordability.    Community Health Worker provided:  Sending her doctor an in basket message that pt is interested in digital medicine.   Follow up required: yes  Follow-up Outreach - Due: 5/3/2024

## 2024-04-24 ENCOUNTER — OFFICE VISIT (OUTPATIENT)
Dept: VASCULAR SURGERY | Facility: CLINIC | Age: 74
End: 2024-04-24
Payer: MEDICARE

## 2024-04-24 VITALS
BODY MASS INDEX: 24.55 KG/M2 | HEIGHT: 65 IN | SYSTOLIC BLOOD PRESSURE: 167 MMHG | HEART RATE: 69 BPM | DIASTOLIC BLOOD PRESSURE: 65 MMHG

## 2024-04-24 DIAGNOSIS — I70.0 AORTIC CALCIFICATION: ICD-10-CM

## 2024-04-24 DIAGNOSIS — Z78.0 MENOPAUSE: ICD-10-CM

## 2024-04-24 DIAGNOSIS — I73.9 PERIPHERAL VASCULAR DISEASE, UNSPECIFIED: ICD-10-CM

## 2024-04-24 DIAGNOSIS — I73.9 PVD (PERIPHERAL VASCULAR DISEASE): Primary | ICD-10-CM

## 2024-04-24 DIAGNOSIS — I73.9 PAD (PERIPHERAL ARTERY DISEASE): Primary | ICD-10-CM

## 2024-04-24 PROCEDURE — 1159F MED LIST DOCD IN RCRD: CPT | Mod: CPTII,S$GLB,, | Performed by: THORACIC SURGERY (CARDIOTHORACIC VASCULAR SURGERY)

## 2024-04-24 PROCEDURE — 3066F NEPHROPATHY DOC TX: CPT | Mod: CPTII,S$GLB,, | Performed by: THORACIC SURGERY (CARDIOTHORACIC VASCULAR SURGERY)

## 2024-04-24 PROCEDURE — 3077F SYST BP >= 140 MM HG: CPT | Mod: CPTII,S$GLB,, | Performed by: THORACIC SURGERY (CARDIOTHORACIC VASCULAR SURGERY)

## 2024-04-24 PROCEDURE — 3052F HG A1C>EQUAL 8.0%<EQUAL 9.0%: CPT | Mod: CPTII,S$GLB,, | Performed by: THORACIC SURGERY (CARDIOTHORACIC VASCULAR SURGERY)

## 2024-04-24 PROCEDURE — 99999 PR PBB SHADOW E&M-EST. PATIENT-LVL IV: CPT | Mod: PBBFAC,,, | Performed by: THORACIC SURGERY (CARDIOTHORACIC VASCULAR SURGERY)

## 2024-04-24 PROCEDURE — 1126F AMNT PAIN NOTED NONE PRSNT: CPT | Mod: CPTII,S$GLB,, | Performed by: THORACIC SURGERY (CARDIOTHORACIC VASCULAR SURGERY)

## 2024-04-24 PROCEDURE — 1160F RVW MEDS BY RX/DR IN RCRD: CPT | Mod: CPTII,S$GLB,, | Performed by: THORACIC SURGERY (CARDIOTHORACIC VASCULAR SURGERY)

## 2024-04-24 PROCEDURE — 3078F DIAST BP <80 MM HG: CPT | Mod: CPTII,S$GLB,, | Performed by: THORACIC SURGERY (CARDIOTHORACIC VASCULAR SURGERY)

## 2024-04-24 PROCEDURE — 3288F FALL RISK ASSESSMENT DOCD: CPT | Mod: CPTII,S$GLB,, | Performed by: THORACIC SURGERY (CARDIOTHORACIC VASCULAR SURGERY)

## 2024-04-24 PROCEDURE — 99213 OFFICE O/P EST LOW 20 MIN: CPT | Mod: S$GLB,,, | Performed by: THORACIC SURGERY (CARDIOTHORACIC VASCULAR SURGERY)

## 2024-04-24 PROCEDURE — 4010F ACE/ARB THERAPY RXD/TAKEN: CPT | Mod: CPTII,S$GLB,, | Performed by: THORACIC SURGERY (CARDIOTHORACIC VASCULAR SURGERY)

## 2024-04-24 PROCEDURE — 3062F POS MACROALBUMINURIA REV: CPT | Mod: CPTII,S$GLB,, | Performed by: THORACIC SURGERY (CARDIOTHORACIC VASCULAR SURGERY)

## 2024-04-24 PROCEDURE — 1101F PT FALLS ASSESS-DOCD LE1/YR: CPT | Mod: CPTII,S$GLB,, | Performed by: THORACIC SURGERY (CARDIOTHORACIC VASCULAR SURGERY)

## 2024-04-24 NOTE — PROGRESS NOTES
This patient has peripheral arterial disease.  She recently saw her internist who felt that she needed a checkup for her vascular disease.  She has a history of stenting of the iliac and right superficial femoral artery.  She has been asymptomatic.    She was recently hospitalized with a UTI and actually developed pulmonary emboli.  She is being treated for this.    Other history is well documented.  She has a history of smoking for decades.    On exam vital signs are stable.  Pupils are equal and round reactive to light.  Neck is supple.  Chest is equal breath sounds.  Heart is in a regular rate and rhythm.  Abdomen is benign.  Perfusion to the legs and feet seems to be satisfactory.    At this point conservative management is warranted.  I would recommend repeat arterial duplex of the extremities in 3-4 months.  Based on this further recommendations can be made.

## 2024-04-24 NOTE — TELEPHONE ENCOUNTER
Called in to pharmacy [facility] /january    Neurology outpatient follow-up visit    Patient name: Shelby Cordero    Chief Complaint:  Memory loss.    History of present illness:  This is a 67 years old right-handed female.  Initially, the patient has appointment today for chronic migraine headache.  However, the patient has more concern regarding to memory difficulty.  The onset was about a year ago.  The course is slowly progressive.  The patient reports occasional word finding difficulty.  But the patient is able to maintain her daily activities.  The patient also denies significant depression or sleep disorder.  The patient has previous MRI brain 6 years ago which showed generalized brain atrophy and small vessel disease.  She also had a recent CT brain 2 years ago which confirmed the same findings.    Education: 1 year in college.    MMSE 26/30.  Orientation to time 5/5, place 5/5, registration 3/3, recall 3/3, calculation 2/5, and drawing 0/1.    Interval History:  04/26/24: The patient is here for follow-up clinical headache.  The patient reports worsening of headache at this time.  The headache is exclusively on the right side above her ear.  The patient had tooth extraction with some infection prior to this onset.  The patient tried to course of antibiotics without significant benefit.  The headache is constant without significant nausea, phonophobia or photophobia.  For her dementia, the patient reports some benefit from donepezil.  The patient also denies significant side effect from donepezil.    Past medical history:    Past Medical History:   Diagnosis Date    Allergic rhinitis     Anxiety     Bilateral carpal tunnel syndrome 04/13/2021    CAD (coronary artery disease) 06/05/2019    COVID-19 12/28/2020    Depression     Endometriosis     hx    Fibromyalgia     GERD (gastroesophageal reflux disease)     Headache(784.0)     Hearing loss     Hiatal hernia     Hyperlipidemia     Irritable bowel syndrome     Left bundle branch block (LBBB)

## 2024-05-01 ENCOUNTER — LAB VISIT (OUTPATIENT)
Dept: LAB | Facility: HOSPITAL | Age: 74
End: 2024-05-01
Attending: NURSE PRACTITIONER
Payer: MEDICARE

## 2024-05-01 ENCOUNTER — OFFICE VISIT (OUTPATIENT)
Dept: FAMILY MEDICINE | Facility: CLINIC | Age: 74
End: 2024-05-01
Payer: MEDICARE

## 2024-05-01 VITALS
TEMPERATURE: 98 F | SYSTOLIC BLOOD PRESSURE: 120 MMHG | DIASTOLIC BLOOD PRESSURE: 88 MMHG | HEIGHT: 65 IN | BODY MASS INDEX: 24.36 KG/M2 | OXYGEN SATURATION: 98 % | RESPIRATION RATE: 20 BRPM | WEIGHT: 146.19 LBS | HEART RATE: 68 BPM

## 2024-05-01 DIAGNOSIS — N18.4 TYPE 2 DIABETES MELLITUS WITH STAGE 4 CHRONIC KIDNEY DISEASE, WITHOUT LONG-TERM CURRENT USE OF INSULIN: ICD-10-CM

## 2024-05-01 DIAGNOSIS — Z87.891 FORMER CIGARETTE SMOKER: ICD-10-CM

## 2024-05-01 DIAGNOSIS — Z12.11 COLON CANCER SCREENING: ICD-10-CM

## 2024-05-01 DIAGNOSIS — E11.22 TYPE 2 DIABETES MELLITUS WITH STAGE 4 CHRONIC KIDNEY DISEASE, WITHOUT LONG-TERM CURRENT USE OF INSULIN: ICD-10-CM

## 2024-05-01 DIAGNOSIS — Z23 NEED FOR PNEUMOCOCCAL VACCINATION: ICD-10-CM

## 2024-05-01 DIAGNOSIS — E03.9 HYPOTHYROIDISM, UNSPECIFIED TYPE: ICD-10-CM

## 2024-05-01 DIAGNOSIS — Z87.440 HISTORY OF UTI: ICD-10-CM

## 2024-05-01 DIAGNOSIS — I10 ESSENTIAL HYPERTENSION: Primary | ICD-10-CM

## 2024-05-01 PROCEDURE — 99214 OFFICE O/P EST MOD 30 MIN: CPT | Mod: S$GLB,,, | Performed by: NURSE PRACTITIONER

## 2024-05-01 PROCEDURE — 1101F PT FALLS ASSESS-DOCD LE1/YR: CPT | Mod: CPTII,S$GLB,, | Performed by: NURSE PRACTITIONER

## 2024-05-01 PROCEDURE — 3066F NEPHROPATHY DOC TX: CPT | Mod: CPTII,S$GLB,, | Performed by: NURSE PRACTITIONER

## 2024-05-01 PROCEDURE — 90677 PCV20 VACCINE IM: CPT | Mod: S$GLB,,, | Performed by: NURSE PRACTITIONER

## 2024-05-01 PROCEDURE — 3288F FALL RISK ASSESSMENT DOCD: CPT | Mod: CPTII,S$GLB,, | Performed by: NURSE PRACTITIONER

## 2024-05-01 PROCEDURE — G0009 ADMIN PNEUMOCOCCAL VACCINE: HCPCS | Mod: S$GLB,,, | Performed by: NURSE PRACTITIONER

## 2024-05-01 PROCEDURE — 87186 SC STD MICRODIL/AGAR DIL: CPT | Performed by: NURSE PRACTITIONER

## 2024-05-01 PROCEDURE — 1126F AMNT PAIN NOTED NONE PRSNT: CPT | Mod: CPTII,S$GLB,, | Performed by: NURSE PRACTITIONER

## 2024-05-01 PROCEDURE — 3052F HG A1C>EQUAL 8.0%<EQUAL 9.0%: CPT | Mod: CPTII,S$GLB,, | Performed by: NURSE PRACTITIONER

## 2024-05-01 PROCEDURE — 87077 CULTURE AEROBIC IDENTIFY: CPT | Performed by: NURSE PRACTITIONER

## 2024-05-01 PROCEDURE — 3079F DIAST BP 80-89 MM HG: CPT | Mod: CPTII,S$GLB,, | Performed by: NURSE PRACTITIONER

## 2024-05-01 PROCEDURE — 87088 URINE BACTERIA CULTURE: CPT | Performed by: NURSE PRACTITIONER

## 2024-05-01 PROCEDURE — 87086 URINE CULTURE/COLONY COUNT: CPT | Performed by: NURSE PRACTITIONER

## 2024-05-01 PROCEDURE — 99999 PR PBB SHADOW E&M-EST. PATIENT-LVL IV: CPT | Mod: PBBFAC,,, | Performed by: NURSE PRACTITIONER

## 2024-05-01 PROCEDURE — 1159F MED LIST DOCD IN RCRD: CPT | Mod: CPTII,S$GLB,, | Performed by: NURSE PRACTITIONER

## 2024-05-01 PROCEDURE — 3062F POS MACROALBUMINURIA REV: CPT | Mod: CPTII,S$GLB,, | Performed by: NURSE PRACTITIONER

## 2024-05-01 PROCEDURE — 1160F RVW MEDS BY RX/DR IN RCRD: CPT | Mod: CPTII,S$GLB,, | Performed by: NURSE PRACTITIONER

## 2024-05-01 PROCEDURE — 4010F ACE/ARB THERAPY RXD/TAKEN: CPT | Mod: CPTII,S$GLB,, | Performed by: NURSE PRACTITIONER

## 2024-05-01 PROCEDURE — 3074F SYST BP LT 130 MM HG: CPT | Mod: CPTII,S$GLB,, | Performed by: NURSE PRACTITIONER

## 2024-05-01 RX ORDER — PIOGLITAZONEHYDROCHLORIDE 30 MG/1
30 TABLET ORAL DAILY
Qty: 90 TABLET | Refills: 1 | Status: SHIPPED | OUTPATIENT
Start: 2024-05-01 | End: 2025-05-01

## 2024-05-01 RX ORDER — LEVOTHYROXINE SODIUM 88 UG/1
88 TABLET ORAL
Qty: 90 TABLET | Refills: 1 | Status: SHIPPED | OUTPATIENT
Start: 2024-05-01

## 2024-05-01 NOTE — PROGRESS NOTES
SUBJECTIVE:      Patient ID: Epi Mcintosh is a 74 y.o. female.    Chief Complaint: Results    Epi is here for f/u on recent labs and DM/HTN. PMH includes tobacco use, COPD, CAD, depression, diabetes, hypertension, hypothyroidism, pancreatitis, peripheral vascular disease /pad status post stent in the right leg with angioplasty stenting of the right superficial femoral and right iliac arteries. Taking all meds as prescribed daily- no SE or complaints. A1C up to 8.2 on recent labs. Kidney function worsened-Cr 2.4, GFR 20. She was still infected with UTI at the time of labs. Feeling better since taking Macrobid for UTI at last visit- completed antibiotics as prescribed. Cholesterol well-controlled on statin therapy. Has seen pulmonary and vascular since her last visit- notes reviewed. Nephrology appt scheduled in 6/24.     Overdue HM reviewed today         No family history on file.   Social History     Socioeconomic History    Marital status:    Tobacco Use    Smoking status: Every Day     Current packs/day: 0.00     Average packs/day: 0.5 packs/day for 52.0 years (26.0 ttl pk-yrs)     Types: Vaping w/o nicotine, Cigarettes     Start date: 1969     Last attempt to quit: 2021     Years since quitting: 3.3    Smokeless tobacco: Never   Substance and Sexual Activity    Alcohol use: Yes     Comment: occasionally    Drug use: Never     Social Determinants of Health     Financial Resource Strain: High Risk (4/5/2024)    Overall Financial Resource Strain (CARDIA)     Difficulty of Paying Living Expenses: Very hard   Food Insecurity: Food Insecurity Present (4/5/2024)    Hunger Vital Sign     Worried About Running Out of Food in the Last Year: Often true     Ran Out of Food in the Last Year: Often true   Transportation Needs: No Transportation Needs (4/5/2024)    PRAPARE - Transportation     Lack of Transportation (Medical): No     Lack of Transportation (Non-Medical): No   Physical Activity:  Sufficiently Active (4/5/2024)    Exercise Vital Sign     Days of Exercise per Week: 3 days     Minutes of Exercise per Session: 60 min   Stress: Stress Concern Present (4/5/2024)    [employer] [place] [employer] - Occupational Stress Questionnaire     Feeling of Stress : Rather much   Housing Stability: Low Risk  (4/5/2024)    Housing Stability Vital Sign     Unable to Pay for Housing in the Last Year: No     Number of Places Lived in the Last Year: 1     Unstable Housing in the Last Year: No     Current Outpatient Medications   Medication Sig Dispense Refill    albuterol (PROVENTIL/VENTOLIN HFA) 90 mcg/actuation inhaler Inhale 2 puffs into the lungs 4 (four) times daily as needed.      apixaban (ELIQUIS) 5 mg Tab Take 2 tablets (10 mg total) by mouth 2 (two) times daily for 5 days, THEN 1 tablet (5 mg total) 2 (two) times daily. 200 tablet 0    citalopram (CELEXA) 40 MG tablet Take 1 tablet (40 mg total) by mouth once daily. 90 tablet 1    diazePAM (VALIUM) 10 MG Tab Take 1 tablet (10 mg total) by mouth daily as needed for Anxiety. 30 tablet 1    glipiZIDE 5 MG TR24 Take 1 tablet (5 mg total) by mouth daily with breakfast. 90 tablet 1    metoprolol tartrate (LOPRESSOR) 25 MG tablet Take 1 tablet (25 mg total) by mouth 2 (two) times daily. 180 tablet 1    pantoprazole (PROTONIX) 40 MG tablet Take 1 tablet (40 mg total) by mouth once daily. 90 tablet 1    rosuvastatin (CRESTOR) 40 MG Tab Take 40 mg by mouth once daily.      ACCU-CHEK ALE CONTROL SOLN Soln       ACCU-CHEK ALE PLUS METER [facility]       ACCU-CHEK ALE PLUS TEST STRP Strp       ACCU-CHEK SOFTCLIX LANCETS [facility]       BD ALCOHOL SWABS PadM       levothyroxine (SYNTHROID) 88 MCG tablet Take 1 tablet (88 mcg total) by mouth before breakfast. 90 tablet 1    pioglitazone (ACTOS) 30 MG tablet Take 1 tablet (30 mg total) by mouth once daily. 90 tablet 1     No current facility-administered medications for this visit.     Facility-Administered  Medications Ordered in Other Visits   Medication Dose Route Frequency Provider Last Rate Last Admin    0.9%  NaCl infusion   Intravenous Continuous Petr Kaufman  mL/hr at 04/01/22 1019 New Bag at 04/01/22 1019     Review of patient's allergies indicates:   Allergen Reactions    Plavix [clopidogrel] Rash    Zolpidem      Other reaction(s): Other (See Comments)  Seeing people who were not in the room     Codeine       Past Medical History:   Diagnosis Date    COPD (chronic obstructive pulmonary disease)     Coronary artery disease     Depression     Diabetes mellitus     Hypertension     Pancreatitis     PVD (peripheral vascular disease)      Past Surgical History:   Procedure Laterality Date    AORTOGRAPHY WITH SERIALOGRAPHY N/A 4/1/2022    Procedure: AORTOGRAM, WITH SERIALOGRAPHY;  Surgeon: Petr Kaufman MD;  Location: [facility] CATH/EP LAB;  Service: Peripheral Vascular;  Laterality: N/A;    CARDIAC SURGERY      CORONARY ARTERY BYPASS GRAFT  12/2018    HYSTERECTOMY         Review of Systems   Constitutional:  Positive for fatigue. Negative for activity change, appetite change, chills, fever and unexpected weight change.   HENT:  Negative for congestion, ear discharge, ear pain, sore throat, trouble swallowing and voice change.    Eyes:  Negative for photophobia, pain, discharge and visual disturbance.   Respiratory:  Negative for cough, chest tightness and shortness of breath.    Cardiovascular:  Negative for chest pain, palpitations and leg swelling.   Gastrointestinal:  Negative for abdominal pain, blood in stool, constipation, diarrhea, nausea and vomiting.   Endocrine: Negative for cold intolerance, heat intolerance and polyuria.   Genitourinary:  Negative for decreased urine volume, difficulty urinating, dysuria, flank pain, frequency and hematuria.   Musculoskeletal:  Negative for arthralgias and gait problem.   Skin:  Negative for rash and wound.   Allergic/Immunologic: Negative for  "immunocompromised state.   Neurological:  Negative for dizziness, speech difficulty and headaches.   Hematological:  Negative for adenopathy. Bruises/bleeds easily.   Psychiatric/Behavioral:  Negative for confusion, self-injury and suicidal ideas.       OBJECTIVE:      Vitals:    05/01/24 1427   BP: 120/88   BP Location: Right arm   Patient Position: Sitting   BP Method: Medium (Manual)   Pulse: 68   Resp: 20   Temp: 97.7 °F (36.5 °C)   TempSrc: Oral   SpO2: 98%   Weight: 66.3 kg (146 lb 3.2 oz)   Height: 5' 5" (1.651 m)     Physical Exam  Vitals and nursing note reviewed.   Constitutional:       General: She is not in acute distress.     Appearance: Normal appearance. She is normal weight.   HENT:      Nose: Nose normal.      Mouth/Throat:      Mouth: Mucous membranes are moist.   Eyes:      Pupils: Pupils are equal, round, and reactive to light.   Neck:      Thyroid: No thyroid mass or thyromegaly.   Cardiovascular:      Rate and Rhythm: Regular rhythm. Bradycardia present.      Pulses:           Dorsalis pedis pulses are 1+ on the right side and 1+ on the left side.        Posterior tibial pulses are 1+ on the right side and 1+ on the left side.      Heart sounds: Normal heart sounds. No murmur heard.  Pulmonary:      Effort: Pulmonary effort is normal. No respiratory distress.      Breath sounds: Normal breath sounds. No wheezing, rhonchi or rales.   Abdominal:      Palpations: Abdomen is soft.      Tenderness: There is no abdominal tenderness. There is no right CVA tenderness or left CVA tenderness.   Musculoskeletal:      Cervical back: Normal range of motion and neck supple.      Right lower leg: No edema.      Left lower leg: No edema.      Right foot: Normal range of motion. No deformity.      Left foot: Normal range of motion. No deformity.   Feet:      Right foot:      Protective Sensation: 10 sites tested.  10 sites sensed.      Skin integrity: No ulcer, blister, skin breakdown, erythema, warmth, callus " or dry skin.      Toenail Condition: Right toenails are normal.      Left foot:      Protective Sensation: 10 sites tested.  10 sites sensed.      Skin integrity: No ulcer, blister, skin breakdown, erythema, warmth, callus or dry skin.      Toenail Condition: Left toenails are normal.   Lymphadenopathy:      Cervical: No cervical adenopathy.   Skin:     General: Skin is warm and dry.      Coloration: Skin is not jaundiced or pale.   Neurological:      Mental Status: She is alert and oriented to person, place, and time.   Psychiatric:         Mood and Affect: Mood normal.         Behavior: Behavior normal.         Thought Content: Thought content normal.         Judgment: Judgment normal.        Assessment:       1. Essential hypertension, dx 2018    2. Type 2 diabetes mellitus with stage 4 chronic kidney disease, without long-term current use of insulin    3. History of UTI    4. Hypothyroidism, unspecified type    5. Colon cancer screening    6. Former cigarette smoker    7. Need for pneumococcal vaccination        Plan:       Essential hypertension, dx 2018   -BP stable and controlled; cont meds and home BP monitoring     Type 2 diabetes mellitus with stage 4 chronic kidney disease, without long-term current use of insulin  -     BASIC METABOLIC PANEL; Future; Expected date: 05/01/2024  -     pioglitazone (ACTOS) 30 MG tablet; Take 1 tablet (30 mg total) by mouth once daily.  Dispense: 90 tablet; Refill: 1  -     Foot Exam Performed  -increase Actos to 30 mg daily; recheck renal function today- may need decrease in Glipizide    History of UTI  -     Urinalysis; Future; Expected date: 05/01/2024  -     Urine culture; Future; Expected date: 05/01/2024  -check to ensure infection is gone today     Hypothyroidism, unspecified type  -     levothyroxine (SYNTHROID) 88 MCG tablet; Take 1 tablet (88 mcg total) by mouth before breakfast.  Dispense: 90 tablet; Refill: 1  -stable, cont dose     Colon cancer  screening   -adamantly refuses all colon cancer screening methods after discussion and review; understands risks    Former cigarette smoker    Need for pneumococcal vaccination  -     (In Office Administered) Pneumococcal Conjugate Vaccine (20 Valent) (IM) (Preferred)        No follow-ups on file.      5/1/2024 Marylin Parmar, ASTER, FNP    This note was created using [employer] voice recognition software that occasionally misinterprets phrases or words.

## 2024-05-02 ENCOUNTER — PATIENT MESSAGE (OUTPATIENT)
Dept: ADMINISTRATIVE | Facility: HOSPITAL | Age: 74
End: 2024-05-02
Payer: MEDICARE

## 2024-05-03 ENCOUNTER — TELEPHONE (OUTPATIENT)
Dept: FAMILY MEDICINE | Facility: CLINIC | Age: 74
End: 2024-05-03
Payer: MEDICARE

## 2024-05-03 ENCOUNTER — PATIENT OUTREACH (OUTPATIENT)
Dept: ADMINISTRATIVE | Facility: OTHER | Age: 74
End: 2024-05-03
Payer: MEDICARE

## 2024-05-03 DIAGNOSIS — N39.0 COMPLICATED UTI (URINARY TRACT INFECTION): Primary | ICD-10-CM

## 2024-05-03 DIAGNOSIS — E11.22 TYPE 2 DIABETES MELLITUS WITH STAGE 4 CHRONIC KIDNEY DISEASE, WITHOUT LONG-TERM CURRENT USE OF INSULIN: ICD-10-CM

## 2024-05-03 DIAGNOSIS — N18.4 TYPE 2 DIABETES MELLITUS WITH STAGE 4 CHRONIC KIDNEY DISEASE, WITHOUT LONG-TERM CURRENT USE OF INSULIN: ICD-10-CM

## 2024-05-03 RX ORDER — CIPROFLOXACIN 250 MG/1
250 TABLET, FILM COATED ORAL 2 TIMES DAILY
Qty: 14 TABLET | Refills: 0 | Status: SHIPPED | OUTPATIENT
Start: 2024-05-03 | End: 2024-05-10

## 2024-05-03 NOTE — PROGRESS NOTES
Please notify patient that results are abnormal of urine culture- shows infection is still present. Will need to treat with a stronger antibiotic Cipro twice daily x 7 days- start today; increase water intake; may need urology appt if this infection does not clear up; also please tell her I spoke with nephrology doctor about her kidney function which has worsened slightly, likely due to infection presently; CrCl now 18; will need to do antibiotics and recheck labs in 2 weeks; I will place orders- please set her up for labs around 15th or 16th; she also needs to stop Glipizde for now; just hold medicine until her next renal check

## 2024-05-03 NOTE — TELEPHONE ENCOUNTER
----- Message from FRANCISCO Monteiro sent at 5/3/2024  9:03 AM CDT -----  Please notify patient that results are abnormal of urine culture- shows infection is still present. Will need to treat with a stronger antibiotic Cipro twice daily x 7 days- start today; increase water intake; may need urology appt if this infection does not clear up; also please tell her I spoke with nephrology doctor about her kidney function which has worsened slightly, likely due to infection presently; CrCl now 18; will need to do antibiotics and recheck labs in 2 weeks; I will place orders- please set her up for labs around 15th or 16th; she also needs to stop Glipizde for now; just hold medicine until her next renal check

## 2024-05-04 LAB — BACTERIA UR CULT: ABNORMAL

## 2024-05-06 ENCOUNTER — OUTPATIENT CASE MANAGEMENT (OUTPATIENT)
Dept: ADMINISTRATIVE | Facility: OTHER | Age: 74
End: 2024-05-06
Payer: MEDICARE

## 2024-05-06 NOTE — PROGRESS NOTES
Please notify patient that results are abnormal of urine culture- UTI confirmed; medication she is taking will effectively treat the infection. Increase clear fluids

## 2024-05-15 NOTE — TELEPHONE ENCOUNTER
Patient called back, urine results was given. Patient stated that she was coming to redo testing 5-15-24. Verbal understanding was expressed.

## 2024-05-16 ENCOUNTER — LAB VISIT (OUTPATIENT)
Dept: LAB | Facility: HOSPITAL | Age: 74
End: 2024-05-16
Attending: NURSE PRACTITIONER
Payer: MEDICARE

## 2024-05-16 DIAGNOSIS — N39.0 COMPLICATED UTI (URINARY TRACT INFECTION): ICD-10-CM

## 2024-05-16 PROCEDURE — 87088 URINE BACTERIA CULTURE: CPT | Mod: HCNC | Performed by: NURSE PRACTITIONER

## 2024-05-16 PROCEDURE — 87086 URINE CULTURE/COLONY COUNT: CPT | Mod: HCNC | Performed by: NURSE PRACTITIONER

## 2024-05-16 PROCEDURE — 87077 CULTURE AEROBIC IDENTIFY: CPT | Mod: HCNC | Performed by: NURSE PRACTITIONER

## 2024-05-16 PROCEDURE — 87186 SC STD MICRODIL/AGAR DIL: CPT | Mod: HCNC | Performed by: NURSE PRACTITIONER

## 2024-05-17 ENCOUNTER — TELEPHONE (OUTPATIENT)
Dept: FAMILY MEDICINE | Facility: CLINIC | Age: 74
End: 2024-05-17
Payer: MEDICARE

## 2024-05-17 DIAGNOSIS — N39.0 COMPLICATED UTI (URINARY TRACT INFECTION): Primary | ICD-10-CM

## 2024-05-17 NOTE — TELEPHONE ENCOUNTER
----- Message from FRANCISCO Monteiro sent at 5/17/2024 11:25 AM CDT -----  Please notify patient that results are abnormal of the urine - it looks like she still has infection; I am going to wait for the culture, but I am sending a referral to urology and infectious disease to see why this infection is not resolving; her kidney function is slightly better- she still needs to follow up with nephrology as planned; I may send more antibiotics after I review the culture

## 2024-05-19 LAB — BACTERIA UR CULT: ABNORMAL

## 2024-05-20 ENCOUNTER — PATIENT MESSAGE (OUTPATIENT)
Dept: ADMINISTRATIVE | Facility: HOSPITAL | Age: 74
End: 2024-05-20
Payer: MEDICARE

## 2024-05-21 ENCOUNTER — TELEPHONE (OUTPATIENT)
Dept: FAMILY MEDICINE | Facility: CLINIC | Age: 74
End: 2024-05-21
Payer: MEDICARE

## 2024-05-21 DIAGNOSIS — N39.0 COMPLICATED UTI (URINARY TRACT INFECTION): Primary | ICD-10-CM

## 2024-05-21 NOTE — TELEPHONE ENCOUNTER
----- Message from FRANCISCO Monteiro sent at 5/21/2024 11:58 AM CDT -----  Please notify patient that results are abnormal of her urine culture- is she completing the antibiotics I have sent? She still has E-coli infection; any symptoms?.I tried to call her twice today and number could not accept calls; I will need her to see urology and ID- referrals have been placed; I will need to treat her but need to do some looking into what she can have- let me know about her condition please if you can reach her

## 2024-05-21 NOTE — PROGRESS NOTES
Patient still has UTI which grew out E coli despite treatment with Cipro; kidney function only improving slightly, will need to treat infection until she can see a specialist; I am unable to use Bactrim due to her renal function, just use Cipro, Augmentin not recommended due to kidney function; can use low-dose Levaquin but caution with Celexa therapy and history of prolonged QT on last EKG; collaborating MD Dr. Barker-recommended ceftriaxone 1 g IM in clinic until she can get in with specialty; please contact patient and see if she can come on the nurse schedule to get the IM injection of antibiotics in clinic tomorrow, I will pend the order if so

## 2024-05-21 NOTE — TELEPHONE ENCOUNTER
Called patient. No answer. Message left for return call to [phone] option #3.      Patient still has UTI which grew out E coli despite treatment with Cipro; kidney function only improving slightly, will need to treat infection until she can see a specialist; I am unable to use Bactrim due to her renal function, just use Cipro, Augmentin not recommended due to kidney function; can use low-dose Levaquin but caution with Celexa therapy and history of prolonged QT on last EKG; collaborating MD Dr. Barker-recommended ceftriaxone 1 g IM in clinic until she can get in with specialty; please contact patient and see if she can come on the nurse schedule to get the IM injection of antibiotics in clinic tomorrow, I will pend the order if so

## 2024-05-21 NOTE — TELEPHONE ENCOUNTER
Attempted to contact patient, went t voice mail. Message was left for patient to check her portal to please answer questions.

## 2024-05-23 ENCOUNTER — CLINICAL SUPPORT (OUTPATIENT)
Dept: FAMILY MEDICINE | Facility: CLINIC | Age: 74
End: 2024-05-23
Payer: MEDICARE

## 2024-05-23 VITALS — TEMPERATURE: 98 F

## 2024-05-23 DIAGNOSIS — N39.0 COMPLICATED UTI (URINARY TRACT INFECTION): Primary | ICD-10-CM

## 2024-05-23 PROCEDURE — 99999 PR PBB SHADOW E&M-EST. PATIENT-LVL I: CPT | Mod: PBBFAC,HCNC,,

## 2024-05-23 PROCEDURE — 96372 THER/PROPH/DIAG INJ SC/IM: CPT | Mod: HCNC,S$GLB,, | Performed by: NURSE PRACTITIONER

## 2024-05-23 RX ORDER — CEFTRIAXONE 1 G/1
1 INJECTION, POWDER, FOR SOLUTION INTRAMUSCULAR; INTRAVENOUS
Status: COMPLETED | OUTPATIENT
Start: 2024-05-23 | End: 2024-05-23

## 2024-05-23 RX ADMIN — CEFTRIAXONE 1 G: 1 INJECTION, POWDER, FOR SOLUTION INTRAMUSCULAR; INTRAVENOUS at 10:05

## 2024-06-04 ENCOUNTER — TELEPHONE (OUTPATIENT)
Dept: FAMILY MEDICINE | Facility: CLINIC | Age: 74
End: 2024-06-04
Payer: MEDICARE

## 2024-06-04 NOTE — TELEPHONE ENCOUNTER
Patient called stating she was on antibiotics for about 3 months. She is complaining of having to go to the bathroom every 30 minutes with burning upon urination and feeling of her bladder not emptying. She stated she has an appointment with the urologist on 6/13/24 and was trying to hold out but the burning is really getting bad when she urinates. She is asking if you can call her in a generic inexpensive antibiotic because she is short on money.   Please advise

## 2024-06-13 ENCOUNTER — OFFICE VISIT (OUTPATIENT)
Dept: UROLOGY | Facility: CLINIC | Age: 74
End: 2024-06-13
Payer: MEDICARE

## 2024-06-13 VITALS — BODY MASS INDEX: 24.36 KG/M2 | WEIGHT: 146.19 LBS | HEIGHT: 65 IN

## 2024-06-13 DIAGNOSIS — N39.0 RECURRENT UTI: Primary | ICD-10-CM

## 2024-06-13 LAB
BACTERIA #/AREA URNS AUTO: ABNORMAL /HPF
BILIRUBIN, UA POC OHS: NEGATIVE
BLOOD, UA POC OHS: ABNORMAL
CLARITY, UA POC OHS: ABNORMAL
COLOR, UA POC OHS: YELLOW
GLUCOSE, UA POC OHS: 250
KETONES, UA POC OHS: NEGATIVE
LEUKOCYTES, UA POC OHS: ABNORMAL
MICROSCOPIC COMMENT: ABNORMAL
NITRITE, UA POC OHS: NEGATIVE
PH, UA POC OHS: 6
PROTEIN, UA POC OHS: 100
RBC #/AREA URNS AUTO: 3 /HPF (ref 0–4)
SPECIFIC GRAVITY, UA POC OHS: <=1.005
UROBILINOGEN, UA POC OHS: 0.2
WBC #/AREA URNS AUTO: >100 /HPF (ref 0–5)
WBC CLUMPS UR QL AUTO: ABNORMAL

## 2024-06-13 PROCEDURE — 3066F NEPHROPATHY DOC TX: CPT | Mod: CPTII,S$GLB,, | Performed by: NURSE PRACTITIONER

## 2024-06-13 PROCEDURE — 99204 OFFICE O/P NEW MOD 45 MIN: CPT | Mod: 25,S$GLB,, | Performed by: NURSE PRACTITIONER

## 2024-06-13 PROCEDURE — 87088 URINE BACTERIA CULTURE: CPT | Performed by: NURSE PRACTITIONER

## 2024-06-13 PROCEDURE — 3052F HG A1C>EQUAL 8.0%<EQUAL 9.0%: CPT | Mod: CPTII,S$GLB,, | Performed by: NURSE PRACTITIONER

## 2024-06-13 PROCEDURE — 1101F PT FALLS ASSESS-DOCD LE1/YR: CPT | Mod: CPTII,S$GLB,, | Performed by: NURSE PRACTITIONER

## 2024-06-13 PROCEDURE — 1126F AMNT PAIN NOTED NONE PRSNT: CPT | Mod: CPTII,S$GLB,, | Performed by: NURSE PRACTITIONER

## 2024-06-13 PROCEDURE — 51701 INSERT BLADDER CATHETER: CPT | Mod: S$GLB,,, | Performed by: NURSE PRACTITIONER

## 2024-06-13 PROCEDURE — 81001 URINALYSIS AUTO W/SCOPE: CPT | Performed by: NURSE PRACTITIONER

## 2024-06-13 PROCEDURE — 87186 SC STD MICRODIL/AGAR DIL: CPT | Performed by: NURSE PRACTITIONER

## 2024-06-13 PROCEDURE — 1159F MED LIST DOCD IN RCRD: CPT | Mod: CPTII,S$GLB,, | Performed by: NURSE PRACTITIONER

## 2024-06-13 PROCEDURE — 3062F POS MACROALBUMINURIA REV: CPT | Mod: CPTII,S$GLB,, | Performed by: NURSE PRACTITIONER

## 2024-06-13 PROCEDURE — 1160F RVW MEDS BY RX/DR IN RCRD: CPT | Mod: CPTII,S$GLB,, | Performed by: NURSE PRACTITIONER

## 2024-06-13 PROCEDURE — 4010F ACE/ARB THERAPY RXD/TAKEN: CPT | Mod: CPTII,S$GLB,, | Performed by: NURSE PRACTITIONER

## 2024-06-13 PROCEDURE — 3288F FALL RISK ASSESSMENT DOCD: CPT | Mod: CPTII,S$GLB,, | Performed by: NURSE PRACTITIONER

## 2024-06-13 PROCEDURE — 99999 PR PBB SHADOW E&M-EST. PATIENT-LVL V: CPT | Mod: PBBFAC,HCNC,, | Performed by: NURSE PRACTITIONER

## 2024-06-13 PROCEDURE — 87086 URINE CULTURE/COLONY COUNT: CPT | Performed by: NURSE PRACTITIONER

## 2024-06-13 PROCEDURE — 81003 URINALYSIS AUTO W/O SCOPE: CPT | Mod: QW,S$GLB,, | Performed by: NURSE PRACTITIONER

## 2024-06-13 PROCEDURE — 87077 CULTURE AEROBIC IDENTIFY: CPT | Performed by: NURSE PRACTITIONER

## 2024-06-13 NOTE — PROGRESS NOTES
[facility]  Group: [facility]enc[facility]/Len/Rick/Yves  NPs: Sangita Robb/Debora Wong    Note today written by:Debora Wong  Date of Service: 06/13/2024      CHIEF COMPLAINT: Recurrent UTI.    PRESENTING ILLNESS:    Epi Mcintosh is a 74 y.o. female who presents for recurrent UTI. This is her initial clinic visit.    6/13/24  Pt presents for recurrent UTI  Pt has been having UTI since December  She is scheduled to follow up with ID in July  No prior hx of recurrent UTI before December 2023  UTI symptoms include bladder pain, dysuria, occasional low back pain  Denies fever or chills with UTI  + gross hematuria last week, lasted 2 days  Having UTI symptoms today.     Cath  glucose, moderate blood, lg leuk  Cath IO PVR 20 ml    5/16/24 creatinine 2.5 / GFR 19.6. was referred to nephrology    3/17/24 CT abd pelvis wo contrast  KIDNEYS AND URETERS: Left kidney is mildly enlarged and there is mild left perinephric stranding. Several punctate bilateral renal calcifications may be within the collecting systems. No obvious ureteral stones but there does appear to be mild left periureteral stranding.     Baseline voiding: voids 10-12 x per day, + nocturia, + UUI. OAB has been ongoing for at least 3 years. Wears pads, 1-4 per day, depends on day  Wears depends if long car ride or going somewhere and unsure of bathroom  Not on OAB medication    Has chronic vomiting, not following GI  Denies constipation  + diarrhea, takes immodium 1 pill twice a week    Water intake: 60oz water per day, drinks tea during the day    Personal or family hx of  malignancy: denies  History of  trauma: denies  Smoking history: everyday smoker    + blood clots (PE), no estrace  Following pulmonary, on eliquis    Urine cultures:   Lab Results   Component Value Date    LABURIN ESCHERICHIA COLI  > 100,000 cfu/ml   (A) 05/16/2024    LABURIN ESCHERICHIA COLI  >100,000 cfu/ml   (A)  05/01/2024    LABURIN ESCHERICHIA COLI  >100,000 cfu/ml   (A) 04/12/2024    LABURIN ESCHERICHIA COLI  >100,000 cfu/ml   (A) 03/17/2024         REVIEW OF SYSTEMS: as stated above in hpi    PATIENT HISTORY:  Past Medical History:   Diagnosis Date    COPD (chronic obstructive pulmonary disease)     Coronary artery disease     Depression     Diabetes mellitus     Hypertension     Pancreatitis     PVD (peripheral vascular disease)        Past Surgical History:   Procedure Laterality Date    AORTOGRAPHY WITH SERIALOGRAPHY N/A 4/1/2022    Procedure: AORTOGRAM, WITH SERIALOGRAPHY;  Surgeon: Petr Kaufman MD;  Location: [facility] CATH/EP LAB;  Service: Peripheral Vascular;  Laterality: N/A;    CARDIAC SURGERY      CORONARY ARTERY BYPASS GRAFT  12/2018    HYSTERECTOMY         Allergies:  Plavix [clopidogrel], Zolpidem, and Codeine      PHYSICAL EXAMINATION:  Constitutional: She is oriented to person, place, and time. She appears well-developed and well-nourished.  She is in no apparent distress.  Abdominal:  She exhibits no distension.  There is no CVA tenderness.   Neurological: She is alert and oriented to person, place, and time.   Psych: Cooperative with normal affect.    Genitourinary:  Normal external female genitalia  Urethral meatus is normal  Consent verbally obtained.  Betadine prep was applied to the urethral meatus. An in and out cath was performed after voiding.  The PVR was 20 ml.      Physical Exam      LABS:    Lab Results   Component Value Date    CREATININE 2.5 (H) 05/16/2024     Lab Results   Component Value Date    HGBA1C 8.2 (H) 04/09/2024         IMPRESSION:    Encounter Diagnoses   Name Primary?    Recurrent UTI Yes       PLAN:  -Catheterized urine sent for micro UA and culture  Offered to start pt on keflex based on last culture and adjust based on culture results but she prefers to hold off until culture results.  Antibiotics are expensive with her insurance and she does not want to have to change  antibiotic.  Will treat based on results    -CLIFF ordered and scheduled  Pt unable to have contrast CT with decreased kidney function    -Will send message to MD regarding scheduling cysto for gross hematuria and recurrent UTI    -UTI precautions:  Avoid constipation.  Drink lots of water, as recommended for kidney function  Void every 2-3 hrs regardless of urge  Void soon after urge arises. Do not hold urine once urge occurs.  Avoid pad usage or wear thinnest pads possible  Start Probiotic with Lactobacillus, especially with antibiotics use    -F/u with ID as scheduled for recurrent UTI    -RTC based on results      I encouraged her or any of her family members to call or email me with questions and/or concerns.    Visit today is associated with current or anticipated ongoing medical care related to this patient's single serious condition/complex condition, recurrent UTI    45 minutes of total time spent on the encounter, which includes face to face time and non-face to face time preparing to see the patient (eg, review of tests), Obtaining and/or reviewing separately obtained history, Documenting clinical information in the electronic or other health record, Independently interpreting results (not separately reported) and communicating results to the patient/family/caregiver, or Care coordination (not separately reported).

## 2024-06-13 NOTE — H&P (VIEW-ONLY)
[facility]  Group: [facility]/Len/Rick/Yves  NPs: Sangita Robb/Debora Wong    Note today written by:Debora Wong  Date of Service: 06/13/2024      CHIEF COMPLAINT: Recurrent UTI.    PRESENTING ILLNESS:    Epi Mcintosh is a 74 y.o. female who presents for recurrent UTI. This is her initial clinic visit.    6/13/24  Pt presents for recurrent UTI  Pt has been having UTI since December  She is scheduled to follow up with ID in July  No prior hx of recurrent UTI before December 2023  UTI symptoms include bladder pain, dysuria, occasional low back pain  Denies fever or chills with UTI  + gross hematuria last week, lasted 2 days  Having UTI symptoms today.     Cath  glucose, moderate blood, lg leuk  Cath IO PVR 20 ml    5/16/24 creatinine 2.5 / GFR 19.6. was referred to nephrology    3/17/24 CT abd pelvis wo contrast  KIDNEYS AND URETERS: Left kidney is mildly enlarged and there is mild left perinephric stranding. Several punctate bilateral renal calcifications may be within the collecting systems. No obvious ureteral stones but there does appear to be mild left periureteral stranding.     Baseline voiding: voids 10-12 x per day, + nocturia, + UUI. OAB has been ongoing for at least 3 years. Wears pads, 1-4 per day, depends on day  Wears depends if long car ride or going somewhere and unsure of bathroom  Not on OAB medication    Has chronic vomiting, not following GI  Denies constipation  + diarrhea, takes immodium 1 pill twice a week    Water intake: 60oz water per day, drinks tea during the day    Personal or family hx of  malignancy: denies  History of  trauma: denies  Smoking history: everyday smoker    + blood clots (PE), no estrace  Following pulmonary, on eliquis    Urine cultures:   Lab Results   Component Value Date    LABURIN ESCHERICHIA COLI  > 100,000 cfu/ml   (A) 05/16/2024    LABURIN ESCHERICHIA COLI  >100,000 cfu/ml   (A)  05/01/2024    LABURIN ESCHERICHIA COLI  >100,000 cfu/ml   (A) 04/12/2024    LABURIN ESCHERICHIA COLI  >100,000 cfu/ml   (A) 03/17/2024         REVIEW OF SYSTEMS: as stated above in hpi    PATIENT HISTORY:  Past Medical History:   Diagnosis Date    COPD (chronic obstructive pulmonary disease)     Coronary artery disease     Depression     Diabetes mellitus     Hypertension     Pancreatitis     PVD (peripheral vascular disease)        Past Surgical History:   Procedure Laterality Date    AORTOGRAPHY WITH SERIALOGRAPHY N/A 4/1/2022    Procedure: AORTOGRAM, WITH SERIALOGRAPHY;  Surgeon: Petr Kaufman MD;  Location: [facility] CATH/EP LAB;  Service: Peripheral Vascular;  Laterality: N/A;    CARDIAC SURGERY      CORONARY ARTERY BYPASS GRAFT  12/2018    HYSTERECTOMY         Allergies:  Plavix [clopidogrel], Zolpidem, and Codeine      PHYSICAL EXAMINATION:  Constitutional: She is oriented to person, place, and time. She appears well-developed and well-nourished.  She is in no apparent distress.  Abdominal:  She exhibits no distension.  There is no CVA tenderness.   Neurological: She is alert and oriented to person, place, and time.   Psych: Cooperative with normal affect.    Genitourinary:  Normal external female genitalia  Urethral meatus is normal  Consent verbally obtained.  Betadine prep was applied to the urethral meatus. An in and out cath was performed after voiding.  The PVR was 20 ml.      Physical Exam      LABS:    Lab Results   Component Value Date    CREATININE 2.5 (H) 05/16/2024     Lab Results   Component Value Date    HGBA1C 8.2 (H) 04/09/2024         IMPRESSION:    Encounter Diagnoses   Name Primary?    Recurrent UTI Yes       PLAN:  -Catheterized urine sent for micro UA and culture  Offered to start pt on keflex based on last culture and adjust based on culture results but she prefers to hold off until culture results.  Antibiotics are expensive with her insurance and she does not want to have to change  antibiotic.  Will treat based on results    -CLIFF ordered and scheduled  Pt unable to have contrast CT with decreased kidney function    -Will send message to MD regarding scheduling cysto for gross hematuria and recurrent UTI    -UTI precautions:  Avoid constipation.  Drink lots of water, as recommended for kidney function  Void every 2-3 hrs regardless of urge  Void soon after urge arises. Do not hold urine once urge occurs.  Avoid pad usage or wear thinnest pads possible  Start Probiotic with Lactobacillus, especially with antibiotics use    -F/u with ID as scheduled for recurrent UTI    -RTC based on results      I encouraged her or any of her family members to call or email me with questions and/or concerns.    Visit today is associated with current or anticipated ongoing medical care related to this patient's single serious condition/complex condition, recurrent UTI    45 minutes of total time spent on the encounter, which includes face to face time and non-face to face time preparing to see the patient (eg, review of tests), Obtaining and/or reviewing separately obtained history, Documenting clinical information in the electronic or other health record, Independently interpreting results (not separately reported) and communicating results to the patient/family/caregiver, or Care coordination (not separately reported).

## 2024-06-13 NOTE — PATIENT INSTRUCTIONS
Will call with culture results once completed    UTI precautions:  Avoid constipation.  Drink lots of water, as recommended for kidney function  Void every 2-3 hrs regardless of urge  Void soon after urge arises. Do not hold urine once urge occurs.  Avoid pad usage or wear thinnest pads possible  Start Probiotic with Lactobacillus, especially with antibiotics use    Recommend cystoscopy to evaluate bladder

## 2024-06-14 DIAGNOSIS — N39.0 RECURRENT UTI: ICD-10-CM

## 2024-06-14 DIAGNOSIS — R31.0 GROSS HEMATURIA: Primary | ICD-10-CM

## 2024-06-15 LAB — BACTERIA UR CULT: ABNORMAL

## 2024-06-17 ENCOUNTER — TELEPHONE (OUTPATIENT)
Dept: UROLOGY | Facility: CLINIC | Age: 74
End: 2024-06-17
Payer: MEDICARE

## 2024-06-17 DIAGNOSIS — N39.0 BACTERIAL UTI: Primary | ICD-10-CM

## 2024-06-17 DIAGNOSIS — A49.9 BACTERIAL UTI: Primary | ICD-10-CM

## 2024-06-17 PROBLEM — N12 PYELONEPHRITIS: Status: RESOLVED | Noted: 2024-03-17 | Resolved: 2024-06-17

## 2024-06-17 PROBLEM — N18.30 ACUTE RENAL FAILURE SUPERIMPOSED ON STAGE 3 CHRONIC KIDNEY DISEASE: Status: RESOLVED | Noted: 2024-03-17 | Resolved: 2024-06-17

## 2024-06-17 PROBLEM — N17.9 ACUTE RENAL FAILURE SUPERIMPOSED ON STAGE 3 CHRONIC KIDNEY DISEASE: Status: RESOLVED | Noted: 2024-03-17 | Resolved: 2024-06-17

## 2024-06-17 PROBLEM — I26.99 ACUTE PULMONARY EMBOLISM: Status: RESOLVED | Noted: 2024-03-17 | Resolved: 2024-06-17

## 2024-06-17 RX ORDER — CEPHALEXIN 500 MG/1
500 CAPSULE ORAL DAILY
Qty: 10 CAPSULE | Refills: 0 | Status: SHIPPED | OUTPATIENT
Start: 2024-06-17 | End: 2024-06-27

## 2024-06-17 NOTE — TELEPHONE ENCOUNTER
Spoke with pt regarding urine culture results  She is currently in the appt with Nephrologist, Dr Hernandez  Will prescribe keflex for UTI treatment  Dr Hernandez recommends keflex 500 mg once a day d/t kidney function  Discussed side effects and indications for keflex. Prescription sent to the pharmacy. Pt verbalized understanding.  Proceed with cysto as scheduled, will keep on antibiotics until cysto  Allergies reviewed

## 2024-06-19 DIAGNOSIS — R31.0 GROSS HEMATURIA: Primary | ICD-10-CM

## 2024-06-19 NOTE — PROGRESS NOTES
Procedure Order to Urology [[id number]]    Electronically signed by: Debora Wong NP on 06/14/24 1132 Status: Active   Ordering user: Debora Wong NP 06/14/24 1132 Authorized by: Debora Wong NP   Ordering mode: Standard   Frequency:  06/14/24 -     Diagnoses  Gross hematuria [R31.0]  Recurrent UTI [N39.0]   Questionnaire    Question Answer   Procedure Cystoscopy Adele - charlie 6/26   [facility], Please order Urine POCT without micro . Local sedation (no urine Dr Marinelli or Dr chaves)

## 2024-06-20 ENCOUNTER — HOSPITAL ENCOUNTER (OUTPATIENT)
Dept: RADIOLOGY | Facility: HOSPITAL | Age: 74
Discharge: HOME | End: 2024-06-20
Attending: NURSE PRACTITIONER
Payer: MEDICARE

## 2024-06-20 DIAGNOSIS — N39.0 RECURRENT UTI: ICD-10-CM

## 2024-06-20 PROCEDURE — 76770 US EXAM ABDO BACK WALL COMP: CPT | Mod: 26,,, | Performed by: RADIOLOGY

## 2024-06-20 PROCEDURE — 76770 US EXAM ABDO BACK WALL COMP: CPT | Mod: TC

## 2024-06-24 ENCOUNTER — TELEPHONE (OUTPATIENT)
Dept: UROLOGY | Facility: CLINIC | Age: 74
End: 2024-06-24
Payer: MEDICARE

## 2024-06-24 DIAGNOSIS — N39.0 BACTERIAL UTI: ICD-10-CM

## 2024-06-24 DIAGNOSIS — A49.9 BACTERIAL UTI: ICD-10-CM

## 2024-06-24 RX ORDER — CEPHALEXIN 500 MG/1
500 CAPSULE ORAL DAILY
Qty: 10 CAPSULE | Refills: 0 | Status: SHIPPED | OUTPATIENT
Start: 2024-06-24 | End: 2024-07-04

## 2024-06-24 NOTE — TELEPHONE ENCOUNTER
----- Message from Rachael Hill, Patient Care Assistant sent at 6/24/2024  9:44 AM CDT -----  Regarding: medication  Contact: pt  Type: Needs Medical Advice    Who Called:  pt     Pharmacy name and phone #:    [facility] - [place] - [address] [place] [address]  Phone: [phone] Fax: [phone]    Best Call Back Number: [phone] (home)     Additional Information: pt states she would like a callback regarding refilling cephALEXin (KEFLEX) 500 MG capsule at [facility] pharmacy instead. Please call to advise. Thanks!

## 2024-06-24 NOTE — TELEPHONE ENCOUNTER
Patient requesting Rx to be sent to [facility]. Stated it's taking too long for rx to come mail order. Would like rx sent to [facility] in [place]

## 2024-06-25 DIAGNOSIS — Z00.00 ENCOUNTER FOR MEDICARE ANNUAL WELLNESS EXAM: ICD-10-CM

## 2024-06-26 ENCOUNTER — OFFICE VISIT (OUTPATIENT)
Dept: FAMILY MEDICINE | Facility: CLINIC | Age: 74
End: 2024-06-26
Payer: MEDICARE

## 2024-06-26 ENCOUNTER — HOSPITAL ENCOUNTER (OUTPATIENT)
Facility: HOSPITAL | Age: 74
Discharge: HOME | End: 2024-06-26
Attending: UROLOGY | Admitting: UROLOGY
Payer: MEDICARE

## 2024-06-26 VITALS
SYSTOLIC BLOOD PRESSURE: 150 MMHG | HEART RATE: 53 BPM | TEMPERATURE: 98 F | DIASTOLIC BLOOD PRESSURE: 72 MMHG | WEIGHT: 146.13 LBS | BODY MASS INDEX: 24.35 KG/M2 | OXYGEN SATURATION: 100 % | HEIGHT: 65 IN | RESPIRATION RATE: 20 BRPM

## 2024-06-26 DIAGNOSIS — E11.22 TYPE 2 DIABETES MELLITUS WITH STAGE 4 CHRONIC KIDNEY DISEASE, WITHOUT LONG-TERM CURRENT USE OF INSULIN: ICD-10-CM

## 2024-06-26 DIAGNOSIS — N18.4 TYPE 2 DIABETES MELLITUS WITH STAGE 4 CHRONIC KIDNEY DISEASE, WITHOUT LONG-TERM CURRENT USE OF INSULIN: ICD-10-CM

## 2024-06-26 DIAGNOSIS — I10 ESSENTIAL HYPERTENSION: Primary | ICD-10-CM

## 2024-06-26 DIAGNOSIS — R31.0 GROSS HEMATURIA: Primary | ICD-10-CM

## 2024-06-26 PROCEDURE — A4217 STERILE WATER/SALINE, 500 ML: HCPCS | Performed by: UROLOGY

## 2024-06-26 PROCEDURE — 52000 CYSTOURETHROSCOPY: CPT | Mod: ,,, | Performed by: UROLOGY

## 2024-06-26 PROCEDURE — 1159F MED LIST DOCD IN RCRD: CPT | Mod: CPTII,S$GLB,, | Performed by: NURSE PRACTITIONER

## 2024-06-26 PROCEDURE — 1101F PT FALLS ASSESS-DOCD LE1/YR: CPT | Mod: CPTII,S$GLB,, | Performed by: NURSE PRACTITIONER

## 2024-06-26 PROCEDURE — 3078F DIAST BP <80 MM HG: CPT | Mod: CPTII,S$GLB,, | Performed by: NURSE PRACTITIONER

## 2024-06-26 PROCEDURE — 99999 PR PBB SHADOW E&M-EST. PATIENT-LVL IV: CPT | Mod: PBBFAC,,, | Performed by: NURSE PRACTITIONER

## 2024-06-26 PROCEDURE — 3288F FALL RISK ASSESSMENT DOCD: CPT | Mod: CPTII,S$GLB,, | Performed by: NURSE PRACTITIONER

## 2024-06-26 PROCEDURE — 4010F ACE/ARB THERAPY RXD/TAKEN: CPT | Mod: CPTII,S$GLB,, | Performed by: NURSE PRACTITIONER

## 2024-06-26 PROCEDURE — 3052F HG A1C>EQUAL 8.0%<EQUAL 9.0%: CPT | Mod: CPTII,S$GLB,, | Performed by: NURSE PRACTITIONER

## 2024-06-26 PROCEDURE — 1160F RVW MEDS BY RX/DR IN RCRD: CPT | Mod: CPTII,S$GLB,, | Performed by: NURSE PRACTITIONER

## 2024-06-26 PROCEDURE — 3062F POS MACROALBUMINURIA REV: CPT | Mod: CPTII,S$GLB,, | Performed by: NURSE PRACTITIONER

## 2024-06-26 PROCEDURE — 3077F SYST BP >= 140 MM HG: CPT | Mod: CPTII,S$GLB,, | Performed by: NURSE PRACTITIONER

## 2024-06-26 PROCEDURE — 3066F NEPHROPATHY DOC TX: CPT | Mod: CPTII,S$GLB,, | Performed by: NURSE PRACTITIONER

## 2024-06-26 PROCEDURE — 52000 CYSTOURETHROSCOPY: CPT | Performed by: UROLOGY

## 2024-06-26 PROCEDURE — 25000003 PHARM REV CODE 250: Performed by: UROLOGY

## 2024-06-26 PROCEDURE — 99214 OFFICE O/P EST MOD 30 MIN: CPT | Mod: S$GLB,,, | Performed by: NURSE PRACTITIONER

## 2024-06-26 PROCEDURE — 87086 URINE CULTURE/COLONY COUNT: CPT | Performed by: UROLOGY

## 2024-06-26 RX ORDER — INSULIN PUMP SYRINGE, 3 ML
EACH MISCELLANEOUS
Qty: 1 EACH | Refills: 0 | Status: SHIPPED | OUTPATIENT
Start: 2024-06-26

## 2024-06-26 RX ORDER — AMLODIPINE BESYLATE 2.5 MG/1
2.5 TABLET ORAL DAILY
Qty: 30 TABLET | Refills: 2 | Status: SHIPPED | OUTPATIENT
Start: 2024-06-26

## 2024-06-26 RX ORDER — LANCETS
EACH MISCELLANEOUS
Qty: 50 EACH | Refills: 5 | Status: SHIPPED | OUTPATIENT
Start: 2024-06-26

## 2024-06-26 RX ORDER — WATER 1 ML/ML
IRRIGANT IRRIGATION
Status: DISCONTINUED | OUTPATIENT
Start: 2024-06-26 | End: 2024-06-26 | Stop reason: HOSPADM

## 2024-06-26 RX ORDER — ESTRADIOL 0.1 MG/G
1 CREAM VAGINAL
Qty: 42.5 G | Refills: 1 | Status: SHIPPED | OUTPATIENT
Start: 2024-06-27 | End: 2024-06-26

## 2024-06-26 RX ORDER — LIDOCAINE HYDROCHLORIDE 20 MG/ML
JELLY TOPICAL
Status: DISCONTINUED | OUTPATIENT
Start: 2024-06-26 | End: 2024-06-26 | Stop reason: HOSPADM

## 2024-06-26 RX ORDER — ESTRADIOL 0.1 MG/G
1 CREAM VAGINAL
Qty: 42.5 G | Refills: 1 | Status: SHIPPED | OUTPATIENT
Start: 2024-06-27 | End: 2025-06-27

## 2024-06-26 NOTE — PLAN OF CARE
Discharge instructions given to pt, verbalized understanding.  Refused fluids.  Prescription sent to pharmacy per dr guerra.  Ambulating out to family in lobby in no distress.

## 2024-06-26 NOTE — PROGRESS NOTES
SUBJECTIVE:      Patient ID: Epi Mcintosh is a 74 y.o. female.    Chief Complaint: Diabetes and Hypertension    Epi is here for f/u on DM/HTN. PMH includes tobacco use, COPD, CAD, depression, diabetes, hypertension, hypothyroidism, pancreatitis, peripheral vascular disease /pad status post stent in the right leg with angioplasty stenting of the right superficial femoral and right iliac arteries. Taking all meds as prescribed daily- no SE or complaints.  Has seen the nephrologist for her reduced Kidney function since last visit-has testing to be completed, no notes or labs are available to review today.  Has seen urology provider today for cystoscopy.  She has been prescribed Keflex to take for UTI.  Also was recommended to take a topical vaginal cream to prevent UTI infections.  Patient states her blood pressure was elevated today before and during her procedure.  Her blood pressure has been running high at times at home as well.  Taking her metoprolol twice daily as prescribed. She has not been taking the losartan due to her reduced kidney function.  Also had stopped the glipizide after last visit.  She is tolerating Actos 30 mg without side effects or complaints.  Blood sugar has been running 120-130 most days but she has an occasional 180 reading at home.  She is otherwise feeling well other than worried about limited finances due to all of her medical procedures and issues recently.  Has upcoming appointments with pulmonology, Infectious Disease, and Urology for follow-up.              No family history on file.   Social History     Socioeconomic History    Marital status:    Tobacco Use    Smoking status: Every Day     Current packs/day: 0.00     Average packs/day: 0.5 packs/day for 52.0 years (26.0 ttl pk-yrs)     Types: Vaping w/o nicotine, Cigarettes     Start date: 1969     Last attempt to quit: 2021     Years since quitting: 3.4    Smokeless tobacco: Never   Substance and Sexual  Activity    Alcohol use: Yes     Comment: occasionally    Drug use: Never     Social Determinants of Health     Financial Resource Strain: High Risk (4/5/2024)    Overall Financial Resource Strain (CARDIA)     Difficulty of Paying Living Expenses: Very hard   Food Insecurity: Food Insecurity Present (4/5/2024)    Hunger Vital Sign     Worried About Running Out of Food in the Last Year: Often true     Ran Out of Food in the Last Year: Often true   Transportation Needs: No Transportation Needs (4/5/2024)    PRAPARE - Transportation     Lack of Transportation (Medical): No     Lack of Transportation (Non-Medical): No   Physical Activity: Sufficiently Active (4/5/2024)    Exercise Vital Sign     Days of Exercise per Week: 3 days     Minutes of Exercise per Session: 60 min   Stress: Stress Concern Present (4/5/2024)    [employer] [place] [employer] - Occupational Stress Questionnaire     Feeling of Stress : Rather much   Housing Stability: Low Risk  (4/5/2024)    Housing Stability Vital Sign     Unable to Pay for Housing in the Last Year: No     Number of Places Lived in the Last Year: 1     Unstable Housing in the Last Year: No     Current Outpatient Medications   Medication Sig Dispense Refill    albuterol (PROVENTIL/VENTOLIN HFA) 90 mcg/actuation inhaler Inhale 2 puffs into the lungs 4 (four) times daily as needed.      amLODIPine (NORVASC) 2.5 MG tablet Take 1 tablet (2.5 mg total) by mouth once daily. 30 tablet 2    apixaban (ELIQUIS) 5 mg Tab Take 1 tablet (5 mg total) by mouth 2 (two) times daily. 30 tablet 3    cephALEXin (KEFLEX) 500 MG capsule Take 1 capsule (500 mg total) by mouth once daily. for 10 days 10 capsule 0    citalopram (CELEXA) 40 MG tablet Take 1 tablet (40 mg total) by mouth once daily. 90 tablet 1    [START ON 6/27/2024] estradioL (ESTRACE) 0.01 % (0.1 mg/gram) vaginal cream Place 1 g vaginally twice a week. 42.5 g 1    levothyroxine (SYNTHROID) 88 MCG tablet Take 1 tablet (88 mcg  total) by mouth before breakfast. 90 tablet 1    metoprolol tartrate (LOPRESSOR) 25 MG tablet Take 1 tablet (25 mg total) by mouth 2 (two) times daily. 180 tablet 1    pantoprazole (PROTONIX) 40 MG tablet Take 1 tablet (40 mg total) by mouth once daily. 90 tablet 1    pioglitazone (ACTOS) 30 MG tablet Take 1 tablet (30 mg total) by mouth once daily. 90 tablet 1    rosuvastatin (CRESTOR) 40 MG Tab Take 40 mg by mouth once daily.      BD ALCOHOL SWABS PadM       blood sugar diagnostic Strp To check BG two times daily, to use with insurance preferred meter 50 strip 5    blood-glucose meter kit To check BG two times daily, to use with insurance preferred meter 1 each 0    lancets Misc To check BG two times daily, to use with insurance preferred meter 50 each 5     No current facility-administered medications for this visit.     Facility-Administered Medications Ordered in Other Visits   Medication Dose Route Frequency Provider Last Rate Last Admin    0.9%  NaCl infusion   Intravenous Continuous Petr Kaufman  mL/hr at 04/01/22 1019 New Bag at 04/01/22 1019     Review of patient's allergies indicates:   Allergen Reactions    Plavix [clopidogrel] Rash    Zolpidem      Other reaction(s): Other (See Comments)  Seeing people who were not in the room     Codeine       Past Medical History:   Diagnosis Date    COPD (chronic obstructive pulmonary disease)     Coronary artery disease     Depression     Diabetes mellitus     Hypertension     Pancreatitis     PVD (peripheral vascular disease)      Past Surgical History:   Procedure Laterality Date    AORTOGRAPHY WITH SERIALOGRAPHY N/A 4/1/2022    Procedure: AORTOGRAM, WITH SERIALOGRAPHY;  Surgeon: Petr Kaufman MD;  Location: [facility] CATH/EP LAB;  Service: Peripheral Vascular;  Laterality: N/A;    CARDIAC SURGERY      CORONARY ARTERY BYPASS GRAFT  12/2018    HYSTERECTOMY         Review of Systems   Constitutional:  Negative for activity change, appetite change,  "chills, fatigue, fever and unexpected weight change.   HENT:  Negative for congestion, ear discharge, ear pain, sore throat, trouble swallowing and voice change.    Eyes:  Negative for photophobia, pain, discharge and visual disturbance.   Respiratory:  Negative for cough, chest tightness and shortness of breath.    Cardiovascular:  Negative for chest pain, palpitations and leg swelling.   Gastrointestinal:  Negative for abdominal pain, diarrhea, nausea and vomiting.   Endocrine: Negative for cold intolerance, heat intolerance and polyuria.   Genitourinary:  Negative for decreased urine volume, difficulty urinating, dysuria, flank pain, frequency and hematuria.   Musculoskeletal:  Negative for arthralgias and gait problem.   Skin:  Negative for rash and wound.   Allergic/Immunologic: Negative for immunocompromised state.   Neurological:  Negative for dizziness, speech difficulty and headaches.   Hematological:  Negative for adenopathy. Bruises/bleeds easily.   Psychiatric/Behavioral:  Negative for confusion, self-injury and suicidal ideas.       OBJECTIVE:      Vitals:    06/26/24 1420   BP: (!) 150/72   Pulse: (!) 53   Resp: 20   Temp: 97.7 °F (36.5 °C)   TempSrc: Oral   SpO2: 100%   Weight: 66.3 kg (146 lb 1.6 oz)   Height: 5' 5" (1.651 m)     Physical Exam  Vitals and nursing note reviewed.   Constitutional:       General: She is not in acute distress.     Appearance: Normal appearance. She is normal weight.   HENT:      Nose: Nose normal.      Mouth/Throat:      Mouth: Mucous membranes are moist.   Eyes:      Pupils: Pupils are equal, round, and reactive to light.   Neck:      Thyroid: No thyroid mass or thyromegaly.   Cardiovascular:      Rate and Rhythm: Regular rhythm. Bradycardia present.      Pulses:           Dorsalis pedis pulses are 1+ on the right side and 1+ on the left side.        Posterior tibial pulses are 1+ on the right side and 1+ on the left side.      Heart sounds: Normal heart sounds. No " murmur heard.  Pulmonary:      Effort: Pulmonary effort is normal. No respiratory distress.      Breath sounds: Normal breath sounds. No wheezing, rhonchi or rales.   Musculoskeletal:      Cervical back: Normal range of motion and neck supple.      Right lower leg: No edema.      Left lower leg: No edema.      Right foot: Normal range of motion. No deformity.      Left foot: Normal range of motion. No deformity.   Feet:      Right foot:      Protective Sensation: 10 sites tested.  10 sites sensed.      Skin integrity: No ulcer, blister, skin breakdown, erythema, warmth, callus or dry skin.      Toenail Condition: Right toenails are normal.      Left foot:      Protective Sensation: 10 sites tested.  10 sites sensed.      Skin integrity: No ulcer, blister, skin breakdown, erythema, warmth, callus or dry skin.      Toenail Condition: Left toenails are normal.   Lymphadenopathy:      Cervical: No cervical adenopathy.   Skin:     General: Skin is warm and dry.      Coloration: Skin is not jaundiced or pale.   Neurological:      Mental Status: She is alert and oriented to person, place, and time.   Psychiatric:         Mood and Affect: Mood normal.         Behavior: Behavior normal.         Thought Content: Thought content normal.         Judgment: Judgment normal.        Assessment:       1. Essential hypertension, dx 2018    2. Type 2 diabetes mellitus with stage 4 chronic kidney disease, without long-term current use of insulin        Plan:       Essential hypertension, dx 2018  -     amLODIPine (NORVASC) 2.5 MG tablet; Take 1 tablet (2.5 mg total) by mouth once daily.  Dispense: 30 tablet; Refill: 2   -add amlodipine low-dose daily due to elevated readings; this will not negatively affect her kidneys, she should monitor her blood pressure at home and follow-up in 6 weeks for recheck; notify me if blood pressure is low    Type 2 diabetes mellitus with stage 4 chronic kidney disease, without long-term current use of  insulin  -     blood-glucose meter kit; To check BG two times daily, to use with insurance preferred meter  Dispense: 1 each; Refill: 0  -     lancets Misc; To check BG two times daily, to use with insurance preferred meter  Dispense: 50 each; Refill: 5  -     blood sugar diagnostic Strp; To check BG two times daily, to use with insurance preferred meter  Dispense: 50 strip; Refill: 5  -     Hemoglobin A1C; Future; Expected date: 08/09/2024  -     COMPREHENSIVE METABOLIC PANEL; Future; Expected date: 08/09/2024   -will need Nephrology notes from her recent visit to review, patient should contact Nephrology office to see what labs she needs done and tests before her follow-up with Dr. Hernandez; continue Actos daily, low sugar diet, labs in August      Follow up in about 7 weeks (around 8/14/2024) for diabetes, HTN.      6/26/2024 ASTER Mccabe, FNP    This note was created using Taplister voice recognition software that occasionally misinterprets phrases or words.

## 2024-06-26 NOTE — OP NOTE
[facility]  Operative/Discharge Note    Date: 06/26/2024    Pre-Op Diagnosis: Gross hematuria, recurrent UTI    Post-Op Diagnosis: Same    Procedure(s) Performed:   1.  Cystoscopy     Specimen(s): Urine cytology and culture    Staff Surgeon: Agustín Marinelli MD    Assistant Surgeon: Agustín Marinelli Jr, MD    Anesthesia: Local anesthesia topical 2% lidocaine gel    Indications: Epi Mcintosh is a 74 y.o. female with recurrent UTI and gross hematuria.    Findings: Mild trabeculations. Otherwise unremarkable cystoscopy.    Estimated Blood Loss: min    Drains: None    Procedure in Detail:  After risks, benefits and possible complications of cystoscopy were explained, the patient elected to undergo the procedure and informed consent was obtained. All questions were answered in the onel-operative area. The patient was transferred to the cystoscopy suite and placed in the lithotomy position.  The patient was prepped and draped in the usual sterile fashion. Lidocaine jelly was administered for local anesthesia. Time out was performed.    A flexible cystoscope was introduced into the bladder per urethra. This passed easily.  The entire urethra was visualized which showed no masses or strictures.  The right and left ureteral orifices were identified in the normal anatomic position and were seen effluxing clear urine.  Formal cystoscopy was performed which revealed no masses or lesions suspicious for malignancy, mild trabeculations, no bladder stones and no bladder diverticula.      The patient tolerated the procedure well and was transferred to recovery in stable condition.    Disposition: Home    Discharge home today status post uncomplicated procedure as above  Diet - resume home diet  Follow up: RTC in 3 months with NP Debora Wong. RX for estrace cream sent as patient had atrophic vaginitis on exam.   Instructions: Follow up with ID as scheduled.   Meds:     Medication List        START taking these medications       estradioL 0.01 % (0.1 mg/gram) vaginal cream  Commonly known as: ESTRACE  Place 1 g vaginally twice a week.  Start taking on: June 27, 2024            CHANGE how you take these medications      citalopram 40 MG tablet  Commonly known as: CeleXA  Take 1 tablet (40 mg total) by mouth once daily.  What changed: how much to take            CONTINUE taking these medications      ACCU-CHEK ALE CONTROL SOLN Soln  Generic drug: blood glucose control high,low     ACCU-CHEK ALE PLUS METER Misc  Generic drug: blood-glucose meter     ACCU-CHEK ALE PLUS TEST STRP Strp  Generic drug: blood sugar diagnostic     ACCU-CHEK SOFTCLIX LANCETS Misc  Generic drug: lancets     albuterol 90 mcg/actuation inhaler  Commonly known as: PROVENTIL/VENTOLIN HFA     ALCOHOL PREP PAD SPACER  Generic drug: alcohol swabs     * apixaban 5 mg Tab  Commonly known as: ELIQUIS  Take 2 tablets (10 mg total) by mouth 2 (two) times daily for 5 days, THEN 1 tablet (5 mg total) 2 (two) times daily.  Start taking on: April 12, 2024     * apixaban 5 mg Tab  Commonly known as: ELIQUIS  Take 1 tablet (5 mg total) by mouth 2 (two) times daily.     * apixaban 5 mg Tab  Commonly known as: ELIQUIS  Take 1 tablet (5 mg total) by mouth 2 (two) times daily.     cephALEXin 500 MG capsule  Commonly known as: KEFLEX  Take 1 capsule (500 mg total) by mouth once daily. for 10 days     diazePAM 10 MG Tab  Commonly known as: VALIUM  Take 1 tablet (10 mg total) by mouth daily as needed for Anxiety.     glipiZIDE 5 MG Tr24  Take 1 tablet (5 mg total) by mouth daily with breakfast.     levothyroxine 88 MCG tablet  Commonly known as: SYNTHROID  Take 1 tablet (88 mcg total) by mouth before breakfast.     metoprolol tartrate 25 MG tablet  Commonly known as: LOPRESSOR  Take 1 tablet (25 mg total) by mouth 2 (two) times daily.     pantoprazole 40 MG tablet  Commonly known as: PROTONIX  Take 1 tablet (40 mg total) by mouth once daily.     pioglitazone 30 MG tablet  Commonly  known as: ACTOS  Take 1 tablet (30 mg total) by mouth once daily.     rosuvastatin 40 MG Tab  Commonly known as: CRESTOR           * This list has 3 medication(s) that are the same as other medications prescribed for you. Read the directions carefully, and ask your doctor or other care provider to review them with you.                   Where to Get Your Medications        These medications were sent to [facility] - [place], [place] - [address]  [address], [place] [address]      Phone: [phone]   estradioL 0.01 % (0.1 mg/gram) vaginal cream         Agustín Marinelli Jr, MD

## 2024-06-27 VITALS
SYSTOLIC BLOOD PRESSURE: 155 MMHG | DIASTOLIC BLOOD PRESSURE: 68 MMHG | BODY MASS INDEX: 24.36 KG/M2 | HEART RATE: 56 BPM | OXYGEN SATURATION: 98 % | RESPIRATION RATE: 18 BRPM | TEMPERATURE: 98 F | WEIGHT: 146.19 LBS | HEIGHT: 65 IN

## 2024-06-29 LAB — BACTERIA UR CULT: NO GROWTH

## 2024-07-09 ENCOUNTER — LAB VISIT (OUTPATIENT)
Dept: LAB | Facility: HOSPITAL | Age: 74
End: 2024-07-09
Attending: STUDENT IN AN ORGANIZED HEALTH CARE EDUCATION/TRAINING PROGRAM
Payer: MEDICARE

## 2024-07-09 ENCOUNTER — OFFICE VISIT (OUTPATIENT)
Dept: INFECTIOUS DISEASES | Facility: CLINIC | Age: 74
End: 2024-07-09
Payer: MEDICARE

## 2024-07-09 VITALS
HEART RATE: 58 BPM | TEMPERATURE: 97 F | HEIGHT: 65 IN | DIASTOLIC BLOOD PRESSURE: 72 MMHG | WEIGHT: 141.63 LBS | SYSTOLIC BLOOD PRESSURE: 152 MMHG | BODY MASS INDEX: 23.6 KG/M2

## 2024-07-09 DIAGNOSIS — N39.0 COMPLICATED UTI (URINARY TRACT INFECTION): ICD-10-CM

## 2024-07-09 DIAGNOSIS — I25.10 CORONARY ARTERY DISEASE INVOLVING LEFT MAIN CORONARY ARTERY: ICD-10-CM

## 2024-07-09 DIAGNOSIS — Z13.31 POSITIVE DEPRESSION SCREENING: ICD-10-CM

## 2024-07-09 DIAGNOSIS — Z95.1 S/P CABG X 1: ICD-10-CM

## 2024-07-09 DIAGNOSIS — Z72.0 TOBACCO ABUSE: ICD-10-CM

## 2024-07-09 DIAGNOSIS — I10 ESSENTIAL HYPERTENSION: ICD-10-CM

## 2024-07-09 DIAGNOSIS — I25.10 CORONARY ARTERY DISEASE INVOLVING LEFT MAIN CORONARY ARTERY: Primary | ICD-10-CM

## 2024-07-09 DIAGNOSIS — E11.8 TYPE 2 DIABETES MELLITUS WITH COMPLICATION, WITHOUT LONG-TERM CURRENT USE OF INSULIN: ICD-10-CM

## 2024-07-09 LAB
ALBUMIN SERPL BCP-MCNC: 4 G/DL (ref 3.5–5.2)
ALP SERPL-CCNC: 94 U/L (ref 55–135)
ALT SERPL W/O P-5'-P-CCNC: 13 U/L (ref 10–44)
ANION GAP SERPL CALC-SCNC: 9 MMOL/L (ref 8–16)
AST SERPL-CCNC: 17 U/L (ref 10–40)
BACTERIA #/AREA URNS HPF: ABNORMAL /HPF
BASOPHILS # BLD AUTO: 0.03 K/UL (ref 0–0.2)
BASOPHILS NFR BLD: 0.4 % (ref 0–1.9)
BILIRUB SERPL-MCNC: 0.3 MG/DL (ref 0.1–1)
BILIRUB UR QL STRIP: NEGATIVE
BUN SERPL-MCNC: 41 MG/DL (ref 8–23)
CALCIUM SERPL-MCNC: 8.7 MG/DL (ref 8.7–10.5)
CHLORIDE SERPL-SCNC: 99 MMOL/L (ref 95–110)
CHOLEST SERPL-MCNC: 125 MG/DL (ref 120–199)
CHOLEST/HDLC SERPL: 3.2 {RATIO} (ref 2–5)
CLARITY UR: ABNORMAL
CO2 SERPL-SCNC: 24 MMOL/L (ref 23–29)
COLOR UR: YELLOW
CREAT SERPL-MCNC: 2.9 MG/DL (ref 0.5–1.4)
CRP SERPL-MCNC: 0.4 MG/DL
DIFFERENTIAL METHOD BLD: ABNORMAL
EOSINOPHIL # BLD AUTO: 0.1 K/UL (ref 0–0.5)
EOSINOPHIL NFR BLD: 1.5 % (ref 0–8)
ERYTHROCYTE [DISTWIDTH] IN BLOOD BY AUTOMATED COUNT: 13.7 % (ref 11.5–14.5)
EST. GFR  (NO RACE VARIABLE): 16.4 ML/MIN/1.73 M^2
GLUCOSE SERPL-MCNC: 270 MG/DL (ref 70–110)
GLUCOSE UR QL STRIP: ABNORMAL
HCT VFR BLD AUTO: 35.7 % (ref 37–48.5)
HDLC SERPL-MCNC: 39 MG/DL (ref 40–75)
HDLC SERPL: 31.2 % (ref 20–50)
HGB BLD-MCNC: 11.5 G/DL (ref 12–16)
HGB UR QL STRIP: ABNORMAL
HYALINE CASTS #/AREA URNS LPF: 0 /LPF
IMM GRANULOCYTES # BLD AUTO: 0.03 K/UL (ref 0–0.04)
IMM GRANULOCYTES NFR BLD AUTO: 0.4 % (ref 0–0.5)
KETONES UR QL STRIP: NEGATIVE
LDLC SERPL CALC-MCNC: 52 MG/DL (ref 63–159)
LEUKOCYTE ESTERASE UR QL STRIP: ABNORMAL
LYMPHOCYTES # BLD AUTO: 1.7 K/UL (ref 1–4.8)
LYMPHOCYTES NFR BLD: 24.2 % (ref 18–48)
MCH RBC QN AUTO: 30.1 PG (ref 27–31)
MCHC RBC AUTO-ENTMCNC: 32.2 G/DL (ref 32–36)
MCV RBC AUTO: 94 FL (ref 82–98)
MICROSCOPIC COMMENT: ABNORMAL
MONOCYTES # BLD AUTO: 0.6 K/UL (ref 0.3–1)
MONOCYTES NFR BLD: 7.8 % (ref 4–15)
NEUTROPHILS # BLD AUTO: 4.7 K/UL (ref 1.8–7.7)
NEUTROPHILS NFR BLD: 65.7 % (ref 38–73)
NITRITE UR QL STRIP: NEGATIVE
NONHDLC SERPL-MCNC: 86 MG/DL
NRBC BLD-RTO: 0 /100 WBC
PH UR STRIP: 7 [PH] (ref 5–8)
PLATELET # BLD AUTO: 254 K/UL (ref 150–450)
PMV BLD AUTO: 9.4 FL (ref 9.2–12.9)
POTASSIUM SERPL-SCNC: 4.6 MMOL/L (ref 3.5–5.1)
PROT SERPL-MCNC: 6.8 G/DL (ref 6–8.4)
PROT UR QL STRIP: ABNORMAL
RBC # BLD AUTO: 3.82 M/UL (ref 4–5.4)
RBC #/AREA URNS HPF: 7 /HPF (ref 0–4)
SODIUM SERPL-SCNC: 132 MMOL/L (ref 136–145)
SP GR UR STRIP: 1.01 (ref 1–1.03)
TRIGL SERPL-MCNC: 170 MG/DL (ref 30–150)
URN SPEC COLLECT METH UR: ABNORMAL
UROBILINOGEN UR STRIP-ACNC: NEGATIVE EU/DL
WBC # BLD AUTO: 7.16 K/UL (ref 3.9–12.7)
WBC #/AREA URNS HPF: >100 /HPF (ref 0–5)
WBC CLUMPS URNS QL MICRO: ABNORMAL
YEAST URNS QL MICRO: ABNORMAL

## 2024-07-09 PROCEDURE — 80053 COMPREHEN METABOLIC PANEL: CPT | Performed by: STUDENT IN AN ORGANIZED HEALTH CARE EDUCATION/TRAINING PROGRAM

## 2024-07-09 PROCEDURE — 80061 LIPID PANEL: CPT | Performed by: STUDENT IN AN ORGANIZED HEALTH CARE EDUCATION/TRAINING PROGRAM

## 2024-07-09 PROCEDURE — 99204 OFFICE O/P NEW MOD 45 MIN: CPT | Mod: S$GLB,,, | Performed by: STUDENT IN AN ORGANIZED HEALTH CARE EDUCATION/TRAINING PROGRAM

## 2024-07-09 PROCEDURE — 36415 COLL VENOUS BLD VENIPUNCTURE: CPT | Performed by: STUDENT IN AN ORGANIZED HEALTH CARE EDUCATION/TRAINING PROGRAM

## 2024-07-09 PROCEDURE — 87186 SC STD MICRODIL/AGAR DIL: CPT | Performed by: STUDENT IN AN ORGANIZED HEALTH CARE EDUCATION/TRAINING PROGRAM

## 2024-07-09 PROCEDURE — 1126F AMNT PAIN NOTED NONE PRSNT: CPT | Mod: CPTII,S$GLB,, | Performed by: STUDENT IN AN ORGANIZED HEALTH CARE EDUCATION/TRAINING PROGRAM

## 2024-07-09 PROCEDURE — 3288F FALL RISK ASSESSMENT DOCD: CPT | Mod: CPTII,S$GLB,, | Performed by: STUDENT IN AN ORGANIZED HEALTH CARE EDUCATION/TRAINING PROGRAM

## 2024-07-09 PROCEDURE — 3062F POS MACROALBUMINURIA REV: CPT | Mod: CPTII,S$GLB,, | Performed by: STUDENT IN AN ORGANIZED HEALTH CARE EDUCATION/TRAINING PROGRAM

## 2024-07-09 PROCEDURE — 81001 URINALYSIS AUTO W/SCOPE: CPT | Performed by: STUDENT IN AN ORGANIZED HEALTH CARE EDUCATION/TRAINING PROGRAM

## 2024-07-09 PROCEDURE — 85025 COMPLETE CBC W/AUTO DIFF WBC: CPT | Performed by: STUDENT IN AN ORGANIZED HEALTH CARE EDUCATION/TRAINING PROGRAM

## 2024-07-09 PROCEDURE — 1100F PTFALLS ASSESS-DOCD GE2>/YR: CPT | Mod: CPTII,S$GLB,, | Performed by: STUDENT IN AN ORGANIZED HEALTH CARE EDUCATION/TRAINING PROGRAM

## 2024-07-09 PROCEDURE — 3078F DIAST BP <80 MM HG: CPT | Mod: CPTII,S$GLB,, | Performed by: STUDENT IN AN ORGANIZED HEALTH CARE EDUCATION/TRAINING PROGRAM

## 2024-07-09 PROCEDURE — 3077F SYST BP >= 140 MM HG: CPT | Mod: CPTII,S$GLB,, | Performed by: STUDENT IN AN ORGANIZED HEALTH CARE EDUCATION/TRAINING PROGRAM

## 2024-07-09 PROCEDURE — 4010F ACE/ARB THERAPY RXD/TAKEN: CPT | Mod: CPTII,S$GLB,, | Performed by: STUDENT IN AN ORGANIZED HEALTH CARE EDUCATION/TRAINING PROGRAM

## 2024-07-09 PROCEDURE — 3066F NEPHROPATHY DOC TX: CPT | Mod: CPTII,S$GLB,, | Performed by: STUDENT IN AN ORGANIZED HEALTH CARE EDUCATION/TRAINING PROGRAM

## 2024-07-09 PROCEDURE — 3052F HG A1C>EQUAL 8.0%<EQUAL 9.0%: CPT | Mod: CPTII,S$GLB,, | Performed by: STUDENT IN AN ORGANIZED HEALTH CARE EDUCATION/TRAINING PROGRAM

## 2024-07-09 PROCEDURE — 86140 C-REACTIVE PROTEIN: CPT | Performed by: STUDENT IN AN ORGANIZED HEALTH CARE EDUCATION/TRAINING PROGRAM

## 2024-07-09 PROCEDURE — 1159F MED LIST DOCD IN RCRD: CPT | Mod: CPTII,S$GLB,, | Performed by: STUDENT IN AN ORGANIZED HEALTH CARE EDUCATION/TRAINING PROGRAM

## 2024-07-09 PROCEDURE — 87086 URINE CULTURE/COLONY COUNT: CPT | Performed by: STUDENT IN AN ORGANIZED HEALTH CARE EDUCATION/TRAINING PROGRAM

## 2024-07-09 NOTE — PROGRESS NOTES
[facility] - Infectious Diseases   Department of Infectious Disease  Office Visit Note        PATIENT NAME: Epi Mcintosh  YOB: 1950   MRN: [id number]  DATE OF VISIT: 07/09/2024    REASON FOR VISIT: Referral and New Patient for Complicated UTI from  [facility]      HISTORY OF PRESENT ILLNESS     Epi Mcintosh is a 74 y.o. female , heavy smoker with past medical history of HTN, CAD s/p CABG, COPD not on home O2, HLD, hypothyroidism, CKD not on HD who was referred by primary care for recurrent UTIs.  Patient mentions she has been having urinary symptoms since December of 2023.  She presented with upper respiratory symptoms, found to have PE and then a UTI.  She has been follow with Urology since, status post cystoscopy 6/28 no acute pathology found, cytology negative for malignancy.    She is here to establish care.  She mentions she gets frequent UTIs, and has not been able to establish a cause. At this moment, she reports increased urinary frequency, she is certain she is able to fully empty her bladder and denies dysuria.  She denies fever or chills, no night sweats, no cough or shortness of breath, no neck pain, no nausea or vomiting, no abdominal pain, no changes in bowel movements.  She recalls being on multiple courses of oral antibiotics for prior UTIs.    Last urine culture from 6/26 no growth  Urine culture 6/13 E coli resistant to ampicillin, intermediate to Unasyn, sensitive to everything else   Urine cultures 5/16 E coli resistant to ampicillin, intermediate to Unasyn, sensitive to everything  Urine culture 5/1 E coli resistant to Unasyn and ampicillin, sensitive to everything   Urine culture 4/12 E coli resistant to ampicillin, sensitive to everything else    Outdoor activities: Active smoker, no alcohol, no drugs. Retired,   Travel: None  Implants:  Stents   Antibiotic history:    Macrobid 3/11  Cipro 3/20  Doxycyline 3/20  Cephalexin 6/24    Social  History  Marital Status:   Alcohol History:  reports current alcohol use.  Tobacco History:  reports that she has been smoking vaping w/o nicotine and cigarettes. She started smoking about 55 years ago. She has a 26 pack-year smoking history. She has never used smokeless tobacco.  Drug History:  reports no history of drug use.    INTERVAL HISTORY     7/9:  Patient referred by primary care to establish care for recurrent UTIs.    ALLERGIES AND CURRENT MEDICATIONS     Review of patient's allergies indicates:   Allergen Reactions    Plavix [clopidogrel] Rash    Zolpidem      Other reaction(s): Other (See Comments)  Seeing people who were not in the room     Codeine        Current Outpatient Medications   Medication Sig Dispense Refill    albuterol (PROVENTIL/VENTOLIN HFA) 90 mcg/actuation inhaler Inhale 2 puffs into the lungs 4 (four) times daily as needed.      amLODIPine (NORVASC) 2.5 MG tablet Take 1 tablet (2.5 mg total) by mouth once daily. 30 tablet 2    apixaban (ELIQUIS) 5 mg Tab Take 1 tablet (5 mg total) by mouth 2 (two) times daily. 30 tablet 3    BD ALCOHOL SWABS PadM       blood sugar diagnostic Strp To check BG two times daily, to use with insurance preferred meter 50 strip 5    blood-glucose meter kit To check BG two times daily, to use with insurance preferred meter 1 each 0    citalopram (CELEXA) 40 MG tablet Take 1 tablet (40 mg total) by mouth once daily. 90 tablet 1    estradioL (ESTRACE) 0.01 % (0.1 mg/gram) vaginal cream Place 1 g vaginally twice a week. 42.5 g 1    lancets Misc To check BG two times daily, to use with insurance preferred meter 50 each 5    levothyroxine (SYNTHROID) 88 MCG tablet Take 1 tablet (88 mcg total) by mouth before breakfast. 90 tablet 1    metoprolol tartrate (LOPRESSOR) 25 MG tablet Take 1 tablet (25 mg total) by mouth 2 (two) times daily. 180 tablet 1    pantoprazole (PROTONIX) 40 MG tablet Take 1 tablet (40 mg total) by mouth once daily. 90 tablet 1     pioglitazone (ACTOS) 30 MG tablet Take 1 tablet (30 mg total) by mouth once daily. 90 tablet 1    rosuvastatin (CRESTOR) 40 MG Tab Take 40 mg by mouth once daily.       No current facility-administered medications for this visit.     Facility-Administered Medications Ordered in Other Visits   Medication Dose Route Frequency Provider Last Rate Last Admin    0.9%  NaCl infusion   Intravenous Continuous Petr Kaufman  mL/hr at 04/01/22 1019 New Bag at 04/01/22 1019       MEDICAL/SURGICAL/FAMILY HISTORY     Past Surgical History:   Procedure Laterality Date    AORTOGRAPHY WITH SERIALOGRAPHY N/A 4/1/2022    Procedure: AORTOGRAM, WITH SERIALOGRAPHY;  Surgeon: Petr Kaufman MD;  Location: [facility] CATH/EP LAB;  Service: Peripheral Vascular;  Laterality: N/A;    CARDIAC SURGERY      CORONARY ARTERY BYPASS GRAFT  12/2018    CYSTOSCOPY N/A 6/26/2024    Procedure: CYSTOSCOPY;  Surgeon: Agustín Marinelli Jr., MD;  Location: [facility] ASU OR;  Service: Urology;  Laterality: N/A;    HYSTERECTOMY       Past Medical History:   Diagnosis Date    COPD (chronic obstructive pulmonary disease)     Coronary artery disease     Depression     Diabetes mellitus     Hypertension     Pancreatitis     PVD (peripheral vascular disease)      No family history on file.     REVIEW OF SYSTEMS     Review of Systems  Constitutional:  Denies fevers, chills, night sweats, loss of appetite.  HEENT: Denies visual changes,ear pain, sinus congestion, mouth pain or trouble swallowing, sore throat or  dental pain.  Neck: Denies neck pain or lumps.  Respiratory: Denies shortness of breath, coughing, wheezing or hemoptysis.  Cardiovascular:  Denies chest pain, palpitations or edema.  Gastrointestinal: Denies  nausea, vomiting, constipation or diarrhea.  Genitourinary:  Denies dysuria, +increased frequency, denies urgency or hematuria   Musculoskeletal:  Denies joint pain or swelling, difficulty walking.    Skin:  Denies rash or itching.  Neurologic:   Denies motor sensory loss, headaches or dizziness.    Psychiatric:  Denies changes in mood or behavior.      PHYSICAL EXAM     Vital Signs  Wt Readings from Last 3 Encounters:   07/09/24 64.2 kg (141 lb 9.6 oz)   06/26/24 66.3 kg (146 lb 1.6 oz)   06/21/24 66.3 kg (146 lb 2.6 oz)     Temp Readings from Last 3 Encounters:   07/09/24 96.8 °F (36 °C)   06/26/24 97.7 °F (36.5 °C) (Oral)   06/26/24 97.7 °F (36.5 °C) (Skin)     BP Readings from Last 3 Encounters:   07/09/24 (!) 152/72   06/26/24 (!) 150/72   06/26/24 (!) 155/68     Pulse Readings from Last 3 Encounters:   07/09/24 (!) 58   06/26/24 (!) 53   06/26/24 (!) 56       Physical Exam  General:   female breathing comfortable on room air  Eyes: Eyes with no icterus or injection. Vision grossly normal  Ears: Hearing grossly normal.  Nose: Nares patent  Mouth: Moist mucous membranes, dentition is . No ulcerations, erythema or exudates.  Neck: Supple, no tenderness to palpation.  Cardiovascular: Regular rate and rhythm, no murmurs, no edema.    Respiratory:  Clear to auscultation bilaterally, no tachypnea or increased work of breathing.  Gastrointestinal:  Soft with active bowel sounds, no tenderness to palpation, no distention.  Genitourinary:  No suprapubic tenderness.  No CVA tenderness  Musculoskeletal:  Moves all extremities with equal strength.    Skin:  Warm and dry, no obvious rashes.    Neuro:   Oriented, conversant, follows commands.  Psych: Good mood, normal affect.      WOUND     N/A    VASCULAR ACCESS DEVICE     N/A    LABS AND DIAGNOSTICS       Significant Labs: I have reviewed all relevant and available labs and microbiology. .    CBC    Latest Reference Range & Units 07/09/24 12:02   WBC 3.90 - 12.70 K/uL 7.16   RBC 4.00 - 5.40 M/uL 3.82 (L)   Hemoglobin 12.0 - 16.0 g/dL 11.5 (L)   Hematocrit 37.0 - 48.5 % 35.7 (L)   MCV 82 - 98 fL 94   MCH 27.0 - 31.0 pg 30.1   MCHC 32.0 - 36.0 g/dL 32.2   RDW 11.5 - 14.5 % 13.7   Platelet Count 150 - 450  "K/uL 254   MPV 9.2 - 12.9 fL 9.4   Gran % 38.0 - 73.0 % 65.7   Lymph % 18.0 - 48.0 % 24.2   Mono % 4.0 - 15.0 % 7.8   Eos % 0.0 - 8.0 % 1.5   Basophil % 0.0 - 1.9 % 0.4   Immature Granulocytes 0.0 - 0.5 % 0.4   Gran # (ANC) 1.8 - 7.7 K/uL 4.7   Lymph # 1.0 - 4.8 K/uL 1.7   Mono # 0.3 - 1.0 K/uL 0.6   Eos # 0.0 - 0.5 K/uL 0.1   Baso # 0.00 - 0.20 K/uL 0.03   Immature Grans (Abs) 0.00 - 0.04 K/uL 0.03   nRBC 0 /100 WBC 0   Differential Method  Automated   (L): Data is abnormally low      CHEMISTRY   Latest Reference Range & Units 07/09/24 12:02   Sodium 136 - 145 mmol/L 132 (L)   Potassium 3.5 - 5.1 mmol/L 4.6   Chloride 95 - 110 mmol/L 99   CO2 23 - 29 mmol/L 24   Anion Gap 8 - 16 mmol/L 9   BUN 8 - 23 mg/dL 41 (H)   Creatinine 0.5 - 1.4 mg/dL 2.9 (H)   eGFR >60 mL/min/1.73 m^2 16.4 !   Glucose 70 - 110 mg/dL 270 (H)   Calcium 8.7 - 10.5 mg/dL 8.7   ALP 55 - 135 U/L 94   PROTEIN TOTAL 6.0 - 8.4 g/dL 6.8   Albumin 3.5 - 5.2 g/dL 4.0   BILIRUBIN TOTAL 0.1 - 1.0 mg/dL 0.3   AST 10 - 40 U/L 17   ALT 10 - 44 U/L 13   CRP <1.00 mg/dL 0.40   (L): Data is abnormally low  (H): Data is abnormally high  !: Data is abnormal     Latest Reference Range & Units 07/09/24 12:02   Cholesterol Total 120 - 199 mg/dL 125   HDL 40 - 75 mg/dL 39 (L)   HDL/Cholesterol Ratio 20.0 - 50.0 % 31.2   Non-HDL Cholesterol mg/dL 86   Total Cholesterol/HDL Ratio 2.0 - 5.0  3.2   Triglycerides 30 - 150 mg/dL 170 (H)   LDL Cholesterol 63.0 - 159.0 mg/dL 52.0 (L)   (L): Data is abnormally low  (H): Data is abnormally high    Estimated Creatinine Clearance: 15.1 mL/min (A) (based on SCr of 2.9 mg/dL (H)).      INFLAMMATORY/PROCAL  LAST 7 DAYS    No results found for: "ESR"  No results found for: "CRP"    PRIOR  MICROBIOLOGY:    Susceptibility data from last 90 days.  Collected Specimen Info Organism AMOX/K CLAV'ATE Amp/Sulbactam Ampicillin Cefazolin Cefepime Ceftriaxone Ciprofloxacin Ertapenem Gentamicin Levofloxacin Meropenem Nitrofurantoin " PIPERACILLIN/TAZO SUSCEPTIBILITY Tetracycline   06/13/24 Urine, Catheterized Escherichia coli  S  I  R  S  S  S  S  S  S  S  S  S  S    05/16/24 Urine, Clean Catch Escherichia coli  S  I  R  S  S  S  S  S  S  S  S  S  S    05/01/24 Urine, Clean Catch Escherichia coli  S  R  R  S  S  S  S  S  S  S  S  S  S    04/12/24 Urine Escherichia coli   S  R  S  S  S  S  S  S  S  S  S  S  S     Collected Specimen Info Organism Tobramycin Trimeth/Sulfa   06/13/24 Urine, Catheterized Escherichia coli  S  S   05/16/24 Urine, Clean Catch Escherichia coli  S  S   05/01/24 Urine, Clean Catch Escherichia coli  S  S   04/12/24 Urine Escherichia coli  S  S       LAST 7 DAYS MICROBIOLOGY   Microbiology Results (last 7 days)       ** No results found for the last 168 hours. **            PATHOLOGY    6/26 Urine cytology:  6/26/2024   URINE, CYTOLOGIC PREPARATION:   - NEGATIVE FOR HIGH GRADE UROTHELIAL CARCINOMA       CURRENT/PREVIOUS VISIT EKG  Results for orders placed or performed during the hospital encounter of 03/17/24   EKG 12-lead    Collection Time: 03/17/24  3:37 PM   Result Value Ref Range    QRS Duration 86 ms    [facility] QTC Calculation 475 ms    Narrative    Test Reason : R06.02,    Vent. Rate : 066 BPM     Atrial Rate : 066 BPM     P-R Int : 142 ms          QRS Dur : 086 ms      QT Int : 454 ms       P-R-T Axes : 064 -48 070 degrees     QTc Int : 475 ms    Normal sinus rhythm  Left anterior fascicular block  Nonspecific T wave abnormality  Prolonged QT  Abnormal ECG  When compared with ECG of 30-MAR-2022 09:10,  T wave inversion now evident in Anterior leads  Confirmed by Kerwin DAWKINS, Bernard TEJADA ([id number]) on 3/23/2024 10:31:30 PM    Referred By: [email or web address]   SELF           Confirmed By:Bernard Suresh MD       Significant Diagnostics: I have reviewed all relevant and available diagnostic results.    Kidney US:   Impression:     1. Chronic right renal atrophy.  2. Normal left renal ultrasound.  Previously described small renal calculi  not currently identified.  3. Small postvoid residual.       CURRENT/PREVIOUS VISIT EKG  Results for orders placed or performed during the hospital encounter of 03/17/24   EKG 12-lead    Collection Time: 03/17/24  3:37 PM   Result Value Ref Range    QRS Duration 86 ms    [facility] QTC Calculation 475 ms    Narrative    Test Reason : R06.02,    Vent. Rate : 066 BPM     Atrial Rate : 066 BPM     P-R Int : 142 ms          QRS Dur : 086 ms      QT Int : 454 ms       P-R-T Axes : 064 -48 070 degrees     QTc Int : 475 ms    Normal sinus rhythm  Left anterior fascicular block  Nonspecific T wave abnormality  Prolonged QT  Abnormal ECG  When compared with ECG of 30-MAR-2022 09:10,  T wave inversion now evident in Anterior leads  Confirmed by Kerwin DAWKINS, Bernard TEJADA ([id number]) on 3/23/2024 10:31:30 PM    Referred By: [email or web address]   SELF           Confirmed By:Bernard Suresh MD       ASSESSMENT AND PLAN:     Recurrent UTIs, rule out acute UTI given increased urinary frequency   Last urine culture 6/26 no growth    Kidney ultrasound 6/20 negative for acute pathology  Will repeat labs today including UA with reflex   Advised to drink plenty of fluids  Alarm symptoms given to the patient  Will call patient with results     PMHx: HTN, CAD s/p CABG, COPD not on home O2, HLD, hypothyroidism, CKD not on HD  Follows with PCP    Complicated UTI (urinary tract infection)  -     Ambulatory referral/consult to Infectious Disease    Coronary artery disease involving left main coronary artery    Essential hypertension, dx 2018    S/P CABG x 1, 12/2018    Type 2 diabetes mellitus with complication, without long-term current use of insulin    Tobacco abuse        I spent a total of 47 minutes on the day of the visit.This includes face to face time and non-face to face time preparing to see the patient (eg, review of tests), obtaining and/or reviewing separately obtained history, documenting clinical information in the electronic or other health record,  independently interpreting results and communicating results to the patient/family/caregiver, or care coordinator.     Cathi Thorpe MD  Date of Service: 07/09/2024      This note was created using [employer]  voice recognition software that occasionally misinterpreted phrases or words.

## 2024-07-11 ENCOUNTER — TELEPHONE (OUTPATIENT)
Dept: INFECTIOUS DISEASES | Facility: CLINIC | Age: 74
End: 2024-07-11
Payer: MEDICARE

## 2024-07-11 DIAGNOSIS — B96.20 E. COLI MENINGITIS: ICD-10-CM

## 2024-07-11 DIAGNOSIS — A49.8 E COLI INFECTION: ICD-10-CM

## 2024-07-11 DIAGNOSIS — N30.00 ACUTE CYSTITIS WITHOUT HEMATURIA: Primary | ICD-10-CM

## 2024-07-11 DIAGNOSIS — G00.8 E. COLI MENINGITIS: ICD-10-CM

## 2024-07-11 LAB — BACTERIA UR CULT: ABNORMAL

## 2024-07-11 RX ORDER — CEPHALEXIN 500 MG/1
1000 CAPSULE ORAL EVERY 12 HOURS
Qty: 28 CAPSULE | Refills: 0 | Status: SHIPPED | OUTPATIENT
Start: 2024-07-11 | End: 2024-07-18

## 2024-07-11 NOTE — TELEPHONE ENCOUNTER
I spoke with patient to advise Dr Rivera called in   Keflex to pharmacy ; per Dr Rivera's orders   [facility]  7/11/24    ----- Message from Cathi Thorpe MD sent at 7/11/2024 11:20 AM CDT -----  Regarding: RE: order antibiotic  Thank you ma'am.  I sent Keflex for her to take 2 pills twice a day for 7 days  ----- Message -----  From: Nazia Emmanuel MA  Sent: 7/11/2024   9:27 AM CDT  To: Cathi Thorpe MD  Subject: order antibiotic                                 Found her :)  Need to send antibiotic for this patient   [facility]  7/11/24

## 2024-07-15 RX ORDER — PNEUMOCOCCAL 20-VALENT CONJUGATE VACCINE 2.2; 2.2; 2.2; 2.2; 2.2; 2.2; 2.2; 2.2; 2.2; 2.2; 2.2; 2.2; 2.2; 2.2; 2.2; 2.2; 4.4; 2.2; 2.2; 2.2 UG/.5ML; UG/.5ML; UG/.5ML; UG/.5ML; UG/.5ML; UG/.5ML; UG/.5ML; UG/.5ML; UG/.5ML; UG/.5ML; UG/.5ML; UG/.5ML; UG/.5ML; UG/.5ML; UG/.5ML; UG/.5ML; UG/.5ML; UG/.5ML; UG/.5ML; UG/.5ML
INJECTION, SUSPENSION INTRAMUSCULAR
COMMUNITY
Start: 2024-05-01

## 2024-07-15 RX ORDER — CEFTRIAXONE 1 G/1
INJECTION, POWDER, FOR SOLUTION INTRAMUSCULAR; INTRAVENOUS
COMMUNITY
Start: 2024-05-23

## 2024-07-15 RX ORDER — LOSARTAN POTASSIUM 100 MG/1
TABLET ORAL
COMMUNITY
Start: 2024-05-06

## 2024-07-18 ENCOUNTER — OFFICE VISIT (OUTPATIENT)
Dept: PULMONOLOGY | Facility: CLINIC | Age: 74
End: 2024-07-18
Payer: MEDICARE

## 2024-07-18 VITALS
OXYGEN SATURATION: 100 % | BODY MASS INDEX: 24.01 KG/M2 | WEIGHT: 144.31 LBS | HEART RATE: 57 BPM | SYSTOLIC BLOOD PRESSURE: 143 MMHG | DIASTOLIC BLOOD PRESSURE: 80 MMHG

## 2024-07-18 DIAGNOSIS — Z72.0 TOBACCO ABUSE: ICD-10-CM

## 2024-07-18 DIAGNOSIS — J44.9 CHRONIC OBSTRUCTIVE PULMONARY DISEASE, UNSPECIFIED COPD TYPE: Primary | ICD-10-CM

## 2024-07-18 DIAGNOSIS — I26.99 PULMONARY EMBOLISM, UNSPECIFIED CHRONICITY, UNSPECIFIED PULMONARY EMBOLISM TYPE, UNSPECIFIED WHETHER ACUTE COR PULMONALE PRESENT: ICD-10-CM

## 2024-07-18 DIAGNOSIS — G47.9 SLEEP DISORDER: ICD-10-CM

## 2024-07-18 PROCEDURE — 1160F RVW MEDS BY RX/DR IN RCRD: CPT | Mod: CPTII,S$GLB,, | Performed by: INTERNAL MEDICINE

## 2024-07-18 PROCEDURE — 3062F POS MACROALBUMINURIA REV: CPT | Mod: CPTII,S$GLB,, | Performed by: INTERNAL MEDICINE

## 2024-07-18 PROCEDURE — 3079F DIAST BP 80-89 MM HG: CPT | Mod: CPTII,S$GLB,, | Performed by: INTERNAL MEDICINE

## 2024-07-18 PROCEDURE — 3288F FALL RISK ASSESSMENT DOCD: CPT | Mod: CPTII,S$GLB,, | Performed by: INTERNAL MEDICINE

## 2024-07-18 PROCEDURE — 3052F HG A1C>EQUAL 8.0%<EQUAL 9.0%: CPT | Mod: CPTII,S$GLB,, | Performed by: INTERNAL MEDICINE

## 2024-07-18 PROCEDURE — 99214 OFFICE O/P EST MOD 30 MIN: CPT | Mod: S$GLB,,, | Performed by: INTERNAL MEDICINE

## 2024-07-18 PROCEDURE — 3077F SYST BP >= 140 MM HG: CPT | Mod: CPTII,S$GLB,, | Performed by: INTERNAL MEDICINE

## 2024-07-18 PROCEDURE — 1126F AMNT PAIN NOTED NONE PRSNT: CPT | Mod: CPTII,S$GLB,, | Performed by: INTERNAL MEDICINE

## 2024-07-18 PROCEDURE — 4010F ACE/ARB THERAPY RXD/TAKEN: CPT | Mod: CPTII,S$GLB,, | Performed by: INTERNAL MEDICINE

## 2024-07-18 PROCEDURE — 3066F NEPHROPATHY DOC TX: CPT | Mod: CPTII,S$GLB,, | Performed by: INTERNAL MEDICINE

## 2024-07-18 PROCEDURE — 1159F MED LIST DOCD IN RCRD: CPT | Mod: CPTII,S$GLB,, | Performed by: INTERNAL MEDICINE

## 2024-07-18 PROCEDURE — 1101F PT FALLS ASSESS-DOCD LE1/YR: CPT | Mod: CPTII,S$GLB,, | Performed by: INTERNAL MEDICINE

## 2024-07-18 NOTE — PROGRESS NOTES
Office Visit Note *    Patient Name: Epi Mcintosh  MRN: [id number]  : 1950      Reason for visit: PE    HPI:     2024 - Here for follow up after hospitalization where she was found to have a PE.   On ELIQUIS And tolerating it well.  Still having issues with kidney infections and we reviewed her recent lab work.  UA looks infected and she has macrodantin RX which she can  start.    2024 - Here for follow up, respiratory status remains stable.  She has had issues with BP after med changes and this has been addressed.  Also continues with recurrent UTI (E coli) - seeing multiple MD including Dr Rivera.      Past Medical History    Past Medical History:   Diagnosis Date    COPD (chronic obstructive pulmonary disease)     Coronary artery disease     Depression     Diabetes mellitus     Hypertension     Pancreatitis     PVD (peripheral vascular disease)        Past Surgical History    Past Surgical History:   Procedure Laterality Date    AORTOGRAPHY WITH SERIALOGRAPHY N/A 2022    Procedure: AORTOGRAM, WITH SERIALOGRAPHY;  Surgeon: Petr Kaufman MD;  Location: [facility] CATH/EP LAB;  Service: Peripheral Vascular;  Laterality: N/A;    CARDIAC SURGERY      CORONARY ARTERY BYPASS GRAFT  2018    CYSTOSCOPY N/A 2024    Procedure: CYSTOSCOPY;  Surgeon: Agustín Marinelli Jr., MD;  Location: [facility] ASU OR;  Service: Urology;  Laterality: N/A;    HYSTERECTOMY         Medications      Current Outpatient Medications:     albuterol (PROVENTIL/VENTOLIN HFA) 90 mcg/actuation inhaler, Inhale 2 puffs into the lungs 4 (four) times daily as needed., Disp: , Rfl:     amLODIPine (NORVASC) 2.5 MG tablet, Take 1 tablet (2.5 mg total) by mouth once daily., Disp: 30 tablet, Rfl: 2    apixaban (ELIQUIS) 5 mg Tab, Take 1 tablet (5 mg total) by mouth 2 (two) times daily., Disp: 30 tablet, Rfl: 3    BD ALCOHOL SWABS PadM, , Disp: , Rfl:     blood sugar diagnostic Strp, To check BG two times daily, to use with insurance  preferred meter, Disp: 50 strip, Rfl: 5    blood-glucose meter kit, To check BG two times daily, to use with insurance preferred meter, Disp: 1 each, Rfl: 0    cefTRIAXone (ROCEPHIN) 1 gram injection, , Disp: , Rfl:     cephALEXin (KEFLEX) 500 MG capsule, Take 2 capsules (1,000 mg total) by mouth every 12 (twelve) hours. for 7 days, Disp: 28 capsule, Rfl: 0    citalopram (CELEXA) 40 MG tablet, Take 1 tablet (40 mg total) by mouth once daily., Disp: 90 tablet, Rfl: 1    lancets Misc, To check BG two times daily, to use with insurance preferred meter, Disp: 50 each, Rfl: 5    levothyroxine (SYNTHROID) 88 MCG tablet, Take 1 tablet (88 mcg total) by mouth before breakfast., Disp: 90 tablet, Rfl: 1    losartan (COZAAR) 100 MG tablet, , Disp: , Rfl:     metoprolol tartrate (LOPRESSOR) 25 MG tablet, Take 1 tablet (25 mg total) by mouth 2 (two) times daily., Disp: 180 tablet, Rfl: 1    pantoprazole (PROTONIX) 40 MG tablet, Take 1 tablet (40 mg total) by mouth once daily., Disp: 90 tablet, Rfl: 1    pioglitazone (ACTOS) 30 MG tablet, Take 1 tablet (30 mg total) by mouth once daily., Disp: 90 tablet, Rfl: 1    PREVNAR 20, PF, 0.5 mL Syrg injection, , Disp: , Rfl:     rosuvastatin (CRESTOR) 40 MG Tab, Take 40 mg by mouth once daily., Disp: , Rfl:   No current facility-administered medications for this visit.    Facility-Administered Medications Ordered in Other Visits:     0.9%  NaCl infusion, , Intravenous, Continuous, Petr Kaufman MD, Last Rate: 125 mL/hr at 04/01/22 1019, New Bag at 04/01/22 1019    Allergies    Review of patient's allergies indicates:   Allergen Reactions    Plavix [clopidogrel] Rash    Zolpidem      Other reaction(s): Other (See Comments)  Seeing people who were not in the room     Codeine        SocHx    Social History     Tobacco Use   Smoking Status Every Day    Current packs/day: 0.00    Average packs/day: 0.5 packs/day for 52.0 years (26.0 ttl pk-yrs)    Types: Vaping w/o nicotine,  Cigarettes    Start date: 1969    Last attempt to quit: 2021    Years since quitting: 3.5   Smokeless Tobacco Never       Social History     Substance and Sexual Activity   Alcohol Use Yes    Comment: occasionally       FMHx    No family history on file.      Review of Systems  Review of Systems   Constitutional:  Negative for chills, diaphoresis, fever, malaise/fatigue and weight loss.   HENT:  Negative for congestion.    Eyes:  Negative for pain.   Respiratory:  Negative for cough, hemoptysis, sputum production, shortness of breath, wheezing and stridor.    Cardiovascular:  Negative for chest pain, palpitations, orthopnea, claudication, leg swelling and PND.   Gastrointestinal:  Positive for vomiting. Negative for abdominal pain, constipation, diarrhea, heartburn and nausea.   Genitourinary:  Positive for dysuria, frequency and urgency.   Musculoskeletal:  Negative for falls and myalgias.   Neurological:  Negative for sensory change, focal weakness and weakness.   Psychiatric/Behavioral:  Negative for depression, substance abuse and suicidal ideas. The patient is not nervous/anxious.        Physical Exam    Vitals:    07/18/24 1035   BP: (!) 143/80   BP Location: Left arm   Patient Position: Sitting   BP Method: Medium (Manual)   Pulse: (!) 57   SpO2: 100%   Weight: 65.5 kg (144 lb 4.8 oz)       Physical Exam  Vitals and nursing note reviewed.   Constitutional:       General: She is not in acute distress.     Appearance: Normal appearance. She is well-developed. She is not ill-appearing, toxic-appearing or diaphoretic.   HENT:      Head: Normocephalic and atraumatic.      Right Ear: External ear normal.      Left Ear: External ear normal.      Nose: Nose normal.      Mouth/Throat:      Mouth: Mucous membranes are moist.      Pharynx: Oropharynx is clear. No oropharyngeal exudate or posterior oropharyngeal erythema.   Eyes:      General: No scleral icterus.        Right eye: No discharge.         Left eye: No  discharge.      Extraocular Movements: Extraocular movements intact.      Conjunctiva/sclera: Conjunctivae normal.      Pupils: Pupils are equal, round, and reactive to light.   Neck:      Thyroid: No thyromegaly.      Vascular: No carotid bruit or JVD.      Trachea: No tracheal deviation.   Cardiovascular:      Rate and Rhythm: Normal rate and regular rhythm.      Heart sounds: Normal heart sounds. No murmur heard.     No friction rub. No gallop.   Pulmonary:      Effort: Pulmonary effort is normal. No respiratory distress.      Breath sounds: Normal breath sounds. No stridor. No wheezing, rhonchi or rales.   Chest:      Chest wall: No tenderness.   Abdominal:      General: Bowel sounds are normal. There is no distension.      Palpations: Abdomen is soft.      Tenderness: There is no abdominal tenderness. There is no guarding.   Musculoskeletal:         General: No tenderness. Normal range of motion.      Cervical back: Normal range of motion and neck supple. No rigidity or tenderness.      Right lower leg: No edema.      Left lower leg: No edema.   Lymphadenopathy:      Cervical: No cervical adenopathy.   Skin:     General: Skin is warm and dry.   Neurological:      General: No focal deficit present.      Mental Status: She is alert and oriented to person, place, and time. Mental status is at baseline.      Cranial Nerves: No cranial nerve deficit.      Motor: No weakness.      Gait: Gait normal.   Psychiatric:         Mood and Affect: Mood normal.         Behavior: Behavior normal.         Thought Content: Thought content normal.         Judgment: Judgment normal.         Labs    Lab Results   Component Value Date    WBC 7.16 07/09/2024    HGB 11.5 (L) 07/09/2024    HCT 35.7 (L) 07/09/2024     07/09/2024       Sodium   Date Value Ref Range Status   07/09/2024 132 (L) 136 - 145 mmol/L Final     Potassium   Date Value Ref Range Status   07/09/2024 4.6 3.5 - 5.1 mmol/L Final     Chloride   Date Value Ref  Range Status   07/09/2024 99 95 - 110 mmol/L Final     CO2   Date Value Ref Range Status   07/09/2024 24 23 - 29 mmol/L Final     Glucose   Date Value Ref Range Status   07/09/2024 270 (H) 70 - 110 mg/dL Final     BUN   Date Value Ref Range Status   07/09/2024 41 (H) 8 - 23 mg/dL Final     Creatinine   Date Value Ref Range Status   07/09/2024 2.9 (H) 0.5 - 1.4 mg/dL Final     Calcium   Date Value Ref Range Status   07/09/2024 8.7 8.7 - 10.5 mg/dL Final     Total Protein   Date Value Ref Range Status   07/09/2024 6.8 6.0 - 8.4 g/dL Final     Albumin   Date Value Ref Range Status   07/09/2024 4.0 3.5 - 5.2 g/dL Final     Total Bilirubin   Date Value Ref Range Status   07/09/2024 0.3 0.1 - 1.0 mg/dL Final     Comment:     For infants and newborns, interpretation of results should be based  on gestational age, weight and in agreement with clinical  observations.    Premature Infant recommended reference ranges:  Up to 24 hours.............<8.0 mg/dL  Up to 48 hours............<12.0 mg/dL  3-5 days..................<15.0 mg/dL  6-29 days.................<15.0 mg/dL       Alkaline Phosphatase   Date Value Ref Range Status   07/09/2024 94 55 - 135 U/L Final     AST   Date Value Ref Range Status   07/09/2024 17 10 - 40 U/L Final     ALT   Date Value Ref Range Status   07/09/2024 13 10 - 44 U/L Final     Anion Gap   Date Value Ref Range Status   07/09/2024 9 8 - 16 mmol/L Final       Xrays        Impression/Plan    Problem List Items Addressed This Visit          Pulmonary    COPD (chronic obstructive pulmonary disease) - Primary     Clinically stable            Hematology    Pulmonary embolism     Stable  Will continue anticoagulation through 9/2024  RTC 3 months            Other    Tobacco abuse     Still not smoking         Sleep disorder     Will follow                    Carlitos Pope MD

## 2024-07-24 ENCOUNTER — LAB VISIT (OUTPATIENT)
Dept: LAB | Facility: HOSPITAL | Age: 74
End: 2024-07-24
Attending: INTERNAL MEDICINE
Payer: MEDICARE

## 2024-07-24 DIAGNOSIS — N18.30 STAGE 3 CHRONIC KIDNEY DISEASE, UNSPECIFIED WHETHER STAGE 3A OR 3B CKD: ICD-10-CM

## 2024-07-24 DIAGNOSIS — E11.22 TYPE 2 DIABETES MELLITUS WITH STAGE 4 CHRONIC KIDNEY DISEASE, WITHOUT LONG-TERM CURRENT USE OF INSULIN: ICD-10-CM

## 2024-07-24 DIAGNOSIS — N17.9 ACUTE KIDNEY INJURY (NONTRAUMATIC): Primary | ICD-10-CM

## 2024-07-24 DIAGNOSIS — N18.4 TYPE 2 DIABETES MELLITUS WITH STAGE 4 CHRONIC KIDNEY DISEASE, WITHOUT LONG-TERM CURRENT USE OF INSULIN: ICD-10-CM

## 2024-07-24 DIAGNOSIS — N25.81 SECONDARY HYPERPARATHYROIDISM OF RENAL ORIGIN: ICD-10-CM

## 2024-07-24 DIAGNOSIS — I10 ESSENTIAL HYPERTENSION, MALIGNANT: ICD-10-CM

## 2024-07-24 LAB
ALBUMIN SERPL BCP-MCNC: 3.5 G/DL (ref 3.5–5.2)
ALBUMIN SERPL BCP-MCNC: 3.5 G/DL (ref 3.5–5.2)
ALP SERPL-CCNC: 122 U/L (ref 55–135)
ALT SERPL W/O P-5'-P-CCNC: 19 U/L (ref 10–44)
ANION GAP SERPL CALC-SCNC: 10 MMOL/L (ref 8–16)
ANION GAP SERPL CALC-SCNC: 10 MMOL/L (ref 8–16)
AST SERPL-CCNC: 23 U/L (ref 10–40)
BACTERIA #/AREA URNS HPF: ABNORMAL /HPF
BASOPHILS # BLD AUTO: 0.04 K/UL (ref 0–0.2)
BASOPHILS NFR BLD: 0.5 % (ref 0–1.9)
BILIRUB SERPL-MCNC: 0.3 MG/DL (ref 0.1–1)
BILIRUB UR QL STRIP: NEGATIVE
BUN SERPL-MCNC: 53 MG/DL (ref 8–23)
BUN SERPL-MCNC: 53 MG/DL (ref 8–23)
CALCIUM SERPL-MCNC: 9.1 MG/DL (ref 8.7–10.5)
CALCIUM SERPL-MCNC: 9.1 MG/DL (ref 8.7–10.5)
CHLORIDE SERPL-SCNC: 101 MMOL/L (ref 95–110)
CHLORIDE SERPL-SCNC: 101 MMOL/L (ref 95–110)
CLARITY UR: ABNORMAL
CO2 SERPL-SCNC: 19 MMOL/L (ref 23–29)
CO2 SERPL-SCNC: 19 MMOL/L (ref 23–29)
COLOR UR: YELLOW
CREAT SERPL-MCNC: 3.6 MG/DL (ref 0.5–1.4)
CREAT SERPL-MCNC: 3.6 MG/DL (ref 0.5–1.4)
CREAT UR-MCNC: 19 MG/DL (ref 15–325)
DIFFERENTIAL METHOD BLD: ABNORMAL
EOSINOPHIL # BLD AUTO: 0.1 K/UL (ref 0–0.5)
EOSINOPHIL NFR BLD: 1.1 % (ref 0–8)
ERYTHROCYTE [DISTWIDTH] IN BLOOD BY AUTOMATED COUNT: 13.5 % (ref 11.5–14.5)
EST. GFR  (NO RACE VARIABLE): 12.6 ML/MIN/1.73 M^2
EST. GFR  (NO RACE VARIABLE): 12.6 ML/MIN/1.73 M^2
ESTIMATED AVG GLUCOSE: 263 MG/DL (ref 68–131)
GLUCOSE SERPL-MCNC: 266 MG/DL (ref 70–110)
GLUCOSE SERPL-MCNC: 266 MG/DL (ref 70–110)
GLUCOSE UR QL STRIP: ABNORMAL
HBA1C MFR BLD: 10.8 % (ref 4–5.6)
HCT VFR BLD AUTO: 36.5 % (ref 37–48.5)
HGB BLD-MCNC: 12.1 G/DL (ref 12–16)
HGB UR QL STRIP: ABNORMAL
HYALINE CASTS #/AREA URNS LPF: 0 /LPF
IMM GRANULOCYTES # BLD AUTO: 0.05 K/UL (ref 0–0.04)
IMM GRANULOCYTES NFR BLD AUTO: 0.6 % (ref 0–0.5)
KETONES UR QL STRIP: NEGATIVE
LEUKOCYTE ESTERASE UR QL STRIP: ABNORMAL
LYMPHOCYTES # BLD AUTO: 2 K/UL (ref 1–4.8)
LYMPHOCYTES NFR BLD: 24.1 % (ref 18–48)
MAGNESIUM SERPL-MCNC: 1.8 MG/DL (ref 1.6–2.6)
MCH RBC QN AUTO: 30 PG (ref 27–31)
MCHC RBC AUTO-ENTMCNC: 33.2 G/DL (ref 32–36)
MCV RBC AUTO: 91 FL (ref 82–98)
MICROSCOPIC COMMENT: ABNORMAL
MONOCYTES # BLD AUTO: 0.5 K/UL (ref 0.3–1)
MONOCYTES NFR BLD: 5.9 % (ref 4–15)
NEUTROPHILS # BLD AUTO: 5.8 K/UL (ref 1.8–7.7)
NEUTROPHILS NFR BLD: 67.8 % (ref 38–73)
NITRITE UR QL STRIP: NEGATIVE
NRBC BLD-RTO: 0 /100 WBC
PH UR STRIP: 6 [PH] (ref 5–8)
PHOSPHATE SERPL-MCNC: 4.4 MG/DL (ref 2.7–4.5)
PLATELET # BLD AUTO: 237 K/UL (ref 150–450)
PMV BLD AUTO: 9.3 FL (ref 9.2–12.9)
POTASSIUM SERPL-SCNC: 5.2 MMOL/L (ref 3.5–5.1)
POTASSIUM SERPL-SCNC: 5.2 MMOL/L (ref 3.5–5.1)
PROT SERPL-MCNC: 7 G/DL (ref 6–8.4)
PROT UR QL STRIP: ABNORMAL
PROT UR-MCNC: 58 MG/DL (ref 0–15)
PROT/CREAT UR: 3.05 MG/G{CREAT} (ref 0–0.2)
PTH-INTACT SERPL-MCNC: 83.3 PG/ML (ref 9–77)
RBC # BLD AUTO: 4.03 M/UL (ref 4–5.4)
RBC #/AREA URNS HPF: 4 /HPF (ref 0–4)
SODIUM SERPL-SCNC: 130 MMOL/L (ref 136–145)
SODIUM SERPL-SCNC: 130 MMOL/L (ref 136–145)
SP GR UR STRIP: 1.01 (ref 1–1.03)
SQUAMOUS #/AREA URNS HPF: 1 /HPF
URATE SERPL-MCNC: 5.7 MG/DL (ref 2.4–5.7)
URN SPEC COLLECT METH UR: ABNORMAL
UROBILINOGEN UR STRIP-ACNC: NEGATIVE EU/DL
WBC # BLD AUTO: 8.48 K/UL (ref 3.9–12.7)
WBC #/AREA URNS HPF: >100 /HPF (ref 0–5)

## 2024-07-24 PROCEDURE — 83970 ASSAY OF PARATHORMONE: CPT | Performed by: INTERNAL MEDICINE

## 2024-07-24 PROCEDURE — 84156 ASSAY OF PROTEIN URINE: CPT | Performed by: INTERNAL MEDICINE

## 2024-07-24 PROCEDURE — 85025 COMPLETE CBC W/AUTO DIFF WBC: CPT | Performed by: INTERNAL MEDICINE

## 2024-07-24 PROCEDURE — 82570 ASSAY OF URINE CREATININE: CPT | Performed by: INTERNAL MEDICINE

## 2024-07-24 PROCEDURE — 36415 COLL VENOUS BLD VENIPUNCTURE: CPT | Performed by: INTERNAL MEDICINE

## 2024-07-24 PROCEDURE — 80053 COMPREHEN METABOLIC PANEL: CPT | Performed by: NURSE PRACTITIONER

## 2024-07-24 PROCEDURE — 83735 ASSAY OF MAGNESIUM: CPT | Performed by: INTERNAL MEDICINE

## 2024-07-24 PROCEDURE — 84550 ASSAY OF BLOOD/URIC ACID: CPT | Performed by: INTERNAL MEDICINE

## 2024-07-24 PROCEDURE — 84100 ASSAY OF PHOSPHORUS: CPT | Performed by: INTERNAL MEDICINE

## 2024-07-24 PROCEDURE — 81000 URINALYSIS NONAUTO W/SCOPE: CPT | Performed by: INTERNAL MEDICINE

## 2024-07-24 PROCEDURE — 83036 HEMOGLOBIN GLYCOSYLATED A1C: CPT | Performed by: NURSE PRACTITIONER

## 2024-07-24 PROCEDURE — 82306 VITAMIN D 25 HYDROXY: CPT | Performed by: INTERNAL MEDICINE

## 2024-07-24 PROCEDURE — 80069 RENAL FUNCTION PANEL: CPT | Performed by: INTERNAL MEDICINE

## 2024-07-25 ENCOUNTER — TELEPHONE (OUTPATIENT)
Dept: FAMILY MEDICINE | Facility: CLINIC | Age: 74
End: 2024-07-25
Payer: MEDICARE

## 2024-07-25 LAB — 25(OH)D3+25(OH)D2 SERPL-MCNC: 30 NG/ML (ref 30–96)

## 2024-07-25 NOTE — TELEPHONE ENCOUNTER
----- Message from FRANCISCO Monteiro sent at 7/25/2024  7:49 AM CDT -----  Please notify patient that results show worsening A1C to 10.8- kidney function worsened as well as sodium; need to see nephrology about this; will need to discuss other diabetes treatments at the follow up. Please verify patient has a follow-up appointment to review the results.

## 2024-07-25 NOTE — TELEPHONE ENCOUNTER
Called patient to give her the lab results. Received voice mail. Left message and call back number.

## 2024-07-25 NOTE — PROGRESS NOTES
Please notify patient that results show worsening A1C to 10.8- kidney function worsened as well as sodium; need to see nephrology about this; will need to discuss other diabetes treatments at the follow up. Please verify patient has a follow-up appointment to review the results.

## 2024-07-26 ENCOUNTER — PATIENT MESSAGE (OUTPATIENT)
Dept: ADMINISTRATIVE | Facility: HOSPITAL | Age: 74
End: 2024-07-26
Payer: MEDICARE

## 2024-07-26 DIAGNOSIS — N18.4 TYPE 2 DIABETES MELLITUS WITH STAGE 4 CHRONIC KIDNEY DISEASE, WITHOUT LONG-TERM CURRENT USE OF INSULIN: ICD-10-CM

## 2024-07-26 DIAGNOSIS — E11.22 TYPE 2 DIABETES MELLITUS WITH STAGE 4 CHRONIC KIDNEY DISEASE, WITHOUT LONG-TERM CURRENT USE OF INSULIN: ICD-10-CM

## 2024-07-29 DIAGNOSIS — E78.2 MIXED HYPERLIPIDEMIA: ICD-10-CM

## 2024-07-29 DIAGNOSIS — N18.4 TYPE 2 DIABETES MELLITUS WITH STAGE 4 CHRONIC KIDNEY DISEASE, WITHOUT LONG-TERM CURRENT USE OF INSULIN: Primary | ICD-10-CM

## 2024-07-29 DIAGNOSIS — I10 ESSENTIAL HYPERTENSION: ICD-10-CM

## 2024-07-29 DIAGNOSIS — E11.22 TYPE 2 DIABETES MELLITUS WITH STAGE 4 CHRONIC KIDNEY DISEASE, WITHOUT LONG-TERM CURRENT USE OF INSULIN: Primary | ICD-10-CM

## 2024-07-29 RX ORDER — INSULIN PUMP SYRINGE, 3 ML
EACH MISCELLANEOUS
Qty: 1 EACH | Refills: 0 | Status: SHIPPED | OUTPATIENT
Start: 2024-07-29

## 2024-07-29 RX ORDER — ROSUVASTATIN CALCIUM 40 MG/1
40 TABLET, COATED ORAL DAILY
Qty: 90 TABLET | Refills: 0 | Status: CANCELLED | OUTPATIENT
Start: 2024-07-29

## 2024-07-29 RX ORDER — AMLODIPINE BESYLATE 2.5 MG/1
2.5 TABLET ORAL DAILY
Qty: 30 TABLET | Refills: 2 | Status: CANCELLED | OUTPATIENT
Start: 2024-07-29

## 2024-07-29 RX ORDER — LANCETS 33 GAUGE
EACH MISCELLANEOUS
Qty: 100 EACH | Refills: 2 | Status: SHIPPED | OUTPATIENT
Start: 2024-07-29

## 2024-07-29 NOTE — TELEPHONE ENCOUNTER
Patient needs her maintenance medications sent to [facility]. Only antibiotics or something needed fast should be sent to [facility].  Patient stated that she is not taking Norvasc and went back to just the losartan because it works. She stated her pressure was stating high.   Please advise

## 2024-07-30 VITALS — DIASTOLIC BLOOD PRESSURE: 75 MMHG | SYSTOLIC BLOOD PRESSURE: 160 MMHG

## 2024-07-30 DIAGNOSIS — I10 ESSENTIAL HYPERTENSION: ICD-10-CM

## 2024-07-30 RX ORDER — AMLODIPINE BESYLATE 5 MG/1
5 TABLET ORAL DAILY
Qty: 30 TABLET | Refills: 0 | Status: SHIPPED | OUTPATIENT
Start: 2024-07-30

## 2024-07-30 RX ORDER — AMLODIPINE BESYLATE 5 MG/1
5 TABLET ORAL DAILY
Qty: 90 TABLET | Refills: 1 | Status: SHIPPED | OUTPATIENT
Start: 2024-07-30 | End: 2024-07-30 | Stop reason: SDUPTHER

## 2024-07-30 RX ORDER — LOSARTAN POTASSIUM 100 MG/1
100 TABLET ORAL DAILY
Qty: 90 TABLET | Refills: 0 | OUTPATIENT
Start: 2024-07-30

## 2024-07-30 NOTE — TELEPHONE ENCOUNTER
She should not be taking the losartan unless the kidney specialist says ok- her kidney function is not good; if her BP is running high, I will increase her amlodipine; please let me know if she needs to have it increased

## 2024-07-30 NOTE — TELEPHONE ENCOUNTER
----- Message from Stefani Cuadra sent at 7/30/2024 11:22 AM CDT -----  Regarding: Refill for amLODIPine (NORVASC) 5 MG tablet  Patient came in requesting a refill for amLODIPine (NORVASC) 5 MG tablet to be sent to [facility] in [place] until she can get her regular refill from [facility]. States that she has none of medication at this time.       Thank You

## 2024-07-30 NOTE — TELEPHONE ENCOUNTER
Spoke with patient and advised as ordered. Patient says her BP has been running 211/90 and 160/75. Patient advised to stop taking Losartan and we will call her back with NP recommendations. Patient verbalized understanding.

## 2024-07-30 NOTE — TELEPHONE ENCOUNTER
I have increased her amlodipine; please have her come in for BP check in 1 mo if she is not already scheduled

## 2024-07-31 NOTE — TELEPHONE ENCOUNTER
----- Message from Wilbur Hoang sent at 7/31/2024  1:11 PM CDT -----  Regarding: Refill Request  Contact: Epi  The patient called to refill her Eliquis medication to be filled at [facility] in [place], [place].

## 2024-08-02 ENCOUNTER — PATIENT MESSAGE (OUTPATIENT)
Dept: ADMINISTRATIVE | Facility: HOSPITAL | Age: 74
End: 2024-08-02
Payer: MEDICARE

## 2024-08-12 ENCOUNTER — OFFICE VISIT (OUTPATIENT)
Dept: FAMILY MEDICINE | Facility: CLINIC | Age: 74
End: 2024-08-12
Payer: MEDICARE

## 2024-08-12 VITALS
HEART RATE: 55 BPM | SYSTOLIC BLOOD PRESSURE: 142 MMHG | DIASTOLIC BLOOD PRESSURE: 70 MMHG | WEIGHT: 140.81 LBS | BODY MASS INDEX: 23.46 KG/M2 | HEIGHT: 65 IN | OXYGEN SATURATION: 99 % | TEMPERATURE: 98 F

## 2024-08-12 DIAGNOSIS — N18.4 TYPE 2 DIABETES MELLITUS WITH STAGE 4 CHRONIC KIDNEY DISEASE, WITHOUT LONG-TERM CURRENT USE OF INSULIN: ICD-10-CM

## 2024-08-12 DIAGNOSIS — E11.22 TYPE 2 DIABETES MELLITUS WITH STAGE 4 CHRONIC KIDNEY DISEASE, WITHOUT LONG-TERM CURRENT USE OF INSULIN: ICD-10-CM

## 2024-08-12 DIAGNOSIS — I10 ESSENTIAL HYPERTENSION: Primary | ICD-10-CM

## 2024-08-12 DIAGNOSIS — F17.210 CIGARETTE SMOKER: ICD-10-CM

## 2024-08-12 PROCEDURE — 1160F RVW MEDS BY RX/DR IN RCRD: CPT | Mod: CPTII,S$GLB,, | Performed by: NURSE PRACTITIONER

## 2024-08-12 PROCEDURE — 99214 OFFICE O/P EST MOD 30 MIN: CPT | Mod: S$GLB,,, | Performed by: NURSE PRACTITIONER

## 2024-08-12 PROCEDURE — 3062F POS MACROALBUMINURIA REV: CPT | Mod: CPTII,S$GLB,, | Performed by: NURSE PRACTITIONER

## 2024-08-12 PROCEDURE — 3077F SYST BP >= 140 MM HG: CPT | Mod: CPTII,S$GLB,, | Performed by: NURSE PRACTITIONER

## 2024-08-12 PROCEDURE — 3288F FALL RISK ASSESSMENT DOCD: CPT | Mod: CPTII,S$GLB,, | Performed by: NURSE PRACTITIONER

## 2024-08-12 PROCEDURE — 1126F AMNT PAIN NOTED NONE PRSNT: CPT | Mod: CPTII,S$GLB,, | Performed by: NURSE PRACTITIONER

## 2024-08-12 PROCEDURE — 3066F NEPHROPATHY DOC TX: CPT | Mod: CPTII,S$GLB,, | Performed by: NURSE PRACTITIONER

## 2024-08-12 PROCEDURE — 99999 PR PBB SHADOW E&M-EST. PATIENT-LVL III: CPT | Mod: PBBFAC,,, | Performed by: NURSE PRACTITIONER

## 2024-08-12 PROCEDURE — 1101F PT FALLS ASSESS-DOCD LE1/YR: CPT | Mod: CPTII,S$GLB,, | Performed by: NURSE PRACTITIONER

## 2024-08-12 PROCEDURE — 3078F DIAST BP <80 MM HG: CPT | Mod: CPTII,S$GLB,, | Performed by: NURSE PRACTITIONER

## 2024-08-12 PROCEDURE — 1159F MED LIST DOCD IN RCRD: CPT | Mod: CPTII,S$GLB,, | Performed by: NURSE PRACTITIONER

## 2024-08-12 PROCEDURE — 3046F HEMOGLOBIN A1C LEVEL >9.0%: CPT | Mod: CPTII,S$GLB,, | Performed by: NURSE PRACTITIONER

## 2024-08-12 PROCEDURE — 4010F ACE/ARB THERAPY RXD/TAKEN: CPT | Mod: CPTII,S$GLB,, | Performed by: NURSE PRACTITIONER

## 2024-08-12 RX ORDER — PEN NEEDLE, DIABETIC 30 GX3/16"
NEEDLE, DISPOSABLE MISCELLANEOUS
Qty: 100 EACH | Refills: 2 | Status: SHIPPED | OUTPATIENT
Start: 2024-08-12

## 2024-08-12 RX ORDER — AMLODIPINE BESYLATE 5 MG/1
5 TABLET ORAL DAILY
Qty: 30 TABLET | Refills: 0 | Status: CANCELLED | OUTPATIENT
Start: 2024-08-12

## 2024-08-12 RX ORDER — AMLODIPINE BESYLATE 5 MG/1
5 TABLET ORAL DAILY
Qty: 90 TABLET | Refills: 1 | Status: SHIPPED | OUTPATIENT
Start: 2024-08-12

## 2024-08-12 RX ORDER — INSULIN GLARGINE 100 [IU]/ML
10 INJECTION, SOLUTION SUBCUTANEOUS NIGHTLY
Qty: 3 ML | Refills: 2 | Status: SHIPPED | OUTPATIENT
Start: 2024-08-12

## 2024-08-12 NOTE — PROGRESS NOTES
SUBJECTIVE:      Patient ID: Epi Mcintosh is a 74 y.o. female.    Chief Complaint: Follow-up (DM and HTN /Glucose- 260s range /Nephrologist- states her A1C was 10)    Epi is here for f/u on DM/HTN today. PMH includes tobacco use, COPD, CAD, depression, diabetes, hypertension, hypothyroidism, pancreatitis, peripheral vascular disease /pad status post stent in the right leg with angioplasty stenting of the right superficial femoral and right iliac arteries. Taking all meds as prescribed daily- no SE or complaints.  Has seen the nephrologist for her Kidney function since last visit-now in stage 5 CKD per Dr. Hernandez.  BP is improving significantly but still has a few elevated readings on Norvasc 5 mg and metoprolol twice daily as prescribed. It has been less than a month since last increase. She is tolerating Actos 30 mg without side effects or complaints.  Blood sugar has been running 220-260s recently. A1C recently shows 10.8 results. Feeling stressed about her health, but denies other health complaints at this time.     Overdue HM reviewed- currently smoking and has only stopped a few short days at a time             No family history on file.   Social History     Socioeconomic History    Marital status:    Tobacco Use    Smoking status: Every Day     Current packs/day: 0.50     Average packs/day: 0.5 packs/day for 55.6 years (27.8 ttl pk-yrs)     Types: Vaping w/o nicotine, Cigarettes     Start date: 1969    Smokeless tobacco: Never   Substance and Sexual Activity    Alcohol use: Yes     Comment: occasionally    Drug use: Never     Social Determinants of Health     Financial Resource Strain: High Risk (4/5/2024)    Overall Financial Resource Strain (CARDIA)     Difficulty of Paying Living Expenses: Very hard   Food Insecurity: Food Insecurity Present (4/5/2024)    Hunger Vital Sign     Worried About Running Out of Food in the Last Year: Often true     Ran Out of Food in the Last Year: Often true    Transportation Needs: No Transportation Needs (4/5/2024)    PRAPARE - Transportation     Lack of Transportation (Medical): No     Lack of Transportation (Non-Medical): No   Physical Activity: Sufficiently Active (4/5/2024)    Exercise Vital Sign     Days of Exercise per Week: 3 days     Minutes of Exercise per Session: 60 min   Stress: Stress Concern Present (4/5/2024)    [employer] [place] [employer] - Occupational Stress Questionnaire     Feeling of Stress : Rather much   Housing Stability: Low Risk  (4/5/2024)    Housing Stability Vital Sign     Unable to Pay for Housing in the Last Year: No     Number of Places Lived in the Last Year: 1     Unstable Housing in the Last Year: No     Current Outpatient Medications   Medication Sig Dispense Refill    albuterol (PROVENTIL/VENTOLIN HFA) 90 mcg/actuation inhaler Inhale 2 puffs into the lungs 4 (four) times daily as needed.      apixaban (ELIQUIS) 5 mg Tab Take 1 tablet (5 mg total) by mouth 2 (two) times daily. 30 tablet 3    BD ALCOHOL SWABS PadM       blood sugar diagnostic (TRUE METRIX GLUCOSE TEST STRIP) Strp Sig: To check BG two times daily, to use with insurance preferred meter 100 strip 2    blood-glucose meter (TRUE METRIX AIR GLUCOSE METER) kit To check BG two times daily, to use with insurance preferred meter 1 each 0    cefTRIAXone (ROCEPHIN) 1 gram injection       citalopram (CELEXA) 40 MG tablet Take 1 tablet (40 mg total) by mouth once daily. 90 tablet 1    lancets (TRUEPLUS LANCETS) 33 gauge Misc To check BG two times daily, to use with insurance preferred meter 100 each 2    lancets Misc To check BG two times daily, to use with insurance preferred meter 50 each 5    levothyroxine (SYNTHROID) 88 MCG tablet Take 1 tablet (88 mcg total) by mouth before breakfast. 90 tablet 1    metoprolol tartrate (LOPRESSOR) 25 MG tablet Take 1 tablet (25 mg total) by mouth 2 (two) times daily. 180 tablet 1    pantoprazole (PROTONIX) 40 MG tablet Take 1  "tablet (40 mg total) by mouth once daily. 90 tablet 1    pioglitazone (ACTOS) 30 MG tablet Take 1 tablet (30 mg total) by mouth once daily. 90 tablet 1    rosuvastatin (CRESTOR) 40 MG Tab Take 40 mg by mouth once daily.      amLODIPine (NORVASC) 5 MG tablet Take 1 tablet (5 mg total) by mouth once daily. 90 tablet 1    insulin glargine U-100, Lantus, (LANTUS SOLOSTAR U-100 INSULIN) 100 unit/mL (3 mL) InPn pen Inject 10 Units into the skin every evening. 3 mL 2    pen needle, diabetic 32 gauge x 5/32" Ndle Use 1 pen needle nightly to administer insulin 100 each 2     No current facility-administered medications for this visit.     Facility-Administered Medications Ordered in Other Visits   Medication Dose Route Frequency Provider Last Rate Last Admin    0.9%  NaCl infusion   Intravenous Continuous Petr Kaufman  mL/hr at 04/01/22 1019 New Bag at 04/01/22 1019     Review of patient's allergies indicates:   Allergen Reactions    Plavix [clopidogrel] Rash    Zolpidem      Other reaction(s): Other (See Comments)  Seeing people who were not in the room     Codeine       Past Medical History:   Diagnosis Date    COPD (chronic obstructive pulmonary disease)     Coronary artery disease     Depression     Diabetes mellitus     Hypertension     Pancreatitis     PVD (peripheral vascular disease)      Past Surgical History:   Procedure Laterality Date    AORTOGRAPHY WITH SERIALOGRAPHY N/A 4/1/2022    Procedure: AORTOGRAM, WITH SERIALOGRAPHY;  Surgeon: Petr Kaufman MD;  Location: [facility] CATH/EP LAB;  Service: Peripheral Vascular;  Laterality: N/A;    CARDIAC SURGERY      CORONARY ARTERY BYPASS GRAFT  12/2018    CYSTOSCOPY N/A 6/26/2024    Procedure: CYSTOSCOPY;  Surgeon: Agustín Marinelli Jr., MD;  Location: [facility] ASU OR;  Service: Urology;  Laterality: N/A;    HYSTERECTOMY         Review of Systems   Constitutional:  Negative for activity change, appetite change, chills, fatigue, fever and unexpected weight " "change.   HENT:  Negative for congestion, ear discharge, ear pain and sore throat.    Eyes:  Negative for photophobia, pain, discharge and visual disturbance.   Respiratory:  Negative for cough, chest tightness and shortness of breath.    Cardiovascular:  Negative for chest pain, palpitations and leg swelling.   Gastrointestinal:  Negative for abdominal pain, constipation, diarrhea, nausea and vomiting.   Endocrine: Positive for polydipsia. Negative for cold intolerance, heat intolerance, polyphagia and polyuria.   Genitourinary:  Negative for decreased urine volume, difficulty urinating, dysuria, flank pain, frequency and hematuria.   Musculoskeletal:  Negative for arthralgias and gait problem.   Skin:  Negative for wound.   Allergic/Immunologic: Negative for immunocompromised state.   Neurological:  Negative for dizziness, speech difficulty and headaches.   Hematological:  Negative for adenopathy. Bruises/bleeds easily.   Psychiatric/Behavioral:  Negative for confusion, self-injury and suicidal ideas.       OBJECTIVE:      Vitals:    08/12/24 1052   BP: (!) 142/70   BP Location: Right arm   Patient Position: Sitting   BP Method: Medium (Manual)   Pulse: (!) 55   Temp: 98 °F (36.7 °C)   TempSrc: Oral   SpO2: 99%   Weight: 63.9 kg (140 lb 12.8 oz)   Height: 5' 5" (1.651 m)     Physical Exam  Vitals and nursing note reviewed.   Constitutional:       General: She is not in acute distress.     Appearance: Normal appearance. She is normal weight. She is not ill-appearing.   HENT:      Mouth/Throat:      Mouth: Mucous membranes are moist.   Eyes:      Pupils: Pupils are equal, round, and reactive to light.   Neck:      Thyroid: No thyroid mass or thyromegaly.   Cardiovascular:      Rate and Rhythm: Regular rhythm. Bradycardia present.      Heart sounds: Normal heart sounds. No murmur heard.  Pulmonary:      Effort: Pulmonary effort is normal. No respiratory distress.      Breath sounds: Normal breath sounds. No wheezing, " "rhonchi or rales.   Abdominal:      Palpations: Abdomen is soft.      Tenderness: There is no abdominal tenderness. There is no right CVA tenderness or left CVA tenderness.   Musculoskeletal:      Cervical back: Normal range of motion and neck supple.      Right lower leg: No edema.      Left lower leg: No edema.   Lymphadenopathy:      Cervical: No cervical adenopathy.   Skin:     General: Skin is warm and dry.      Coloration: Skin is not jaundiced or pale.   Neurological:      Mental Status: She is alert.   Psychiatric:         Mood and Affect: Mood normal.         Behavior: Behavior normal.         Thought Content: Thought content normal.         Judgment: Judgment normal.        Assessment:       1. Essential hypertension, dx 2018    2. Type 2 diabetes mellitus with stage 4 chronic kidney disease, without long-term current use of insulin    3. Former cigarette smoker        Plan:       Essential hypertension, dx 2018  -     amLODIPine (NORVASC) 5 MG tablet; Take 1 tablet (5 mg total) by mouth once daily.  Dispense: 90 tablet; Refill: 1  -cont medications; improving; continue your log at home and call to give me readings in 2-3 weeks, may increase to 10 mg daily    Type 2 diabetes mellitus with stage 4 chronic kidney disease, without long-term current use of insulin  -     insulin glargine U-100, Lantus, (LANTUS SOLOSTAR U-100 INSULIN) 100 unit/mL (3 mL) InPn pen; Inject 10 Units into the skin every evening.  Dispense: 3 mL; Refill: 2  -     pen needle, diabetic 32 gauge x 5/32" Ndle; Use 1 pen needle nightly to administer insulin  Dispense: 100 each; Refill: 2  -A1c uncontrolled, 10.8 up from 8.2; now in stage 5 CKD, can continue Actos as this is renal safe; need to add insulin at night once daily 10 units, discussed proper use, storage, and possible side effects; patient is meeting with a CKD and diabetes nutritionist soon; advised do not skip meals, keep blood sugar log for follow-up appointment    Former " cigarette smoker  -     CT Chest Lung Screening Low Dose; Future; Expected date: 08/12/2024   -patient had quit smoking but is now smoking again, get baseline CT screening      Follow up in about 1 month (around 9/12/2024) for f/u HTN, DM .      8/12/2024 ASTER Mccabe, FNP    This note was created using [employer] voice recognition software that occasionally misinterprets phrases or words.

## 2024-08-19 ENCOUNTER — LAB VISIT (OUTPATIENT)
Dept: LAB | Facility: HOSPITAL | Age: 74
End: 2024-08-19
Attending: INTERNAL MEDICINE
Payer: MEDICARE

## 2024-08-19 DIAGNOSIS — N18.30 CHRONIC KIDNEY DISEASE, STAGE III (MODERATE): Primary | ICD-10-CM

## 2024-08-19 LAB
ALBUMIN SERPL BCP-MCNC: 3.4 G/DL (ref 3.5–5.2)
ANION GAP SERPL CALC-SCNC: 11 MMOL/L (ref 8–16)
BACTERIA #/AREA URNS HPF: ABNORMAL /HPF
BILIRUB UR QL STRIP: NEGATIVE
BUN SERPL-MCNC: 55 MG/DL (ref 8–23)
CALCIUM SERPL-MCNC: 9 MG/DL (ref 8.7–10.5)
CHLORIDE SERPL-SCNC: 102 MMOL/L (ref 95–110)
CLARITY UR: ABNORMAL
CO2 SERPL-SCNC: 19 MMOL/L (ref 23–29)
COLOR UR: YELLOW
CREAT SERPL-MCNC: 3.7 MG/DL (ref 0.5–1.4)
CREAT UR-MCNC: 84.8 MG/DL (ref 15–325)
EST. GFR  (NO RACE VARIABLE): 12.2 ML/MIN/1.73 M^2
GLUCOSE SERPL-MCNC: 241 MG/DL (ref 70–110)
GLUCOSE UR QL STRIP: ABNORMAL
HGB UR QL STRIP: ABNORMAL
HYALINE CASTS #/AREA URNS LPF: 0 /LPF
KETONES UR QL STRIP: NEGATIVE
LEUKOCYTE ESTERASE UR QL STRIP: ABNORMAL
MICROSCOPIC COMMENT: ABNORMAL
NITRITE UR QL STRIP: POSITIVE
PH UR STRIP: 6 [PH] (ref 5–8)
PHOSPHATE SERPL-MCNC: 4.6 MG/DL (ref 2.7–4.5)
POTASSIUM SERPL-SCNC: 4.8 MMOL/L (ref 3.5–5.1)
PROT UR QL STRIP: ABNORMAL
PROT UR-MCNC: 168 MG/DL (ref 0–15)
RBC #/AREA URNS HPF: 8 /HPF (ref 0–4)
SODIUM SERPL-SCNC: 132 MMOL/L (ref 136–145)
SP GR UR STRIP: 1.01 (ref 1–1.03)
SQUAMOUS #/AREA URNS HPF: 2 /HPF
URN SPEC COLLECT METH UR: ABNORMAL
UROBILINOGEN UR STRIP-ACNC: NEGATIVE EU/DL
WBC #/AREA URNS HPF: >100 /HPF (ref 0–5)

## 2024-08-19 PROCEDURE — 36415 COLL VENOUS BLD VENIPUNCTURE: CPT | Performed by: INTERNAL MEDICINE

## 2024-08-19 PROCEDURE — 84156 ASSAY OF PROTEIN URINE: CPT | Performed by: INTERNAL MEDICINE

## 2024-08-19 PROCEDURE — 80069 RENAL FUNCTION PANEL: CPT | Performed by: INTERNAL MEDICINE

## 2024-08-19 PROCEDURE — 81000 URINALYSIS NONAUTO W/SCOPE: CPT | Performed by: INTERNAL MEDICINE

## 2024-08-19 PROCEDURE — 82570 ASSAY OF URINE CREATININE: CPT | Performed by: INTERNAL MEDICINE

## 2024-08-26 ENCOUNTER — TELEPHONE (OUTPATIENT)
Dept: FAMILY MEDICINE | Facility: CLINIC | Age: 74
End: 2024-08-26
Payer: MEDICARE

## 2024-08-26 ENCOUNTER — HOSPITAL ENCOUNTER (OUTPATIENT)
Dept: RADIOLOGY | Facility: HOSPITAL | Age: 74
Discharge: HOME | End: 2024-08-26
Attending: NURSE PRACTITIONER
Payer: MEDICARE

## 2024-08-26 DIAGNOSIS — F17.210 CIGARETTE SMOKER: ICD-10-CM

## 2024-08-26 PROCEDURE — 71271 CT THORAX LUNG CANCER SCR C-: CPT | Mod: TC

## 2024-08-26 PROCEDURE — 71271 CT THORAX LUNG CANCER SCR C-: CPT | Mod: 26,,, | Performed by: RADIOLOGY

## 2024-08-26 NOTE — TELEPHONE ENCOUNTER
----- Message from FRANCISCO Monteiro sent at 8/26/2024  3:31 PM CDT -----  Please notify patient that results of her CT chest show emphysema with some nodules that suggest mucus plugging; no malignant or suspicious findings, thought to be benign in appearance or behavior; we will continue annual screening; incidentally noted vascular and coronary artery calcifications- continue cholesterol medication and blood thinners, follow-up to discuss further

## 2024-08-26 NOTE — PROGRESS NOTES
Please notify patient that results of her CT chest show emphysema with some nodules that suggest mucus plugging; no malignant or suspicious findings, thought to be benign in appearance or behavior; we will continue annual screening; incidentally noted vascular and coronary artery calcifications- continue cholesterol medication and blood thinners, follow-up to discuss further

## 2024-09-03 ENCOUNTER — PATIENT MESSAGE (OUTPATIENT)
Dept: ADMINISTRATIVE | Facility: HOSPITAL | Age: 74
End: 2024-09-03
Payer: MEDICARE

## 2024-09-17 ENCOUNTER — OFFICE VISIT (OUTPATIENT)
Dept: FAMILY MEDICINE | Facility: CLINIC | Age: 74
End: 2024-09-17
Payer: MEDICARE

## 2024-09-17 VITALS
HEIGHT: 65 IN | WEIGHT: 139.38 LBS | OXYGEN SATURATION: 98 % | BODY MASS INDEX: 23.22 KG/M2 | SYSTOLIC BLOOD PRESSURE: 110 MMHG | DIASTOLIC BLOOD PRESSURE: 78 MMHG | RESPIRATION RATE: 18 BRPM | HEART RATE: 52 BPM | TEMPERATURE: 98 F

## 2024-09-17 DIAGNOSIS — I10 ESSENTIAL HYPERTENSION: ICD-10-CM

## 2024-09-17 DIAGNOSIS — Z23 NEED FOR INFLUENZA VACCINATION: ICD-10-CM

## 2024-09-17 DIAGNOSIS — E11.22 TYPE 2 DIABETES MELLITUS WITH STAGE 5 CHRONIC KIDNEY DISEASE NOT ON CHRONIC DIALYSIS, WITHOUT LONG-TERM CURRENT USE OF INSULIN: ICD-10-CM

## 2024-09-17 DIAGNOSIS — N18.5 CHRONIC KIDNEY DISEASE (CKD), STAGE V: Primary | ICD-10-CM

## 2024-09-17 DIAGNOSIS — J43.9 PULMONARY EMPHYSEMA, UNSPECIFIED EMPHYSEMA TYPE: ICD-10-CM

## 2024-09-17 DIAGNOSIS — N18.5 TYPE 2 DIABETES MELLITUS WITH STAGE 5 CHRONIC KIDNEY DISEASE NOT ON CHRONIC DIALYSIS, WITHOUT LONG-TERM CURRENT USE OF INSULIN: ICD-10-CM

## 2024-09-17 PROCEDURE — 1160F RVW MEDS BY RX/DR IN RCRD: CPT | Mod: CPTII,S$GLB,, | Performed by: NURSE PRACTITIONER

## 2024-09-17 PROCEDURE — 3062F POS MACROALBUMINURIA REV: CPT | Mod: CPTII,S$GLB,, | Performed by: NURSE PRACTITIONER

## 2024-09-17 PROCEDURE — G0008 ADMIN INFLUENZA VIRUS VAC: HCPCS | Mod: S$GLB,,, | Performed by: NURSE PRACTITIONER

## 2024-09-17 PROCEDURE — 3074F SYST BP LT 130 MM HG: CPT | Mod: CPTII,S$GLB,, | Performed by: NURSE PRACTITIONER

## 2024-09-17 PROCEDURE — 1159F MED LIST DOCD IN RCRD: CPT | Mod: CPTII,S$GLB,, | Performed by: NURSE PRACTITIONER

## 2024-09-17 PROCEDURE — 3078F DIAST BP <80 MM HG: CPT | Mod: CPTII,S$GLB,, | Performed by: NURSE PRACTITIONER

## 2024-09-17 PROCEDURE — 1101F PT FALLS ASSESS-DOCD LE1/YR: CPT | Mod: CPTII,S$GLB,, | Performed by: NURSE PRACTITIONER

## 2024-09-17 PROCEDURE — 90653 IIV ADJUVANT VACCINE IM: CPT | Mod: S$GLB,,, | Performed by: NURSE PRACTITIONER

## 2024-09-17 PROCEDURE — 99214 OFFICE O/P EST MOD 30 MIN: CPT | Mod: S$GLB,,, | Performed by: NURSE PRACTITIONER

## 2024-09-17 PROCEDURE — 99999 PR PBB SHADOW E&M-EST. PATIENT-LVL V: CPT | Mod: PBBFAC,,, | Performed by: NURSE PRACTITIONER

## 2024-09-17 PROCEDURE — 3046F HEMOGLOBIN A1C LEVEL >9.0%: CPT | Mod: CPTII,S$GLB,, | Performed by: NURSE PRACTITIONER

## 2024-09-17 PROCEDURE — 4010F ACE/ARB THERAPY RXD/TAKEN: CPT | Mod: CPTII,S$GLB,, | Performed by: NURSE PRACTITIONER

## 2024-09-17 PROCEDURE — 3288F FALL RISK ASSESSMENT DOCD: CPT | Mod: CPTII,S$GLB,, | Performed by: NURSE PRACTITIONER

## 2024-09-17 PROCEDURE — 3066F NEPHROPATHY DOC TX: CPT | Mod: CPTII,S$GLB,, | Performed by: NURSE PRACTITIONER

## 2024-09-17 PROCEDURE — 1126F AMNT PAIN NOTED NONE PRSNT: CPT | Mod: CPTII,S$GLB,, | Performed by: NURSE PRACTITIONER

## 2024-09-17 RX ORDER — FLUTICASONE FUROATE, UMECLIDINIUM BROMIDE AND VILANTEROL TRIFENATATE 100; 62.5; 25 UG/1; UG/1; UG/1
1 POWDER RESPIRATORY (INHALATION) DAILY
Qty: 3 EACH | Refills: 1 | Status: SHIPPED | OUTPATIENT
Start: 2024-09-17 | End: 2024-12-16

## 2024-09-17 RX ORDER — PIOGLITAZONEHYDROCHLORIDE 15 MG/1
15 TABLET ORAL DAILY
COMMUNITY

## 2024-09-17 NOTE — PROGRESS NOTES
SUBJECTIVE:      Patient ID: Epi Mcintosh is a 74 y.o. female.    Chief Complaint: Diabetes    Established patient here for follow-up on her diabetes and hypertension. Patient denies any side effects or complaints with the insulin or BP meds but has not been taking her Actos because she thought she was not supposed to. Reports her blood glucose was in the 180s at first but has been reading in the 160s consistently. Reports exertional shortness of breath as well. Pt is trying to reduce her smoking, but does not really want to quit.     Pt has an upcoming f/u with Dr. Hernandez- trying to make decision in regards to dialysis     Reviewed CT of her chest done since last visit-patient aware of results, declines cardiology referral at this time      No family history on file.   Social History     Socioeconomic History    Marital status:    Tobacco Use    Smoking status: Every Day     Current packs/day: 0.50     Average packs/day: 0.5 packs/day for 55.7 years (27.9 ttl pk-yrs)     Types: Vaping w/o nicotine, Cigarettes     Start date: 1969    Smokeless tobacco: Never   Substance and Sexual Activity    Alcohol use: Yes     Comment: occasionally    Drug use: Never     Social Determinants of Health     Financial Resource Strain: High Risk (9/4/2024)    Overall Financial Resource Strain (CARDIA)     Difficulty of Paying Living Expenses: Very hard   Food Insecurity: Food Insecurity Present (9/4/2024)    Hunger Vital Sign     Worried About Running Out of Food in the Last Year: Often true     Ran Out of Food in the Last Year: Sometimes true   Transportation Needs: No Transportation Needs (4/5/2024)    PRAPARE - Transportation     Lack of Transportation (Medical): No     Lack of Transportation (Non-Medical): No   Physical Activity: Inactive (9/4/2024)    Exercise Vital Sign     Days of Exercise per Week: 0 days     Minutes of Exercise per Session: 60 min   Stress: Stress Concern Present (9/4/2024)     [facility] [place] [facility] - Occupational Stress Questionnaire     Feeling of Stress : Rather much   Housing Stability: Low Risk  (4/5/2024)    Housing Stability Vital Sign     Unable to Pay for Housing in the Last Year: No     Number of Places Lived in the Last Year: 1     Unstable Housing in the Last Year: No     Current Outpatient Medications   Medication Sig Dispense Refill    albuterol (PROVENTIL/VENTOLIN HFA) 90 mcg/actuation inhaler Inhale 2 puffs into the lungs 4 (four) times daily as needed.      amLODIPine (NORVASC) 5 MG tablet Take 1 tablet (5 mg total) by mouth once daily. 90 tablet 1    apixaban (ELIQUIS) 5 mg Tab Take 1 tablet (5 mg total) by mouth 2 (two) times daily. 30 tablet 3    citalopram (CELEXA) 40 MG tablet Take 1 tablet (40 mg total) by mouth once daily. 90 tablet 1    insulin glargine U-100, Lantus, (LANTUS SOLOSTAR U-100 INSULIN) 100 unit/mL (3 mL) InPn pen Inject 10 Units into the skin every evening. 3 mL 2    levothyroxine (SYNTHROID) 88 MCG tablet Take 1 tablet (88 mcg total) by mouth before breakfast. 90 tablet 1    metoprolol tartrate (LOPRESSOR) 25 MG tablet Take 1 tablet (25 mg total) by mouth 2 (two) times daily. 180 tablet 1    pantoprazole (PROTONIX) 40 MG tablet Take 1 tablet (40 mg total) by mouth once daily. 90 tablet 1    rosuvastatin (CRESTOR) 40 MG Tab Take 40 mg by mouth once daily.      blood sugar diagnostic (TRUE METRIX GLUCOSE TEST STRIP) Strp Sig: To check BG two times daily, to use with insurance preferred meter 100 strip 2    blood-glucose meter (TRUE METRIX AIR GLUCOSE METER) kit To check BG two times daily, to use with insurance preferred meter 1 each 0    fluticasone-umeclidin-vilanter (TRELEGY ELLIPTA) 100-62.5-25 mcg DsDv Inhale 1 puff into the lungs once daily. 3 each 1    lancets (TRUEPLUS LANCETS) 33 gauge Misc To check BG two times daily, to use with insurance preferred meter 100 each 2    lancets Misc To check BG two times daily, to use with  "insurance preferred meter 50 each 5    pen needle, diabetic 32 gauge x 5/32" Ndle Use 1 pen needle nightly to administer insulin 100 each 2    pioglitazone (ACTOS) 15 MG tablet Take 15 mg by mouth once daily.       No current facility-administered medications for this visit.     Facility-Administered Medications Ordered in Other Visits   Medication Dose Route Frequency Provider Last Rate Last Admin    0.9%  NaCl infusion   Intravenous Continuous Petr Kaufman  mL/hr at 04/01/22 1019 New Bag at 04/01/22 1019     Review of patient's allergies indicates:   Allergen Reactions    Plavix [clopidogrel] Rash    Zolpidem      Other reaction(s): Other (See Comments)  Seeing people who were not in the room     Codeine       Past Medical History:   Diagnosis Date    COPD (chronic obstructive pulmonary disease)     Coronary artery disease     Depression     Diabetes mellitus     Hypertension     Pancreatitis     PVD (peripheral vascular disease)      Past Surgical History:   Procedure Laterality Date    AORTOGRAPHY WITH SERIALOGRAPHY N/A 4/1/2022    Procedure: AORTOGRAM, WITH SERIALOGRAPHY;  Surgeon: Petr Kaufman MD;  [facility]: [facility] CATH/EP LAB;  Service: Peripheral Vascular;  Laterality: N/A;    CARDIAC SURGERY      CORONARY ARTERY BYPASS GRAFT  12/2018    CYSTOSCOPY N/A 6/26/2024    Procedure: CYSTOSCOPY;  Surgeon: Agustín Marinelli Jr., MD;  Location: [facility] OR;  Service: Urology;  Laterality: N/A;    HYSTERECTOMY         Review of Systems   Constitutional:  Negative for activity change, appetite change, chills, fatigue, fever and unexpected weight change.   HENT:  Negative for congestion, ear discharge, ear pain and sore throat.    Eyes:  Negative for photophobia, pain, discharge and visual disturbance.   Respiratory:  Positive for shortness of breath and wheezing. Negative for cough and chest tightness.    Cardiovascular:  Negative for chest pain, palpitations and leg swelling.   Gastrointestinal:  " "Negative for abdominal pain, constipation, diarrhea, nausea and vomiting.   Endocrine: Negative for cold intolerance, heat intolerance, polydipsia, polyphagia and polyuria.   Genitourinary:  Negative for decreased urine volume, difficulty urinating, dysuria, flank pain, frequency and hematuria.   Musculoskeletal:  Negative for arthralgias and gait problem.   Skin:  Negative for wound.   Allergic/Immunologic: Negative for immunocompromised state.   Neurological:  Negative for dizziness, speech difficulty and headaches.   Hematological:  Negative for adenopathy. Bruises/bleeds easily.   Psychiatric/Behavioral:  Negative for confusion, self-injury and suicidal ideas.       OBJECTIVE:      Vitals:    09/17/24 1257   BP: 110/78   BP Location: Left arm   Patient Position: Sitting   BP Method: Medium (Manual)   Pulse: (!) 52   Resp: 18   Temp: 97.8 °F (36.6 °C)   TempSrc: Oral   SpO2: 98%   Weight: 63.2 kg (139 lb 6.4 oz)   Height: 5' 5" (1.651 m)     Physical Exam  Vitals and nursing note reviewed.   Constitutional:       General: She is not in acute distress.     Appearance: Normal appearance. She is normal weight. She is not ill-appearing, toxic-appearing or diaphoretic.   HENT:      Head: Normocephalic.      Nose: Nose normal.      Mouth/Throat:      Mouth: Mucous membranes are moist.   Eyes:      General: No scleral icterus.     Pupils: Pupils are equal, round, and reactive to light.   Cardiovascular:      Rate and Rhythm: Normal rate and regular rhythm.      Heart sounds: Normal heart sounds. No murmur heard.     No gallop.   Pulmonary:      Effort: Pulmonary effort is normal. No respiratory distress.      Breath sounds: Wheezing (expiratory throughout) present. No rhonchi or rales.   Musculoskeletal:         General: Normal range of motion.      Cervical back: Normal range of motion.   Skin:     General: Skin is warm and dry.      Coloration: Skin is not jaundiced or pale.   Neurological:      Mental Status: She is " alert and oriented to person, place, and time.   Psychiatric:         Mood and Affect: Mood normal.         Behavior: Behavior normal.         Thought Content: Thought content normal.         Judgment: Judgment normal.        Assessment:       1. Chronic kidney disease (CKD), stage V    2. Type 2 diabetes mellitus with stage 5 chronic kidney disease not on chronic dialysis, without long-term current use of insulin    3. Essential hypertension, dx 2018    4. Pulmonary emphysema, unspecified emphysema type    5. Need for influenza vaccination        Plan:       Chronic kidney disease (CKD), stage V   -cont care with nephrology     Type 2 diabetes mellitus with stage 5 chronic kidney disease not on chronic dialysis, without long-term current use of insulin  -     Hemoglobin A1C; Future; Expected date: 09/17/2024  -     COMPREHENSIVE METABOLIC PANEL; Future; Expected date: 09/17/2024  -improving, cont Lantus; restart lower dose Actos daily; contact clinic for any issues with BS; labs in 11/24    Essential hypertension, dx 2018   -stable/controlled, cont Norvasc daily    Pulmonary emphysema, unspecified emphysema type  -     fluticasone-umeclidin-vilanter (TRELEGY ELLIPTA) 100-62.5-25 mcg DsDv; Inhale 1 puff into the lungs once daily.  Dispense: 3 each; Refill: 1  -emphysema noted on CT chest; trial of Trelegy once daily, advised proper use and rinse mouth and spit water out to prevent thrush; use albuterol inhaler as needed for breakthrough shortness of breath or wheezing; recommended stopped smoking    Need for influenza vaccination  -     influenza (adjuvanted) (Fluad) 45 mcg/0.5 mL IM vaccine (> or = 66 yo) 0.5 mL      I spent a total of 34 minutes on the day of the visit.This includes face to face time and non-face to face time preparing to see the patient (eg, review of tests), obtaining and/or reviewing separately obtained history, documenting clinical information in the electronic or other health record,  independently interpreting results and communicating results to the patient/family/caregiver, or care coordinator.     Follow up in about 3 months (around 12/17/2024) for diabetes, HTN.      9/17/2024 ASTER Mccabe, SHAILAP    This note was created using [employer] voice recognition software that occasionally misinterprets phrases or words.

## 2024-09-19 ENCOUNTER — LAB VISIT (OUTPATIENT)
Dept: LAB | Facility: HOSPITAL | Age: 74
End: 2024-09-19
Attending: INTERNAL MEDICINE
Payer: MEDICARE

## 2024-09-19 DIAGNOSIS — N17.9 ACUTE RENAL FAILURE, UNSPECIFIED ACUTE RENAL FAILURE TYPE: Primary | ICD-10-CM

## 2024-09-19 DIAGNOSIS — N18.9 CHRONIC KIDNEY DISEASE, UNSPECIFIED CKD STAGE: ICD-10-CM

## 2024-09-19 LAB
ALBUMIN SERPL BCP-MCNC: 3.4 G/DL (ref 3.5–5.2)
ANION GAP SERPL CALC-SCNC: 12 MMOL/L (ref 8–16)
BASOPHILS # BLD AUTO: 0.04 K/UL (ref 0–0.2)
BASOPHILS NFR BLD: 0.5 % (ref 0–1.9)
BUN SERPL-MCNC: 48 MG/DL (ref 8–23)
CALCIUM SERPL-MCNC: 9.2 MG/DL (ref 8.7–10.5)
CHLORIDE SERPL-SCNC: 105 MMOL/L (ref 95–110)
CO2 SERPL-SCNC: 18 MMOL/L (ref 23–29)
CREAT SERPL-MCNC: 3.1 MG/DL (ref 0.5–1.4)
DIFFERENTIAL METHOD BLD: ABNORMAL
EOSINOPHIL # BLD AUTO: 0.1 K/UL (ref 0–0.5)
EOSINOPHIL NFR BLD: 1 % (ref 0–8)
ERYTHROCYTE [DISTWIDTH] IN BLOOD BY AUTOMATED COUNT: 14.5 % (ref 11.5–14.5)
EST. GFR  (NO RACE VARIABLE): 15.1 ML/MIN/1.73 M^2
GLUCOSE SERPL-MCNC: 166 MG/DL (ref 70–110)
HCT VFR BLD AUTO: 37.2 % (ref 37–48.5)
HGB BLD-MCNC: 12.1 G/DL (ref 12–16)
IMM GRANULOCYTES # BLD AUTO: 0.03 K/UL (ref 0–0.04)
IMM GRANULOCYTES NFR BLD AUTO: 0.4 % (ref 0–0.5)
LYMPHOCYTES # BLD AUTO: 1.5 K/UL (ref 1–4.8)
LYMPHOCYTES NFR BLD: 19 % (ref 18–48)
MCH RBC QN AUTO: 29.9 PG (ref 27–31)
MCHC RBC AUTO-ENTMCNC: 32.5 G/DL (ref 32–36)
MCV RBC AUTO: 92 FL (ref 82–98)
MONOCYTES # BLD AUTO: 0.7 K/UL (ref 0.3–1)
MONOCYTES NFR BLD: 8.2 % (ref 4–15)
NEUTROPHILS # BLD AUTO: 5.7 K/UL (ref 1.8–7.7)
NEUTROPHILS NFR BLD: 70.9 % (ref 38–73)
NRBC BLD-RTO: 0 /100 WBC
PHOSPHATE SERPL-MCNC: 4.6 MG/DL (ref 2.7–4.5)
PLATELET # BLD AUTO: 263 K/UL (ref 150–450)
PMV BLD AUTO: 8.8 FL (ref 9.2–12.9)
POTASSIUM SERPL-SCNC: 5.1 MMOL/L (ref 3.5–5.1)
RBC # BLD AUTO: 4.05 M/UL (ref 4–5.4)
SODIUM SERPL-SCNC: 135 MMOL/L (ref 136–145)
WBC # BLD AUTO: 8.02 K/UL (ref 3.9–12.7)

## 2024-09-19 PROCEDURE — 36415 COLL VENOUS BLD VENIPUNCTURE: CPT | Performed by: INTERNAL MEDICINE

## 2024-09-19 PROCEDURE — 85025 COMPLETE CBC W/AUTO DIFF WBC: CPT | Performed by: INTERNAL MEDICINE

## 2024-09-19 PROCEDURE — 80069 RENAL FUNCTION PANEL: CPT | Performed by: INTERNAL MEDICINE

## 2024-09-24 ENCOUNTER — TELEPHONE (OUTPATIENT)
Dept: FAMILY MEDICINE | Facility: CLINIC | Age: 74
End: 2024-09-24
Payer: MEDICARE

## 2024-09-24 NOTE — TELEPHONE ENCOUNTER
----- Message from Kemi Morley sent at 9/24/2024 12:20 PM CDT -----  - 12:18- pt can not afford [facility]   [phone]

## 2024-09-25 ENCOUNTER — HOSPITAL ENCOUNTER (EMERGENCY)
Facility: HOSPITAL | Age: 74
Discharge: LEFT BEFORE DISCHARGE | End: 2024-09-25
Attending: EMERGENCY MEDICINE
Payer: MEDICARE

## 2024-09-25 VITALS
DIASTOLIC BLOOD PRESSURE: 68 MMHG | SYSTOLIC BLOOD PRESSURE: 137 MMHG | TEMPERATURE: 98 F | BODY MASS INDEX: 23.3 KG/M2 | RESPIRATION RATE: 14 BRPM | OXYGEN SATURATION: 98 % | WEIGHT: 140 LBS | HEART RATE: 80 BPM

## 2024-09-25 DIAGNOSIS — R53.1 WEAKNESS: ICD-10-CM

## 2024-09-25 DIAGNOSIS — R11.2 NAUSEA AND VOMITING, UNSPECIFIED VOMITING TYPE: ICD-10-CM

## 2024-09-25 DIAGNOSIS — R10.9 ABDOMINAL PAIN, UNSPECIFIED ABDOMINAL LOCATION: Primary | ICD-10-CM

## 2024-09-25 DIAGNOSIS — R10.9 ABDOMINAL PAIN: ICD-10-CM

## 2024-09-25 PROCEDURE — 99281 EMR DPT VST MAYX REQ PHY/QHP: CPT

## 2024-09-25 RX ORDER — HYDROMORPHONE HYDROCHLORIDE 1 MG/ML
1 INJECTION, SOLUTION INTRAMUSCULAR; INTRAVENOUS; SUBCUTANEOUS
Status: DISCONTINUED | OUTPATIENT
Start: 2024-09-25 | End: 2024-09-25 | Stop reason: HOSPADM

## 2024-09-25 RX ORDER — ONDANSETRON HYDROCHLORIDE 2 MG/ML
4 INJECTION, SOLUTION INTRAVENOUS
Status: DISCONTINUED | OUTPATIENT
Start: 2024-09-25 | End: 2024-09-25 | Stop reason: HOSPADM

## 2024-09-25 RX ORDER — PANTOPRAZOLE SODIUM 40 MG/10ML
40 INJECTION, POWDER, LYOPHILIZED, FOR SOLUTION INTRAVENOUS
Status: DISCONTINUED | OUTPATIENT
Start: 2024-09-25 | End: 2024-09-25 | Stop reason: HOSPADM

## 2024-09-25 NOTE — TELEPHONE ENCOUNTER
Called patient to advise her to contact her insurance company to see if they cover Breztri. Verbal understanding was expressed.

## 2024-09-26 NOTE — ED PROVIDER NOTES
"Encounter Date: 9/25/2024       History     Chief Complaint   Patient presents with    Abdominal Pain     Report "left lower abdominal pain" onset "tonight " reports + vomiting, no diarrhea, reports "not urinating like usual"      74-year-old female presented emergency department with left lower abdominal pain.  Patient said she was having decreased urination and nausea and vomiting and pain in the left lower abdomen.  Patient also is feeling weak and dizzy.  Denies dysuria or hematuria.  Denies any chest pain or shortness of breath.  Denies any focal weakness or numbness.  Patient has history of renal insufficiency and has history of having single working kidney..  Patient also has history of heart disease and COPD.  Has diabetes and hypertension as well.      Review of patient's allergies indicates:   Allergen Reactions    Plavix [clopidogrel] Rash    Zolpidem      Other reaction(s): Other (See Comments)  Seeing people who were not in the room     Codeine      Past Medical History:   Diagnosis Date    COPD (chronic obstructive pulmonary disease)     Coronary artery disease     Depression     Diabetes mellitus     Hypertension     Pancreatitis     PVD (peripheral vascular disease)      Past Surgical History:   Procedure Laterality Date    AORTOGRAPHY WITH SERIALOGRAPHY N/A 4/1/2022    Procedure: AORTOGRAM, WITH SERIALOGRAPHY;  Surgeon: Petr Kaufman MD;  Location: [facility] CATH/EP LAB;  Service: Peripheral Vascular;  Laterality: N/A;    CARDIAC SURGERY      CORONARY ARTERY BYPASS GRAFT  12/2018    CYSTOSCOPY N/A 6/26/2024    Procedure: CYSTOSCOPY;  Surgeon: Agustín Marinelli Jr., MD;  Location: [facility] OR;  Service: Urology;  Laterality: N/A;    HYSTERECTOMY       No family history on file.  Social History     Tobacco Use    Smoking status: Every Day     Current packs/day: 0.50     Average packs/day: 0.5 packs/day for 55.7 years (27.9 ttl pk-yrs)     Types: Vaping w/o nicotine, Cigarettes     Start date: 1969    " Smokeless tobacco: Never   Substance Use Topics    Alcohol use: Yes     Comment: occasionally    Drug use: Never     Review of Systems   Constitutional:  Positive for chills and fatigue.   HENT: Negative.     Eyes: Negative.    Respiratory: Negative.     Cardiovascular: Negative.  Negative for chest pain.   Gastrointestinal:  Positive for abdominal pain, nausea and vomiting.   Endocrine: Negative.    Genitourinary:  Positive for decreased urine volume.   Musculoskeletal: Negative.    Skin: Negative.    Allergic/Immunologic: Negative.    Neurological:  Positive for weakness.   Hematological: Negative.    Psychiatric/Behavioral: Negative.     All other systems reviewed and are negative.      Physical Exam     Initial Vitals [09/25/24 2004]   BP Pulse Resp Temp SpO2   137/68 80 14 98.1 °F (36.7 °C) 98 %      MAP       --         Physical Exam    Nursing note and vitals reviewed.  Constitutional: She appears well-developed and well-nourished.   Uncomfortable   HENT:   Head: Normocephalic and atraumatic.   Nose: Nose normal.   Mouth/Throat: Oropharynx is clear and moist.   Eyes: Conjunctivae and EOM are normal. Pupils are equal, round, and reactive to light.   Neck: Neck supple. No thyromegaly present. No tracheal deviation present. No JVD present.   Normal range of motion.  Cardiovascular:  Normal rate, regular rhythm, normal heart sounds and intact distal pulses.           No murmur heard.  Pulmonary/Chest: Breath sounds normal. No stridor. No respiratory distress. She has no wheezes. She has no rales.   Abdominal: Abdomen is soft. Bowel sounds are normal. There is abdominal tenderness.   Diffuse tenderness mostly in the left lower quadrant of the abdomen   Musculoskeletal:         General: No edema. Normal range of motion.      Cervical back: Normal range of motion and neck supple.     Neurological: She is alert and oriented to person, place, and time. No cranial nerve deficit or sensory deficit. GCS score is 15. GCS  eye subscore is 4. GCS verbal subscore is 5. GCS motor subscore is 6.   Has generalized weakness and malaise but has no focal neurologic deficits and able to move all extremities.   Skin: Skin is warm. Capillary refill takes less than 2 seconds.   Psychiatric: She has a normal mood and affect. Thought content normal.         ED Course   Procedures  Labs Reviewed   CBC W/ AUTO DIFFERENTIAL   COMPREHENSIVE METABOLIC PANEL   LIPASE   URINALYSIS, REFLEX TO URINE CULTURE   TROPONIN I HIGH SENSITIVITY          Imaging Results    None          Medications   sodium chloride 0.9% bolus 1,000 mL 1,000 mL (has no administration in time range)   HYDROmorphone injection 1 mg (has no administration in time range)   ondansetron injection 4 mg (has no administration in time range)   pantoprazole injection 40 mg (has no administration in time range)     Medical Decision Making  74-year-old female appears to be extremely ill has abdominal pain and differential diagnosis includes kidney failure as having decreased urination and dehydration secondary to nausea and vomiting.  Acute diverticulitis also in the differential diagnosis.  Plan is to do further evaluation with CT scan and hydrate and re-evaluate patient and patient will be getting a complete cardiac workup as well.    Amount and/or Complexity of Data Reviewed  Labs: ordered.  Radiology: ordered.  ECG/medicine tests: ordered.  Discussion of management or test interpretation with external provider(s): I was told by the nurse that patient decided to leave and patient and patient's family member left and nurse tried to stop patient but patient would not listen and did not want to wait as did not get a bed immediately.  Patient left prior to my re-evaluation.  Patient and family member has capacity to decide further own and does understand that she is very sick.    Risk  Prescription drug management.                                      Clinical Impression:  Final  diagnoses:  [R10.9] Abdominal pain  [R10.9] Abdominal pain, unspecified abdominal location (Primary)  [R53.1] Weakness  [R11.2] Nausea and vomiting, unspecified vomiting type          ED Disposition Condition    Eloped Stable                Jake Fierro MD  09/25/24 2022

## 2024-10-10 ENCOUNTER — LAB VISIT (OUTPATIENT)
Dept: LAB | Facility: HOSPITAL | Age: 74
End: 2024-10-10
Attending: INTERNAL MEDICINE
Payer: MEDICARE

## 2024-10-10 DIAGNOSIS — E11.22 TYPE 2 DIABETES MELLITUS WITH STAGE 5 CHRONIC KIDNEY DISEASE NOT ON CHRONIC DIALYSIS, WITHOUT LONG-TERM CURRENT USE OF INSULIN: ICD-10-CM

## 2024-10-10 DIAGNOSIS — N17.9 ACUTE RENAL FAILURE, UNSPECIFIED ACUTE RENAL FAILURE TYPE: Primary | ICD-10-CM

## 2024-10-10 DIAGNOSIS — N18.5 TYPE 2 DIABETES MELLITUS WITH STAGE 5 CHRONIC KIDNEY DISEASE NOT ON CHRONIC DIALYSIS, WITHOUT LONG-TERM CURRENT USE OF INSULIN: ICD-10-CM

## 2024-10-10 LAB
ALBUMIN SERPL BCP-MCNC: 2.5 G/DL (ref 3.5–5.2)
ALBUMIN SERPL BCP-MCNC: 2.6 G/DL (ref 3.5–5.2)
ALP SERPL-CCNC: 88 U/L (ref 55–135)
ALT SERPL W/O P-5'-P-CCNC: 13 U/L (ref 10–44)
ANION GAP SERPL CALC-SCNC: 12 MMOL/L (ref 8–16)
ANION GAP SERPL CALC-SCNC: 13 MMOL/L (ref 8–16)
AST SERPL-CCNC: 14 U/L (ref 10–40)
BILIRUB SERPL-MCNC: 0.2 MG/DL (ref 0.1–1)
BUN SERPL-MCNC: 56 MG/DL (ref 8–23)
BUN SERPL-MCNC: 56 MG/DL (ref 8–23)
CALCIUM SERPL-MCNC: 8.4 MG/DL (ref 8.7–10.5)
CALCIUM SERPL-MCNC: 8.5 MG/DL (ref 8.7–10.5)
CHLORIDE SERPL-SCNC: 102 MMOL/L (ref 95–110)
CHLORIDE SERPL-SCNC: 102 MMOL/L (ref 95–110)
CO2 SERPL-SCNC: 17 MMOL/L (ref 23–29)
CO2 SERPL-SCNC: 17 MMOL/L (ref 23–29)
CREAT SERPL-MCNC: 5.4 MG/DL (ref 0.5–1.4)
CREAT SERPL-MCNC: 5.5 MG/DL (ref 0.5–1.4)
EST. GFR  (NO RACE VARIABLE): 7.6 ML/MIN/1.73 M^2
EST. GFR  (NO RACE VARIABLE): 7.8 ML/MIN/1.73 M^2
ESTIMATED AVG GLUCOSE: 220 MG/DL (ref 68–131)
GLUCOSE SERPL-MCNC: 161 MG/DL (ref 70–110)
GLUCOSE SERPL-MCNC: 161 MG/DL (ref 70–110)
HBA1C MFR BLD: 9.3 % (ref 4–5.6)
PHOSPHATE SERPL-MCNC: 4.2 MG/DL (ref 2.7–4.5)
POTASSIUM SERPL-SCNC: 5 MMOL/L (ref 3.5–5.1)
POTASSIUM SERPL-SCNC: 5 MMOL/L (ref 3.5–5.1)
PROT SERPL-MCNC: 6.8 G/DL (ref 6–8.4)
SODIUM SERPL-SCNC: 131 MMOL/L (ref 136–145)
SODIUM SERPL-SCNC: 132 MMOL/L (ref 136–145)

## 2024-10-10 PROCEDURE — 83036 HEMOGLOBIN GLYCOSYLATED A1C: CPT | Performed by: NURSE PRACTITIONER

## 2024-10-10 PROCEDURE — 80053 COMPREHEN METABOLIC PANEL: CPT | Performed by: NURSE PRACTITIONER

## 2024-10-10 PROCEDURE — 80069 RENAL FUNCTION PANEL: CPT | Performed by: INTERNAL MEDICINE

## 2024-10-14 ENCOUNTER — TELEPHONE (OUTPATIENT)
Dept: FAMILY MEDICINE | Facility: CLINIC | Age: 74
End: 2024-10-14
Payer: MEDICARE

## 2024-10-14 NOTE — TELEPHONE ENCOUNTER
----- Message from Anali sent at 10/11/2024 11:40 AM CDT -----  The patient has been sick for weeks. She thinks it's the new insulin shot she was put on. Please call pt's # [phone] GH

## 2024-10-14 NOTE — TELEPHONE ENCOUNTER
Called patient to try to get more information, received voicemail. Left message and call back number. Please advise

## 2024-10-15 ENCOUNTER — PATIENT MESSAGE (OUTPATIENT)
Dept: FAMILY MEDICINE | Facility: CLINIC | Age: 74
End: 2024-10-15
Payer: MEDICARE

## 2024-10-17 ENCOUNTER — HOSPITAL ENCOUNTER (INPATIENT)
Facility: HOSPITAL | Age: 74
LOS: 3 days | Discharge: HOME HEALTH | DRG: 683 | End: 2024-10-20
Attending: STUDENT IN AN ORGANIZED HEALTH CARE EDUCATION/TRAINING PROGRAM | Admitting: INTERNAL MEDICINE
Payer: MEDICARE

## 2024-10-17 ENCOUNTER — TELEPHONE (OUTPATIENT)
Dept: FAMILY MEDICINE | Facility: CLINIC | Age: 74
End: 2024-10-17

## 2024-10-17 ENCOUNTER — OFFICE VISIT (OUTPATIENT)
Dept: FAMILY MEDICINE | Facility: CLINIC | Age: 74
End: 2024-10-17
Payer: MEDICARE

## 2024-10-17 ENCOUNTER — LAB VISIT (OUTPATIENT)
Dept: LAB | Facility: HOSPITAL | Age: 74
End: 2024-10-17
Attending: NURSE PRACTITIONER
Payer: MEDICARE

## 2024-10-17 ENCOUNTER — HOSPITAL ENCOUNTER (OUTPATIENT)
Dept: RADIOLOGY | Facility: HOSPITAL | Age: 74
Discharge: HOME | End: 2024-10-17
Attending: NURSE PRACTITIONER
Payer: MEDICARE

## 2024-10-17 VITALS
BODY MASS INDEX: 23.32 KG/M2 | HEIGHT: 65 IN | SYSTOLIC BLOOD PRESSURE: 116 MMHG | HEART RATE: 62 BPM | DIASTOLIC BLOOD PRESSURE: 62 MMHG | WEIGHT: 140 LBS | TEMPERATURE: 98 F | RESPIRATION RATE: 18 BRPM | OXYGEN SATURATION: 100 %

## 2024-10-17 DIAGNOSIS — N30.00 ACUTE CYSTITIS WITHOUT HEMATURIA: ICD-10-CM

## 2024-10-17 DIAGNOSIS — N10 ACUTE PYELONEPHRITIS: ICD-10-CM

## 2024-10-17 DIAGNOSIS — R19.7 DIARRHEA, UNSPECIFIED TYPE: ICD-10-CM

## 2024-10-17 DIAGNOSIS — D64.9 ANEMIA, UNSPECIFIED TYPE: ICD-10-CM

## 2024-10-17 DIAGNOSIS — R60.9 EDEMA: ICD-10-CM

## 2024-10-17 DIAGNOSIS — R11.2 NAUSEA AND VOMITING, UNSPECIFIED VOMITING TYPE: ICD-10-CM

## 2024-10-17 DIAGNOSIS — R10.32 ABDOMINAL PAIN, LLQ: Primary | ICD-10-CM

## 2024-10-17 DIAGNOSIS — N18.5 CHRONIC KIDNEY DISEASE (CKD), STAGE V: ICD-10-CM

## 2024-10-17 DIAGNOSIS — R10.32 ABDOMINAL PAIN, LLQ: ICD-10-CM

## 2024-10-17 DIAGNOSIS — N17.9 AKI (ACUTE KIDNEY INJURY): Primary | ICD-10-CM

## 2024-10-17 DIAGNOSIS — I16.0 HYPERTENSIVE URGENCY: ICD-10-CM

## 2024-10-17 DIAGNOSIS — E11.8 TYPE 2 DIABETES MELLITUS WITH COMPLICATION, WITHOUT LONG-TERM CURRENT USE OF INSULIN: ICD-10-CM

## 2024-10-17 DIAGNOSIS — E11.22 TYPE 2 DIABETES MELLITUS WITH STAGE 5 CHRONIC KIDNEY DISEASE NOT ON CHRONIC DIALYSIS, WITHOUT LONG-TERM CURRENT USE OF INSULIN: ICD-10-CM

## 2024-10-17 DIAGNOSIS — E87.1 HYPONATREMIA: ICD-10-CM

## 2024-10-17 DIAGNOSIS — N18.5 TYPE 2 DIABETES MELLITUS WITH STAGE 5 CHRONIC KIDNEY DISEASE NOT ON CHRONIC DIALYSIS, WITHOUT LONG-TERM CURRENT USE OF INSULIN: ICD-10-CM

## 2024-10-17 DIAGNOSIS — R07.9 CHEST PAIN: ICD-10-CM

## 2024-10-17 LAB
ALBUMIN SERPL BCP-MCNC: 2.7 G/DL (ref 3.5–5.2)
ALBUMIN SERPL BCP-MCNC: 3.2 G/DL (ref 3.5–5.2)
ALP SERPL-CCNC: 108 U/L (ref 40–150)
ALP SERPL-CCNC: 92 U/L (ref 55–135)
ALT SERPL W/O P-5'-P-CCNC: 7 U/L (ref 10–44)
ALT SERPL W/O P-5'-P-CCNC: 8 U/L (ref 10–44)
ANION GAP SERPL CALC-SCNC: 10 MMOL/L (ref 8–16)
ANION GAP SERPL CALC-SCNC: 10 MMOL/L (ref 8–16)
ANION GAP SERPL CALC-SCNC: 8 MMOL/L (ref 8–16)
AST SERPL-CCNC: 11 U/L (ref 10–40)
AST SERPL-CCNC: 14 U/L (ref 10–40)
BACTERIA #/AREA URNS AUTO: ABNORMAL /HPF
BACTERIA #/AREA URNS HPF: ABNORMAL /HPF
BASOPHILS # BLD AUTO: 0.05 K/UL (ref 0–0.2)
BASOPHILS # BLD AUTO: 0.05 K/UL (ref 0–0.2)
BASOPHILS NFR BLD: 0.5 % (ref 0–1.9)
BASOPHILS NFR BLD: 0.7 % (ref 0–1.9)
BILIRUB SERPL-MCNC: 0.3 MG/DL (ref 0.1–1)
BILIRUB SERPL-MCNC: 0.3 MG/DL (ref 0.1–1)
BILIRUB UR QL STRIP: NEGATIVE
BILIRUB UR QL STRIP: NEGATIVE
BNP SERPL-MCNC: 333 PG/ML (ref 0–99)
BUN SERPL-MCNC: 46 MG/DL (ref 8–23)
BUN SERPL-MCNC: 47 MG/DL (ref 8–23)
BUN SERPL-MCNC: 48 MG/DL (ref 8–23)
CALCIUM SERPL-MCNC: 7.3 MG/DL (ref 8.7–10.5)
CALCIUM SERPL-MCNC: 7.3 MG/DL (ref 8.7–10.5)
CALCIUM SERPL-MCNC: 8.3 MG/DL (ref 8.7–10.5)
CHLORIDE SERPL-SCNC: 102 MMOL/L (ref 95–110)
CHLORIDE SERPL-SCNC: 103 MMOL/L (ref 95–110)
CHLORIDE SERPL-SCNC: 99 MMOL/L (ref 95–110)
CLARITY UR REFRACT.AUTO: ABNORMAL
CLARITY UR: ABNORMAL
CO2 SERPL-SCNC: 17 MMOL/L (ref 23–29)
CO2 SERPL-SCNC: 19 MMOL/L (ref 23–29)
CO2 SERPL-SCNC: 19 MMOL/L (ref 23–29)
COLOR UR AUTO: COLORLESS
COLOR UR: COLORLESS
CREAT SERPL-MCNC: 4.7 MG/DL (ref 0.5–1.4)
CREAT SERPL-MCNC: 4.8 MG/DL (ref 0.5–1.4)
CREAT SERPL-MCNC: 4.8 MG/DL (ref 0.5–1.4)
DIFFERENTIAL METHOD BLD: ABNORMAL
DIFFERENTIAL METHOD BLD: ABNORMAL
EOSINOPHIL # BLD AUTO: 0.1 K/UL (ref 0–0.5)
EOSINOPHIL # BLD AUTO: 0.1 K/UL (ref 0–0.5)
EOSINOPHIL NFR BLD: 0.9 % (ref 0–8)
EOSINOPHIL NFR BLD: 0.9 % (ref 0–8)
ERYTHROCYTE [DISTWIDTH] IN BLOOD BY AUTOMATED COUNT: 14.5 % (ref 11.5–14.5)
ERYTHROCYTE [DISTWIDTH] IN BLOOD BY AUTOMATED COUNT: 14.6 % (ref 11.5–14.5)
EST. GFR  (NO RACE VARIABLE): 8.9 ML/MIN/1.73 M^2
EST. GFR  (NO RACE VARIABLE): 8.9 ML/MIN/1.73 M^2
EST. GFR  (NO RACE VARIABLE): 9.2 ML/MIN/1.73 M^2
GLUCOSE SERPL-MCNC: 210 MG/DL (ref 70–110)
GLUCOSE SERPL-MCNC: 245 MG/DL (ref 70–110)
GLUCOSE SERPL-MCNC: 307 MG/DL (ref 70–110)
GLUCOSE UR QL STRIP: ABNORMAL
GLUCOSE UR QL STRIP: ABNORMAL
HCT VFR BLD AUTO: 28.2 % (ref 37–48.5)
HCT VFR BLD AUTO: 31.7 % (ref 37–48.5)
HGB BLD-MCNC: 10.1 G/DL (ref 12–16)
HGB BLD-MCNC: 9.2 G/DL (ref 12–16)
HGB UR QL STRIP: ABNORMAL
HGB UR QL STRIP: ABNORMAL
HYALINE CASTS #/AREA URNS LPF: 0 /LPF
HYALINE CASTS UR QL AUTO: 9 /LPF
IMM GRANULOCYTES # BLD AUTO: 0.05 K/UL (ref 0–0.04)
IMM GRANULOCYTES # BLD AUTO: 0.1 K/UL (ref 0–0.04)
IMM GRANULOCYTES NFR BLD AUTO: 0.7 % (ref 0–0.5)
IMM GRANULOCYTES NFR BLD AUTO: 1.1 % (ref 0–0.5)
KETONES UR QL STRIP: NEGATIVE
KETONES UR QL STRIP: NEGATIVE
LEUKOCYTE ESTERASE UR QL STRIP: ABNORMAL
LEUKOCYTE ESTERASE UR QL STRIP: ABNORMAL
LIPASE SERPL-CCNC: 75 U/L (ref 4–60)
LYMPHOCYTES # BLD AUTO: 0.9 K/UL (ref 1–4.8)
LYMPHOCYTES # BLD AUTO: 1.1 K/UL (ref 1–4.8)
LYMPHOCYTES NFR BLD: 14.5 % (ref 18–48)
LYMPHOCYTES NFR BLD: 9.1 % (ref 18–48)
MCH RBC QN AUTO: 30.1 PG (ref 27–31)
MCH RBC QN AUTO: 30.3 PG (ref 27–31)
MCHC RBC AUTO-ENTMCNC: 31.9 G/DL (ref 32–36)
MCHC RBC AUTO-ENTMCNC: 32.6 G/DL (ref 32–36)
MCV RBC AUTO: 93 FL (ref 82–98)
MCV RBC AUTO: 94 FL (ref 82–98)
MICROSCOPIC COMMENT: ABNORMAL
MICROSCOPIC COMMENT: ABNORMAL
MONOCYTES # BLD AUTO: 0.7 K/UL (ref 0.3–1)
MONOCYTES # BLD AUTO: 0.8 K/UL (ref 0.3–1)
MONOCYTES NFR BLD: 8 % (ref 4–15)
MONOCYTES NFR BLD: 9.9 % (ref 4–15)
NEUTROPHILS # BLD AUTO: 5.6 K/UL (ref 1.8–7.7)
NEUTROPHILS # BLD AUTO: 7.5 K/UL (ref 1.8–7.7)
NEUTROPHILS NFR BLD: 73.3 % (ref 38–73)
NEUTROPHILS NFR BLD: 80.4 % (ref 38–73)
NITRITE UR QL STRIP: NEGATIVE
NITRITE UR QL STRIP: NEGATIVE
NRBC BLD-RTO: 0 /100 WBC
NRBC BLD-RTO: 0 /100 WBC
OSMOLALITY SERPL: 295 MOSM/KG (ref 275–295)
PH UR STRIP: 6 [PH] (ref 5–8)
PH UR STRIP: 7 [PH] (ref 5–8)
PLATELET # BLD AUTO: 242 K/UL (ref 150–450)
PLATELET # BLD AUTO: 295 K/UL (ref 150–450)
PMV BLD AUTO: 8.4 FL (ref 9.2–12.9)
PMV BLD AUTO: 8.8 FL (ref 9.2–12.9)
POCT GLUCOSE: 266 MG/DL (ref 70–110)
POTASSIUM SERPL-SCNC: 4.7 MMOL/L (ref 3.5–5.1)
POTASSIUM SERPL-SCNC: 4.7 MMOL/L (ref 3.5–5.1)
POTASSIUM SERPL-SCNC: 5.2 MMOL/L (ref 3.5–5.1)
PROT SERPL-MCNC: 6.2 G/DL (ref 6–8.4)
PROT SERPL-MCNC: 6.4 G/DL (ref 6–8.4)
PROT UR QL STRIP: ABNORMAL
PROT UR QL STRIP: ABNORMAL
RBC # BLD AUTO: 3.04 M/UL (ref 4–5.4)
RBC # BLD AUTO: 3.36 M/UL (ref 4–5.4)
RBC #/AREA URNS AUTO: 25 /HPF (ref 0–4)
RBC #/AREA URNS HPF: 6 /HPF (ref 0–4)
SODIUM SERPL-SCNC: 128 MMOL/L (ref 136–145)
SODIUM SERPL-SCNC: 129 MMOL/L (ref 136–145)
SODIUM SERPL-SCNC: 130 MMOL/L (ref 136–145)
SODIUM UR-SCNC: 62 MMOL/L (ref 20–250)
SP GR UR STRIP: 1 (ref 1–1.03)
SP GR UR STRIP: 1.01 (ref 1–1.03)
SQUAMOUS #/AREA URNS AUTO: 2 /HPF
TROPONIN I SERPL HS-MCNC: 25.1 PG/ML (ref 0–14.9)
URN SPEC COLLECT METH UR: ABNORMAL
URN SPEC COLLECT METH UR: ABNORMAL
UROBILINOGEN UR STRIP-ACNC: NEGATIVE EU/DL
WBC # BLD AUTO: 7.6 K/UL (ref 3.9–12.7)
WBC # BLD AUTO: 9.3 K/UL (ref 3.9–12.7)
WBC #/AREA URNS AUTO: >100 /HPF (ref 0–5)
WBC #/AREA URNS HPF: >100 /HPF (ref 0–5)
WBC CLUMPS UR QL AUTO: ABNORMAL
WBC CLUMPS URNS QL MICRO: ABNORMAL

## 2024-10-17 PROCEDURE — 12000002 HC ACUTE/MED SURGE SEMI-PRIVATE ROOM

## 2024-10-17 PROCEDURE — 25000003 PHARM REV CODE 250: Performed by: STUDENT IN AN ORGANIZED HEALTH CARE EDUCATION/TRAINING PROGRAM

## 2024-10-17 PROCEDURE — 25000003 PHARM REV CODE 250: Performed by: INTERNAL MEDICINE

## 2024-10-17 PROCEDURE — 99999 PR PBB SHADOW E&M-EST. PATIENT-LVL V: CPT | Mod: PBBFAC,,, | Performed by: NURSE PRACTITIONER

## 2024-10-17 PROCEDURE — 63600175 PHARM REV CODE 636 W HCPCS: Performed by: STUDENT IN AN ORGANIZED HEALTH CARE EDUCATION/TRAINING PROGRAM

## 2024-10-17 PROCEDURE — 83930 ASSAY OF BLOOD OSMOLALITY: CPT | Performed by: STUDENT IN AN ORGANIZED HEALTH CARE EDUCATION/TRAINING PROGRAM

## 2024-10-17 PROCEDURE — 74176 CT ABD & PELVIS W/O CONTRAST: CPT | Mod: 26,,, | Performed by: RADIOLOGY

## 2024-10-17 PROCEDURE — 85025 COMPLETE CBC W/AUTO DIFF WBC: CPT | Performed by: EMERGENCY MEDICINE

## 2024-10-17 PROCEDURE — 96375 TX/PRO/DX INJ NEW DRUG ADDON: CPT

## 2024-10-17 PROCEDURE — 82962 GLUCOSE BLOOD TEST: CPT

## 2024-10-17 PROCEDURE — 36415 COLL VENOUS BLD VENIPUNCTURE: CPT | Performed by: STUDENT IN AN ORGANIZED HEALTH CARE EDUCATION/TRAINING PROGRAM

## 2024-10-17 PROCEDURE — 84300 ASSAY OF URINE SODIUM: CPT | Performed by: EMERGENCY MEDICINE

## 2024-10-17 PROCEDURE — 74176 CT ABD & PELVIS W/O CONTRAST: CPT | Mod: TC

## 2024-10-17 PROCEDURE — 81001 URINALYSIS AUTO W/SCOPE: CPT | Mod: 91 | Performed by: EMERGENCY MEDICINE

## 2024-10-17 PROCEDURE — 36415 COLL VENOUS BLD VENIPUNCTURE: CPT | Performed by: NURSE PRACTITIONER

## 2024-10-17 PROCEDURE — 96361 HYDRATE IV INFUSION ADD-ON: CPT

## 2024-10-17 PROCEDURE — 36415 COLL VENOUS BLD VENIPUNCTURE: CPT | Performed by: EMERGENCY MEDICINE

## 2024-10-17 PROCEDURE — 83690 ASSAY OF LIPASE: CPT | Performed by: NURSE PRACTITIONER

## 2024-10-17 PROCEDURE — 80048 BASIC METABOLIC PNL TOTAL CA: CPT | Performed by: STUDENT IN AN ORGANIZED HEALTH CARE EDUCATION/TRAINING PROGRAM

## 2024-10-17 PROCEDURE — 96374 THER/PROPH/DIAG INJ IV PUSH: CPT

## 2024-10-17 PROCEDURE — 85025 COMPLETE CBC W/AUTO DIFF WBC: CPT | Mod: 91 | Performed by: NURSE PRACTITIONER

## 2024-10-17 PROCEDURE — 81001 URINALYSIS AUTO W/SCOPE: CPT | Performed by: NURSE PRACTITIONER

## 2024-10-17 PROCEDURE — 83880 ASSAY OF NATRIURETIC PEPTIDE: CPT | Performed by: EMERGENCY MEDICINE

## 2024-10-17 PROCEDURE — 80053 COMPREHEN METABOLIC PANEL: CPT | Performed by: EMERGENCY MEDICINE

## 2024-10-17 PROCEDURE — 99285 EMERGENCY DEPT VISIT HI MDM: CPT | Mod: 25

## 2024-10-17 PROCEDURE — 80053 COMPREHEN METABOLIC PANEL: CPT | Mod: 91 | Performed by: NURSE PRACTITIONER

## 2024-10-17 PROCEDURE — 84484 ASSAY OF TROPONIN QUANT: CPT | Performed by: STUDENT IN AN ORGANIZED HEALTH CARE EDUCATION/TRAINING PROGRAM

## 2024-10-17 PROCEDURE — 87086 URINE CULTURE/COLONY COUNT: CPT | Mod: 59 | Performed by: EMERGENCY MEDICINE

## 2024-10-17 RX ORDER — HYDRALAZINE HYDROCHLORIDE 20 MG/ML
10 INJECTION INTRAMUSCULAR; INTRAVENOUS EVERY 4 HOURS PRN
Status: DISCONTINUED | OUTPATIENT
Start: 2024-10-17 | End: 2024-10-18

## 2024-10-17 RX ORDER — ALUMINUM HYDROXIDE, MAGNESIUM HYDROXIDE, AND SIMETHICONE 1200; 120; 1200 MG/30ML; MG/30ML; MG/30ML
30 SUSPENSION ORAL 4 TIMES DAILY PRN
Status: DISCONTINUED | OUTPATIENT
Start: 2024-10-17 | End: 2024-10-20 | Stop reason: HOSPADM

## 2024-10-17 RX ORDER — ACETAMINOPHEN 325 MG/1
650 TABLET ORAL EVERY 8 HOURS PRN
Status: DISCONTINUED | OUTPATIENT
Start: 2024-10-17 | End: 2024-10-20 | Stop reason: HOSPADM

## 2024-10-17 RX ORDER — LANOLIN ALCOHOL/MO/W.PET/CERES
800 CREAM (GRAM) TOPICAL
Status: DISCONTINUED | OUTPATIENT
Start: 2024-10-17 | End: 2024-10-20 | Stop reason: HOSPADM

## 2024-10-17 RX ORDER — AMLODIPINE BESYLATE 5 MG/1
5 TABLET ORAL DAILY
Status: DISCONTINUED | OUTPATIENT
Start: 2024-10-18 | End: 2024-10-17

## 2024-10-17 RX ORDER — SODIUM,POTASSIUM PHOSPHATES 280-250MG
2 POWDER IN PACKET (EA) ORAL
Status: DISCONTINUED | OUTPATIENT
Start: 2024-10-17 | End: 2024-10-20 | Stop reason: HOSPADM

## 2024-10-17 RX ORDER — HYDROCODONE BITARTRATE AND ACETAMINOPHEN 5; 325 MG/1; MG/1
1 TABLET ORAL EVERY 6 HOURS PRN
Status: DISCONTINUED | OUTPATIENT
Start: 2024-10-17 | End: 2024-10-20 | Stop reason: HOSPADM

## 2024-10-17 RX ORDER — CEFTRIAXONE 1 G/1
1 INJECTION, POWDER, FOR SOLUTION INTRAMUSCULAR; INTRAVENOUS ONCE
Status: COMPLETED | OUTPATIENT
Start: 2024-10-17 | End: 2024-10-17

## 2024-10-17 RX ORDER — IBUPROFEN 200 MG
16 TABLET ORAL
Status: DISCONTINUED | OUTPATIENT
Start: 2024-10-17 | End: 2024-10-20 | Stop reason: HOSPADM

## 2024-10-17 RX ORDER — AMLODIPINE BESYLATE 5 MG/1
10 TABLET ORAL DAILY
Status: DISCONTINUED | OUTPATIENT
Start: 2024-10-17 | End: 2024-10-17

## 2024-10-17 RX ORDER — AMLODIPINE BESYLATE 5 MG/1
5 TABLET ORAL
Status: COMPLETED | OUTPATIENT
Start: 2024-10-17 | End: 2024-10-17

## 2024-10-17 RX ORDER — CITALOPRAM 20 MG/1
20 TABLET, FILM COATED ORAL DAILY
Status: DISCONTINUED | OUTPATIENT
Start: 2024-10-18 | End: 2024-10-20 | Stop reason: HOSPADM

## 2024-10-17 RX ORDER — INSULIN ASPART 100 [IU]/ML
0-10 INJECTION, SOLUTION INTRAVENOUS; SUBCUTANEOUS
Status: DISCONTINUED | OUTPATIENT
Start: 2024-10-17 | End: 2024-10-20 | Stop reason: HOSPADM

## 2024-10-17 RX ORDER — DOCUSATE SODIUM 100 MG
400 CAPSULE ORAL
Status: DISCONTINUED | OUTPATIENT
Start: 2024-10-17 | End: 2024-10-19

## 2024-10-17 RX ORDER — GLUCAGON 1 MG
1 KIT INJECTION
Status: DISCONTINUED | OUTPATIENT
Start: 2024-10-17 | End: 2024-10-20 | Stop reason: HOSPADM

## 2024-10-17 RX ORDER — ONDANSETRON HYDROCHLORIDE 2 MG/ML
4 INJECTION, SOLUTION INTRAVENOUS EVERY 6 HOURS PRN
Status: DISCONTINUED | OUTPATIENT
Start: 2024-10-17 | End: 2024-10-20 | Stop reason: HOSPADM

## 2024-10-17 RX ORDER — TALC
6 POWDER (GRAM) TOPICAL NIGHTLY PRN
Status: DISCONTINUED | OUTPATIENT
Start: 2024-10-17 | End: 2024-10-20 | Stop reason: HOSPADM

## 2024-10-17 RX ORDER — HYDRALAZINE HYDROCHLORIDE 25 MG/1
25 TABLET, FILM COATED ORAL ONCE
Status: COMPLETED | OUTPATIENT
Start: 2024-10-17 | End: 2024-10-17

## 2024-10-17 RX ORDER — IBUPROFEN 200 MG
24 TABLET ORAL
Status: DISCONTINUED | OUTPATIENT
Start: 2024-10-17 | End: 2024-10-20 | Stop reason: HOSPADM

## 2024-10-17 RX ORDER — ONDANSETRON 8 MG/1
8 TABLET, ORALLY DISINTEGRATING ORAL EVERY 8 HOURS PRN
Qty: 12 TABLET | Refills: 0 | Status: ON HOLD | OUTPATIENT
Start: 2024-10-17

## 2024-10-17 RX ORDER — SODIUM CHLORIDE 9 MG/ML
INJECTION, SOLUTION INTRAVENOUS CONTINUOUS
Status: DISCONTINUED | OUTPATIENT
Start: 2024-10-17 | End: 2024-10-19

## 2024-10-17 RX ORDER — SODIUM BICARBONATE 650 MG/1
650 TABLET ORAL DAILY
Status: ON HOLD | COMMUNITY
End: 2024-10-20

## 2024-10-17 RX ORDER — METOPROLOL TARTRATE 25 MG/1
25 TABLET, FILM COATED ORAL 2 TIMES DAILY
Status: DISCONTINUED | OUTPATIENT
Start: 2024-10-17 | End: 2024-10-20 | Stop reason: HOSPADM

## 2024-10-17 RX ORDER — NALOXONE HCL 0.4 MG/ML
0.02 VIAL (ML) INJECTION
Status: DISCONTINUED | OUTPATIENT
Start: 2024-10-17 | End: 2024-10-20 | Stop reason: HOSPADM

## 2024-10-17 RX ORDER — CEFTRIAXONE 2 G/1
2 INJECTION, POWDER, FOR SOLUTION INTRAMUSCULAR; INTRAVENOUS
Status: DISCONTINUED | OUTPATIENT
Start: 2024-10-18 | End: 2024-10-20 | Stop reason: HOSPADM

## 2024-10-17 RX ORDER — ACETAMINOPHEN 325 MG/1
650 TABLET ORAL EVERY 4 HOURS PRN
Status: DISCONTINUED | OUTPATIENT
Start: 2024-10-17 | End: 2024-10-20 | Stop reason: HOSPADM

## 2024-10-17 RX ORDER — PANTOPRAZOLE SODIUM 40 MG/1
40 TABLET, DELAYED RELEASE ORAL
Status: DISCONTINUED | OUTPATIENT
Start: 2024-10-18 | End: 2024-10-20 | Stop reason: HOSPADM

## 2024-10-17 RX ORDER — AMLODIPINE BESYLATE 5 MG/1
10 TABLET ORAL DAILY
Status: DISCONTINUED | OUTPATIENT
Start: 2024-10-18 | End: 2024-10-20 | Stop reason: HOSPADM

## 2024-10-17 RX ORDER — HYDRALAZINE HYDROCHLORIDE 20 MG/ML
10 INJECTION INTRAMUSCULAR; INTRAVENOUS
Status: COMPLETED | OUTPATIENT
Start: 2024-10-17 | End: 2024-10-17

## 2024-10-17 RX ADMIN — CEFTRIAXONE SODIUM 1 G: 1 INJECTION, POWDER, FOR SOLUTION INTRAMUSCULAR; INTRAVENOUS at 07:10

## 2024-10-17 RX ADMIN — Medication 400 ML: at 11:10

## 2024-10-17 RX ADMIN — SODIUM CHLORIDE: 9 INJECTION, SOLUTION INTRAVENOUS at 11:10

## 2024-10-17 RX ADMIN — HYDRALAZINE HYDROCHLORIDE 10 MG: 20 INJECTION INTRAMUSCULAR; INTRAVENOUS at 08:10

## 2024-10-17 RX ADMIN — Medication 400 ML: at 07:10

## 2024-10-17 RX ADMIN — SODIUM CHLORIDE 500 ML: 9 INJECTION, SOLUTION INTRAVENOUS at 07:10

## 2024-10-17 RX ADMIN — AMLODIPINE BESYLATE 5 MG: 5 TABLET ORAL at 07:10

## 2024-10-17 RX ADMIN — METOPROLOL TARTRATE 25 MG: 25 TABLET, FILM COATED ORAL at 09:10

## 2024-10-17 RX ADMIN — SODIUM CHLORIDE 500 ML: 9 INJECTION, SOLUTION INTRAVENOUS at 09:10

## 2024-10-17 RX ADMIN — APIXABAN 5 MG: 5 TABLET, FILM COATED ORAL at 09:10

## 2024-10-17 RX ADMIN — HYDRALAZINE HYDROCHLORIDE 25 MG: 25 TABLET ORAL at 07:10

## 2024-10-17 NOTE — ED PROVIDER NOTES
Encounter Date: 10/17/2024       History     Chief Complaint   Patient presents with    Abnormal Urinalysis     Patient had urinalysis today and UTI was found - sent by DYLON Benson to ER - pt has only one functioning kidney      HPI    Epi Mcintosh is a 74 y.o. female with a past medical history insulin-dependent diabetes, history fluid retention, GERD, and CKD that presents emergency department for evaluation of left lower quadrant abdominal pain, diarrhea, emesis, and back pain that has been ongoing for approximately 3 weeks.  Patient was also complaining of urinary urgency.  States that she has multiple episodes of poorly-controlled diarrhea.  Does endorse decreased urination but feels that she does have some urgency.  Does endorse bilateral edema which he attributes to sodium intake.  She was seen at her PCP's office today who ordered outpatient labs and CT scan.  She was found to have left-sided pyelonephritis as well as diverticulosis.  There was not any evidence of diverticulitis.  Denies active fever, chest pain, shortness of breath, numbness, weakness, and constipation.      Review of patient's allergies indicates:   Allergen Reactions    Plavix [clopidogrel] Rash    Zolpidem      Other reaction(s): Other (See Comments)  Seeing people who were not in the room     Codeine      Past Medical History:   Diagnosis Date    COPD (chronic obstructive pulmonary disease)     Coronary artery disease     Depression     Diabetes mellitus     Hypertension     Pancreatitis     PVD (peripheral vascular disease)      Past Surgical History:   Procedure Laterality Date    AORTOGRAPHY WITH SERIALOGRAPHY N/A 4/1/2022    Procedure: AORTOGRAM, WITH SERIALOGRAPHY;  Surgeon: Petr Kaufman MD;  Location: [facility] CATH/EP LAB;  Service: Peripheral Vascular;  Laterality: N/A;    CARDIAC SURGERY      CORONARY ARTERY BYPASS GRAFT  12/2018    CYSTOSCOPY N/A 6/26/2024    Procedure: CYSTOSCOPY;  Surgeon: Agustín Marinelli  MD Mateo;  Location: [facility] OR;  Service: Urology;  Laterality: N/A;    HYSTERECTOMY       Family History   Problem Relation Name Age of Onset    Arthritis Mother       Social History     Tobacco Use    Smoking status: Every Day     Current packs/day: 0.50     Average packs/day: 0.5 packs/day for 55.8 years (27.9 ttl pk-yrs)     Types: Vaping w/o nicotine, Cigarettes     Start date: 1969    Smokeless tobacco: Never   Substance Use Topics    Alcohol use: Yes     Comment: occasionally    Drug use: Never     Review of Systems   Constitutional:  Negative for fever.   HENT:  Negative for sore throat.    Respiratory:  Negative for shortness of breath.    Cardiovascular:  Positive for leg swelling. Negative for chest pain.   Gastrointestinal:  Positive for abdominal pain (Left-sided flank pain with CVA tenderness), diarrhea, nausea and vomiting.   Genitourinary:  Negative for dysuria.   Musculoskeletal:  Negative for back pain.   Skin:  Negative for rash.   Neurological:  Negative for weakness.   Hematological:  Does not bruise/bleed easily.       Physical Exam     Initial Vitals [10/17/24 1411]   BP Pulse Resp Temp SpO2   135/69 74 18 98 °F (36.7 °C) 99 %      MAP       --         Physical Exam    Nursing note and vitals reviewed.  Constitutional: She appears well-developed and well-nourished.   HENT:   Head: Normocephalic and atraumatic.   Eyes: EOM are normal. Pupils are equal, round, and reactive to light.   Neck:   Normal range of motion.  Cardiovascular:  Normal rate, regular rhythm and normal heart sounds.           Pulmonary/Chest: Breath sounds normal. No respiratory distress. She has no wheezes. She has no rhonchi. She has no rales.   Abdominal: Abdomen is soft. She exhibits no distension. There is no abdominal tenderness. There is no rebound.   Musculoskeletal:         General: Normal range of motion.      Cervical back: Normal range of motion.     Neurological: She is alert and oriented to person, place, and  time. She has normal strength. No cranial nerve deficit or sensory deficit. GCS score is 15. GCS eye subscore is 4. GCS verbal subscore is 5. GCS motor subscore is 6.   Skin: Capillary refill takes less than 2 seconds. No rash noted.   Psychiatric: She has a normal mood and affect. Thought content normal.         ED Course   Procedures  Labs Reviewed   CBC W/ AUTO DIFFERENTIAL - Abnormal       Result Value    WBC 7.60      RBC 3.04 (*)     Hemoglobin 9.2 (*)     Hematocrit 28.2 (*)     MCV 93      MCH 30.3      MCHC 32.6      RDW 14.5      Platelets 242      MPV 8.4 (*)     Immature Granulocytes 0.7 (*)     Gran # (ANC) 5.6      Immature Grans (Abs) 0.05 (*)     Lymph # 1.1      Mono # 0.8      Eos # 0.1      Baso # 0.05      nRBC 0      Gran % 73.3 (*)     Lymph % 14.5 (*)     Mono % 9.9      Eosinophil % 0.9      Basophil % 0.7      Differential Method Automated     COMPREHENSIVE METABOLIC PANEL - Abnormal    Sodium 128 (*)     Potassium 4.7      Chloride 99      CO2 19 (*)     Glucose 307 (*)     BUN 48 (*)     Creatinine 4.8 (*)     Calcium 7.3 (*)     Total Protein 6.2      Albumin 3.2 (*)     Total Bilirubin 0.3      Alkaline Phosphatase 92      AST 11      ALT 7 (*)     eGFR 8.9 (*)     Anion Gap 10     URINALYSIS, REFLEX TO URINE CULTURE - Abnormal    Specimen UA Urine, Clean Catch      Color, UA Colorless (*)     Appearance, UA Hazy (*)     pH, UA 7.0      Specific Gravity, UA 1.005      Protein, UA 1+ (*)     Glucose, UA Trace (*)     Ketones, UA Negative      Bilirubin (UA) Negative      Occult Blood UA 2+ (*)     Nitrite, UA Negative      Urobilinogen, UA Negative      Leukocytes, UA 3+ (*)     Narrative:     In and Out Cath as needed it patient unable to void  Specimen Source->Urine   URINALYSIS MICROSCOPIC - Abnormal    RBC, UA 6 (*)     WBC, UA >100 (*)     WBC Clumps, UA Few (*)     Bacteria None      Hyaline Casts, UA 0      Microscopic Comment SEE COMMENT      Narrative:     In and Out Cath as  needed it patient unable to void  Specimen Source->Urine   B-TYPE NATRIURETIC PEPTIDE - Abnormal     (*)    POCT GLUCOSE - Abnormal    POCT Glucose 266 (*)    CULTURE, URINE   SODIUM, URINE, RANDOM   OSMOLALITY, SERUM   B-TYPE NATRIURETIC PEPTIDE   SODIUM, URINE, RANDOM    Sodium, Urine 62     OSMOLALITY, SERUM    Osmolality 295     BASIC METABOLIC PANEL   CREATININE, URINE, RANDOM   TROPONIN I HIGH SENSITIVITY   POCT GLUCOSE MONITORING CONTINUOUS          Imaging Results              US Retroperitoneal Complete (In process)                      X-Ray Chest AP Portable (Final result)  Result time 10/17/24 21:25:11      Final result by Carlitos Browning MD (10/17/24 21:25:11)                   Impression:      No convincing radiographic evidence of acute intrathoracic process on this single view.      Electronically signed by: Carlitos Browning MD  Date:    10/17/2024  Time:    21:25               Narrative:    EXAMINATION:  XR CHEST AP PORTABLE    CLINICAL HISTORY:  Edema, unspecified    TECHNIQUE:  Single frontal view of the chest was performed.    COMPARISON:  03/17/2024    FINDINGS:  Cardiac monitoring leads overlie the chest.  There is postoperative change of prior median sternotomy.  The cardiac silhouette is not significantly enlarged.  Lungs are symmetrically expanded with mild chronic coarse bibasilar interstitial attenuation.  No large confluent airspace consolidation identified.  No significant volume of pleural fluid or pneumothorax identified.  Osseous structures demonstrate degenerative change.                                       Medications   electrolytes-dextrose (Pedialyte) oral solution 400 mL (400 mLs Oral Given 10/1949)   apixaban tablet 5 mg (5 mg Oral Given 10/17/24 2110)   citalopram tablet 20 mg (has no administration in time range)   metoprolol tartrate (LOPRESSOR) tablet 25 mg (25 mg Oral Given 10/17/24 2110)   pantoprazole EC tablet 40 mg (has no administration in time range)    melatonin tablet 6 mg (has no administration in time range)   ondansetron injection 4 mg (has no administration in time range)   acetaminophen tablet 650 mg (has no administration in time range)   aluminum-magnesium hydroxide-simethicone 200-200-20 mg/5 mL suspension 30 mL (has no administration in time range)   acetaminophen tablet 650 mg (has no administration in time range)   HYDROcodone-acetaminophen 5-325 mg per tablet 1 tablet (has no administration in time range)   naloxone 0.4 mg/mL injection 0.02 mg (has no administration in time range)   potassium bicarbonate disintegrating tablet 50 mEq (has no administration in time range)   potassium bicarbonate disintegrating tablet 35 mEq (has no administration in time range)   potassium bicarbonate disintegrating tablet 60 mEq (has no administration in time range)   magnesium oxide tablet 800 mg (has no administration in time range)   magnesium oxide tablet 800 mg (has no administration in time range)   potassium, sodium phosphates 280-160-250 mg packet 2 packet (has no administration in time range)   potassium, sodium phosphates 280-160-250 mg packet 2 packet (has no administration in time range)   potassium, sodium phosphates 280-160-250 mg packet 2 packet (has no administration in time range)   glucose chewable tablet 16 g (has no administration in time range)   glucose chewable tablet 24 g (has no administration in time range)   dextrose 50% injection 12.5 g (has no administration in time range)   dextrose 50% injection 25 g (has no administration in time range)   glucagon (human recombinant) injection 1 mg (has no administration in time range)   insulin aspart U-100 pen 0-10 Units (has no administration in time range)   cefTRIAXone injection 2 g (has no administration in time range)   0.9%  NaCl infusion (has no administration in time range)   hydrALAZINE injection 10 mg (has no administration in time range)   amLODIPine tablet 10 mg (has no administration in  time range)   cefTRIAXone injection 1 g (1 g Intravenous Given 10/17/24 1929)   sodium chloride 0.9% bolus 500 mL 500 mL (0 mLs Intravenous Stopped 10/17/24 2100)   amLODIPine tablet 5 mg (5 mg Oral Given 10/17/24 1950)   hydrALAZINE tablet 25 mg (25 mg Oral Given 10/1949)   hydrALAZINE injection 10 mg (10 mg Intravenous Given 10/17/24 2045)   sodium chloride 0.9% bolus 500 mL 500 mL (0 mLs Intravenous Stopped 10/17/24 2147)     Medical Decision Making  Epi Mcintosh is a 74 y.o. female  with a past medical history insulin-dependent diabetes, history fluid retention, GERD, and CKD that presents emergency department for evaluation of left lower quadrant abdominal pain, diarrhea, emesis, and back pain that has been ongoing for approximately 3 weeks.  Vitals were stable though hypertensive.  Exam with nontoxic female.  Minimal left-sided flank and lower left quadrant abdominal tenderness.  Differential includes but isn't limited to KAIT on CKD, UTI, hyponatremia, electrolyte abnormality, and GI bleed.  CBC shows hemoglobin of 9.2.  This appears to be down from 12.1 from 4 weeks ago.  No clear evidence of bleed and patient denies any bleeding like symptoms.  Not having evidence of symptomatic anemia.  May be dilutional from volume overload.  UA shows 3+ leuk esterase.  Given Rocephin.  Review of CT scan from earlier in the day did not show evidence of diverticulitis.  There was diverticulosis.  CMP does show mild hyponatremia which appears to be near baseline.  Creatinine is elevated to 4.8 which appears to be up from baseline 3.1.  Patient last had labs drawn approximately 1 week ago but has not had any additional follow up on these labs.  Urine studies are pending.  Chest x-ray is clear.  Given the nausea and vomiting associated with diarrhea and rising creatinine, this maybe secondary to dehydration.  Given 1 L of fluids.  Patient also had episode of significant hypertension, so this was treated.   This may be contributing to her elevated creatinine.  Admitted to Hospital Medicine for further evaluation.    Amount and/or Complexity of Data Reviewed  Labs: ordered. Decision-making details documented in ED Course.  Radiology: ordered.    Risk  OTC drugs.  Prescription drug management.  Decision regarding hospitalization.               ED Course as of 10/17/24 2209   Thu Oct 17, 2024   1636 Hemoglobin(!): 9.2 [ME]      ED Course User Index  [ME] Camron Qureshi MD                           Clinical Impression:  Final diagnoses:  [R60.9] Edema  [N17.9] KAIT (acute kidney injury) (Primary)  [D64.9] Anemia, unspecified type  [E87.1] Hyponatremia  [N30.00] Acute cystitis without hematuria          ED Disposition Condition    Admit Stable                Camron Qureshi MD  10/17/24 2209

## 2024-10-17 NOTE — TELEPHONE ENCOUNTER
Called and spoke to pt about CT results- pt strongly advised to go to ER today, infection needs to be treated with IV antibiotics due to her severely declining kidney function and patient needs hydration with fluids; patient voiced understanding and is agreeable to going today; understands the risks of not receiving treatment and voiced understanding

## 2024-10-17 NOTE — PROGRESS NOTES
SUBJECTIVE:      Patient ID: Epi Mcintosh is a 74 y.o. female.    Chief Complaint: Back Pain (X 3 weeks), Abdominal Pain (X 3 weeks), Diarrhea (X 3 weeks), and Emesis (X 3 weeks)    History of Present Illness    CHIEF COMPLAINT:  Epi presents with sudden onset of GI symptoms including nausea, vomiting, diarrhea, and abdominal pain, accompanied by chills and body convulsions.    HPI:  Epi reports abrupt symptom onset the day after her last visit, approximately 3 weeks ago. She had sudden gagging, vomiting, and diarrhea, with pain in her lower left abdomen radiating to her back. Epi had uncontrollable diarrhea and whole-body convulsions with chills.     The abdominal pain is continuous and stabbing, initially reminiscent of previous pancreatitis episodes. It is localized to her lower abdomen and, while improved since onset, persists. The diarrhea is intermittent, and the patient denies hematochezia.    Nausea and vomiting have been persistent, occurring every morning and continuing throughout the day. Epi can trigger gagging episodes through thought alone.     Epi had decreased urination, which has been improving over the last couple of days. She notes some pedal edema, attributed to increased sodium intake from nightly soup consumption.    Epi attempted to seek care at a hospital during the acute phase but left due to severe symptoms and embarrassment over uncontrollable diarrhea. Her nephrologist, Dr. Sinclair, contacted her out of concern regarding lack of hospitalization.    Epi reports decreased appetite. She has been consuming scrambled eggs, toast, fruit, and crackers. Nightly soup consumption is believed to contribute to pedal edema due to high sodium content.    Epi has a history of pancreatitis, having had it twice before. She has been off insulin for 2 weeks but has continued taking Actos at a reduced dose. She associates the onset of her GI symptoms with  starting Actos or insulin.    Epi denies current difficulty urinating.    MEDICATIONS:  Epi is on Lantus (insulin glargine) 10 units for diabetes management. She is also taking Actos 15 mg (pioglitazone) for diabetes management. She takes Protonix 40 mg (pantoprazole) in the morning before breakfast for reflux/heartburn.    MEDICAL HISTORY:  Epi has a history of pancreatitis, kidney disease, diabetes, and GERD.    TEST RESULTS:  Epi's A1c level was 9.3 on 10/10/24 which is down 1.5 points from her previous test 3 mo ago. She recently completed a kidney function test- GFR down to 7 from 15.          No family history on file.   Social History     Socioeconomic History    Marital status:    Tobacco Use    Smoking status: Every Day     Current packs/day: 0.50     Average packs/day: 0.5 packs/day for 55.8 years (27.9 ttl pk-yrs)     Types: Vaping w/o nicotine, Cigarettes     Start date: 1969    Smokeless tobacco: Never   Substance and Sexual Activity    Alcohol use: Yes     Comment: occasionally    Drug use: Never     Social Drivers of Health     Financial Resource Strain: High Risk (9/4/2024)    Overall Financial Resource Strain (CARDIA)     Difficulty of Paying Living Expenses: Very hard   Food Insecurity: Food Insecurity Present (9/4/2024)    Hunger Vital Sign     Worried About Running Out of Food in the Last Year: Often true     Ran Out of Food in the Last Year: Sometimes true   Transportation Needs: No Transportation Needs (4/5/2024)    PRAPARE - Transportation     Lack of Transportation (Medical): No     Lack of Transportation (Non-Medical): No   Physical Activity: Inactive (9/4/2024)    Exercise Vital Sign     Days of Exercise per Week: 0 days     Minutes of Exercise per Session: 60 min   Stress: Stress Concern Present (9/4/2024)    [employer] - Occupational Stress Questionnaire     Feeling of Stress : Rather much   Housing Stability: Low Risk  (4/5/2024)     Housing Stability Vital Sign     Unable to Pay for Housing in the Last Year: No     Number of Places Lived in the Last Year: 1     Unstable Housing in the Last Year: No     Current Outpatient Medications   Medication Sig Dispense Refill    albuterol (PROVENTIL/VENTOLIN HFA) 90 mcg/actuation inhaler Inhale 2 puffs into the lungs 4 (four) times daily as needed.      amLODIPine (NORVASC) 5 MG tablet Take 1 tablet (5 mg total) by mouth once daily. 90 tablet 1    apixaban (ELIQUIS) 5 mg Tab Take 1 tablet (5 mg total) by mouth 2 (two) times daily. 30 tablet 3    citalopram (CELEXA) 40 MG tablet Take 1 tablet (40 mg total) by mouth once daily. 90 tablet 1    insulin glargine U-100, Lantus, (LANTUS SOLOSTAR U-100 INSULIN) 100 unit/mL (3 mL) InPn pen Inject 10 Units into the skin every evening. 3 mL 2    levothyroxine (SYNTHROID) 88 MCG tablet Take 1 tablet (88 mcg total) by mouth before breakfast. 90 tablet 1    metoprolol tartrate (LOPRESSOR) 25 MG tablet Take 1 tablet (25 mg total) by mouth 2 (two) times daily. 180 tablet 1    pantoprazole (PROTONIX) 40 MG tablet Take 1 tablet (40 mg total) by mouth once daily. 90 tablet 1    pioglitazone (ACTOS) 15 MG tablet Take 15 mg by mouth once daily.      rosuvastatin (CRESTOR) 40 MG Tab Take 40 mg by mouth once daily.      blood sugar diagnostic (TRUE METRIX GLUCOSE TEST STRIP) Strp Sig: To check BG two times daily, to use with insurance preferred meter 100 strip 2    blood-glucose meter (TRUE METRIX AIR GLUCOSE METER) kit To check BG two times daily, to use with insurance preferred meter 1 each 0    lancets (TRUEPLUS LANCETS) 33 gauge Misc To check BG two times daily, to use with insurance preferred meter 100 each 2    lancets Misc To check BG two times daily, to use with insurance preferred meter 50 each 5    ondansetron (ZOFRAN-ODT) 8 MG TbDL Take 1 tablet (8 mg total) by mouth every 8 (eight) hours as needed (nausea or vomiting). 12 tablet 0    pen needle, diabetic 32 gauge x  "5/32" Ndle Use 1 pen needle nightly to administer insulin 100 each 2     No current facility-administered medications for this visit.     Facility-Administered Medications Ordered in Other Visits   Medication Dose Route Frequency Provider Last Rate Last Admin    0.9%  NaCl infusion   Intravenous Continuous Petr Kaufman  mL/hr at 04/01/22 1019 New Bag at 04/01/22 1019     Review of patient's allergies indicates:   Allergen Reactions    Plavix [clopidogrel] Rash    Zolpidem      Other reaction(s): Other (See Comments)  Seeing people who were not in the room     Codeine       Past Medical History:   Diagnosis Date    COPD (chronic obstructive pulmonary disease)     Coronary artery disease     Depression     Diabetes mellitus     Hypertension     Pancreatitis     PVD (peripheral vascular disease)      Past Surgical History:   Procedure Laterality Date    AORTOGRAPHY WITH SERIALOGRAPHY N/A 4/1/2022    Procedure: AORTOGRAM, WITH SERIALOGRAPHY;  Surgeon: Petr Kaufman MD;  Location: [facility] CATH/EP LAB;  Service: Peripheral Vascular;  Laterality: N/A;    CARDIAC SURGERY      CORONARY ARTERY BYPASS GRAFT  12/2018    CYSTOSCOPY N/A 6/26/2024    Procedure: CYSTOSCOPY;  Surgeon: Agustín Marinelli Jr., MD;  Location: [facility];  Service: Urology;  Laterality: N/A;    HYSTERECTOMY         Review of Systems   OBJECTIVE:      Vitals:    10/17/24 0915   BP: 116/62   BP Location: Left arm   Patient Position: Sitting   Pulse: 62   Resp: 18   Temp: 98 °F (36.7 °C)   TempSrc: Oral   SpO2: 100%   Weight: 63.5 kg (140 lb)   Height: 5' 5" (1.651 m)     Physical Exam   Assessment:       1. Abdominal pain, LLQ    2. Diarrhea, unspecified type    3. Nausea and vomiting, unspecified vomiting type    4. Chronic kidney disease (CKD), stage V    5. Type 2 diabetes mellitus with stage 5 chronic kidney disease not on chronic dialysis, without long-term current use of insulin        Plan:      Assessment & Plan    DIABETES:  - " Explained that uncontrolled glucose can worsen infections.  - Restart Lantus insulin 10 units daily.  -Actos does not have any of her current sx on SE profile- continue at 15 mg daily    FLUID RETENTION:  - Discussed potential link between elevated sodium intake and fluid retention.  - Sharlet to reduce sodium intake to help manage fluid retention and swelling.    GASTROESOPHAGEAL REFLUX DISEASE (GERD):  - Continued Protonix (pantoprazole) in the morning before breakfast.    LABS:  - Ordered labs including lipase and other tests.  - Ordered urinalysis.    ABDOMINAL IMAGING:  - Ordered non-contrast CT of abdomen stat today    FOLLOW UP:  - Follow up when CT scheduling information is received from either the imaging center or hospital outpatient department.         Abdominal pain, LLQ  -     COMPREHENSIVE METABOLIC PANEL; Future; Expected date: 10/17/2024  -     Urinalysis; Future; Expected date: 10/17/2024  -     Urine culture; Future; Expected date: 10/17/2024  -     CBC Auto Differential; Future; Expected date: 10/17/2024  -     CT Abdomen Pelvis  Without Contrast; Future  -r/o diverticulitis; labs ordered to check lipase due to hx of pancreatitis     Diarrhea, unspecified type  -     COMPREHENSIVE METABOLIC PANEL; Future; Expected date: 10/17/2024  -     CBC Auto Differential; Future; Expected date: 10/17/2024  -     CT Abdomen Pelvis  Without Contrast; Future    Nausea and vomiting, unspecified vomiting type  -     LIPASE; Future; Expected date: 10/17/2024  -     COMPREHENSIVE METABOLIC PANEL; Future; Expected date: 10/17/2024  -     ondansetron (ZOFRAN-ODT) 8 MG TbDL; Take 1 tablet (8 mg total) by mouth every 8 (eight) hours as needed (nausea or vomiting).  Dispense: 12 tablet; Refill: 0   -zofran prn n/v- ok to take with CKD     Chronic kidney disease (CKD), stage V  -     COMPREHENSIVE METABOLIC PANEL; Future; Expected date: 10/17/2024    Type 2 diabetes mellitus with stage 5 chronic kidney disease not on  chronic dialysis, without long-term current use of insulin   -restart Lantus nightly at 10 units tonight       -proceed to nearest hospital for any worsening or new symptoms we did not discuss today    Follow up if symptoms worsen or fail to improve.      10/17/2024 ASTER Mccabe, FNP  This note was generated with the assistance of ambient listening technology. Verbal consent was obtained by the patient and accompanying visitor(s) for the recording of patient appointment to facilitate this note. I attest to having reviewed and edited the generated note for accuracy, though some syntax or spelling errors may persist. Please contact the author of this note for any clarification.     This note was created using [employer] voice recognition software that occasionally misinterprets phrases or words.

## 2024-10-18 ENCOUNTER — TELEPHONE (OUTPATIENT)
Dept: FAMILY MEDICINE | Facility: CLINIC | Age: 74
End: 2024-10-18
Payer: MEDICARE

## 2024-10-18 PROBLEM — N18.4 ACUTE RENAL FAILURE SUPERIMPOSED ON STAGE 4 CHRONIC KIDNEY DISEASE: Status: ACTIVE | Noted: 2024-10-17

## 2024-10-18 LAB
ALBUMIN SERPL BCP-MCNC: 3.1 G/DL (ref 3.5–5.2)
ALP SERPL-CCNC: 84 U/L (ref 55–135)
ALT SERPL W/O P-5'-P-CCNC: 7 U/L (ref 10–44)
ANION GAP SERPL CALC-SCNC: 10 MMOL/L (ref 8–16)
AST SERPL-CCNC: 10 U/L (ref 10–40)
BASOPHILS # BLD AUTO: 0.03 K/UL (ref 0–0.2)
BASOPHILS NFR BLD: 0.3 % (ref 0–1.9)
BILIRUB SERPL-MCNC: 0.2 MG/DL (ref 0.1–1)
BUN SERPL-MCNC: 46 MG/DL (ref 8–23)
CALCIUM SERPL-MCNC: 7.4 MG/DL (ref 8.7–10.5)
CHLORIDE SERPL-SCNC: 107 MMOL/L (ref 95–110)
CO2 SERPL-SCNC: 18 MMOL/L (ref 23–29)
CREAT SERPL-MCNC: 4.5 MG/DL (ref 0.5–1.4)
CREAT UR-MCNC: 36.2 MG/DL (ref 15–325)
DIFFERENTIAL METHOD BLD: ABNORMAL
EOSINOPHIL # BLD AUTO: 0.1 K/UL (ref 0–0.5)
EOSINOPHIL NFR BLD: 1.2 % (ref 0–8)
ERYTHROCYTE [DISTWIDTH] IN BLOOD BY AUTOMATED COUNT: 14.6 % (ref 11.5–14.5)
EST. GFR  (NO RACE VARIABLE): 9.7 ML/MIN/1.73 M^2
GLUCOSE SERPL-MCNC: 181 MG/DL (ref 70–110)
HCT VFR BLD AUTO: 27.4 % (ref 37–48.5)
HGB BLD-MCNC: 8.8 G/DL (ref 12–16)
IMM GRANULOCYTES # BLD AUTO: 0.07 K/UL (ref 0–0.04)
IMM GRANULOCYTES NFR BLD AUTO: 0.8 % (ref 0–0.5)
LYMPHOCYTES # BLD AUTO: 1.2 K/UL (ref 1–4.8)
LYMPHOCYTES NFR BLD: 14.2 % (ref 18–48)
MAGNESIUM SERPL-MCNC: 1.3 MG/DL (ref 1.6–2.6)
MCH RBC QN AUTO: 29.7 PG (ref 27–31)
MCHC RBC AUTO-ENTMCNC: 32.1 G/DL (ref 32–36)
MCV RBC AUTO: 93 FL (ref 82–98)
MONOCYTES # BLD AUTO: 0.8 K/UL (ref 0.3–1)
MONOCYTES NFR BLD: 9.7 % (ref 4–15)
NEUTROPHILS # BLD AUTO: 6.3 K/UL (ref 1.8–7.7)
NEUTROPHILS NFR BLD: 73.8 % (ref 38–73)
NRBC BLD-RTO: 0 /100 WBC
PLATELET # BLD AUTO: 249 K/UL (ref 150–450)
PMV BLD AUTO: 8.5 FL (ref 9.2–12.9)
POCT GLUCOSE: 198 MG/DL (ref 70–110)
POCT GLUCOSE: 247 MG/DL (ref 70–110)
POCT GLUCOSE: 276 MG/DL (ref 70–110)
POCT GLUCOSE: 291 MG/DL (ref 70–110)
POCT GLUCOSE: 299 MG/DL (ref 70–110)
POTASSIUM SERPL-SCNC: 4.9 MMOL/L (ref 3.5–5.1)
PROT SERPL-MCNC: 5.9 G/DL (ref 6–8.4)
RBC # BLD AUTO: 2.96 M/UL (ref 4–5.4)
SODIUM SERPL-SCNC: 135 MMOL/L (ref 136–145)
WBC # BLD AUTO: 8.58 K/UL (ref 3.9–12.7)

## 2024-10-18 PROCEDURE — 82570 ASSAY OF URINE CREATININE: CPT | Performed by: STUDENT IN AN ORGANIZED HEALTH CARE EDUCATION/TRAINING PROGRAM

## 2024-10-18 PROCEDURE — 51798 US URINE CAPACITY MEASURE: CPT

## 2024-10-18 PROCEDURE — 93005 ELECTROCARDIOGRAM TRACING: CPT | Performed by: INTERNAL MEDICINE

## 2024-10-18 PROCEDURE — 63600175 PHARM REV CODE 636 W HCPCS: Performed by: NURSE PRACTITIONER

## 2024-10-18 PROCEDURE — 83735 ASSAY OF MAGNESIUM: CPT | Performed by: INTERNAL MEDICINE

## 2024-10-18 PROCEDURE — 93010 ELECTROCARDIOGRAM REPORT: CPT | Mod: ,,, | Performed by: INTERNAL MEDICINE

## 2024-10-18 PROCEDURE — 63600175 PHARM REV CODE 636 W HCPCS: Performed by: INTERNAL MEDICINE

## 2024-10-18 PROCEDURE — 80053 COMPREHEN METABOLIC PANEL: CPT | Performed by: INTERNAL MEDICINE

## 2024-10-18 PROCEDURE — 12000002 HC ACUTE/MED SURGE SEMI-PRIVATE ROOM

## 2024-10-18 PROCEDURE — 85025 COMPLETE CBC W/AUTO DIFF WBC: CPT | Performed by: INTERNAL MEDICINE

## 2024-10-18 PROCEDURE — 25000003 PHARM REV CODE 250: Performed by: INTERNAL MEDICINE

## 2024-10-18 PROCEDURE — 36415 COLL VENOUS BLD VENIPUNCTURE: CPT | Performed by: INTERNAL MEDICINE

## 2024-10-18 PROCEDURE — 25000003 PHARM REV CODE 250: Performed by: STUDENT IN AN ORGANIZED HEALTH CARE EDUCATION/TRAINING PROGRAM

## 2024-10-18 RX ORDER — HYDRALAZINE HYDROCHLORIDE 20 MG/ML
10 INJECTION INTRAMUSCULAR; INTRAVENOUS EVERY 4 HOURS PRN
Status: DISCONTINUED | OUTPATIENT
Start: 2024-10-18 | End: 2024-10-20 | Stop reason: HOSPADM

## 2024-10-18 RX ORDER — MAGNESIUM SULFATE HEPTAHYDRATE 40 MG/ML
2 INJECTION, SOLUTION INTRAVENOUS ONCE
Status: COMPLETED | OUTPATIENT
Start: 2024-10-18 | End: 2024-10-18

## 2024-10-18 RX ADMIN — INSULIN ASPART 3 UNITS: 100 INJECTION, SOLUTION INTRAVENOUS; SUBCUTANEOUS at 08:10

## 2024-10-18 RX ADMIN — Medication 400 ML: at 04:10

## 2024-10-18 RX ADMIN — AMLODIPINE BESYLATE 10 MG: 5 TABLET ORAL at 06:10

## 2024-10-18 RX ADMIN — MAGNESIUM SULFATE HEPTAHYDRATE 2 G: 40 INJECTION, SOLUTION INTRAVENOUS at 11:10

## 2024-10-18 RX ADMIN — INSULIN ASPART 3 UNITS: 100 INJECTION, SOLUTION INTRAVENOUS; SUBCUTANEOUS at 12:10

## 2024-10-18 RX ADMIN — APIXABAN 5 MG: 5 TABLET, FILM COATED ORAL at 09:10

## 2024-10-18 RX ADMIN — METOPROLOL TARTRATE 25 MG: 25 TABLET, FILM COATED ORAL at 09:10

## 2024-10-18 RX ADMIN — PANTOPRAZOLE SODIUM 40 MG: 40 TABLET, DELAYED RELEASE ORAL at 06:10

## 2024-10-18 RX ADMIN — CEFTRIAXONE SODIUM 2 G: 2 INJECTION, POWDER, FOR SOLUTION INTRAMUSCULAR; INTRAVENOUS at 06:10

## 2024-10-18 RX ADMIN — CITALOPRAM HYDROBROMIDE 20 MG: 20 TABLET ORAL at 08:10

## 2024-10-18 RX ADMIN — SODIUM CHLORIDE: 9 INJECTION, SOLUTION INTRAVENOUS at 03:10

## 2024-10-18 RX ADMIN — METOPROLOL TARTRATE 25 MG: 25 TABLET, FILM COATED ORAL at 08:10

## 2024-10-18 RX ADMIN — INSULIN ASPART 6 UNITS: 100 INJECTION, SOLUTION INTRAVENOUS; SUBCUTANEOUS at 11:10

## 2024-10-18 RX ADMIN — APIXABAN 5 MG: 5 TABLET, FILM COATED ORAL at 08:10

## 2024-10-18 RX ADMIN — INSULIN ASPART 2 UNITS: 100 INJECTION, SOLUTION INTRAVENOUS; SUBCUTANEOUS at 08:10

## 2024-10-18 RX ADMIN — INSULIN ASPART 4 UNITS: 100 INJECTION, SOLUTION INTRAVENOUS; SUBCUTANEOUS at 04:10

## 2024-10-18 NOTE — TELEPHONE ENCOUNTER
Patient notified and verbalized understanding. Patient will contact clinic if referral not placed while in hospital.

## 2024-10-18 NOTE — ASSESSMENT & PLAN NOTE
Aware  CT abdomen done yesterday showed  Heavy atherosclerotic calcification  of the abdominal aorta especially at the aortic bifurcation extending into the common iliac arteries with expected severe stenosis.

## 2024-10-18 NOTE — CONSULTS
INPATIENT NEPHROLOGY CONSULT   [facility] NEPHROLOGY    Epi Mcintosh  10/18/2024    Reason for consultation:    Acute kidney injury    Chief Complaint:   Chief Complaint   Patient presents with    Abnormal Urinalysis     Patient had urinalysis today and UTI was found - sent by DYLON Benson to ER - pt has only one functioning kidney           History of Present Illness:    Per ER    Epi Mcintosh is a 74 y.o. female with a past medical history insulin-dependent diabetes, history fluid retention, GERD, and CKD that presents emergency department for evaluation of left lower quadrant abdominal pain, diarrhea, emesis, and back pain that has been ongoing for approximately 3 weeks.  Patient was also complaining of urinary urgency.  States that she has multiple episodes of poorly-controlled diarrhea.  Does endorse decreased urination but feels that she does have some urgency.  Does endorse bilateral edema which he attributes to sodium intake.  She was seen at her PCP's office today who ordered outpatient labs and CT scan.  She was found to have left-sided pyelonephritis as well as diverticulosis.  There was not any evidence of diverticulitis.  Denies active fever, chest pain, shortness of breath, numbness, weakness, and constipation.       Plan of Care:       Assessment:     Acute kidney injury  --ns 75 cc/hour  --Avoid NSAIDS, Chen II inhibitors, and other non-essential nephrotoxic agents  --strict I/Os  --keep map above 55.    --renal dose medication   --renal ultrasound with no hydronephrosis  --pvr bladder scan    CKD III  --long term HgA1c goal less than 7  --long term bp goal 130/80  --Avoid NSAIDS, Chen II inhibitors, and other non-essential nephrotoxic agents    Hypertension  --avoid over correction.  140-150/80 ok  --low salt   --continue amlodipine and metoprolol    Metabolic acidosis  --sodium bicarb 650mg  daily    Anemia  --iron panel  --kiesha if iron stores adequate        Thank you for  allowing us to participate in this patient's care. We will continue to follow.    Vital Signs:  Temp Readings from Last 3 Encounters:   10/18/24 98.1 °F (36.7 °C) (Oral)   10/17/24 98 °F (36.7 °C) (Oral)   09/25/24 98.1 °F (36.7 °C) (Oral)       Pulse Readings from Last 3 Encounters:   10/18/24 76   10/17/24 62   09/25/24 80       BP Readings from Last 3 Encounters:   10/18/24 (!) 172/73   10/17/24 116/62   09/25/24 137/68       Weight:  Wt Readings from Last 3 Encounters:   10/17/24 66.1 kg (145 lb 11.6 oz)   10/17/24 63.5 kg (140 lb)   09/25/24 63.5 kg (140 lb)       Past Medical & Surgical History:  Past Medical History:   Diagnosis Date    COPD (chronic obstructive pulmonary disease)     Coronary artery disease     Depression     Diabetes mellitus     Hypertension     Pancreatitis     PVD (peripheral vascular disease)        Past Surgical History:   Procedure Laterality Date    AORTOGRAPHY WITH SERIALOGRAPHY N/A 4/1/2022    Procedure: AORTOGRAM, WITH SERIALOGRAPHY;  Surgeon: Petr Kaufman MD;  Location: [facility] CATH/EP LAB;  Service: Peripheral Vascular;  Laterality: N/A;    CARDIAC SURGERY      CORONARY ARTERY BYPASS GRAFT  12/2018    CYSTOSCOPY N/A 6/26/2024    Procedure: CYSTOSCOPY;  Surgeon: Agustín Marinelli Jr., MD;  Location: [facility] ASU OR;  Service: Urology;  Laterality: N/A;    HYSTERECTOMY         Past Social History:  Social History     Socioeconomic History    Marital status:    Tobacco Use    Smoking status: Every Day     Current packs/day: 0.50     Average packs/day: 0.5 packs/day for 55.8 years (27.9 ttl pk-yrs)     Types: Vaping w/o nicotine, Cigarettes     Start date: 1969    Smokeless tobacco: Never   Substance and Sexual Activity    Alcohol use: Yes     Comment: occasionally    Drug use: Never     Social Drivers of Health     Financial Resource Strain: Low Risk  (10/18/2024)    Overall Financial Resource Strain (CARDIA)     Difficulty of Paying Living Expenses: Not very hard   Recent  Concern: Financial Resource Strain - Medium Risk (10/17/2024)    Overall Financial Resource Strain (CARDIA)     Difficulty of Paying Living Expenses: Somewhat hard   Food Insecurity: No Food Insecurity (10/18/2024)    Hunger Vital Sign     Worried About Running Out of Food in the Last Year: Never true     Ran Out of Food in the Last Year: Never true   Recent Concern: Food Insecurity - Food Insecurity Present (10/17/2024)    Hunger Vital Sign     Worried About Running Out of Food in the Last Year: Sometimes true     Ran Out of Food in the Last Year: Never true   Transportation Needs: No Transportation Needs (10/18/2024)    TRANSPORTATION NEEDS     Transportation : No   Physical Activity: Insufficiently Active (10/18/2024)    Exercise Vital Sign     Days of Exercise per Week: 3 days     Minutes of Exercise per Session: 20 min   Stress: No Stress Concern Present (10/18/2024)    [employer] [place] [employer] - Occupational Stress Questionnaire     Feeling of Stress : Only a little   Recent Concern: Stress - Stress Concern Present (10/17/2024)    [employer] [place] [employer] - Occupational Stress Questionnaire     Feeling of Stress : Very much   Housing Stability: Low Risk  (10/18/2024)    Housing Stability Vital Sign     Unable to Pay for Housing in the Last Year: No     Homeless in the Last Year: No       Medications:  Current Facility-Administered Medications on File Prior to Encounter   Medication Dose Route Frequency Provider Last Rate Last Admin    [DISCONTINUED] 0.9%  NaCl infusion   Intravenous Continuous Petr Kaufman  mL/hr at 04/01/22 1019 New Bag at 04/01/22 1019     Current Outpatient Medications on File Prior to Encounter   Medication Sig Dispense Refill    albuterol (PROVENTIL/VENTOLIN HFA) 90 mcg/actuation inhaler Inhale 2 puffs into the lungs 4 (four) times daily as needed for Shortness of Breath or Wheezing.      amLODIPine (NORVASC) 5 MG tablet Take 1 tablet (5 mg  "total) by mouth once daily. 90 tablet 1    apixaban (ELIQUIS) 5 mg Tab Take 1 tablet (5 mg total) by mouth 2 (two) times daily. 30 tablet 3    citalopram (CELEXA) 40 MG tablet Take 1 tablet (40 mg total) by mouth once daily. 90 tablet 1    insulin glargine U-100, Lantus, (LANTUS SOLOSTAR U-100 INSULIN) 100 unit/mL (3 mL) InPn pen Inject 10 Units into the skin every evening. 3 mL 2    levothyroxine (SYNTHROID) 88 MCG tablet Take 1 tablet (88 mcg total) by mouth before breakfast. 90 tablet 1    metoprolol tartrate (LOPRESSOR) 25 MG tablet Take 1 tablet (25 mg total) by mouth 2 (two) times daily. 180 tablet 1    ondansetron (ZOFRAN-ODT) 8 MG TbDL Take 1 tablet (8 mg total) by mouth every 8 (eight) hours as needed (nausea or vomiting). 12 tablet 0    pantoprazole (PROTONIX) 40 MG tablet Take 1 tablet (40 mg total) by mouth once daily. 90 tablet 1    pioglitazone (ACTOS) 15 MG tablet Take 15 mg by mouth once daily.      rosuvastatin (CRESTOR) 40 MG Tab Take 40 mg by mouth once daily.      sodium bicarbonate 650 MG tablet Take 650 mg by mouth once daily.      blood sugar diagnostic (TRUE METRIX GLUCOSE TEST STRIP) Strp Sig: To check BG two times daily, to use with insurance preferred meter 100 strip 2    blood-glucose meter (TRUE METRIX AIR GLUCOSE METER) kit To check BG two times daily, to use with insurance preferred meter 1 each 0    lancets (TRUEPLUS LANCETS) 33 gauge Misc To check BG two times daily, to use with insurance preferred meter 100 each 2    lancets Misc To check BG two times daily, to use with insurance preferred meter 50 each 5    pen needle, diabetic 32 gauge x 5/32" Ndle Use 1 pen needle nightly to administer insulin 100 each 2     Scheduled Meds:   amLODIPine  10 mg Oral Daily    apixaban  5 mg Oral BID    cefTRIAXone (Rocephin) IV (PEDS and ADULTS)  2 g Intravenous Q24H    citalopram  20 mg Oral Daily    electrolytes-dextrose  400 mL Oral Q4H    magnesium sulfate IVPB  2 g Intravenous Once    " "metoprolol tartrate  25 mg Oral BID    pantoprazole  40 mg Oral Before breakfast     Continuous Infusions:   0.9% NaCl   Intravenous Continuous 75 mL/hr at 10/17/24 2302 New Bag at 10/17/24 2302     PRN Meds:.  Current Facility-Administered Medications:     acetaminophen, 650 mg, Oral, Q8H PRN    acetaminophen, 650 mg, Oral, Q4H PRN    aluminum-magnesium hydroxide-simethicone, 30 mL, Oral, QID PRN    dextrose 50%, 12.5 g, Intravenous, PRN    dextrose 50%, 25 g, Intravenous, PRN    glucagon (human recombinant), 1 mg, Intramuscular, PRN    glucose, 16 g, Oral, PRN    glucose, 24 g, Oral, PRN    hydrALAZINE, 10 mg, Intravenous, Q4H PRN    HYDROcodone-acetaminophen, 1 tablet, Oral, Q6H PRN    insulin aspart U-100, 0-10 Units, Subcutaneous, QID (AC + HS) PRN    magnesium oxide, 800 mg, Oral, PRN    magnesium oxide, 800 mg, Oral, PRN    melatonin, 6 mg, Oral, Nightly PRN    naloxone, 0.02 mg, Intravenous, PRN    ondansetron, 4 mg, Intravenous, Q6H PRN    potassium bicarbonate, 35 mEq, Oral, PRN    potassium bicarbonate, 50 mEq, Oral, PRN    potassium bicarbonate, 60 mEq, Oral, PRN    potassium, sodium phosphates, 2 packet, Oral, PRN    potassium, sodium phosphates, 2 packet, Oral, PRN    potassium, sodium phosphates, 2 packet, Oral, PRN    Allergies:  Plavix [clopidogrel], Zolpidem, and Codeine    Past Family History:  Reviewed; refer to Hospitalist Admission Note    Review of Systems:  Review of Systems - All 14 systems reviewed and negative, except as noted in HPI    Physical Exam:    BP (!) 172/73   Pulse 76   Temp 98.1 °F (36.7 °C) (Oral)   Resp 18   Ht 5' 5" (1.651 m)   Wt 66.1 kg (145 lb 11.6 oz)   SpO2 96%   BMI 24.25 kg/m²     General Appearance:    Alert, cooperative, no distress, appears stated age   Head:    Normocephalic, without obvious abnormality, atraumatic   Eyes:    PER, conjunctiva/corneas clear, EOM's intact in both eyes        Throat:   Lips, mucosa, and tongue normal; teeth and gums normal "   Back:     Symmetric, no curvature, ROM normal, no CVA tenderness   Lungs:     Clear to auscultation bilaterally, respirations unlabored   Chest wall:    No tenderness or deformity   Heart:    Regular rate and rhythm, S1 and S2 normal, no murmur, rub   or gallop   Abdomen:     Soft, non-tender, bowel sounds active all four quadrants,     no masses, no organomegaly   Extremities:   Extremities normal, atraumatic, no cyanosis or edema   Pulses:   2+ and symmetric all extremities   MSK:   No joint or muscle swelling, tenderness or deformity   Skin:   Skin color, texture, turgor normal, no rashes or lesions   Neurologic:   CNII-XII intact, normal strength and sensation       Throughout.  No flap     Results:  Lab Results   Component Value Date     (L) 10/18/2024    K 4.9 10/18/2024     10/18/2024    CO2 18 (L) 10/18/2024    BUN 46 (H) 10/18/2024    CREATININE 4.5 (H) 10/18/2024    CALCIUM 7.4 (L) 10/18/2024    ANIONGAP 10 10/18/2024    ESTGFRAFRICA 34.0 (A) 03/30/2022    EGFRNONAA 29.5 (A) 03/30/2022       Lab Results   Component Value Date    CALCIUM 7.4 (L) 10/18/2024    PHOS 4.2 10/10/2024       Recent Labs   Lab 10/18/24  0530   WBC 8.58   RBC 2.96*   HGB 8.8*   HCT 27.4*      MCV 93   MCH 29.7   MCHC 32.1     Imaging Results              US Retroperitoneal Complete (Final result)  Result time 10/17/24 22:36:33      Final result by Carlitos Browning MD (10/17/24 22:36:33)                   Impression:      Atrophic right kidney.    Nonspecific bladder wall thickening.  Correlation with urinalysis for cystitis/infection advised.      Electronically signed by: Carlitos Browning MD  Date:    10/17/2024  Time:    22:36               Narrative:    EXAMINATION:  US RETROPERITONEAL COMPLETE    CLINICAL HISTORY:  kenney;    TECHNIQUE:  Ultrasound of the kidneys and urinary bladder was performed including color flow and Doppler evaluation of the kidneys.    COMPARISON:  CT 10/17/2024, retroperitoneal  ultrasound 06/20/2024    FINDINGS:  Right kidney: The right kidney is atrophic measuring 6.2 cm.  There is right renal cortical thinning and increased cortical echogenicity.  No hydronephrosis.    Left kidney: The left kidney measures 9.3 cm.  No significant cortical thinning.  No hydronephrosis.  The previously noted nonobstructing left renal calculi are better visualized on prior CT examination.    Urinary bladder is relatively nondistended with nonspecific bladder wall thickening.                                       X-Ray Chest AP Portable (Final result)  Result time 10/17/24 21:25:11      Final result by Carlitos Browning MD (10/17/24 21:25:11)                   Impression:      No convincing radiographic evidence of acute intrathoracic process on this single view.      Electronically signed by: Carlitos Browning MD  Date:    10/17/2024  Time:    21:25               Narrative:    EXAMINATION:  XR CHEST AP PORTABLE    CLINICAL HISTORY:  Edema, unspecified    TECHNIQUE:  Single frontal view of the chest was performed.    COMPARISON:  03/17/2024    FINDINGS:  Cardiac monitoring leads overlie the chest.  There is postoperative change of prior median sternotomy.  The cardiac silhouette is not significantly enlarged.  Lungs are symmetrically expanded with mild chronic coarse bibasilar interstitial attenuation.  No large confluent airspace consolidation identified.  No significant volume of pleural fluid or pneumothorax identified.  Osseous structures demonstrate degenerative change.                                    Patient care was time spent personally by me on the following activities:   Obtaining a history  Examination of patient.  Providing medical care at the patients bedside.  Developing a treatment plan with patient or surrogate and bedside caregivers  Ordering and reviewing laboratory studies, radiographic studies, pulse oximetry.  Ordering and performing treatments and interventions.  Evaluation of  patient's response to treatment.  Discussions with consultants while on the unit and immediately available to the patient.  Re-evaluation of the patient's condition.  Documentation in the medical record.       I have personally reviewed pertinent radiological imaging and reports.    Eugene Avery MD  [facility] [place]  [phone]

## 2024-10-18 NOTE — H&P
[facility] - Emergency Dept  Hospital Medicine  History & Physical    Patient Name: Epi Mcintosh  MRN: [id number]  Patient Class: IP- Inpatient  Admission Date: 10/17/2024  Attending Physician: Hero Vasquez MD   Primary Care Provider: Marylin Parmar FNP         Patient information was obtained from patient, past medical records, and ER records.     Subjective:     Principal Problem:Acute pyelonephritis    Chief Complaint:   Chief Complaint   Patient presents with    Abnormal Urinalysis     Patient had urinalysis today and UTI was found - sent by DYLON Benson to ER - pt has only one functioning kidney         HPI: 74 year old pt getting admitted with acute pyelonephritis/KAIT on CKD/HTN urgency  Pt has been suffering from Nausea/Vomiting for last 3 weeks  Most of the times she has been ahving nausea if not vomiting  Also reports loose stools for past 2 weeks  She went and saw PCP yesterday  When labs were checked renal panels were worse  Also CT abdomen/Pelvis was done yesterday in imaging center  Today pt was instructed to come to ER and got admitted     Past Medical History:   Diagnosis Date    COPD (chronic obstructive pulmonary disease)     Coronary artery disease     Depression     Diabetes mellitus     Hypertension     Pancreatitis     PVD (peripheral vascular disease)        Past Surgical History:   Procedure Laterality Date    AORTOGRAPHY WITH SERIALOGRAPHY N/A 4/1/2022    Procedure: AORTOGRAM, WITH SERIALOGRAPHY;  Surgeon: Petr Kaufman MD;  Location: [facility] CATH/EP LAB;  Service: Peripheral Vascular;  Laterality: N/A;    CARDIAC SURGERY      CORONARY ARTERY BYPASS GRAFT  12/2018    CYSTOSCOPY N/A 6/26/2024    Procedure: CYSTOSCOPY;  Surgeon: Agustín Marinelli Jr., MD;  Location: [facility] ASU OR;  Service: Urology;  Laterality: N/A;    HYSTERECTOMY         Review of patient's allergies indicates:   Allergen Reactions    Plavix [clopidogrel] Rash    Zolpidem      Other  "reaction(s): Other (See Comments)  Seeing people who were not in the room     Codeine        Current Facility-Administered Medications on File Prior to Encounter   Medication    [DISCONTINUED] 0.9%  NaCl infusion     Current Outpatient Medications on File Prior to Encounter   Medication Sig    albuterol (PROVENTIL/VENTOLIN HFA) 90 mcg/actuation inhaler Inhale 2 puffs into the lungs 4 (four) times daily as needed.    amLODIPine (NORVASC) 5 MG tablet Take 1 tablet (5 mg total) by mouth once daily.    apixaban (ELIQUIS) 5 mg Tab Take 1 tablet (5 mg total) by mouth 2 (two) times daily.    blood sugar diagnostic (TRUE METRIX GLUCOSE TEST STRIP) Strp Sig: To check BG two times daily, to use with insurance preferred meter    blood-glucose meter (TRUE METRIX AIR GLUCOSE METER) kit To check BG two times daily, to use with insurance preferred meter    citalopram (CELEXA) 40 MG tablet Take 1 tablet (40 mg total) by mouth once daily.    insulin glargine U-100, Lantus, (LANTUS SOLOSTAR U-100 INSULIN) 100 unit/mL (3 mL) InPn pen Inject 10 Units into the skin every evening.    lancets (TRUEPLUS LANCETS) 33 gauge Misc To check BG two times daily, to use with insurance preferred meter    lancets Misc To check BG two times daily, to use with insurance preferred meter    levothyroxine (SYNTHROID) 88 MCG tablet Take 1 tablet (88 mcg total) by mouth before breakfast.    metoprolol tartrate (LOPRESSOR) 25 MG tablet Take 1 tablet (25 mg total) by mouth 2 (two) times daily.    ondansetron (ZOFRAN-ODT) 8 MG TbDL Take 1 tablet (8 mg total) by mouth every 8 (eight) hours as needed (nausea or vomiting).    pantoprazole (PROTONIX) 40 MG tablet Take 1 tablet (40 mg total) by mouth once daily.    pen needle, diabetic 32 gauge x 5/32" Ndle Use 1 pen needle nightly to administer insulin    pioglitazone (ACTOS) 15 MG tablet Take 15 mg by mouth once daily.    rosuvastatin (CRESTOR) 40 MG Tab Take 40 mg by mouth once daily.    [DISCONTINUED] " fluticasone-umeclidin-vilanter (TRELEGY ELLIPTA) 100-62.5-25 mcg DsDv Inhale 1 puff into the lungs once daily. (Patient not taking: Reported on 10/17/2024)     Family History       Problem Relation (Age of Onset)    Arthritis Mother          Tobacco Use    Smoking status: Every Day     Current packs/day: 0.50     Average packs/day: 0.5 packs/day for 55.8 years (27.9 ttl pk-yrs)     Types: Vaping w/o nicotine, Cigarettes     Start date: 1969    Smokeless tobacco: Never   Substance and Sexual Activity    Alcohol use: Yes     Comment: occasionally    Drug use: Never    Sexual activity: Not on file     Review of Systems   Constitutional:  Negative for activity change and appetite change.   HENT:  Negative for congestion and dental problem.    Eyes:  Negative for discharge and itching.   Respiratory:  Negative for shortness of breath.    Cardiovascular:  Negative for chest pain.   Gastrointestinal:  Positive for diarrhea, nausea and vomiting. Negative for abdominal distention and abdominal pain.   Endocrine: Negative for cold intolerance.   Genitourinary:  Negative for difficulty urinating and dysuria.   Musculoskeletal:  Negative for arthralgias and back pain.   Skin:  Negative for color change.   Neurological:  Negative for dizziness and facial asymmetry.   Hematological:  Negative for adenopathy.   Psychiatric/Behavioral:  Negative for agitation and behavioral problems.      Objective:     Vital Signs (Most Recent):  Temp: 98 °F (36.7 °C) (10/17/24 1411)  Pulse: 74 (10/17/24 2000)  Resp: 20 (10/17/24 2000)  BP: (!) 216/86 (10/17/24 2000)  SpO2: 100 % (10/17/24 2000) Vital Signs (24h Range):  Temp:  [98 °F (36.7 °C)] 98 °F (36.7 °C)  Pulse:  [62-74] 74  Resp:  [18-22] 20  SpO2:  [98 %-100 %] 100 %  BP: (116-219)/(62-88) 216/86     Weight: 63.5 kg (140 lb)  Body mass index is 23.3 kg/m².     Physical Exam  Vitals and nursing note reviewed.   Constitutional:       General: She is not in acute distress.  HENT:      Head:  Atraumatic.      Right Ear: External ear normal.      Left Ear: External ear normal.      Nose: Nose normal.      Mouth/Throat:      Mouth: Mucous membranes are moist.   Eyes:      Extraocular Movements: Extraocular movements intact.   Cardiovascular:      Rate and Rhythm: Normal rate.   Pulmonary:      Effort: Pulmonary effort is normal.   Abdominal:      Palpations: Abdomen is soft.   Musculoskeletal:         General: Normal range of motion.      Cervical back: Normal range of motion.   Skin:     General: Skin is warm.   Neurological:      Mental Status: She is alert and oriented to person, place, and time.   Psychiatric:         Behavior: Behavior normal.                Significant Labs: All pertinent labs within the past 24 hours have been reviewed.  CBC:   Recent Labs   Lab 10/17/24  0954 10/17/24  1458   WBC 9.30 7.60   HGB 10.1* 9.2*   HCT 31.7* 28.2*    242     CMP:   Recent Labs   Lab 10/17/24  0954 10/17/24  1458   * 128*   K 5.2* 4.7    99   CO2 17* 19*   * 307*   BUN 47* 48*   CREATININE 4.8* 4.8*   CALCIUM 8.3* 7.3*   PROT 6.4 6.2   ALBUMIN 2.7* 3.2*   BILITOT 0.3 0.3   ALKPHOS 108 92   AST 14 11   ALT 8* 7*   ANIONGAP 10 10       Significant Imaging: I have reviewed all pertinent imaging results/findings within the past 24 hours.    Assessment/Plan:     * Acute pyelonephritis  Urine culture  Maintain iv rocephin       KAIT (acute kidney injury)  Maintain iv fluids  USS retroperitoneum  Consulted Nephro MD     Latest Reference Range & Units 05/01/24 15:14 05/16/24 10:46 07/09/24 12:02 07/24/24 15:01 08/19/24 11:34 09/19/24 11:24 10/10/24 11:18 10/17/24 09:54 10/17/24 14:58   Creatinine 0.5 - 1.4 mg/dL 2.6 (H) 2.5 (H) 2.9 (H) 3.6 (H)  3.6 (H) 3.7 (H) 3.1 (H) 5.5 (H)  5.4 (H) 4.8 (H) 4.8 (H)       Hypertensive urgency  Pt is on amlodipine at home  Her BP levels always been on normal range at home  Pt think she has white coat HTN    Pulmonary embolism  Maintain NOAC      Peripheral  vascular disease, unspecified  Aware  CT abdomen done yesterday showed  Heavy atherosclerotic calcification  of the abdominal aorta especially at the aortic bifurcation extending into the common iliac arteries with expected severe stenosis.           S/P CABG x 1, 12/2018  Aware       Type 2 diabetes mellitus with complication, without long-term current use of insulin  Maintain SSI at the moment      Coronary artery disease involving left main coronary artery  H.o aware       VTE Risk Mitigation (From admission, onward)           Ordered     apixaban tablet 5 mg  2 times daily         10/17/24 2057     IP VTE HIGH RISK PATIENT  Once         10/17/24 2057     Place sequential compression device  Until discontinued         10/17/24 2057                                    Hero Vasquez MD  Department of Hospital Medicine  Count includes the [facility] - Emergency Dept

## 2024-10-18 NOTE — SUBJECTIVE & OBJECTIVE
Past Medical History:   Diagnosis Date    COPD (chronic obstructive pulmonary disease)     Coronary artery disease     Depression     Diabetes mellitus     Hypertension     Pancreatitis     PVD (peripheral vascular disease)        Past Surgical History:   Procedure Laterality Date    AORTOGRAPHY WITH SERIALOGRAPHY N/A 4/1/2022    Procedure: AORTOGRAM, WITH SERIALOGRAPHY;  Surgeon: Petr Kaufman MD;  Location: [facility] CATH/EP LAB;  Service: Peripheral Vascular;  Laterality: N/A;    CARDIAC SURGERY      CORONARY ARTERY BYPASS GRAFT  12/2018    CYSTOSCOPY N/A 6/26/2024    Procedure: CYSTOSCOPY;  Surgeon: Agustín Marinelli Jr., MD;  Location: [facility] ASU OR;  Service: Urology;  Laterality: N/A;    HYSTERECTOMY         Review of patient's allergies indicates:   Allergen Reactions    Plavix [clopidogrel] Rash    Zolpidem      Other reaction(s): Other (See Comments)  Seeing people who were not in the room     Codeine        Current Facility-Administered Medications on File Prior to Encounter   Medication    [DISCONTINUED] 0.9%  NaCl infusion     Current Outpatient Medications on File Prior to Encounter   Medication Sig    albuterol (PROVENTIL/VENTOLIN HFA) 90 mcg/actuation inhaler Inhale 2 puffs into the lungs 4 (four) times daily as needed.    amLODIPine (NORVASC) 5 MG tablet Take 1 tablet (5 mg total) by mouth once daily.    apixaban (ELIQUIS) 5 mg Tab Take 1 tablet (5 mg total) by mouth 2 (two) times daily.    blood sugar diagnostic (TRUE METRIX GLUCOSE TEST STRIP) Strp Sig: To check BG two times daily, to use with insurance preferred meter    blood-glucose meter (TRUE METRIX AIR GLUCOSE METER) kit To check BG two times daily, to use with insurance preferred meter    citalopram (CELEXA) 40 MG tablet Take 1 tablet (40 mg total) by mouth once daily.    insulin glargine U-100, Lantus, (LANTUS SOLOSTAR U-100 INSULIN) 100 unit/mL (3 mL) InPn pen Inject 10 Units into the skin every evening.    lancets (TRUEPLUS LANCETS) 33  "gauge Misc To check BG two times daily, to use with insurance preferred meter    lancets Misc To check BG two times daily, to use with insurance preferred meter    levothyroxine (SYNTHROID) 88 MCG tablet Take 1 tablet (88 mcg total) by mouth before breakfast.    metoprolol tartrate (LOPRESSOR) 25 MG tablet Take 1 tablet (25 mg total) by mouth 2 (two) times daily.    ondansetron (ZOFRAN-ODT) 8 MG TbDL Take 1 tablet (8 mg total) by mouth every 8 (eight) hours as needed (nausea or vomiting).    pantoprazole (PROTONIX) 40 MG tablet Take 1 tablet (40 mg total) by mouth once daily.    pen needle, diabetic 32 gauge x 5/32" Ndle Use 1 pen needle nightly to administer insulin    pioglitazone (ACTOS) 15 MG tablet Take 15 mg by mouth once daily.    rosuvastatin (CRESTOR) 40 MG Tab Take 40 mg by mouth once daily.    [DISCONTINUED] fluticasone-umeclidin-vilanter (TRELEGY ELLIPTA) 100-62.5-25 mcg DsDv Inhale 1 puff into the lungs once daily. (Patient not taking: Reported on 10/17/2024)     Family History       Problem Relation (Age of Onset)    Arthritis Mother          Tobacco Use    Smoking status: Every Day     Current packs/day: 0.50     Average packs/day: 0.5 packs/day for 55.8 years (27.9 ttl pk-yrs)     Types: Vaping w/o nicotine, Cigarettes     Start date: 1969    Smokeless tobacco: Never   Substance and Sexual Activity    Alcohol use: Yes     Comment: occasionally    Drug use: Never    Sexual activity: Not on file     Review of Systems   Constitutional:  Negative for activity change and appetite change.   HENT:  Negative for congestion and dental problem.    Eyes:  Negative for discharge and itching.   Respiratory:  Negative for shortness of breath.    Cardiovascular:  Negative for chest pain.   Gastrointestinal:  Positive for diarrhea, nausea and vomiting. Negative for abdominal distention and abdominal pain.   Endocrine: Negative for cold intolerance.   Genitourinary:  Negative for difficulty urinating and dysuria. "   Musculoskeletal:  Negative for arthralgias and back pain.   Skin:  Negative for color change.   Neurological:  Negative for dizziness and facial asymmetry.   Hematological:  Negative for adenopathy.   Psychiatric/Behavioral:  Negative for agitation and behavioral problems.      Objective:     Vital Signs (Most Recent):  Temp: 98 °F (36.7 °C) (10/17/24 1411)  Pulse: 74 (10/17/24 2000)  Resp: 20 (10/17/24 2000)  BP: (!) 216/86 (10/17/24 2000)  SpO2: 100 % (10/17/24 2000) Vital Signs (24h Range):  Temp:  [98 °F (36.7 °C)] 98 °F (36.7 °C)  Pulse:  [62-74] 74  Resp:  [18-22] 20  SpO2:  [98 %-100 %] 100 %  BP: (116-219)/(62-88) 216/86     Weight: 63.5 kg (140 lb)  Body mass index is 23.3 kg/m².     Physical Exam  Vitals and nursing note reviewed.   Constitutional:       General: She is not in acute distress.  HENT:      Head: Atraumatic.      Right Ear: External ear normal.      Left Ear: External ear normal.      Nose: Nose normal.      Mouth/Throat:      Mouth: Mucous membranes are moist.   Eyes:      Extraocular Movements: Extraocular movements intact.   Cardiovascular:      Rate and Rhythm: Normal rate.   Pulmonary:      Effort: Pulmonary effort is normal.   Abdominal:      Palpations: Abdomen is soft.   Musculoskeletal:         General: Normal range of motion.      Cervical back: Normal range of motion.   Skin:     General: Skin is warm.   Neurological:      Mental Status: She is alert and oriented to person, place, and time.   Psychiatric:         Behavior: Behavior normal.                Significant Labs: All pertinent labs within the past 24 hours have been reviewed.  CBC:   Recent Labs   Lab 10/17/24  0954 10/17/24  1458   WBC 9.30 7.60   HGB 10.1* 9.2*   HCT 31.7* 28.2*    242     CMP:   Recent Labs   Lab 10/17/24  0954 10/17/24  1458   * 128*   K 5.2* 4.7    99   CO2 17* 19*   * 307*   BUN 47* 48*   CREATININE 4.8* 4.8*   CALCIUM 8.3* 7.3*   PROT 6.4 6.2   ALBUMIN 2.7* 3.2*   BILITOT  0.3 0.3   ALKPHOS 108 92   AST 14 11   ALT 8* 7*   ANIONGAP 10 10       Significant Imaging: I have reviewed all pertinent imaging results/findings within the past 24 hours.

## 2024-10-18 NOTE — PLAN OF CARE
[facility]  Initial Discharge Assessment       Primary Care Provider: Marylin Parmar FNP    Admission Diagnosis: KAIT (acute kidney injury) [N17.9]    Admission Date: 10/17/2024  Expected Discharge Date: 10/20/2024   met with Pt at bedside to complete discharge assessment. Pt AAOx4s. Demographics, PCP, and insurance verified. No home health. No dialysis. Pt reports ability to complete ADLs without assistance. Pt verbalized plan to discharge home via family transport. Pt has no other needs to be addressed at this time.    Transition of Care Barriers: None    Payor: HU[employer] MANAGED MEDICARE / Plan: [employer] H[employer] PPO SPECIAL NEEDS / Product Type: Medicare Advantage /     Extended Emergency Contact Information  Primary Emergency Contact: Scar Pugh  Mobile Phone: [phone]  Relation: Son  Secondary Emergency Contact: Shreya Villagran  Mobile Phone: [phone]  Relation: Friend    Discharge Plan A: Home  Discharge Plan B: Home      [facility] [place] [address]  [address] [place] [address]  Phone: [phone] Fax: [phone]    [facility] - [facility] [address]  [address] [facility] [address]  Phone: [phone] Fax: [phone]      Initial Assessment (most recent)       Adult Discharge Assessment - 10/18/24 1012          Discharge Assessment    Assessment Type Discharge Planning Assessment     Confirmed/corrected address, phone number and insurance Yes     Confirmed Demographics Correct on Facesheet     Source of Information patient     Communicated TALITA with patient/caregiver Date not available/Unable to determine     Reason For Admission Acute pyelonephritis     People in Home alone     Do you expect to return to your current living situation? Yes     Do you have help at home or someone to help you manage your care at home? Yes     Who are your caregiver(s) and their phone number(s)? Scar Pugh  (Son)  [phone]     Prior to hospitilization cognitive status: Alert/Oriented     Current cognitive status: Alert/Oriented     Walking or Climbing Stairs Difficulty no     Dressing/Bathing Difficulty no     Home Accessibility wheelchair accessible     Home Layout Able to live on 1st floor;Bathroom on 2nd floor     Equipment Currently Used at Home walker, rolling     Readmission within 30 days? No     Patient currently being followed by outpatient case management? No     Do you currently have service(s) that help you manage your care at home? No     Do you take prescription medications? Yes     Do you have prescription coverage? Yes     Coverage Payor: HUMANA MANAGED MEDICARE - [employer] [facility] HMO PPO SPECIAL NEEDS -     Do you have any problems affording any of your prescribed medications? No     Is the patient taking medications as prescribed? yes     Who is going to help you get home at discharge? Scar Pugh (Son)  [phone]     How do you get to doctors appointments? car, drives self     Are you on dialysis? No     Do you take coumadin? No     Discharge Plan A Home     Discharge Plan B Home     DME Needed Upon Discharge  none     Discharge Plan discussed with: Patient     Transition of Care Barriers None        Physical Activity    On average, how many days per week do you engage in moderate to strenuous exercise (like a brisk walk)? 3 days     On average, how many minutes do you engage in exercise at this level? 20 min        Financial Resource Strain    How hard is it for you to pay for the very basics like food, housing, medical care, and heating? Not very hard        Housing Stability    In the last 12 months, was there a time when you were not able to pay the mortgage or rent on time? No     At any time in the past 12 months, were you homeless or living in a shelter (including now)? No        Transportation Needs    Has the lack of transportation kept you from medical appointments, meetings, work or  from getting things needed for daily living? No        Food Insecurity    Within the past 12 months, you worried that your food would run out before you got the money to buy more. Never true     Within the past 12 months, the food you bought just didn't last and you didn't have money to get more. Never true        Stress    Do you feel stress - tense, restless, nervous, or anxious, or unable to sleep at night because your mind is troubled all the time - these days? Only a little        Social Isolation    How often do you feel lonely or isolated from those around you?  Never        Alcohol Use    Q1: How often do you have a drink containing alcohol? Never     Q2: How many drinks containing alcohol do you have on a typical day when you are drinking? Patient does not drink     Q3: How often do you have six or more drinks on one occasion? Never        Utilities    In the past 12 months has the electric, gas, oil, or water company threatened to shut off services in your home? No        Health Literacy    How often do you need to have someone help you when you read instructions, pamphlets, or other written material from your doctor or pharmacy? Never

## 2024-10-18 NOTE — HOSPITAL COURSE
Ms. Mcintosh has been monitored closely during her hospitalization. She was admitted on 10/17/24 for KAIT on CKDIV and pyelonephritis. Urine with pyuria and microscopic hematuria. She was empirically started on rocephin. She was seen by her PCP who ordered an output CT that revealed the left sided pyelo and she sent a urine culture as well with prelim result: GNR. Urine culture with NGTD in the hospital. Baseline creatinine about 3, but she's 4.8 in the hospital. Nephrology has been consulted. She had a retroperitoneal US that is negative for hydronephrosis and shows an atrophic right kidney. She has been managed with IV hydration, nephrotoxic meds held, all meds renally dosed. Nephrology recommends avoiding hypotension and allowing for mild asymptomatic hypertension. Electrolytes have been trended and repleted. She has been treated with IV fluids. She does not want to stay in the hospital, states she does not want dialysis even if it would save and/or prolong her life. Nephrology agreed that outpatient follow up would be appropriate.  She was seen and examined on date of discharge and was stable for discharge home.  Discharge instructions were reviewed and return precautions discussed with good understanding.  Home health was arranged who will monitor renal function.  She will have close follow up with nephrology.

## 2024-10-18 NOTE — TELEPHONE ENCOUNTER
----- Message from FRANCISCO Dior sent at 10/18/2024  9:32 AM CDT -----  Please notify patient that results show elevated lipase levels; other results were already known including UTI infection, low-sodium, low kidney function; she needs to see a GI specialist or ask for a consult while in the hospital as this is likely causing her nausea and vomiting; I can put in an outpatient consult if they do not place 1 while she is in the hospital, but her lipase is elevated

## 2024-10-18 NOTE — HPI
74 year old pt getting admitted with acute pyelonephritis/KAIT on CKD/HTN urgency  Pt has been suffering from Nausea/Vomiting for last 3 weeks  Most of the times she has been ahving nausea if not vomiting  Also reports loose stools for past 2 weeks  She went and saw PCP yesterday  When labs were checked renal panels were worse  Also CT abdomen/Pelvis was done yesterday in imaging center  Today pt was instructed to come to ER and got admitted

## 2024-10-18 NOTE — ASSESSMENT & PLAN NOTE
Continue amlodipine  Prn hydralazine for SBP >180  Nephrology recommends allowing for asymptomatic hypertension 150

## 2024-10-18 NOTE — PROGRESS NOTES
Please notify patient that results show elevated lipase levels; other results were already known including UTI infection, low-sodium, low kidney function; she needs to see a GI specialist or ask for a consult while in the hospital as this is likely causing her nausea and vomiting; I can put in an outpatient consult if they do not place 1 while she is in the hospital, but her lipase is elevated

## 2024-10-18 NOTE — ASSESSMENT & PLAN NOTE
Maintain iv fluids  Retroperitoneal US with atrophic right kidney, no hydro   Consulted Nephro MD     Latest Reference Range & Units 05/01/24 15:14 05/16/24 10:46 07/09/24 12:02 07/24/24 15:01 08/19/24 11:34 09/19/24 11:24 10/10/24 11:18 10/17/24 09:54 10/17/24 14:58   Creatinine 0.5 - 1.4 mg/dL 2.6 (H) 2.5 (H) 2.9 (H) 3.6 (H)  3.6 (H) 3.7 (H) 3.1 (H) 5.5 (H)  5.4 (H) 4.8 (H) 4.8 (H)

## 2024-10-18 NOTE — SUBJECTIVE & OBJECTIVE
Interval History: Pt did not want to stuck for another IV to get abx, fluids, and mag replacement. Explained that this is to treat her kidneys and prevent life threatening arrhythmias and she continued to decline treatment. Gave her options to leave AMA or go home with hospice. She stated she was ok with going home and dying and absolutely did not want dialysis, but didn't feel she was ready for hospice. After further conversation, she decided to agree to an IV and treatment at this time.    Review of Systems   Genitourinary:  Positive for flank pain.   Neurological:  Positive for weakness.     Objective:     Vital Signs (Most Recent):  Temp: 98.9 °F (37.2 °C) (10/18/24 1114)  Pulse: 64 (10/18/24 1114)  Resp: 18 (10/18/24 1114)  BP: (!) 166/67 (10/18/24 1114)  SpO2: 96 % (10/18/24 1114) Vital Signs (24h Range):  Temp:  [98 °F (36.7 °C)-98.9 °F (37.2 °C)] 98.9 °F (37.2 °C)  Pulse:  [63-78] 64  Resp:  [18-22] 18  SpO2:  [96 %-100 %] 96 %  BP: (141-219)/(67-88) 166/67     Weight: 66.1 kg (145 lb 11.6 oz)  Body mass index is 24.25 kg/m².    Intake/Output Summary (Last 24 hours) at 10/18/2024 1516  Last data filed at 10/18/2024 1413  Gross per 24 hour   Intake 1880 ml   Output 750 ml   Net 1130 ml         Physical Exam  Vitals and nursing note reviewed.   Constitutional:       Appearance: Normal appearance.   HENT:      Head: Normocephalic and atraumatic.      Nose: Nose normal.      Mouth/Throat:      Mouth: Mucous membranes are moist.      Pharynx: Oropharynx is clear.   Eyes:      Extraocular Movements: Extraocular movements intact.      Pupils: Pupils are equal, round, and reactive to light.   Cardiovascular:      Rate and Rhythm: Normal rate and regular rhythm.      Pulses: Normal pulses.   Pulmonary:      Effort: Pulmonary effort is normal.      Comments: Diminished in the bases  Abdominal:      General: Bowel sounds are normal.      Palpations: Abdomen is soft.   Musculoskeletal:         General: Normal range of  motion.      Cervical back: Normal range of motion and neck supple.   Skin:     General: Skin is warm and dry.      Capillary Refill: Capillary refill takes less than 2 seconds.   Neurological:      General: No focal deficit present.      Mental Status: She is alert and oriented to person, place, and time.   Psychiatric:         Mood and Affect: Mood normal.         Behavior: Behavior normal.             Significant Labs: All pertinent labs within the past 24 hours have been reviewed.  CBC:   Recent Labs   Lab 10/17/24  0954 10/17/24  1458 10/18/24  0530   WBC 9.30 7.60 8.58   HGB 10.1* 9.2* 8.8*   HCT 31.7* 28.2* 27.4*    242 249     CMP:   Recent Labs   Lab 10/17/24  0954 10/17/24  1458 10/17/24  2112 10/18/24  0530   * 128* 130* 135*   K 5.2* 4.7 4.7 4.9    99 103 107   CO2 17* 19* 19* 18*   * 307* 245* 181*   BUN 47* 48* 46* 46*   CREATININE 4.8* 4.8* 4.7* 4.5*   CALCIUM 8.3* 7.3* 7.3* 7.4*   PROT 6.4 6.2  --  5.9*   ALBUMIN 2.7* 3.2*  --  3.1*   BILITOT 0.3 0.3  --  0.2   ALKPHOS 108 92  --  84   AST 14 11  --  10   ALT 8* 7*  --  7*   ANIONGAP 10 10 8 10       Significant Imaging: I have reviewed all pertinent imaging results/findings within the past 24 hours.    US Retroperitoneal Complete    Result Date: 10/17/2024  EXAMINATION: US RETROPERITONEAL COMPLETE CLINICAL HISTORY: kenney; TECHNIQUE: Ultrasound of the kidneys and urinary bladder was performed including color flow and Doppler evaluation of the kidneys. COMPARISON: CT 10/17/2024, retroperitoneal ultrasound 06/20/2024 FINDINGS: Right kidney: The right kidney is atrophic measuring 6.2 cm.  There is right renal cortical thinning and increased cortical echogenicity.  No hydronephrosis. Left kidney: The left kidney measures 9.3 cm.  No significant cortical thinning.  No hydronephrosis.  The previously noted nonobstructing left renal calculi are better visualized on prior CT examination. Urinary bladder is relatively nondistended with  nonspecific bladder wall thickening.     Atrophic right kidney. Nonspecific bladder wall thickening.  Correlation with urinalysis for cystitis/infection advised. Electronically signed by: Carlitos Browning MD Date:    10/17/2024 Time:    22:36    X-Ray Chest AP Portable    Result Date: 10/17/2024  EXAMINATION: XR CHEST AP PORTABLE CLINICAL HISTORY: Edema, unspecified TECHNIQUE: Single frontal view of the chest was performed. COMPARISON: 03/17/2024 FINDINGS: Cardiac monitoring leads overlie the chest.  There is postoperative change of prior median sternotomy.  The cardiac silhouette is not significantly enlarged.  Lungs are symmetrically expanded with mild chronic coarse bibasilar interstitial attenuation.  No large confluent airspace consolidation identified.  No significant volume of pleural fluid or pneumothorax identified.  Osseous structures demonstrate degenerative change.     No convincing radiographic evidence of acute intrathoracic process on this single view. Electronically signed by: Carlitos Bronwing MD Date:    10/17/2024 Time:    21:25    CT Abdomen Pelvis  Without Contrast    Result Date: 10/17/2024  EXAMINATION: CT ABDOMEN PELVIS WITHOUT CONTRAST CLINICAL HISTORY: LLQ pain, n/v, diarrhea;hx of pancreatitis and  r/o diverticulitis; Left lower quadrant pain TECHNIQUE: Low dose axial images, sagittal and coronal reformations were obtained from the lung bases to the pubic symphysis. COMPARISON: CT abdomen pelvis of March 17, 2024. FINDINGS: The liver is of normal size contour and CT density without focal mass.  The gallbladder is of normal size but contains some radiodensities suggesting gallstones.  The pancreas is of normal contour and CT density without edema or mass.  The spleen is of normal size and CT density. The adrenal glands are not enlarged.  There is atrophy of the right kidney.  Atherosclerotic calcification is noted of the right renal vessels.  Hydronephrosis or stone is not identified.  On the  left the kidney is enlarged and there is Katia nephric inflammatory change.  A 2 mm and a 2 mm intrarenal stone is noted.  Hydronephrosis is not identified.  The ureter follows a normal course down to the bladder without dilation or distal stone.  There is heavy atherosclerotic calcification noted in the abdominal aorta superior mesenteric artery and at the aortic bifurcation extending into the common iliac vessels with expected severe stenosis. The stomach is of normal configuration.  Small bowel dilatation or air-fluid levels are not seen.  The colon appears of normal configuration without distention or mass.  There are several diverticuli noted in the sigmoid colon without CT evidence of diverticulitis.  Free fluid or free air within the peritoneum is not seen. The bladder is not distended.  A uterus is not seen.  There is a water density cyst in the right adnexa measuring 2.6 cm a probable ovarian cyst.  Free fluid in the pelvis is not seen.     Left pyelonephritis.  A ureteral stone or hydronephrosis is not identified.  There are 2 small 2 mm intrarenal stones.  There is atrophy of the right kidney without mass or hydronephrosis.  Heavy atherosclerotic calcification is noted of the abdominal aorta especially at the aortic bifurcation extending into the common iliac arteries with expected severe stenosis.  Mild diverticulosis coli.  2.6 cm right adnexal cyst probably ovarian in origin. Electronically signed by: Aren Sarmiento MD Date:    10/17/2024 Time:    11:46

## 2024-10-18 NOTE — PLAN OF CARE
Problem: Infection  Goal: Absence of Infection Signs and Symptoms  Outcome: Progressing     Problem: Diabetes Comorbidity  Goal: Blood Glucose Level Within Targeted Range  Outcome: Progressing     Problem: Acute Kidney Injury/Impairment  Goal: Fluid and Electrolyte Balance  Outcome: Progressing  Goal: Improved Oral Intake  Outcome: Progressing  Goal: Effective Renal Function  Outcome: Progressing

## 2024-10-18 NOTE — ASSESSMENT & PLAN NOTE
Maintain iv fluids  USS retroperitoneum  Consulted Nephro MD     Latest Reference Range & Units 05/01/24 15:14 05/16/24 10:46 07/09/24 12:02 07/24/24 15:01 08/19/24 11:34 09/19/24 11:24 10/10/24 11:18 10/17/24 09:54 10/17/24 14:58   Creatinine 0.5 - 1.4 mg/dL 2.6 (H) 2.5 (H) 2.9 (H) 3.6 (H)  3.6 (H) 3.7 (H) 3.1 (H) 5.5 (H)  5.4 (H) 4.8 (H) 4.8 (H)

## 2024-10-18 NOTE — ASSESSMENT & PLAN NOTE
Pt is on amlodipine at home  Her BP levels always been on normal range at home  Pt think she has white coat HTN

## 2024-10-18 NOTE — CONSULTS
"Patient states, "I am so tired of being stuck. I told them they needed to get someone in here that knew what they were doing because I am not a pincushion and I don't need anyone practicing on me."    Patient GFR 9.7 and consulted to nephrology for potential dialysis.   Primary team reports that extended dwell PIV should be sufficient for patient needs at this time.    20G 1-3/4in Extended dwell PIV Placed to RFA.   "

## 2024-10-18 NOTE — PROGRESS NOTES
[facility] Medicine  Progress Note    Patient Name: Epi Mcintosh  MRN: [id number]  Patient Class: IP- Inpatient   Admission Date: 10/17/2024  Length of Stay: 1 days  Attending Physician: Demian Silva DO  Primary Care Provider: Marylin Parmar FNP        Subjective:     Principal Problem:Acute pyelonephritis        HPI:  74 year old pt getting admitted with acute pyelonephritis/KAIT on CKD/HTN urgency  Pt has been suffering from Nausea/Vomiting for last 3 weeks  Most of the times she has been ahving nausea if not vomiting  Also reports loose stools for past 2 weeks  She went and saw PCP yesterday  When labs were checked renal panels were worse  Also CT abdomen/Pelvis was done yesterday in imaging center  Today pt was instructed to come to ER and got admitted     Overview/Hospital Course:  Ms. Mcintosh has been monitored closely during her hospitalization. She was admitted on 10/17/24 for KAIT on CKDIV and pyelonephritis. Urine with pyuria and microscopic hematuria. She was empirically started on rocephin. She was seen by her PCP who ordered an output CT that revealed the left sided pyelo and she sent a urine culture as well that is pending. Urine culture with NGTD in the hospital. Baseline creatinine about 3, but she's 4.8 in the hospital. Nephrology has been consulted. She had a retroperitoneal US that is negative for hydronephrosis and shows an atrophic right kidney. She has been managed with IV hydration, nephrotoxic meds held, all meds renally dosed. Nephrology recommends avoiding hypotension and allowing for mild asymptomatic hypertension. Electrolytes have been trended and repleted. She does not want to stay in the hospital, states she does not want dialysis even if it would save and/or prolong her life.     Interval History: Pt did not want to stuck for another IV to get abx, fluids, and mag replacement. Explained that this is to treat her kidneys and prevent life  threatening arrhythmias and she continued to decline treatment. Gave her options to leave AMA or go home with hospice. She stated she was ok with going home and dying and absolutely did not want dialysis, but didn't feel she was ready for hospice. After further conversation, she decided to agree to an IV and treatment at this time.    Review of Systems   Genitourinary:  Positive for flank pain.   Neurological:  Positive for weakness.     Objective:     Vital Signs (Most Recent):  Temp: 98.9 °F (37.2 °C) (10/18/24 1114)  Pulse: 64 (10/18/24 1114)  Resp: 18 (10/18/24 1114)  BP: (!) 166/67 (10/18/24 1114)  SpO2: 96 % (10/18/24 1114) Vital Signs (24h Range):  Temp:  [98 °F (36.7 °C)-98.9 °F (37.2 °C)] 98.9 °F (37.2 °C)  Pulse:  [63-78] 64  Resp:  [18-22] 18  SpO2:  [96 %-100 %] 96 %  BP: (141-219)/(67-88) 166/67     Weight: 66.1 kg (145 lb 11.6 oz)  Body mass index is 24.25 kg/m².    Intake/Output Summary (Last 24 hours) at 10/18/2024 1516  Last data filed at 10/18/2024 1413  Gross per 24 hour   Intake 1880 ml   Output 750 ml   Net 1130 ml         Physical Exam  Vitals and nursing note reviewed.   Constitutional:       Appearance: Normal appearance.   HENT:      Head: Normocephalic and atraumatic.      Nose: Nose normal.      Mouth/Throat:      Mouth: Mucous membranes are moist.      Pharynx: Oropharynx is clear.   Eyes:      Extraocular Movements: Extraocular movements intact.      Pupils: Pupils are equal, round, and reactive to light.   Cardiovascular:      Rate and Rhythm: Normal rate and regular rhythm.      Pulses: Normal pulses.   Pulmonary:      Effort: Pulmonary effort is normal.      Comments: Diminished in the bases  Abdominal:      General: Bowel sounds are normal.      Palpations: Abdomen is soft.   Musculoskeletal:         General: Normal range of motion.      Cervical back: Normal range of motion and neck supple.   Skin:     General: Skin is warm and dry.      Capillary Refill: Capillary refill takes less  than 2 seconds.   Neurological:      General: No focal deficit present.      Mental Status: She is alert and oriented to person, place, and time.   Psychiatric:         Mood and Affect: Mood normal.         Behavior: Behavior normal.             Significant Labs: All pertinent labs within the past 24 hours have been reviewed.  CBC:   Recent Labs   Lab 10/17/24  0954 10/17/24  1458 10/18/24  0530   WBC 9.30 7.60 8.58   HGB 10.1* 9.2* 8.8*   HCT 31.7* 28.2* 27.4*    242 249     CMP:   Recent Labs   Lab 10/17/24  0954 10/17/24  1458 10/17/24  2112 10/18/24  0530   * 128* 130* 135*   K 5.2* 4.7 4.7 4.9    99 103 107   CO2 17* 19* 19* 18*   * 307* 245* 181*   BUN 47* 48* 46* 46*   CREATININE 4.8* 4.8* 4.7* 4.5*   CALCIUM 8.3* 7.3* 7.3* 7.4*   PROT 6.4 6.2  --  5.9*   ALBUMIN 2.7* 3.2*  --  3.1*   BILITOT 0.3 0.3  --  0.2   ALKPHOS 108 92  --  84   AST 14 11  --  10   ALT 8* 7*  --  7*   ANIONGAP 10 10 8 10       Significant Imaging: I have reviewed all pertinent imaging results/findings within the past 24 hours.    US Retroperitoneal Complete    Result Date: 10/17/2024  EXAMINATION: US RETROPERITONEAL COMPLETE CLINICAL HISTORY: kenney; TECHNIQUE: Ultrasound of the kidneys and urinary bladder was performed including color flow and Doppler evaluation of the kidneys. COMPARISON: CT 10/17/2024, retroperitoneal ultrasound 06/20/2024 FINDINGS: Right kidney: The right kidney is atrophic measuring 6.2 cm.  There is right renal cortical thinning and increased cortical echogenicity.  No hydronephrosis. Left kidney: The left kidney measures 9.3 cm.  No significant cortical thinning.  No hydronephrosis.  The previously noted nonobstructing left renal calculi are better visualized on prior CT examination. Urinary bladder is relatively nondistended with nonspecific bladder wall thickening.     Atrophic right kidney. Nonspecific bladder wall thickening.  Correlation with urinalysis for cystitis/infection advised.  Electronically signed by: Carlitos Browning MD Date:    10/17/2024 Time:    22:36    X-Ray Chest AP Portable    Result Date: 10/17/2024  EXAMINATION: XR CHEST AP PORTABLE CLINICAL HISTORY: Edema, unspecified TECHNIQUE: Single frontal view of the chest was performed. COMPARISON: 03/17/2024 FINDINGS: Cardiac monitoring leads overlie the chest.  There is postoperative change of prior median sternotomy.  The cardiac silhouette is not significantly enlarged.  Lungs are symmetrically expanded with mild chronic coarse bibasilar interstitial attenuation.  No large confluent airspace consolidation identified.  No significant volume of pleural fluid or pneumothorax identified.  Osseous structures demonstrate degenerative change.     No convincing radiographic evidence of acute intrathoracic process on this single view. Electronically signed by: Carlitos Browning MD Date:    10/17/2024 Time:    21:25    CT Abdomen Pelvis  Without Contrast    Result Date: 10/17/2024  EXAMINATION: CT ABDOMEN PELVIS WITHOUT CONTRAST CLINICAL HISTORY: LLQ pain, n/v, diarrhea;hx of pancreatitis and  r/o diverticulitis; Left lower quadrant pain TECHNIQUE: Low dose axial images, sagittal and coronal reformations were obtained from the lung bases to the pubic symphysis. COMPARISON: CT abdomen pelvis of March 17, 2024. FINDINGS: The liver is of normal size contour and CT density without focal mass.  The gallbladder is of normal size but contains some radiodensities suggesting gallstones.  The pancreas is of normal contour and CT density without edema or mass.  The spleen is of normal size and CT density. The adrenal glands are not enlarged.  There is atrophy of the right kidney.  Atherosclerotic calcification is noted of the right renal vessels.  Hydronephrosis or stone is not identified.  On the left the kidney is enlarged and there is Katia nephric inflammatory change.  A 2 mm and a 2 mm intrarenal stone is noted.  Hydronephrosis is not identified.  The  ureter follows a normal course down to the bladder without dilation or distal stone.  There is heavy atherosclerotic calcification noted in the abdominal aorta superior mesenteric artery and at the aortic bifurcation extending into the common iliac vessels with expected severe stenosis. The stomach is of normal configuration.  Small bowel dilatation or air-fluid levels are not seen.  The colon appears of normal configuration without distention or mass.  There are several diverticuli noted in the sigmoid colon without CT evidence of diverticulitis.  Free fluid or free air within the peritoneum is not seen. The bladder is not distended.  A uterus is not seen.  There is a water density cyst in the right adnexa measuring 2.6 cm a probable ovarian cyst.  Free fluid in the pelvis is not seen.     Left pyelonephritis.  A ureteral stone or hydronephrosis is not identified.  There are 2 small 2 mm intrarenal stones.  There is atrophy of the right kidney without mass or hydronephrosis.  Heavy atherosclerotic calcification is noted of the abdominal aorta especially at the aortic bifurcation extending into the common iliac arteries with expected severe stenosis.  Mild diverticulosis coli.  2.6 cm right adnexal cyst probably ovarian in origin. Electronically signed by: Aren Sarmiento MD Date:    10/17/2024 Time:    11:46       Assessment/Plan:      * Acute pyelonephritis  Urine culture  Maintain iv rocephin       Hypertensive urgency  Continue amlodipine  Prn hydralazine for SBP >180  Nephrology recommends allowing for asymptomatic hypertension 150    Acute renal failure superimposed on stage 4 chronic kidney disease  Maintain iv fluids  Retroperitoneal US with atrophic right kidney, no hydro   Consulted Nephro MD     Latest Reference Range & Units 05/01/24 15:14 05/16/24 10:46 07/09/24 12:02 07/24/24 15:01 08/19/24 11:34 09/19/24 11:24 10/10/24 11:18 10/17/24 09:54 10/17/24 14:58   Creatinine 0.5 - 1.4 mg/dL 2.6 (H) 2.5 (H)  2.9 (H) 3.6 (H)  3.6 (H) 3.7 (H) 3.1 (H) 5.5 (H)  5.4 (H) 4.8 (H) 4.8 (H)       History of Pulmonary embolism  Maintain NOAC      Peripheral vascular disease, unspecified  Aware  CT abdomen done yesterday showed  Heavy atherosclerotic calcification  of the abdominal aorta especially at the aortic bifurcation extending into the common iliac arteries with expected severe stenosis.           S/P CABG x 1, 12/2018  Aware       Type 2 diabetes mellitus with complication, without long-term current use of insulin  Patient's FSGs are uncontrolled due to hyperglycemia on current medication regimen.  Last A1c reviewed-   Lab Results   Component Value Date    HGBA1C 9.3 (H) 10/10/2024     Most recent fingerstick glucose reviewed-   Recent Labs   Lab 10/17/24  2124 10/18/24  0013 10/18/24  0719 10/18/24  1058   POCTGLUCOSE 266* 299* 198* 276*     Current correctional scale  Medium  Maintain anti-hyperglycemic dose as follows-   Antihyperglycemics (From admission, onward)      Start     Stop Route Frequency Ordered    10/17/24 2157  insulin aspart U-100 pen 0-10 Units         -- SubQ Before meals & nightly PRN 10/17/24 2057          Hold Oral hypoglycemics while patient is in the hospital.        Coronary artery disease involving left main coronary artery  H.o aware       VTE Risk Mitigation (From admission, onward)           Ordered     apixaban tablet 5 mg  2 times daily         10/17/24 2057     IP VTE HIGH RISK PATIENT  Once         10/17/24 2057     Place sequential compression device  Until discontinued         10/17/24 2057                    Discharge Planning   TALITA: 10/20/2024     Code Status: DNR   Is the patient medically ready for discharge?:     Reason for patient still in hospital (select all that apply): Patient trending condition, Treatment, and Consult recommendations  Discharge Plan A: Home                  Stacey Olmos NP  Department of Hospital Medicine   [facility]

## 2024-10-18 NOTE — ASSESSMENT & PLAN NOTE
Patient's FSGs are uncontrolled due to hyperglycemia on current medication regimen.  Last A1c reviewed-   Lab Results   Component Value Date    HGBA1C 9.3 (H) 10/10/2024     Most recent fingerstick glucose reviewed-   Recent Labs   Lab 10/17/24  2124 10/18/24  0013 10/18/24  0719 10/18/24  1058   POCTGLUCOSE 266* 299* 198* 276*     Current correctional scale  Medium  Maintain anti-hyperglycemic dose as follows-   Antihyperglycemics (From admission, onward)      Start     Stop Route Frequency Ordered    10/17/24 2157  insulin aspart U-100 pen 0-10 Units         -- SubQ Before meals & nightly PRN 10/17/24 2057          Hold Oral hypoglycemics while patient is in the hospital.       Adjustment disorder with mixed disturbance of emotions and conduct

## 2024-10-19 LAB
ALBUMIN SERPL BCP-MCNC: 3.2 G/DL (ref 3.5–5.2)
ALBUMIN SERPL BCP-MCNC: 3.2 G/DL (ref 3.5–5.2)
ALP SERPL-CCNC: 82 U/L (ref 55–135)
ALT SERPL W/O P-5'-P-CCNC: 6 U/L (ref 10–44)
ANION GAP SERPL CALC-SCNC: 7 MMOL/L (ref 8–16)
ANION GAP SERPL CALC-SCNC: 7 MMOL/L (ref 8–16)
AST SERPL-CCNC: 10 U/L (ref 10–40)
BASOPHILS # BLD AUTO: 0.04 K/UL (ref 0–0.2)
BASOPHILS # BLD AUTO: 0.04 K/UL (ref 0–0.2)
BASOPHILS NFR BLD: 0.5 % (ref 0–1.9)
BASOPHILS NFR BLD: 0.5 % (ref 0–1.9)
BILIRUB SERPL-MCNC: 0.2 MG/DL (ref 0.1–1)
BNP SERPL-MCNC: 1295 PG/ML (ref 0–99)
BUN SERPL-MCNC: 42 MG/DL (ref 8–23)
BUN SERPL-MCNC: 42 MG/DL (ref 8–23)
CALCIUM SERPL-MCNC: 7.9 MG/DL (ref 8.7–10.5)
CALCIUM SERPL-MCNC: 7.9 MG/DL (ref 8.7–10.5)
CHLORIDE SERPL-SCNC: 106 MMOL/L (ref 95–110)
CHLORIDE SERPL-SCNC: 106 MMOL/L (ref 95–110)
CO2 SERPL-SCNC: 19 MMOL/L (ref 23–29)
CO2 SERPL-SCNC: 19 MMOL/L (ref 23–29)
CREAT SERPL-MCNC: 4.7 MG/DL (ref 0.5–1.4)
CREAT SERPL-MCNC: 4.7 MG/DL (ref 0.5–1.4)
DIFFERENTIAL METHOD BLD: ABNORMAL
DIFFERENTIAL METHOD BLD: ABNORMAL
EOSINOPHIL # BLD AUTO: 0.1 K/UL (ref 0–0.5)
EOSINOPHIL # BLD AUTO: 0.1 K/UL (ref 0–0.5)
EOSINOPHIL NFR BLD: 1.5 % (ref 0–8)
EOSINOPHIL NFR BLD: 1.5 % (ref 0–8)
ERYTHROCYTE [DISTWIDTH] IN BLOOD BY AUTOMATED COUNT: 14.6 % (ref 11.5–14.5)
ERYTHROCYTE [DISTWIDTH] IN BLOOD BY AUTOMATED COUNT: 14.6 % (ref 11.5–14.5)
EST. GFR  (NO RACE VARIABLE): 9.2 ML/MIN/1.73 M^2
EST. GFR  (NO RACE VARIABLE): 9.2 ML/MIN/1.73 M^2
FERRITIN SERPL-MCNC: 79.1 NG/ML (ref 20–300)
GLUCOSE SERPL-MCNC: 182 MG/DL (ref 70–110)
GLUCOSE SERPL-MCNC: 182 MG/DL (ref 70–110)
HCT VFR BLD AUTO: 28 % (ref 37–48.5)
HCT VFR BLD AUTO: 28 % (ref 37–48.5)
HGB BLD-MCNC: 8.9 G/DL (ref 12–16)
HGB BLD-MCNC: 8.9 G/DL (ref 12–16)
IMM GRANULOCYTES # BLD AUTO: 0.08 K/UL (ref 0–0.04)
IMM GRANULOCYTES # BLD AUTO: 0.08 K/UL (ref 0–0.04)
IMM GRANULOCYTES NFR BLD AUTO: 1 % (ref 0–0.5)
IMM GRANULOCYTES NFR BLD AUTO: 1 % (ref 0–0.5)
IRON SERPL-MCNC: 40 UG/DL (ref 30–160)
LYMPHOCYTES # BLD AUTO: 1.1 K/UL (ref 1–4.8)
LYMPHOCYTES # BLD AUTO: 1.1 K/UL (ref 1–4.8)
LYMPHOCYTES NFR BLD: 13.7 % (ref 18–48)
LYMPHOCYTES NFR BLD: 13.7 % (ref 18–48)
MAGNESIUM SERPL-MCNC: 1.8 MG/DL (ref 1.6–2.6)
MCH RBC QN AUTO: 30 PG (ref 27–31)
MCH RBC QN AUTO: 30 PG (ref 27–31)
MCHC RBC AUTO-ENTMCNC: 31.8 G/DL (ref 32–36)
MCHC RBC AUTO-ENTMCNC: 31.8 G/DL (ref 32–36)
MCV RBC AUTO: 94 FL (ref 82–98)
MCV RBC AUTO: 94 FL (ref 82–98)
MONOCYTES # BLD AUTO: 0.8 K/UL (ref 0.3–1)
MONOCYTES # BLD AUTO: 0.8 K/UL (ref 0.3–1)
MONOCYTES NFR BLD: 9.5 % (ref 4–15)
MONOCYTES NFR BLD: 9.5 % (ref 4–15)
NEUTROPHILS # BLD AUTO: 5.9 K/UL (ref 1.8–7.7)
NEUTROPHILS # BLD AUTO: 5.9 K/UL (ref 1.8–7.7)
NEUTROPHILS NFR BLD: 73.8 % (ref 38–73)
NEUTROPHILS NFR BLD: 73.8 % (ref 38–73)
NRBC BLD-RTO: 0 /100 WBC
NRBC BLD-RTO: 0 /100 WBC
PHOSPHATE SERPL-MCNC: 3.5 MG/DL (ref 2.7–4.5)
PLATELET # BLD AUTO: 249 K/UL (ref 150–450)
PLATELET # BLD AUTO: 249 K/UL (ref 150–450)
PMV BLD AUTO: 8.4 FL (ref 9.2–12.9)
PMV BLD AUTO: 8.4 FL (ref 9.2–12.9)
POCT GLUCOSE: 179 MG/DL (ref 70–110)
POCT GLUCOSE: 211 MG/DL (ref 70–110)
POCT GLUCOSE: 242 MG/DL (ref 70–110)
POCT GLUCOSE: 275 MG/DL (ref 70–110)
POTASSIUM SERPL-SCNC: 5.1 MMOL/L (ref 3.5–5.1)
POTASSIUM SERPL-SCNC: 5.1 MMOL/L (ref 3.5–5.1)
PROT SERPL-MCNC: 6 G/DL (ref 6–8.4)
PTH-INTACT SERPL-MCNC: 143.2 PG/ML (ref 9–77)
RBC # BLD AUTO: 2.97 M/UL (ref 4–5.4)
RBC # BLD AUTO: 2.97 M/UL (ref 4–5.4)
SATURATED IRON: 15 % (ref 20–50)
SODIUM SERPL-SCNC: 132 MMOL/L (ref 136–145)
SODIUM SERPL-SCNC: 132 MMOL/L (ref 136–145)
TOTAL IRON BINDING CAPACITY: 265 UG/DL (ref 250–450)
TRANSFERRIN SERPL-MCNC: 189 MG/DL (ref 200–375)
WBC # BLD AUTO: 8.03 K/UL (ref 3.9–12.7)
WBC # BLD AUTO: 8.03 K/UL (ref 3.9–12.7)

## 2024-10-19 PROCEDURE — 63600175 PHARM REV CODE 636 W HCPCS: Performed by: INTERNAL MEDICINE

## 2024-10-19 PROCEDURE — 84466 ASSAY OF TRANSFERRIN: CPT | Performed by: INTERNAL MEDICINE

## 2024-10-19 PROCEDURE — 83735 ASSAY OF MAGNESIUM: CPT | Performed by: INTERNAL MEDICINE

## 2024-10-19 PROCEDURE — 36415 COLL VENOUS BLD VENIPUNCTURE: CPT | Performed by: INTERNAL MEDICINE

## 2024-10-19 PROCEDURE — 51798 US URINE CAPACITY MEASURE: CPT

## 2024-10-19 PROCEDURE — 85025 COMPLETE CBC W/AUTO DIFF WBC: CPT | Performed by: INTERNAL MEDICINE

## 2024-10-19 PROCEDURE — 12000002 HC ACUTE/MED SURGE SEMI-PRIVATE ROOM

## 2024-10-19 PROCEDURE — 83880 ASSAY OF NATRIURETIC PEPTIDE: CPT | Performed by: INTERNAL MEDICINE

## 2024-10-19 PROCEDURE — 80053 COMPREHEN METABOLIC PANEL: CPT | Performed by: INTERNAL MEDICINE

## 2024-10-19 PROCEDURE — 25000003 PHARM REV CODE 250: Performed by: NURSE PRACTITIONER

## 2024-10-19 PROCEDURE — 82728 ASSAY OF FERRITIN: CPT | Performed by: INTERNAL MEDICINE

## 2024-10-19 PROCEDURE — 25000003 PHARM REV CODE 250: Performed by: INTERNAL MEDICINE

## 2024-10-19 PROCEDURE — 80069 RENAL FUNCTION PANEL: CPT | Performed by: INTERNAL MEDICINE

## 2024-10-19 PROCEDURE — 83970 ASSAY OF PARATHORMONE: CPT | Performed by: INTERNAL MEDICINE

## 2024-10-19 RX ADMIN — CEFTRIAXONE SODIUM 2 G: 2 INJECTION, POWDER, FOR SOLUTION INTRAMUSCULAR; INTRAVENOUS at 06:10

## 2024-10-19 RX ADMIN — INSULIN ASPART 4 UNITS: 100 INJECTION, SOLUTION INTRAVENOUS; SUBCUTANEOUS at 05:10

## 2024-10-19 RX ADMIN — AMLODIPINE BESYLATE 10 MG: 5 TABLET ORAL at 08:10

## 2024-10-19 RX ADMIN — METOPROLOL TARTRATE 25 MG: 25 TABLET, FILM COATED ORAL at 08:10

## 2024-10-19 RX ADMIN — SODIUM CHLORIDE: 9 INJECTION, SOLUTION INTRAVENOUS at 06:10

## 2024-10-19 RX ADMIN — INSULIN ASPART 3 UNITS: 100 INJECTION, SOLUTION INTRAVENOUS; SUBCUTANEOUS at 08:10

## 2024-10-19 RX ADMIN — APIXABAN 5 MG: 5 TABLET, FILM COATED ORAL at 08:10

## 2024-10-19 RX ADMIN — INSULIN ASPART 4 UNITS: 100 INJECTION, SOLUTION INTRAVENOUS; SUBCUTANEOUS at 08:10

## 2024-10-19 RX ADMIN — PANTOPRAZOLE SODIUM 40 MG: 40 TABLET, DELAYED RELEASE ORAL at 05:10

## 2024-10-19 RX ADMIN — INSULIN ASPART 2 UNITS: 100 INJECTION, SOLUTION INTRAVENOUS; SUBCUTANEOUS at 12:10

## 2024-10-19 RX ADMIN — CITALOPRAM HYDROBROMIDE 20 MG: 20 TABLET ORAL at 08:10

## 2024-10-19 RX ADMIN — SODIUM ZIRCONIUM CYCLOSILICATE 10 G: 10 POWDER, FOR SUSPENSION ORAL at 10:10

## 2024-10-19 NOTE — ASSESSMENT & PLAN NOTE
Patient's FSGs are uncontrolled due to hyperglycemia on current medication regimen.  Last A1c reviewed-   Lab Results   Component Value Date    HGBA1C 9.3 (H) 10/10/2024     Most recent fingerstick glucose reviewed-   Recent Labs   Lab 10/18/24  1058 10/18/24  1555 10/18/24  1934 10/19/24  0749   POCTGLUCOSE 276* 247* 291* 211*       Current correctional scale  Medium  Maintain anti-hyperglycemic dose as follows-   Antihyperglycemics (From admission, onward)    Start     Stop Route Frequency Ordered    10/17/24 2157  insulin aspart U-100 pen 0-10 Units         -- SubQ Before meals & nightly PRN 10/17/24 2057        Hold Oral hypoglycemics while patient is in the hospital.

## 2024-10-19 NOTE — PROGRESS NOTES
[facility] Medicine  Progress Note    Patient Name: Epi Mcintosh  MRN: [id number]  Patient Class: IP- Inpatient   Admission Date: 10/17/2024  Length of Stay: 2 days  Attending Physician: Demian Silva DO  Primary Care Provider: Marylin Parmar FNP        Subjective:     Principal Problem:Acute pyelonephritis        HPI:  74 year old pt getting admitted with acute pyelonephritis/KAIT on CKD/HTN urgency  Pt has been suffering from Nausea/Vomiting for last 3 weeks  Most of the times she has been ahving nausea if not vomiting  Also reports loose stools for past 2 weeks  She went and saw PCP yesterday  When labs were checked renal panels were worse  Also CT abdomen/Pelvis was done yesterday in imaging center  Today pt was instructed to come to ER and got admitted     Overview/Hospital Course:  Ms. Mcintosh has been monitored closely during her hospitalization. She was admitted on 10/17/24 for KAIT on CKDIV and pyelonephritis. Urine with pyuria and microscopic hematuria. She was empirically started on rocephin. She was seen by her PCP who ordered an output CT that revealed the left sided pyelo and she sent a urine culture as well with prelim result: GNR. Urine culture with NGTD in the hospital. Baseline creatinine about 3, but she's 4.8 in the hospital. Nephrology has been consulted. She had a retroperitoneal US that is negative for hydronephrosis and shows an atrophic right kidney. She has been managed with IV hydration, nephrotoxic meds held, all meds renally dosed. Nephrology recommends avoiding hypotension and allowing for mild asymptomatic hypertension. Electrolytes have been trended and repleted. She has been treated with IV fluids. She does not want to stay in the hospital, states she does not want dialysis even if it would save and/or prolong her life.     Interval History: Pt states she feels improved. Urine cx GNR pending full C&S creatinine not really budging. She  looks a little edematous today with some periorbital edema, but tells me that is her baseline d/t allergies. BNP is up.     Review of Systems   Genitourinary:  Positive for flank pain.   Neurological:  Positive for weakness.     Objective:     Vital Signs (Most Recent):  Temp: 97.8 °F (36.6 °C) (10/19/24 0701)  Pulse: 66 (10/19/24 0701)  Resp: 18 (10/19/24 0701)  BP: (!) 174/74 (notified nurse) (10/19/24 0701)  SpO2: (!) 93 % (10/19/24 0701) Vital Signs (24h Range):  Temp:  [97.8 °F (36.6 °C)-98.9 °F (37.2 °C)] 97.8 °F (36.6 °C)  Pulse:  [61-66] 66  Resp:  [17-19] 18  SpO2:  [93 %-99 %] 93 %  BP: (137-174)/(67-74) 174/74     Weight: 66.1 kg (145 lb 11.6 oz)  Body mass index is 24.25 kg/m².    Intake/Output Summary (Last 24 hours) at 10/19/2024 0920  Last data filed at 10/19/2024 0818  Gross per 24 hour   Intake 1305 ml   Output 2250 ml   Net -945 ml         Physical Exam  Vitals and nursing note reviewed.   Constitutional:       Appearance: Normal appearance.   HENT:      Head: Normocephalic and atraumatic.      Nose: Nose normal.      Mouth/Throat:      Mouth: Mucous membranes are moist.      Pharynx: Oropharynx is clear.   Eyes:      Extraocular Movements: Extraocular movements intact.      Pupils: Pupils are equal, round, and reactive to light.      Comments: She has some periorbital edema she states is normal for her d/t allergies   Cardiovascular:      Rate and Rhythm: Normal rate and regular rhythm.      Pulses: Normal pulses.   Pulmonary:      Effort: Pulmonary effort is normal.      Comments: Diminished in the bases  Abdominal:      General: Bowel sounds are normal.      Palpations: Abdomen is soft.   Musculoskeletal:         General: Normal range of motion.      Cervical back: Normal range of motion and neck supple.   Skin:     General: Skin is warm and dry.      Capillary Refill: Capillary refill takes less than 2 seconds.   Neurological:      General: No focal deficit present.      Mental Status: She is  alert and oriented to person, place, and time.   Psychiatric:         Mood and Affect: Mood normal.         Behavior: Behavior normal.             Significant Labs: All pertinent labs within the past 24 hours have been reviewed.  CBC:   Recent Labs   Lab 10/17/24  1458 10/18/24  0530 10/19/24  0458   WBC 7.60 8.58 8.03  8.03   HGB 9.2* 8.8* 8.9*  8.9*   HCT 28.2* 27.4* 28.0*  28.0*    249 249  249     CMP:   Recent Labs   Lab 10/17/24  1458 10/17/24  2112 10/18/24  0530 10/19/24  0458   * 130* 135* 132*  132*   K 4.7 4.7 4.9 5.1  5.1   CL 99 103 107 106  106   CO2 19* 19* 18* 19*  19*   * 245* 181* 182*  182*   BUN 48* 46* 46* 42*  42*   CREATININE 4.8* 4.7* 4.5* 4.7*  4.7*   CALCIUM 7.3* 7.3* 7.4* 7.9*  7.9*   PROT 6.2  --  5.9* 6.0   ALBUMIN 3.2*  --  3.1* 3.2*  3.2*   BILITOT 0.3  --  0.2 0.2   ALKPHOS 92  --  84 82   AST 11  --  10 10   ALT 7*  --  7* 6*   ANIONGAP 10 8 10 7*  7*       Significant Imaging: I have reviewed all pertinent imaging results/findings within the past 24 hours.    US Retroperitoneal Complete    Result Date: 10/17/2024  EXAMINATION: US RETROPERITONEAL COMPLETE CLINICAL HISTORY: kenney; TECHNIQUE: Ultrasound of the kidneys and urinary bladder was performed including color flow and Doppler evaluation of the kidneys. COMPARISON: CT 10/17/2024, retroperitoneal ultrasound 06/20/2024 FINDINGS: Right kidney: The right kidney is atrophic measuring 6.2 cm.  There is right renal cortical thinning and increased cortical echogenicity.  No hydronephrosis. Left kidney: The left kidney measures 9.3 cm.  No significant cortical thinning.  No hydronephrosis.  The previously noted nonobstructing left renal calculi are better visualized on prior CT examination. Urinary bladder is relatively nondistended with nonspecific bladder wall thickening.     Atrophic right kidney. Nonspecific bladder wall thickening.  Correlation with urinalysis for cystitis/infection advised.  Electronically signed by: Carlitos Browning MD Date:    10/17/2024 Time:    22:36    X-Ray Chest AP Portable    Result Date: 10/17/2024  EXAMINATION: XR CHEST AP PORTABLE CLINICAL HISTORY: Edema, unspecified TECHNIQUE: Single frontal view of the chest was performed. COMPARISON: 03/17/2024 FINDINGS: Cardiac monitoring leads overlie the chest.  There is postoperative change of prior median sternotomy.  The cardiac silhouette is not significantly enlarged.  Lungs are symmetrically expanded with mild chronic coarse bibasilar interstitial attenuation.  No large confluent airspace consolidation identified.  No significant volume of pleural fluid or pneumothorax identified.  Osseous structures demonstrate degenerative change.     No convincing radiographic evidence of acute intrathoracic process on this single view. Electronically signed by: Carlitos Browning MD Date:    10/17/2024 Time:    21:25    CT Abdomen Pelvis  Without Contrast    Result Date: 10/17/2024  EXAMINATION: CT ABDOMEN PELVIS WITHOUT CONTRAST CLINICAL HISTORY: LLQ pain, n/v, diarrhea;hx of pancreatitis and  r/o diverticulitis; Left lower quadrant pain TECHNIQUE: Low dose axial images, sagittal and coronal reformations were obtained from the lung bases to the pubic symphysis. COMPARISON: CT abdomen pelvis of March 17, 2024. FINDINGS: The liver is of normal size contour and CT density without focal mass.  The gallbladder is of normal size but contains some radiodensities suggesting gallstones.  The pancreas is of normal contour and CT density without edema or mass.  The spleen is of normal size and CT density. The adrenal glands are not enlarged.  There is atrophy of the right kidney.  Atherosclerotic calcification is noted of the right renal vessels.  Hydronephrosis or stone is not identified.  On the left the kidney is enlarged and there is Katia nephric inflammatory change.  A 2 mm and a 2 mm intrarenal stone is noted.  Hydronephrosis is not identified.  The  ureter follows a normal course down to the bladder without dilation or distal stone.  There is heavy atherosclerotic calcification noted in the abdominal aorta superior mesenteric artery and at the aortic bifurcation extending into the common iliac vessels with expected severe stenosis. The stomach is of normal configuration.  Small bowel dilatation or air-fluid levels are not seen.  The colon appears of normal configuration without distention or mass.  There are several diverticuli noted in the sigmoid colon without CT evidence of diverticulitis.  Free fluid or free air within the peritoneum is not seen. The bladder is not distended.  A uterus is not seen.  There is a water density cyst in the right adnexa measuring 2.6 cm a probable ovarian cyst.  Free fluid in the pelvis is not seen.     Left pyelonephritis.  A ureteral stone or hydronephrosis is not identified.  There are 2 small 2 mm intrarenal stones.  There is atrophy of the right kidney without mass or hydronephrosis.  Heavy atherosclerotic calcification is noted of the abdominal aorta especially at the aortic bifurcation extending into the common iliac arteries with expected severe stenosis.  Mild diverticulosis coli.  2.6 cm right adnexal cyst probably ovarian in origin. Electronically signed by: Aren Sarmiento MD Date:    10/17/2024 Time:    11:46       Assessment/Plan:      * Acute pyelonephritis  Admitted to med/surg  Urine culture - GNR  Maintain iv rocephin       Hypertensive urgency  Continue amlodipine  Prn hydralazine for SBP >180  Nephrology recommends allowing for asymptomatic hypertension 150    Acute renal failure superimposed on stage 4 chronic kidney disease  Maintain iv fluids  Retroperitoneal US with atrophic right kidney, no hydro   Consulted Nephro MD     Latest Reference Range & Units 05/01/24 15:14 05/16/24 10:46 07/09/24 12:02 07/24/24 15:01 08/19/24 11:34 09/19/24 11:24 10/10/24 11:18 10/17/24 09:54 10/17/24 14:58   Creatinine 0.5 -  1.4 mg/dL 2.6 (H) 2.5 (H) 2.9 (H) 3.6 (H)  3.6 (H) 3.7 (H) 3.1 (H) 5.5 (H)  5.4 (H) 4.8 (H) 4.8 (H)       History of Pulmonary embolism  Maintain NOAC      Peripheral vascular disease, unspecified  Aware  CT abdomen done yesterday showed  Heavy atherosclerotic calcification  of the abdominal aorta especially at the aortic bifurcation extending into the common iliac arteries with expected severe stenosis.           S/P CABG x 1, 12/2018  Aware       Type 2 diabetes mellitus with complication, without long-term current use of insulin  Patient's FSGs are uncontrolled due to hyperglycemia on current medication regimen.  Last A1c reviewed-   Lab Results   Component Value Date    HGBA1C 9.3 (H) 10/10/2024     Most recent fingerstick glucose reviewed-   Recent Labs   Lab 10/18/24  1058 10/18/24  1555 10/18/24  1934 10/19/24  0749   POCTGLUCOSE 276* 247* 291* 211*       Current correctional scale  Medium  Maintain anti-hyperglycemic dose as follows-   Antihyperglycemics (From admission, onward)      Start     Stop Route Frequency Ordered    10/17/24 2157  insulin aspart U-100 pen 0-10 Units         -- SubQ Before meals & nightly PRN 10/17/24 2057          Hold Oral hypoglycemics while patient is in the hospital.        Coronary artery disease involving left main coronary artery  H.o aware       VTE Risk Mitigation (From admission, onward)           Ordered     apixaban tablet 5 mg  2 times daily         10/17/24 2057     IP VTE HIGH RISK PATIENT  Once         10/17/24 2057     Place sequential compression device  Until discontinued         10/17/24 2057                    Discharge Planning   TALITA: 10/20/2024     Code Status: DNR   Is the patient medically ready for discharge?:     Reason for patient still in hospital (select all that apply): Patient trending condition, Treatment, and Consult recommendations  Discharge Plan A: Home                  Stacey Olmos NP  Department of Hospital Medicine   [facility]  [facility]

## 2024-10-19 NOTE — SUBJECTIVE & OBJECTIVE
Interval History: Pt states she feels improved. Urine cx GNR pending full C&S creatinine not really budging. She looks a little edematous today with some periorbital edema, but tells me that is her baseline d/t allergies. BNP is up.     Review of Systems   Genitourinary:  Positive for flank pain.   Neurological:  Positive for weakness.     Objective:     Vital Signs (Most Recent):  Temp: 97.8 °F (36.6 °C) (10/19/24 0701)  Pulse: 66 (10/19/24 0701)  Resp: 18 (10/19/24 0701)  BP: (!) 174/74 (notified nurse) (10/19/24 0701)  SpO2: (!) 93 % (10/19/24 0701) Vital Signs (24h Range):  Temp:  [97.8 °F (36.6 °C)-98.9 °F (37.2 °C)] 97.8 °F (36.6 °C)  Pulse:  [61-66] 66  Resp:  [17-19] 18  SpO2:  [93 %-99 %] 93 %  BP: (137-174)/(67-74) 174/74     Weight: 66.1 kg (145 lb 11.6 oz)  Body mass index is 24.25 kg/m².    Intake/Output Summary (Last 24 hours) at 10/19/2024 0920  Last data filed at 10/19/2024 0818  Gross per 24 hour   Intake 1305 ml   Output 2250 ml   Net -945 ml         Physical Exam  Vitals and nursing note reviewed.   Constitutional:       Appearance: Normal appearance.   HENT:      Head: Normocephalic and atraumatic.      Nose: Nose normal.      Mouth/Throat:      Mouth: Mucous membranes are moist.      Pharynx: Oropharynx is clear.   Eyes:      Extraocular Movements: Extraocular movements intact.      Pupils: Pupils are equal, round, and reactive to light.      Comments: She has some periorbital edema she states is normal for her d/t allergies   Cardiovascular:      Rate and Rhythm: Normal rate and regular rhythm.      Pulses: Normal pulses.   Pulmonary:      Effort: Pulmonary effort is normal.      Comments: Diminished in the bases  Abdominal:      General: Bowel sounds are normal.      Palpations: Abdomen is soft.   Musculoskeletal:         General: Normal range of motion.      Cervical back: Normal range of motion and neck supple.   Skin:     General: Skin is warm and dry.      Capillary Refill: Capillary  refill takes less than 2 seconds.   Neurological:      General: No focal deficit present.      Mental Status: She is alert and oriented to person, place, and time.   Psychiatric:         Mood and Affect: Mood normal.         Behavior: Behavior normal.             Significant Labs: All pertinent labs within the past 24 hours have been reviewed.  CBC:   Recent Labs   Lab 10/17/24  1458 10/18/24  0530 10/19/24  0458   WBC 7.60 8.58 8.03  8.03   HGB 9.2* 8.8* 8.9*  8.9*   HCT 28.2* 27.4* 28.0*  28.0*    249 249  249     CMP:   Recent Labs   Lab 10/17/24  1458 10/17/24  2112 10/18/24  0530 10/19/24  0458   * 130* 135* 132*  132*   K 4.7 4.7 4.9 5.1  5.1   CL 99 103 107 106  106   CO2 19* 19* 18* 19*  19*   * 245* 181* 182*  182*   BUN 48* 46* 46* 42*  42*   CREATININE 4.8* 4.7* 4.5* 4.7*  4.7*   CALCIUM 7.3* 7.3* 7.4* 7.9*  7.9*   PROT 6.2  --  5.9* 6.0   ALBUMIN 3.2*  --  3.1* 3.2*  3.2*   BILITOT 0.3  --  0.2 0.2   ALKPHOS 92  --  84 82   AST 11  --  10 10   ALT 7*  --  7* 6*   ANIONGAP 10 8 10 7*  7*       Significant Imaging: I have reviewed all pertinent imaging results/findings within the past 24 hours.    US Retroperitoneal Complete    Result Date: 10/17/2024  EXAMINATION: US RETROPERITONEAL COMPLETE CLINICAL HISTORY: kenney; TECHNIQUE: Ultrasound of the kidneys and urinary bladder was performed including color flow and Doppler evaluation of the kidneys. COMPARISON: CT 10/17/2024, retroperitoneal ultrasound 06/20/2024 FINDINGS: Right kidney: The right kidney is atrophic measuring 6.2 cm.  There is right renal cortical thinning and increased cortical echogenicity.  No hydronephrosis. Left kidney: The left kidney measures 9.3 cm.  No significant cortical thinning.  No hydronephrosis.  The previously noted nonobstructing left renal calculi are better visualized on prior CT examination. Urinary bladder is relatively nondistended with nonspecific bladder wall thickening.     Atrophic  right kidney. Nonspecific bladder wall thickening.  Correlation with urinalysis for cystitis/infection advised. Electronically signed by: Carlitos Browning MD Date:    10/17/2024 Time:    22:36    X-Ray Chest AP Portable    Result Date: 10/17/2024  EXAMINATION: XR CHEST AP PORTABLE CLINICAL HISTORY: Edema, unspecified TECHNIQUE: Single frontal view of the chest was performed. COMPARISON: 03/17/2024 FINDINGS: Cardiac monitoring leads overlie the chest.  There is postoperative change of prior median sternotomy.  The cardiac silhouette is not significantly enlarged.  Lungs are symmetrically expanded with mild chronic coarse bibasilar interstitial attenuation.  No large confluent airspace consolidation identified.  No significant volume of pleural fluid or pneumothorax identified.  Osseous structures demonstrate degenerative change.     No convincing radiographic evidence of acute intrathoracic process on this single view. Electronically signed by: Carlitos Browning MD Date:    10/17/2024 Time:    21:25    CT Abdomen Pelvis  Without Contrast    Result Date: 10/17/2024  EXAMINATION: CT ABDOMEN PELVIS WITHOUT CONTRAST CLINICAL HISTORY: LLQ pain, n/v, diarrhea;hx of pancreatitis and  r/o diverticulitis; Left lower quadrant pain TECHNIQUE: Low dose axial images, sagittal and coronal reformations were obtained from the lung bases to the pubic symphysis. COMPARISON: CT abdomen pelvis of March 17, 2024. FINDINGS: The liver is of normal size contour and CT density without focal mass.  The gallbladder is of normal size but contains some radiodensities suggesting gallstones.  The pancreas is of normal contour and CT density without edema or mass.  The spleen is of normal size and CT density. The adrenal glands are not enlarged.  There is atrophy of the right kidney.  Atherosclerotic calcification is noted of the right renal vessels.  Hydronephrosis or stone is not identified.  On the left the kidney is enlarged and there is Katia  nephric inflammatory change.  A 2 mm and a 2 mm intrarenal stone is noted.  Hydronephrosis is not identified.  The ureter follows a normal course down to the bladder without dilation or distal stone.  There is heavy atherosclerotic calcification noted in the abdominal aorta superior mesenteric artery and at the aortic bifurcation extending into the common iliac vessels with expected severe stenosis. The stomach is of normal configuration.  Small bowel dilatation or air-fluid levels are not seen.  The colon appears of normal configuration without distention or mass.  There are several diverticuli noted in the sigmoid colon without CT evidence of diverticulitis.  Free fluid or free air within the peritoneum is not seen. The bladder is not distended.  A uterus is not seen.  There is a water density cyst in the right adnexa measuring 2.6 cm a probable ovarian cyst.  Free fluid in the pelvis is not seen.     Left pyelonephritis.  A ureteral stone or hydronephrosis is not identified.  There are 2 small 2 mm intrarenal stones.  There is atrophy of the right kidney without mass or hydronephrosis.  Heavy atherosclerotic calcification is noted of the abdominal aorta especially at the aortic bifurcation extending into the common iliac arteries with expected severe stenosis.  Mild diverticulosis coli.  2.6 cm right adnexal cyst probably ovarian in origin. Electronically signed by: Aren Sarmiento MD Date:    10/17/2024 Time:    11:46

## 2024-10-19 NOTE — PROGRESS NOTES
" INPATIENT NEPHROLOGY Progress  [facility] NEPHROLOGY    Epi Mcintosh  10/19/2024    Reason for consultation:    Acute kidney injury, CKD 5    Chief Complaint:   Chief Complaint   Patient presents with    Abnormal Urinalysis     Patient had urinalysis today and UTI was found - sent by DYLON Benson to ER - pt has only one functioning kidney           History of Present Illness:    Per ER    Epi Mcintosh is a 74 y.o. female with a past medical history insulin-dependent diabetes, history fluid retention, GERD, and CKD that presents emergency department for evaluation of left lower quadrant abdominal pain, diarrhea, emesis, and back pain that has been ongoing for approximately 3 weeks.  Patient was also complaining of urinary urgency.  States that she has multiple episodes of poorly-controlled diarrhea.  Does endorse decreased urination but feels that she does have some urgency.  Does endorse bilateral edema which he attributes to sodium intake.  She was seen at her PCP's office today who ordered outpatient labs and CT scan.  She was found to have left-sided pyelonephritis as well as diverticulosis.  There was not any evidence of diverticulitis.  Denies active fever, chest pain, shortness of breath, numbness, weakness, and constipation.     10/19  2L UOP.  Renal function not moving much.  BNP elevated so stopped IVF.  Feels SOB, states it's not unusual, no edema.  Exp wheeze present, pt does have COPD.  Ordered bedside PVR.  Pt states she is tired, does not want dialysis, just wants to "die in peace naturally", tired of being in and out of the hospital, not interested in dialysis.  She is requesting to speak  to palliative care, so will consult.  Sees Dr. Hernandez in out patient clinic for CKD 5      Plan of Care:       Assessment:     Acute kidney injury  --ns 75 cc/hour  --Avoid NSAIDS, Chen II inhibitors, and other non-essential nephrotoxic agents  --strict I/Os  --keep map above 55.    --renal " dose medication   --renal ultrasound with no hydronephrosis  --pvr bladder scan    CKD 5  --long term HgA1c goal less than 7  --long term bp goal 130/80  --Avoid NSAIDS, Chen II inhibitors, and other non-essential nephrotoxic agents    Hypertension  --avoid over correction.  140-150/80 ok  --low salt   --continue amlodipine and metoprolol    Metabolic acidosis  --sodium bicarb 650mg  daily    Anemia  --iron panel  --kiesha if iron stores adequate        Thank you for allowing us to participate in this patient's care. We will continue to follow.    Vital Signs:  Temp Readings from Last 3 Encounters:   10/19/24 98.5 °F (36.9 °C)   10/17/24 98 °F (36.7 °C) (Oral)   09/25/24 98.1 °F (36.7 °C) (Oral)       Pulse Readings from Last 3 Encounters:   10/19/24 62   10/17/24 62   09/25/24 80       BP Readings from Last 3 Encounters:   10/19/24 137/72   10/17/24 116/62   09/25/24 137/68       Weight:  Wt Readings from Last 3 Encounters:   10/17/24 66.1 kg (145 lb 11.6 oz)   10/17/24 63.5 kg (140 lb)   09/25/24 63.5 kg (140 lb)       Past Medical & Surgical History:  Past Medical History:   Diagnosis Date    COPD (chronic obstructive pulmonary disease)     Coronary artery disease     Depression     Diabetes mellitus     Hypertension     Pancreatitis     PVD (peripheral vascular disease)        Past Surgical History:   Procedure Laterality Date    AORTOGRAPHY WITH SERIALOGRAPHY N/A 4/1/2022    Procedure: AORTOGRAM, WITH SERIALOGRAPHY;  Surgeon: Petr Kaufman MD;  Location: [facility] CATH/EP LAB;  Service: Peripheral Vascular;  Laterality: N/A;    CARDIAC SURGERY      CORONARY ARTERY BYPASS GRAFT  12/2018    CYSTOSCOPY N/A 6/26/2024    Procedure: CYSTOSCOPY;  Surgeon: Agustín Marinelli Jr., MD;  Location: [facility] ASU OR;  Service: Urology;  Laterality: N/A;    HYSTERECTOMY         Past Social History:  Social History     Socioeconomic History    Marital status:    Tobacco Use    Smoking status: Every Day     Current packs/day:  0.50     Average packs/day: 0.5 packs/day for 55.8 years (27.9 ttl pk-yrs)     Types: Vaping w/o nicotine, Cigarettes     Start date: 1969    Smokeless tobacco: Never   Substance and Sexual Activity    Alcohol use: Yes     Comment: occasionally    Drug use: Never     Social Drivers of Health     Financial Resource Strain: Low Risk  (10/18/2024)    Overall Financial Resource Strain (CARDIA)     Difficulty of Paying Living Expenses: Not very hard   Recent Concern: Financial Resource Strain - Medium Risk (10/17/2024)    Overall Financial Resource Strain (CARDIA)     Difficulty of Paying Living Expenses: Somewhat hard   Food Insecurity: No Food Insecurity (10/18/2024)    Hunger Vital Sign     Worried About Running Out of Food in the Last Year: Never true     Ran Out of Food in the Last Year: Never true   Recent Concern: Food Insecurity - Food Insecurity Present (10/17/2024)    Hunger Vital Sign     Worried About Running Out of Food in the Last Year: Sometimes true     Ran Out of Food in the Last Year: Never true   Transportation Needs: No Transportation Needs (10/18/2024)    TRANSPORTATION NEEDS     Transportation : No   Physical Activity: Insufficiently Active (10/18/2024)    Exercise Vital Sign     Days of Exercise per Week: 3 days     Minutes of Exercise per Session: 20 min   Stress: No Stress Concern Present (10/18/2024)    [employer] - Occupational Stress Questionnaire     Feeling of Stress : Only a little   Recent Concern: Stress - Stress Concern Present (10/17/2024)    [employer] - Occupational Stress Questionnaire     Feeling of Stress : Very much   Housing Stability: Low Risk  (10/18/2024)    Housing Stability Vital Sign     Unable to Pay for Housing in the Last Year: No     Homeless in the Last Year: No       Medications:  No current facility-administered medications on file prior to encounter.     Current Outpatient Medications on File Prior to  "Encounter   Medication Sig Dispense Refill    albuterol (PROVENTIL/VENTOLIN HFA) 90 mcg/actuation inhaler Inhale 2 puffs into the lungs 4 (four) times daily as needed for Shortness of Breath or Wheezing.      amLODIPine (NORVASC) 5 MG tablet Take 1 tablet (5 mg total) by mouth once daily. 90 tablet 1    apixaban (ELIQUIS) 5 mg Tab Take 1 tablet (5 mg total) by mouth 2 (two) times daily. 30 tablet 3    citalopram (CELEXA) 40 MG tablet Take 1 tablet (40 mg total) by mouth once daily. 90 tablet 1    insulin glargine U-100, Lantus, (LANTUS SOLOSTAR U-100 INSULIN) 100 unit/mL (3 mL) InPn pen Inject 10 Units into the skin every evening. 3 mL 2    levothyroxine (SYNTHROID) 88 MCG tablet Take 1 tablet (88 mcg total) by mouth before breakfast. 90 tablet 1    metoprolol tartrate (LOPRESSOR) 25 MG tablet Take 1 tablet (25 mg total) by mouth 2 (two) times daily. 180 tablet 1    ondansetron (ZOFRAN-ODT) 8 MG TbDL Take 1 tablet (8 mg total) by mouth every 8 (eight) hours as needed (nausea or vomiting). 12 tablet 0    pantoprazole (PROTONIX) 40 MG tablet Take 1 tablet (40 mg total) by mouth once daily. 90 tablet 1    pioglitazone (ACTOS) 15 MG tablet Take 15 mg by mouth once daily.      rosuvastatin (CRESTOR) 40 MG Tab Take 40 mg by mouth once daily.      sodium bicarbonate 650 MG tablet Take 650 mg by mouth once daily.      blood sugar diagnostic (TRUE METRIX GLUCOSE TEST STRIP) Strp Sig: To check BG two times daily, to use with insurance preferred meter 100 strip 2    blood-glucose meter (TRUE METRIX AIR GLUCOSE METER) kit To check BG two times daily, to use with insurance preferred meter 1 each 0    lancets (TRUEPLUS LANCETS) 33 gauge Misc To check BG two times daily, to use with insurance preferred meter 100 each 2    lancets Misc To check BG two times daily, to use with insurance preferred meter 50 each 5    pen needle, diabetic 32 gauge x 5/32" Ndle Use 1 pen needle nightly to administer insulin 100 each 2     Scheduled " "Meds:   amLODIPine  10 mg Oral Daily    apixaban  5 mg Oral BID    cefTRIAXone (Rocephin) IV (PEDS and ADULTS)  2 g Intravenous Q24H    citalopram  20 mg Oral Daily    electrolytes-dextrose  400 mL Oral Q4H    metoprolol tartrate  25 mg Oral BID    pantoprazole  40 mg Oral Before breakfast     Continuous Infusions:   0.9% NaCl   Intravenous Continuous 75 mL/hr at 10/19/24 0609 New Bag at 10/19/24 0609     PRN Meds:.  Current Facility-Administered Medications:     acetaminophen, 650 mg, Oral, Q8H PRN    acetaminophen, 650 mg, Oral, Q4H PRN    aluminum-magnesium hydroxide-simethicone, 30 mL, Oral, QID PRN    dextrose 50%, 12.5 g, Intravenous, PRN    dextrose 50%, 25 g, Intravenous, PRN    glucagon (human recombinant), 1 mg, Intramuscular, PRN    glucose, 16 g, Oral, PRN    glucose, 24 g, Oral, PRN    hydrALAZINE, 10 mg, Intravenous, Q4H PRN    HYDROcodone-acetaminophen, 1 tablet, Oral, Q6H PRN    insulin aspart U-100, 0-10 Units, Subcutaneous, QID (AC + HS) PRN    magnesium oxide, 800 mg, Oral, PRN    magnesium oxide, 800 mg, Oral, PRN    melatonin, 6 mg, Oral, Nightly PRN    naloxone, 0.02 mg, Intravenous, PRN    ondansetron, 4 mg, Intravenous, Q6H PRN    potassium bicarbonate, 35 mEq, Oral, PRN    potassium bicarbonate, 50 mEq, Oral, PRN    potassium bicarbonate, 60 mEq, Oral, PRN    potassium, sodium phosphates, 2 packet, Oral, PRN    potassium, sodium phosphates, 2 packet, Oral, PRN    potassium, sodium phosphates, 2 packet, Oral, PRN    Allergies:  Plavix [clopidogrel], Zolpidem, and Codeine    Past Family History:  Reviewed; refer to Hospitalist Admission Note    Review of Systems:  Review of Systems - All 14 systems reviewed and negative, except as noted in HPI    Physical Exam:    /72   Pulse 62   Temp 98.5 °F (36.9 °C)   Resp 19   Ht 5' 5" (1.651 m)   Wt 66.1 kg (145 lb 11.6 oz)   SpO2 99%   BMI 24.25 kg/m²     General Appearance:    Alert, cooperative, no distress, appears stated age   Head:    " Normocephalic, without obvious abnormality, atraumatic   Eyes:    PER, conjunctiva/corneas clear, EOM's intact in both eyes        Throat:   Lips, mucosa, and tongue normal; teeth and gums normal   Back:     Symmetric, no curvature, ROM normal, no CVA tenderness   Lungs:     Clear to auscultation bilaterally, respirations unlabored   Chest wall:    No tenderness or deformity   Heart:    Regular rate and rhythm, S1 and S2 normal, no murmur, rub   or gallop   Abdomen:     Soft, non-tender, bowel sounds active all four quadrants,     no masses, no organomegaly   Extremities:   Extremities normal, atraumatic, no cyanosis or edema   Pulses:   2+ and symmetric all extremities   MSK:   No joint or muscle swelling, tenderness or deformity   Skin:   Skin color, texture, turgor normal, no rashes or lesions   Neurologic:   CNII-XII intact, normal strength and sensation       Throughout.  No flap     Results:  Lab Results   Component Value Date     (L) 10/19/2024     (L) 10/19/2024    K 5.1 10/19/2024    K 5.1 10/19/2024     10/19/2024     10/19/2024    CO2 19 (L) 10/19/2024    CO2 19 (L) 10/19/2024    BUN 42 (H) 10/19/2024    BUN 42 (H) 10/19/2024    CREATININE 4.7 (H) 10/19/2024    CREATININE 4.7 (H) 10/19/2024    CALCIUM 7.9 (L) 10/19/2024    CALCIUM 7.9 (L) 10/19/2024    ANIONGAP 7 (L) 10/19/2024    ANIONGAP 7 (L) 10/19/2024    ESTGFRAFRICA 34.0 (A) 03/30/2022    EGFRNONAA 29.5 (A) 03/30/2022       Lab Results   Component Value Date    CALCIUM 7.9 (L) 10/19/2024    CALCIUM 7.9 (L) 10/19/2024    PHOS 3.5 10/19/2024       Recent Labs   Lab 10/19/24  0458   WBC 8.03  8.03   RBC 2.97*  2.97*   HGB 8.9*  8.9*   HCT 28.0*  28.0*     249   MCV 94  94   MCH 30.0  30.0   MCHC 31.8*  31.8*     Imaging Results              US Retroperitoneal Complete (Final result)  Result time 10/17/24 22:36:33      Final result by Carlitos Browning MD (10/17/24 22:36:33)                   Impression:       Atrophic right kidney.    Nonspecific bladder wall thickening.  Correlation with urinalysis for cystitis/infection advised.      Electronically signed by: Carlitos Browning MD  Date:    10/17/2024  Time:    22:36               Narrative:    EXAMINATION:  US RETROPERITONEAL COMPLETE    CLINICAL HISTORY:  kenney;    TECHNIQUE:  Ultrasound of the kidneys and urinary bladder was performed including color flow and Doppler evaluation of the kidneys.    COMPARISON:  CT 10/17/2024, retroperitoneal ultrasound 06/20/2024    FINDINGS:  Right kidney: The right kidney is atrophic measuring 6.2 cm.  There is right renal cortical thinning and increased cortical echogenicity.  No hydronephrosis.    Left kidney: The left kidney measures 9.3 cm.  No significant cortical thinning.  No hydronephrosis.  The previously noted nonobstructing left renal calculi are better visualized on prior CT examination.    Urinary bladder is relatively nondistended with nonspecific bladder wall thickening.                                       X-Ray Chest AP Portable (Final result)  Result time 10/17/24 21:25:11      Final result by Carlitos Browning MD (10/17/24 21:25:11)                   Impression:      No convincing radiographic evidence of acute intrathoracic process on this single view.      Electronically signed by: Carlitos Browning MD  Date:    10/17/2024  Time:    21:25               Narrative:    EXAMINATION:  XR CHEST AP PORTABLE    CLINICAL HISTORY:  Edema, unspecified    TECHNIQUE:  Single frontal view of the chest was performed.    COMPARISON:  03/17/2024    FINDINGS:  Cardiac monitoring leads overlie the chest.  There is postoperative change of prior median sternotomy.  The cardiac silhouette is not significantly enlarged.  Lungs are symmetrically expanded with mild chronic coarse bibasilar interstitial attenuation.  No large confluent airspace consolidation identified.  No significant volume of pleural fluid or pneumothorax identified.   Osseous structures demonstrate degenerative change.                                    Patient care was time spent personally by me on the following activities:   Obtaining a history  Examination of patient.  Providing medical care at the patients bedside.  Developing a treatment plan with patient or surrogate and bedside caregivers  Ordering and reviewing laboratory studies, radiographic studies, pulse oximetry.  Ordering and performing treatments and interventions.  Evaluation of patient's response to treatment.  Discussions with consultants while on the unit and immediately available to the patient.  Re-evaluation of the patient's condition.  Documentation in the medical record.       I have personally reviewed pertinent radiological imaging and reports.    Brandee Bertrand NP    [facility] [place]  [phone]

## 2024-10-20 VITALS
BODY MASS INDEX: 24.28 KG/M2 | TEMPERATURE: 98 F | DIASTOLIC BLOOD PRESSURE: 74 MMHG | OXYGEN SATURATION: 99 % | HEIGHT: 65 IN | RESPIRATION RATE: 18 BRPM | HEART RATE: 66 BPM | SYSTOLIC BLOOD PRESSURE: 171 MMHG | WEIGHT: 145.75 LBS

## 2024-10-20 LAB
ALBUMIN SERPL BCP-MCNC: 3.3 G/DL (ref 3.5–5.2)
ALP SERPL-CCNC: 91 U/L (ref 55–135)
ALT SERPL W/O P-5'-P-CCNC: 5 U/L (ref 10–44)
ANION GAP SERPL CALC-SCNC: 9 MMOL/L (ref 8–16)
AST SERPL-CCNC: 12 U/L (ref 10–40)
BASOPHILS # BLD AUTO: 0.04 K/UL (ref 0–0.2)
BASOPHILS NFR BLD: 0.5 % (ref 0–1.9)
BILIRUB SERPL-MCNC: 0.2 MG/DL (ref 0.1–1)
BUN SERPL-MCNC: 43 MG/DL (ref 8–23)
CALCIUM SERPL-MCNC: 8.1 MG/DL (ref 8.7–10.5)
CHLORIDE SERPL-SCNC: 106 MMOL/L (ref 95–110)
CO2 SERPL-SCNC: 16 MMOL/L (ref 23–29)
CREAT SERPL-MCNC: 4.6 MG/DL (ref 0.5–1.4)
DIFFERENTIAL METHOD BLD: ABNORMAL
EOSINOPHIL # BLD AUTO: 0.1 K/UL (ref 0–0.5)
EOSINOPHIL NFR BLD: 1.5 % (ref 0–8)
ERYTHROCYTE [DISTWIDTH] IN BLOOD BY AUTOMATED COUNT: 14.7 % (ref 11.5–14.5)
EST. GFR  (NO RACE VARIABLE): 9.4 ML/MIN/1.73 M^2
GLUCOSE SERPL-MCNC: 140 MG/DL (ref 70–110)
HCT VFR BLD AUTO: 28.5 % (ref 37–48.5)
HGB BLD-MCNC: 9.2 G/DL (ref 12–16)
IMM GRANULOCYTES # BLD AUTO: 0.1 K/UL (ref 0–0.04)
IMM GRANULOCYTES NFR BLD AUTO: 1.2 % (ref 0–0.5)
LYMPHOCYTES # BLD AUTO: 1.3 K/UL (ref 1–4.8)
LYMPHOCYTES NFR BLD: 15.4 % (ref 18–48)
MAGNESIUM SERPL-MCNC: 1.7 MG/DL (ref 1.6–2.6)
MCH RBC QN AUTO: 30.2 PG (ref 27–31)
MCHC RBC AUTO-ENTMCNC: 32.3 G/DL (ref 32–36)
MCV RBC AUTO: 93 FL (ref 82–98)
MONOCYTES # BLD AUTO: 0.8 K/UL (ref 0.3–1)
MONOCYTES NFR BLD: 8.8 % (ref 4–15)
NEUTROPHILS # BLD AUTO: 6.2 K/UL (ref 1.8–7.7)
NEUTROPHILS NFR BLD: 72.6 % (ref 38–73)
NRBC BLD-RTO: 0 /100 WBC
PLATELET # BLD AUTO: 253 K/UL (ref 150–450)
PMV BLD AUTO: 8.5 FL (ref 9.2–12.9)
POCT GLUCOSE: 172 MG/DL (ref 70–110)
POCT GLUCOSE: 266 MG/DL (ref 70–110)
POTASSIUM SERPL-SCNC: 5.3 MMOL/L (ref 3.5–5.1)
PROT SERPL-MCNC: 6.1 G/DL (ref 6–8.4)
RBC # BLD AUTO: 3.05 M/UL (ref 4–5.4)
SODIUM SERPL-SCNC: 131 MMOL/L (ref 136–145)
WBC # BLD AUTO: 8.56 K/UL (ref 3.9–12.7)

## 2024-10-20 PROCEDURE — 85025 COMPLETE CBC W/AUTO DIFF WBC: CPT | Performed by: INTERNAL MEDICINE

## 2024-10-20 PROCEDURE — 36415 COLL VENOUS BLD VENIPUNCTURE: CPT | Performed by: INTERNAL MEDICINE

## 2024-10-20 PROCEDURE — 63600175 PHARM REV CODE 636 W HCPCS: Performed by: INTERNAL MEDICINE

## 2024-10-20 PROCEDURE — 83735 ASSAY OF MAGNESIUM: CPT | Performed by: INTERNAL MEDICINE

## 2024-10-20 PROCEDURE — 25000003 PHARM REV CODE 250: Performed by: NURSE PRACTITIONER

## 2024-10-20 PROCEDURE — 25000003 PHARM REV CODE 250: Performed by: INTERNAL MEDICINE

## 2024-10-20 PROCEDURE — 80053 COMPREHEN METABOLIC PANEL: CPT | Performed by: INTERNAL MEDICINE

## 2024-10-20 RX ORDER — SODIUM BICARBONATE 650 MG/1
650 TABLET ORAL 2 TIMES DAILY
Status: DISCONTINUED | OUTPATIENT
Start: 2024-10-20 | End: 2024-10-20 | Stop reason: HOSPADM

## 2024-10-20 RX ORDER — HYDROCODONE BITARTRATE AND ACETAMINOPHEN 5; 325 MG/1; MG/1
1 TABLET ORAL EVERY 8 HOURS PRN
Qty: 12 TABLET | Refills: 0 | Status: SHIPPED | OUTPATIENT
Start: 2024-10-20

## 2024-10-20 RX ORDER — SODIUM BICARBONATE 650 MG/1
650 TABLET ORAL 2 TIMES DAILY
Qty: 180 TABLET | Refills: 0 | Status: SHIPPED | OUTPATIENT
Start: 2024-10-20

## 2024-10-20 RX ORDER — CEPHALEXIN 500 MG/1
500 CAPSULE ORAL EVERY 8 HOURS
Qty: 21 CAPSULE | Refills: 0 | Status: SHIPPED | OUTPATIENT
Start: 2024-10-20 | End: 2024-10-27

## 2024-10-20 RX ADMIN — APIXABAN 5 MG: 5 TABLET, FILM COATED ORAL at 09:10

## 2024-10-20 RX ADMIN — PANTOPRAZOLE SODIUM 40 MG: 40 TABLET, DELAYED RELEASE ORAL at 06:10

## 2024-10-20 RX ADMIN — SODIUM ZIRCONIUM CYCLOSILICATE 10 G: 10 POWDER, FOR SUSPENSION ORAL at 09:10

## 2024-10-20 RX ADMIN — INSULIN ASPART 2 UNITS: 100 INJECTION, SOLUTION INTRAVENOUS; SUBCUTANEOUS at 09:10

## 2024-10-20 RX ADMIN — AMLODIPINE BESYLATE 10 MG: 5 TABLET ORAL at 09:10

## 2024-10-20 RX ADMIN — CITALOPRAM HYDROBROMIDE 20 MG: 20 TABLET ORAL at 09:10

## 2024-10-20 RX ADMIN — METOPROLOL TARTRATE 25 MG: 25 TABLET, FILM COATED ORAL at 09:10

## 2024-10-20 RX ADMIN — SODIUM BICARBONATE 650 MG TABLET 650 MG: at 11:10

## 2024-10-20 RX ADMIN — CEFTRIAXONE SODIUM 2 G: 2 INJECTION, POWDER, FOR SOLUTION INTRAMUSCULAR; INTRAVENOUS at 06:10

## 2024-10-20 NOTE — PLAN OF CARE
Pt choice obtained and scanned into media. Pt's preference is [place] home healthcare. [facility].     10/20/24 1327   Post-Acute Status   Post-Acute Authorization Home Health   Home Health Status Referrals Sent   Coverage [employer] MANAGED MEDICARE - [facility] PPO SPECIAL NEEDS   Patient choice form signed by patient/caregiver List from CMS Compare   Discharge Delays None known at this time   Discharge Plan   Discharge Plan A Home Health   Discharge Plan B Home with family

## 2024-10-20 NOTE — PLAN OF CARE
Discharge orders and chart reviewed with no further post-acute discharge needs identified at this time.     Pt requested [place] home healthcare, referrals sent in [facility]. Follow up appointment scheduled and added to AVS. Family will transport.    At this time, patient is cleared for discharge from Case Management standpoint.           10/20/24 1305   Final Note   Assessment Type Final Discharge Note   Anticipated Discharge Disposition Home-Health   What phone number can be called within the next 1-3 days to see how you are doing after discharge? [phone]   Post-Acute Status   Post-Acute Authorization Home Health   Coverage [employer] MANAGED MEDICARE - [facility] -   Discharge Delays None known at this time

## 2024-10-20 NOTE — PLAN OF CARE
Problem: Infection  Goal: Absence of Infection Signs and Symptoms  Outcome: Progressing     Problem: Adult Inpatient Plan of Care  Goal: Plan of Care Review  Outcome: Progressing  Goal: Patient-Specific Goal (Individualized)  Outcome: Progressing  Goal: Absence of Hospital-Acquired Illness or Injury  Outcome: Progressing  Goal: Optimal Comfort and Wellbeing  Outcome: Progressing  Goal: Readiness for Transition of Care  Outcome: Progressing     Problem: Diabetes Comorbidity  Goal: Blood Glucose Level Within Targeted Range  Outcome: Progressing     Problem: Acute Kidney Injury/Impairment  Goal: Fluid and Electrolyte Balance  Outcome: Progressing  Goal: Improved Oral Intake  Outcome: Progressing  Goal: Effective Renal Function  Outcome: Progressing     Problem: Wound  Goal: Optimal Coping  Outcome: Progressing  Goal: Optimal Functional Ability  Outcome: Progressing  Goal: Absence of Infection Signs and Symptoms  Outcome: Progressing  Goal: Improved Oral Intake  Outcome: Progressing  Goal: Optimal Pain Control and Function  Outcome: Progressing  Goal: Skin Health and Integrity  Outcome: Progressing  Goal: Optimal Wound Healing  Outcome: Progressing     Problem: Coping Ineffective  Goal: Effective Coping  Outcome: Progressing     Problem: Fall Injury Risk  Goal: Absence of Fall and Fall-Related Injury  Outcome: Progressing     Problem: Skin Injury Risk Increased  Goal: Skin Health and Integrity  Outcome: Progressing

## 2024-10-20 NOTE — PROGRESS NOTES
" INPATIENT NEPHROLOGY Progress  [facility] NEPHROLOGY    Epi Mcintosh  10/20/2024    Reason for consultation:    Acute kidney injury, CKD 5    Chief Complaint:   Chief Complaint   Patient presents with    Abnormal Urinalysis     Patient had urinalysis today and UTI was found - sent by DYLON Benson to ER - pt has only one functioning kidney           History of Present Illness:    Per ER    Epi Mcintosh is a 74 y.o. female with a past medical history insulin-dependent diabetes, history fluid retention, GERD, and CKD that presents emergency department for evaluation of left lower quadrant abdominal pain, diarrhea, emesis, and back pain that has been ongoing for approximately 3 weeks.  Patient was also complaining of urinary urgency.  States that she has multiple episodes of poorly-controlled diarrhea.  Does endorse decreased urination but feels that she does have some urgency.  Does endorse bilateral edema which he attributes to sodium intake.  She was seen at her PCP's office today who ordered outpatient labs and CT scan.  She was found to have left-sided pyelonephritis as well as diverticulosis.  There was not any evidence of diverticulitis.  Denies active fever, chest pain, shortness of breath, numbness, weakness, and constipation.     10/19  2L UOP.  Renal function not moving much.  BNP elevated so stopped IVF.  Feels SOB, states it's not unusual, no edema.  Exp wheeze present, pt does have COPD.  Ordered bedside PVR.  Pt states she is tired, does not want dialysis, just wants to "die in peace naturally", tired of being in and out of the hospital, not interested in dialysis.  She is requesting to speak  to palliative care, so will consult.  Sees Dr. Hernandez in out patient clinic for CKD 5  10/20  PVR 41.  Renal function about the same, acidosis worsening--started bicarb yesterday, hyperkalemic--will order lokelma.  Wants to go home.      Plan of Care:       Assessment:     Acute kidney " injury  --ns 75 cc/hour  --Avoid NSAIDS, Chen II inhibitors, and other non-essential nephrotoxic agents  --strict I/Os  --keep map above 55.    --renal dose medication   --renal ultrasound with no hydronephrosis  --pvr bladder scan    CKD 5  --long term HgA1c goal less than 7  --long term bp goal 130/80  --Avoid NSAIDS, Chen II inhibitors, and other non-essential nephrotoxic agents    Hypertension  --avoid over correction.  140-150/80 ok  --low salt   --continue amlodipine and metoprolol    Metabolic acidosis  --sodium bicarb 650mg  daily    Anemia  --iron panel  --kiesha if iron stores adequate        Thank you for allowing us to participate in this patient's care. We will continue to follow.    Vital Signs:  Temp Readings from Last 3 Encounters:   10/20/24 97.8 °F (36.6 °C) (Oral)   10/17/24 98 °F (36.7 °C) (Oral)   09/25/24 98.1 °F (36.7 °C) (Oral)       Pulse Readings from Last 3 Encounters:   10/20/24 66   10/17/24 62   09/25/24 80       BP Readings from Last 3 Encounters:   10/20/24 (!) 170/67   10/17/24 116/62   09/25/24 137/68       Weight:  Wt Readings from Last 3 Encounters:   10/17/24 66.1 kg (145 lb 11.6 oz)   10/17/24 63.5 kg (140 lb)   09/25/24 63.5 kg (140 lb)       Past Medical & Surgical History:  Past Medical History:   Diagnosis Date    COPD (chronic obstructive pulmonary disease)     Coronary artery disease     Depression     Diabetes mellitus     Hypertension     Pancreatitis     PVD (peripheral vascular disease)        Past Surgical History:   Procedure Laterality Date    AORTOGRAPHY WITH SERIALOGRAPHY N/A 4/1/2022    Procedure: AORTOGRAM, WITH SERIALOGRAPHY;  Surgeon: Petr Kaufman MD;  Location: [facility] CATH/EP LAB;  Service: Peripheral Vascular;  Laterality: N/A;    CARDIAC SURGERY      CORONARY ARTERY BYPASS GRAFT  12/2018    CYSTOSCOPY N/A 6/26/2024    Procedure: CYSTOSCOPY;  Surgeon: Agustín Marinelli Jr., MD;  Location: [facility] ASU OR;  Service: Urology;  Laterality: N/A;    HYSTERECTOMY          Past Social History:  Social History     Socioeconomic History    Marital status:    Tobacco Use    Smoking status: Every Day     Current packs/day: 0.50     Average packs/day: 0.5 packs/day for 55.8 years (27.9 ttl pk-yrs)     Types: Vaping w/o nicotine, Cigarettes     Start date: 1969    Smokeless tobacco: Never   Substance and Sexual Activity    Alcohol use: Yes     Comment: occasionally    Drug use: Never     Social Drivers of Health     Financial Resource Strain: Low Risk  (10/18/2024)    Overall Financial Resource Strain (CARDIA)     Difficulty of Paying Living Expenses: Not very hard   Recent Concern: Financial Resource Strain - Medium Risk (10/17/2024)    Overall Financial Resource Strain (CARDIA)     Difficulty of Paying Living Expenses: Somewhat hard   Food Insecurity: No Food Insecurity (10/18/2024)    Hunger Vital Sign     Worried About Running Out of Food in the Last Year: Never true     Ran Out of Food in the Last Year: Never true   Recent Concern: Food Insecurity - Food Insecurity Present (10/17/2024)    Hunger Vital Sign     Worried About Running Out of Food in the Last Year: Sometimes true     Ran Out of Food in the Last Year: Never true   Transportation Needs: No Transportation Needs (10/18/2024)    TRANSPORTATION NEEDS     Transportation : No   Physical Activity: Insufficiently Active (10/18/2024)    Exercise Vital Sign     Days of Exercise per Week: 3 days     Minutes of Exercise per Session: 20 min   Stress: No Stress Concern Present (10/18/2024)    [employer] [place] [employer] - Occupational Stress Questionnaire     Feeling of Stress : Only a little   Recent Concern: Stress - Stress Concern Present (10/17/2024)    [employer] [place] [employer] - Occupational Stress Questionnaire     Feeling of Stress : Very much   Housing Stability: Low Risk  (10/18/2024)    Housing Stability Vital Sign     Unable to Pay for Housing in the Last Year: No     Homeless in the  Last Year: No       Medications:  No current facility-administered medications on file prior to encounter.     Current Outpatient Medications on File Prior to Encounter   Medication Sig Dispense Refill    albuterol (PROVENTIL/VENTOLIN HFA) 90 mcg/actuation inhaler Inhale 2 puffs into the lungs 4 (four) times daily as needed for Shortness of Breath or Wheezing.      amLODIPine (NORVASC) 5 MG tablet Take 1 tablet (5 mg total) by mouth once daily. 90 tablet 1    apixaban (ELIQUIS) 5 mg Tab Take 1 tablet (5 mg total) by mouth 2 (two) times daily. 30 tablet 3    citalopram (CELEXA) 40 MG tablet Take 1 tablet (40 mg total) by mouth once daily. 90 tablet 1    insulin glargine U-100, Lantus, (LANTUS SOLOSTAR U-100 INSULIN) 100 unit/mL (3 mL) InPn pen Inject 10 Units into the skin every evening. 3 mL 2    levothyroxine (SYNTHROID) 88 MCG tablet Take 1 tablet (88 mcg total) by mouth before breakfast. 90 tablet 1    metoprolol tartrate (LOPRESSOR) 25 MG tablet Take 1 tablet (25 mg total) by mouth 2 (two) times daily. 180 tablet 1    ondansetron (ZOFRAN-ODT) 8 MG TbDL Take 1 tablet (8 mg total) by mouth every 8 (eight) hours as needed (nausea or vomiting). 12 tablet 0    pantoprazole (PROTONIX) 40 MG tablet Take 1 tablet (40 mg total) by mouth once daily. 90 tablet 1    pioglitazone (ACTOS) 15 MG tablet Take 15 mg by mouth once daily.      rosuvastatin (CRESTOR) 40 MG Tab Take 40 mg by mouth once daily.      sodium bicarbonate 650 MG tablet Take 650 mg by mouth once daily.      blood sugar diagnostic (TRUE METRIX GLUCOSE TEST STRIP) Strp Sig: To check BG two times daily, to use with insurance preferred meter 100 strip 2    blood-glucose meter (TRUE METRIX AIR GLUCOSE METER) kit To check BG two times daily, to use with insurance preferred meter 1 each 0    lancets (TRUEPLUS LANCETS) 33 gauge Misc To check BG two times daily, to use with insurance preferred meter 100 each 2    lancets Misc To check BG two times daily, to use with  "insurance preferred meter 50 each 5    pen needle, diabetic 32 gauge x 5/32" Ndle Use 1 pen needle nightly to administer insulin 100 each 2     Scheduled Meds:   amLODIPine  10 mg Oral Daily    apixaban  5 mg Oral BID    cefTRIAXone (Rocephin) IV (PEDS and ADULTS)  2 g Intravenous Q24H    citalopram  20 mg Oral Daily    metoprolol tartrate  25 mg Oral BID    pantoprazole  40 mg Oral Before breakfast     Continuous Infusions:      PRN Meds:.  Current Facility-Administered Medications:     acetaminophen, 650 mg, Oral, Q8H PRN    acetaminophen, 650 mg, Oral, Q4H PRN    aluminum-magnesium hydroxide-simethicone, 30 mL, Oral, QID PRN    dextrose 50%, 12.5 g, Intravenous, PRN    dextrose 50%, 25 g, Intravenous, PRN    glucagon (human recombinant), 1 mg, Intramuscular, PRN    glucose, 16 g, Oral, PRN    glucose, 24 g, Oral, PRN    hydrALAZINE, 10 mg, Intravenous, Q4H PRN    HYDROcodone-acetaminophen, 1 tablet, Oral, Q6H PRN    insulin aspart U-100, 0-10 Units, Subcutaneous, QID (AC + HS) PRN    magnesium oxide, 800 mg, Oral, PRN    magnesium oxide, 800 mg, Oral, PRN    melatonin, 6 mg, Oral, Nightly PRN    naloxone, 0.02 mg, Intravenous, PRN    ondansetron, 4 mg, Intravenous, Q6H PRN    potassium bicarbonate, 35 mEq, Oral, PRN    potassium bicarbonate, 50 mEq, Oral, PRN    potassium bicarbonate, 60 mEq, Oral, PRN    potassium, sodium phosphates, 2 packet, Oral, PRN    potassium, sodium phosphates, 2 packet, Oral, PRN    potassium, sodium phosphates, 2 packet, Oral, PRN    Allergies:  Plavix [clopidogrel], Zolpidem, and Codeine    Past Family History:  Reviewed; refer to Hospitalist Admission Note    Review of Systems:  Review of Systems - All 14 systems reviewed and negative, except as noted in HPI    Physical Exam:    BP (!) 170/67 Comment: notified nurse  Pulse 66   Temp 97.8 °F (36.6 °C) (Oral)   Resp 18   Ht 5' 5" (1.651 m)   Wt 66.1 kg (145 lb 11.6 oz)   SpO2 (!) 94%   BMI 24.25 kg/m²     General Appearance:    " Alert, cooperative, no distress, appears stated age   Head:    Normocephalic, without obvious abnormality, atraumatic   Eyes:    PER, conjunctiva/corneas clear, EOM's intact in both eyes        Throat:   Lips, mucosa, and tongue normal; teeth and gums normal   Back:     Symmetric, no curvature, ROM normal, no CVA tenderness   Lungs:     Clear to auscultation bilaterally, respirations unlabored   Chest wall:    No tenderness or deformity   Heart:    Regular rate and rhythm, S1 and S2 normal, no murmur, rub   or gallop   Abdomen:     Soft, non-tender, bowel sounds active all four quadrants,     no masses, no organomegaly   Extremities:   Extremities normal, atraumatic, no cyanosis or edema   Pulses:   2+ and symmetric all extremities   MSK:   No joint or muscle swelling, tenderness or deformity   Skin:   Skin color, texture, turgor normal, no rashes or lesions   Neurologic:   CNII-XII intact, normal strength and sensation       Throughout.  No flap     Results:  Lab Results   Component Value Date     (L) 10/20/2024    K 5.3 (H) 10/20/2024     10/20/2024    CO2 16 (L) 10/20/2024    BUN 43 (H) 10/20/2024    CREATININE 4.6 (H) 10/20/2024    CALCIUM 8.1 (L) 10/20/2024    ANIONGAP 9 10/20/2024    ESTGFRAFRICA 34.0 (A) 03/30/2022    EGFRNONAA 29.5 (A) 03/30/2022       Lab Results   Component Value Date    CALCIUM 8.1 (L) 10/20/2024    PHOS 3.5 10/19/2024       Recent Labs   Lab 10/20/24  0407   WBC 8.56   RBC 3.05*   HGB 9.2*   HCT 28.5*      MCV 93   MCH 30.2   MCHC 32.3     Imaging Results              US Retroperitoneal Complete (Final result)  Result time 10/17/24 22:36:33      Final result by Carlitos Browning MD (10/17/24 22:36:33)                   Impression:      Atrophic right kidney.    Nonspecific bladder wall thickening.  Correlation with urinalysis for cystitis/infection advised.      Electronically signed by: Carlitos Browning MD  Date:    10/17/2024  Time:    22:36               Narrative:     EXAMINATION:  US RETROPERITONEAL COMPLETE    CLINICAL HISTORY:  kenney;    TECHNIQUE:  Ultrasound of the kidneys and urinary bladder was performed including color flow and Doppler evaluation of the kidneys.    COMPARISON:  CT 10/17/2024, retroperitoneal ultrasound 06/20/2024    FINDINGS:  Right kidney: The right kidney is atrophic measuring 6.2 cm.  There is right renal cortical thinning and increased cortical echogenicity.  No hydronephrosis.    Left kidney: The left kidney measures 9.3 cm.  No significant cortical thinning.  No hydronephrosis.  The previously noted nonobstructing left renal calculi are better visualized on prior CT examination.    Urinary bladder is relatively nondistended with nonspecific bladder wall thickening.                                       X-Ray Chest AP Portable (Final result)  Result time 10/17/24 21:25:11      Final result by Carlitos Browning MD (10/17/24 21:25:11)                   Impression:      No convincing radiographic evidence of acute intrathoracic process on this single view.      Electronically signed by: Carlitos Browning MD  Date:    10/17/2024  Time:    21:25               Narrative:    EXAMINATION:  XR CHEST AP PORTABLE    CLINICAL HISTORY:  Edema, unspecified    TECHNIQUE:  Single frontal view of the chest was performed.    COMPARISON:  03/17/2024    FINDINGS:  Cardiac monitoring leads overlie the chest.  There is postoperative change of prior median sternotomy.  The cardiac silhouette is not significantly enlarged.  Lungs are symmetrically expanded with mild chronic coarse bibasilar interstitial attenuation.  No large confluent airspace consolidation identified.  No significant volume of pleural fluid or pneumothorax identified.  Osseous structures demonstrate degenerative change.                                    Patient care was time spent personally by me on the following activities:   Obtaining a history  Examination of patient.  Providing medical care at the  patients bedside.  Developing a treatment plan with patient or surrogate and bedside caregivers  Ordering and reviewing laboratory studies, radiographic studies, pulse oximetry.  Ordering and performing treatments and interventions.  Evaluation of patient's response to treatment.  Discussions with consultants while on the unit and immediately available to the patient.  Re-evaluation of the patient's condition.  Documentation in the medical record.       I have personally reviewed pertinent radiological imaging and reports.    Brandee Bertrand NP    [facility] [place]  [phone]

## 2024-10-21 LAB — BACTERIA UR CULT: NO GROWTH

## 2024-10-22 NOTE — DISCHARGE SUMMARY
[facility] Medicine  Discharge Summary      Patient Name: Epi Mcintosh  MRN: [id number]  VENKATESH: [id number]  Patient Class: IP- Inpatient  Admission Date: 10/17/2024  Hospital Length of Stay: 3 days  Discharge Date and Time: 10/20/2024  1:21 PM  Attending Physician: Luzmaria att. providers found   Discharging Provider: FRANCISCO Olmstead  Primary Care Provider: Marylin Parmar FNP    Primary Care Team: Networked reference to record PCT     HPI:   74 year old pt getting admitted with acute pyelonephritis/KAIT on CKD/HTN urgency  Pt has been suffering from Nausea/Vomiting for last 3 weeks  Most of the times she has been ahving nausea if not vomiting  Also reports loose stools for past 2 weeks  She went and saw PCP yesterday  When labs were checked renal panels were worse  Also CT abdomen/Pelvis was done yesterday in imaging center  Today pt was instructed to come to ER and got admitted     * No surgery found *      Hospital Course:   Ms. Mcintosh has been monitored closely during her hospitalization. She was admitted on 10/17/24 for KAIT on CKDIV and pyelonephritis. Urine with pyuria and microscopic hematuria. She was empirically started on rocephin. She was seen by her PCP who ordered an output CT that revealed the left sided pyelo and she sent a urine culture as well with prelim result: GNR. Urine culture with NGTD in the hospital. Baseline creatinine about 3, but she's 4.8 in the hospital. Nephrology has been consulted. She had a retroperitoneal US that is negative for hydronephrosis and shows an atrophic right kidney. She has been managed with IV hydration, nephrotoxic meds held, all meds renally dosed. Nephrology recommends avoiding hypotension and allowing for mild asymptomatic hypertension. Electrolytes have been trended and repleted. She has been treated with IV fluids. She does not want to stay in the hospital, states she does not want dialysis even if it would save  and/or prolong her life. Nephrology agreed that outpatient follow up would be appropriate.  She was seen and examined on date of discharge and was stable for discharge home.  Discharge instructions were reviewed and return precautions discussed with good understanding.  Home health was arranged who will monitor renal function.  She will have close follow up with nephrology.     Goals of Care Treatment Preferences:  Code Status: DNR      SDOH Screening:  The patient was screened for utility difficulties, food insecurity, transport difficulties, housing insecurity, and interpersonal safety and there were no concerns identified this admission.     Consults:   Consults (From admission, onward)          Status Ordering Provider     Inpatient consult to Midline team  Once        Provider:  (Not yet assigned)    STEPHANE Caldwell            No new Assessment & Plan notes have been filed under this hospital service since the last note was generated.  Service: Hospital Medicine    Final Active Diagnoses:    Diagnosis Date Noted POA    PRINCIPAL PROBLEM:  Acute pyelonephritis [N10] 10/17/2024 Yes    Acute renal failure superimposed on stage 4 chronic kidney disease [N17.9, N18.4] 10/17/2024 Yes    Hypertensive urgency [I16.0] 10/17/2024 Yes    History of Pulmonary embolism [I26.99] 03/17/2024 Yes    Peripheral vascular disease, unspecified [I73.9] 07/29/2021 Yes    S/P CABG x 1, 12/2018 [Z95.1] 06/15/2021 Not Applicable    Type 2 diabetes mellitus with complication, without long-term current use of insulin [E11.8] 12/03/2018 Yes      Problems Resolved During this Admission:       Discharged Condition: stable    Disposition: Home-Health Care [facility]    Follow Up:    Patient Instructions:      Ambulatory referral/consult to Home Health   Standing Status: Future   Referral Priority: Routine Referral Type: Home Health   Referral Reason: Specialty Services Required   Requested Specialty: Home Health Services   Number of  Visits Requested: 1     Diet renal     Diet diabetic     Notify your health care provider if you experience any of the following:  temperature >100.4     Notify your health care provider if you experience any of the following:  persistent nausea and vomiting or diarrhea     Notify your health care provider if you experience any of the following:  severe uncontrolled pain     Notify your health care provider if you experience any of the following:  difficulty breathing or increased cough     Notify your health care provider if you experience any of the following:  severe persistent headache     Notify your health care provider if you experience any of the following:  persistent dizziness, light-headedness, or visual disturbances     Notify your health care provider if you experience any of the following:  increased confusion or weakness     Reason for not Ordering Smoking Cessation Referral     Order Specific Question Answer Comments   Reason for not ordering: Patient refused      Reason for not Prescribing Nicotine Replacement     Order Specific Question Answer Comments   Reason for not Prescribing: Patient refused      Activity as tolerated       Significant Diagnostic Studies: Labs: All labs within the past 24 hours have been reviewed    Pending Diagnostic Studies:       None           Medications:  Reconciled Home Medications:      Medication List        START taking these medications      cephALEXin 500 MG capsule  Commonly known as: KEFLEX  Take 1 capsule (500 mg total) by mouth every 8 (eight) hours. for 7 days     HYDROcodone-acetaminophen 5-325 mg per tablet  Commonly known as: NORCO  Take 1 tablet by mouth every 8 (eight) hours as needed for Pain.            CHANGE how you take these medications      sodium bicarbonate 650 MG tablet  Take 1 tablet (650 mg total) by mouth 2 (two) times daily.  What changed: when to take this            CONTINUE taking these medications      albuterol 90 mcg/actuation  "inhaler  Commonly known as: PROVENTIL/VENTOLIN HFA  Inhale 2 puffs into the lungs 4 (four) times daily as needed for Shortness of Breath or Wheezing.     amLODIPine 5 MG tablet  Commonly known as: NORVASC  Take 1 tablet (5 mg total) by mouth once daily.     apixaban 5 mg Tab  Commonly known as: ELIQUIS  Take 1 tablet (5 mg total) by mouth 2 (two) times daily.     blood sugar diagnostic Strp  Commonly known as: TRUE METRIX GLUCOSE TEST STRIP  Sig: To check BG two times daily, to use with insurance preferred meter     blood-glucose meter kit  Commonly known as: TRUE METRIX AIR GLUCOSE METER  To check BG two times daily, to use with insurance preferred meter     citalopram 40 MG tablet  Commonly known as: CeleXA  Take 1 tablet (40 mg total) by mouth once daily.     * lancets Misc  To check BG two times daily, to use with insurance preferred meter     * lancets 33 gauge Misc  Commonly known as: TRUEPLUS LANCETS  To check BG two times daily, to use with insurance preferred meter     LANTUS SOLOSTAR U-100 INSULIN 100 unit/mL (3 mL) Inpn pen  Generic drug: insulin glargine U-100 (Lantus)  Inject 10 Units into the skin every evening.     levothyroxine 88 MCG tablet  Commonly known as: SYNTHROID  Take 1 tablet (88 mcg total) by mouth before breakfast.     metoprolol tartrate 25 MG tablet  Commonly known as: LOPRESSOR  Take 1 tablet (25 mg total) by mouth 2 (two) times daily.     ondansetron 8 MG Tbdl  Commonly known as: ZOFRAN-ODT  Take 1 tablet (8 mg total) by mouth every 8 (eight) hours as needed (nausea or vomiting).     pantoprazole 40 MG tablet  Commonly known as: PROTONIX  Take 1 tablet (40 mg total) by mouth once daily.     pen needle, diabetic 32 gauge x 5/32" Ndle  Use 1 pen needle nightly to administer insulin     pioglitazone 15 MG tablet  Commonly known as: ACTOS  Take 15 mg by mouth once daily.     rosuvastatin 40 MG Tab  Commonly known as: CRESTOR  Take 40 mg by mouth once daily.           * This list has 2 " medication(s) that are the same as other medications prescribed for you. Read the directions carefully, and ask your doctor or other care provider to review them with you.                  Indwelling Lines/Drains at time of discharge:   Lines/Drains/Airways       None                   Time spent on the discharge of patient: 35 minutes         FRANCISCO Olmstead  Department of Hospital Medicine  [facility]

## 2024-10-23 LAB
OHS QRS DURATION: 84 MS
OHS QTC CALCULATION: 482 MS

## 2024-11-13 ENCOUNTER — OFFICE VISIT (OUTPATIENT)
Dept: FAMILY MEDICINE | Facility: CLINIC | Age: 74
End: 2024-11-13
Payer: MEDICARE

## 2024-11-13 VITALS
HEIGHT: 65 IN | DIASTOLIC BLOOD PRESSURE: 88 MMHG | OXYGEN SATURATION: 99 % | RESPIRATION RATE: 16 BRPM | SYSTOLIC BLOOD PRESSURE: 138 MMHG | WEIGHT: 138 LBS | HEART RATE: 59 BPM | BODY MASS INDEX: 22.99 KG/M2

## 2024-11-13 DIAGNOSIS — Z87.448 HISTORY OF PYELONEPHRITIS: ICD-10-CM

## 2024-11-13 DIAGNOSIS — I26.99 PULMONARY EMBOLISM, UNSPECIFIED CHRONICITY, UNSPECIFIED PULMONARY EMBOLISM TYPE, UNSPECIFIED WHETHER ACUTE COR PULMONALE PRESENT: ICD-10-CM

## 2024-11-13 DIAGNOSIS — J44.9 CHRONIC OBSTRUCTIVE PULMONARY DISEASE, UNSPECIFIED COPD TYPE: ICD-10-CM

## 2024-11-13 DIAGNOSIS — F41.9 ANXIETY: ICD-10-CM

## 2024-11-13 DIAGNOSIS — Z53.20: ICD-10-CM

## 2024-11-13 DIAGNOSIS — I25.10 CORONARY ARTERY DISEASE INVOLVING LEFT MAIN CORONARY ARTERY: ICD-10-CM

## 2024-11-13 DIAGNOSIS — N39.0 RECURRENT UTI: ICD-10-CM

## 2024-11-13 DIAGNOSIS — E11.8 TYPE 2 DIABETES MELLITUS WITH COMPLICATION, WITHOUT LONG-TERM CURRENT USE OF INSULIN: ICD-10-CM

## 2024-11-13 DIAGNOSIS — Z79.01 LONG TERM (CURRENT) USE OF ANTICOAGULANTS: ICD-10-CM

## 2024-11-13 DIAGNOSIS — I10 ESSENTIAL HYPERTENSION: ICD-10-CM

## 2024-11-13 DIAGNOSIS — Z66 DNR (DO NOT RESUSCITATE): ICD-10-CM

## 2024-11-13 DIAGNOSIS — F09 COGNITIVE DYSFUNCTION: Primary | ICD-10-CM

## 2024-11-13 DIAGNOSIS — F32.A DEPRESSIVE DISORDER: ICD-10-CM

## 2024-11-13 DIAGNOSIS — N18.5 CHRONIC KIDNEY DISEASE (CKD), STAGE V: ICD-10-CM

## 2024-11-13 DIAGNOSIS — R30.0 DYSURIA: ICD-10-CM

## 2024-11-13 DIAGNOSIS — I70.221 ATHEROSCLEROSIS OF NATIVE ARTERIES OF EXTREMITIES WITH REST PAIN, RIGHT LEG: ICD-10-CM

## 2024-11-13 DIAGNOSIS — E03.9 ACQUIRED HYPOTHYROIDISM: ICD-10-CM

## 2024-11-13 DIAGNOSIS — I73.9 PVD (PERIPHERAL VASCULAR DISEASE): ICD-10-CM

## 2024-11-13 DIAGNOSIS — E78.5 HYPERLIPIDEMIA, UNSPECIFIED HYPERLIPIDEMIA TYPE: ICD-10-CM

## 2024-11-13 DIAGNOSIS — R53.81 DEBILITY: ICD-10-CM

## 2024-11-13 DIAGNOSIS — Z78.9 MEDICALLY COMPLEX PATIENT: ICD-10-CM

## 2024-11-13 DIAGNOSIS — D64.9 ANEMIA, UNSPECIFIED TYPE: ICD-10-CM

## 2024-11-13 DIAGNOSIS — Z99.89 WALKER AS AMBULATION AID: ICD-10-CM

## 2024-11-13 PROBLEM — S81.802S WOUND OF LEFT LEG, SEQUELA: Status: RESOLVED | Noted: 2020-10-18 | Resolved: 2024-11-13

## 2024-11-13 PROBLEM — E78.1 HYPERTRIGLYCERIDEMIA: Status: RESOLVED | Noted: 2021-06-15 | Resolved: 2024-11-13

## 2024-11-13 PROBLEM — S92.352G CLOSED DISPLACED FRACTURE OF FIFTH METATARSAL BONE OF LEFT FOOT WITH DELAYED HEALING: Status: RESOLVED | Noted: 2020-10-18 | Resolved: 2024-11-13

## 2024-11-13 PROBLEM — N10 ACUTE PYELONEPHRITIS: Status: RESOLVED | Noted: 2024-10-17 | Resolved: 2024-11-13

## 2024-11-13 PROBLEM — N17.9 ACUTE RENAL FAILURE SUPERIMPOSED ON STAGE 4 CHRONIC KIDNEY DISEASE: Status: RESOLVED | Noted: 2024-10-17 | Resolved: 2024-11-13

## 2024-11-13 PROBLEM — N18.4 ACUTE RENAL FAILURE SUPERIMPOSED ON STAGE 4 CHRONIC KIDNEY DISEASE: Status: RESOLVED | Noted: 2024-10-17 | Resolved: 2024-11-13

## 2024-11-13 PROBLEM — N18.32 STAGE 3B CHRONIC KIDNEY DISEASE: Status: RESOLVED | Noted: 2021-06-15 | Resolved: 2024-11-13

## 2024-11-13 PROBLEM — I16.0 HYPERTENSIVE URGENCY: Status: RESOLVED | Noted: 2024-10-17 | Resolved: 2024-11-13

## 2024-11-13 PROCEDURE — 99999 PR PBB SHADOW E&M-EST. PATIENT-LVL IV: CPT | Mod: PBBFAC,,, | Performed by: STUDENT IN AN ORGANIZED HEALTH CARE EDUCATION/TRAINING PROGRAM

## 2024-11-13 NOTE — ASSESSMENT & PLAN NOTE
Lab Results   Component Value Date    WBC 8.56 10/20/2024    HGB 9.2 (L) 10/20/2024    HCT 28.5 (L) 10/20/2024    MCV 93 10/20/2024     10/20/2024       Secondary to CKD

## 2024-11-13 NOTE — ASSESSMENT & PLAN NOTE
Lab Results   Component Value Date    TSH 0.847 04/09/2024     Stable. Continue current medications and regular followup.

## 2024-11-13 NOTE — ASSESSMENT & PLAN NOTE
Lab Results   Component Value Date    HGBA1C 9.3 (H) 10/10/2024     Diabetes Medications               insulin glargine U-100, Lantus, (LANTUS SOLOSTAR U-100 INSULIN) 100 unit/mL (3 mL) InPn pen Inject 10 Units into the skin every evening.    pioglitazone (ACTOS) 15 MG tablet Take 15 mg by mouth once daily.          Will need to titrate her medications.

## 2024-11-13 NOTE — ASSESSMENT & PLAN NOTE
Stable. Continue current medications and regular followup.  Hypertension Medications               amLODIPine (NORVASC) 5 MG tablet Take 1 tablet (5 mg total) by mouth once daily.    metoprolol tartrate (LOPRESSOR) 25 MG tablet Take 1 tablet (25 mg total) by mouth 2 (two) times daily.

## 2024-11-13 NOTE — ASSESSMENT & PLAN NOTE
Stable. Continue current medications and regular followup.  Hyperlipidemia Medications               rosuvastatin (CRESTOR) 40 MG Tab Take 40 mg by mouth once daily.

## 2024-11-13 NOTE — ASSESSMENT & PLAN NOTE
BMP  Lab Results   Component Value Date     (L) 10/20/2024    K 5.3 (H) 10/20/2024     10/20/2024    CO2 16 (L) 10/20/2024    BUN 43 (H) 10/20/2024    CREATININE 4.6 (H) 10/20/2024    CALCIUM 8.1 (L) 10/20/2024    ANIONGAP 9 10/20/2024    EGFRNORACEVR 9.4 (A) 10/20/2024     Stable. Continue current medications and regular followup.  Continue nephrology followup

## 2024-11-13 NOTE — ASSESSMENT & PLAN NOTE
She declines resuscitation or even hospitalization if she were to get sick  Declines hospice at this time but had the discussion

## 2024-11-13 NOTE — ASSESSMENT & PLAN NOTE
Stable. Continue current medications and regular followup.  Seeing cardiology  Continue risk factor management

## 2024-11-13 NOTE — PROGRESS NOTES
Subjective:       Patient ID: Epi Mcintosh is a 74 y.o. female.    Chief Complaint: Establish Care    Establishing care  Patient Active Problem List:     Coronary artery disease involving left main coronary artery     Essential hypertension, dx 2018     Hyperlipidemia     Tobacco abuse     Type 2 diabetes mellitus with complication, without long-term current use of insulin     Unstable angina pectoris     Closed displaced fracture of fifth metatarsal bone of left foot with delayed healing     Wound of left leg, sequela     S/P CABG x 1, 12/2018     Cognitive dysfunction     Nicotine addiction, started age 14, 50+ py and now vaping     Excessive caffeine intake     Sleep disorder     Anemia     Microalbuminuria     Hyperkalemia, on ARB     Bradycardia, drug induced     Aortic calcification     Abdominal obesity     COPD (chronic obstructive pulmonary disease)     History of Pulmonary embolism     Debility     PVD (peripheral vascular disease)     Former cigarette smoker     Chronic kidney disease (CKD), stage V     Atherosclerosis of native arteries of extremities with rest pain, right leg     Anxiety     Depressive disorder     Hypothyroidism     Recurrent UTI      She notes she is not interested in having a nephrologist  She just wants someone who can treat her utis outpt and keep her out of the hospital  She is tired of driving back and forth to [place] for care.    Cardiology- not sure who is her cardiologist.  Vascular disease- does not remember who did her surgery.  Nephrology- Dr. Hernandez.  Urology- was seeing them and could not get in quickly.          Patient Active Problem List   Diagnosis    Coronary artery disease involving left main coronary artery    Essential hypertension, dx 2018    Hyperlipidemia    Tobacco abuse    Type 2 diabetes mellitus with complication, without long-term current use of insulin    Unstable angina pectoris    S/P CABG x 1, 12/2018    Cognitive dysfunction    Nicotine  addiction, started age 14, 50+ py and now vaping    Excessive caffeine intake    Sleep disorder    Anemia    Microalbuminuria    Hyperkalemia, on ARB    Bradycardia, drug induced    Aortic calcification    Abdominal obesity    COPD (chronic obstructive pulmonary disease)    History of Pulmonary embolism    Debility    PVD (peripheral vascular disease)    Former cigarette smoker    Chronic kidney disease (CKD), stage V    Atherosclerosis of native arteries of extremities with rest pain, right leg    Anxiety    Depressive disorder    Hypothyroidism    Recurrent UTI    Long term (current) use of anticoagulants    Walker as ambulation aid    History of pyelonephritis    DNR (do not resuscitate)    Patient declines dialysis     Epi has a current medication list which includes the following prescription(s): albuterol, amlodipine, apixaban, blood sugar diagnostic, blood-glucose meter, citalopram, lantus solostar u-100 insulin, lancets, levothyroxine, metoprolol tartrate, ondansetron, pantoprazole, pen needle, diabetic, pioglitazone, rosuvastatin, and sodium bicarbonate.    Review of Systems   Constitutional:  Negative for activity change and appetite change.   Respiratory:  Negative for shortness of breath.    Cardiovascular:  Negative for chest pain.   Gastrointestinal:  Negative for abdominal pain.   Genitourinary:  Negative for dysuria.   Integumentary:  Negative for rash.   Psychiatric/Behavioral:  Negative for depressed mood and sleep disturbance. The patient is not nervous/anxious.          Health Maintenance Due   Topic Date Due    TETANUS VACCINE  Never done    DEXA Scan  Never done    Shingles Vaccine (1 of 2) Never done    Eye Exam  09/22/2022    RSV Vaccine (Age 60+ and Pregnant patients) (1 - 1-dose 75+ series) Never done    COVID-19 Vaccine (4 - 2024-25 season) 09/01/2024      Health Maintenance reviewed and discussed- encouraged vaccines.     Objective:      Physical Exam  Constitutional:       General:  She is not in acute distress.     Appearance: Normal appearance. She is not ill-appearing.   Eyes:      Conjunctiva/sclera: Conjunctivae normal.   Cardiovascular:      Rate and Rhythm: Normal rate and regular rhythm.      Heart sounds: Normal heart sounds. No murmur heard.  Pulmonary:      Effort: Pulmonary effort is normal. No respiratory distress.      Breath sounds: Normal breath sounds. No wheezing or rales.   Musculoskeletal:      Right lower leg: Edema present.      Left lower leg: Edema present.   Lymphadenopathy:      Cervical: No cervical adenopathy.   Skin:     General: Skin is warm and dry.   Neurological:      Mental Status: She is alert. Mental status is at baseline.      Gait: Gait normal.   Psychiatric:         Mood and Affect: Mood normal.         Behavior: Behavior normal.         Thought Content: Thought content normal.         Judgment: Judgment normal.         Assessment:       1. Cognitive dysfunction    2. Chronic obstructive pulmonary disease, unspecified COPD type    3. Coronary artery disease involving left main coronary artery    4. Essential hypertension, dx 2018    5. Hyperlipidemia, unspecified hyperlipidemia type    6. PVD (peripheral vascular disease)    7. Chronic kidney disease (CKD), stage V    8. Pulmonary embolism, unspecified chronicity, unspecified pulmonary embolism type, unspecified whether acute cor pulmonale present    9. Anemia, unspecified type    10. Type 2 diabetes mellitus with complication, without long-term current use of insulin    11. Debility    12. Atherosclerosis of native arteries of extremities with rest pain, right leg    13. Recurrent UTI    14. Anxiety    15. Depressive disorder    16. Long term (current) use of anticoagulants    17. Walker as ambulation aid    18. Dysuria    19. History of pyelonephritis    20. Acquired hypothyroidism    21. Medically complex patient    22. DNR (do not resuscitate)    23. Patient declines dialysis        Plan:       1.  Cognitive dysfunction  Assessment & Plan:  Stable and doing well      2. Chronic obstructive pulmonary disease, unspecified COPD type  Assessment & Plan:  Stable. Continue current medications and regular followup.        3. Coronary artery disease involving left main coronary artery  Overview:  Added automatically from request for surgery [id number]    Assessment & Plan:  Stable. Continue current medications and regular followup.  Seeing cardiology  Continue risk factor management        4. Essential hypertension, dx 2018  Assessment & Plan:  Stable. Continue current medications and regular followup.  Hypertension Medications               amLODIPine (NORVASC) 5 MG tablet Take 1 tablet (5 mg total) by mouth once daily.    metoprolol tartrate (LOPRESSOR) 25 MG tablet Take 1 tablet (25 mg total) by mouth 2 (two) times daily.                5. Hyperlipidemia, unspecified hyperlipidemia type  Assessment & Plan:  Stable. Continue current medications and regular followup.  Hyperlipidemia Medications               rosuvastatin (CRESTOR) 40 MG Tab Take 40 mg by mouth once daily.                6. PVD (peripheral vascular disease)  Assessment & Plan:  Needs to see vascular.  Continue risk factor management        7. Chronic kidney disease (CKD), stage V  Assessment & Plan:  BMP  Lab Results   Component Value Date     (L) 10/20/2024    K 5.3 (H) 10/20/2024     10/20/2024    CO2 16 (L) 10/20/2024    BUN 43 (H) 10/20/2024    CREATININE 4.6 (H) 10/20/2024    CALCIUM 8.1 (L) 10/20/2024    ANIONGAP 9 10/20/2024    EGFRNORACEVR 9.4 (A) 10/20/2024     Stable. Continue current medications and regular followup.  Continue nephrology followup      8. Pulmonary embolism, unspecified chronicity, unspecified pulmonary embolism type, unspecified whether acute cor pulmonale present  Assessment & Plan:  On long term anticoagulation      9. Anemia, unspecified type  Assessment & Plan:  Lab Results   Component Value Date    WBC 8.56  10/20/2024    HGB 9.2 (L) 10/20/2024    HCT 28.5 (L) 10/20/2024    MCV 93 10/20/2024     10/20/2024       Secondary to CKD      10. Type 2 diabetes mellitus with complication, without long-term current use of insulin  Assessment & Plan:  Lab Results   Component Value Date    HGBA1C 9.3 (H) 10/10/2024     Diabetes Medications               insulin glargine U-100, Lantus, (LANTUS SOLOSTAR U-100 INSULIN) 100 unit/mL (3 mL) InPn pen Inject 10 Units into the skin every evening.    pioglitazone (ACTOS) 15 MG tablet Take 15 mg by mouth once daily.          Will need to titrate her medications.    Orders:  -     Hemoglobin A1C; Future; Expected date: 11/13/2024    11. Debility  Assessment & Plan:  Stable and has a walker      12. Atherosclerosis of native arteries of extremities with rest pain, right leg  Overview:  S/p stenting by vascular  2022- R SFA distal/mid  R SFA mid/proximal        Assessment & Plan:  Continue risk factor management  Keep followup      13. Recurrent UTI  Overview:  Has seen urology and infectious disease    Assessment & Plan:  Having dysuria now.  Had pyelo in October  Recheck urine now due to dysuria.       14. Anxiety  Assessment & Plan:  Stable. Continue current medications and regular followup.        15. Depressive disorder  Assessment & Plan:  Stable. Continue current medications and regular followup.        16. Long term (current) use of anticoagulants  Assessment & Plan:  Stable on eliquis      17. Walker as ambulation aid  Assessment & Plan:  Stable without recent falls      18. Dysuria  -     Urine culture; Future  -     POCT Urinalysis(Instrument)    19. History of pyelonephritis  Overview:  October 2024    Assessment & Plan:  She had symptoms for two weeks prior to treatment.      20. Acquired hypothyroidism  Assessment & Plan:  Lab Results   Component Value Date    TSH 0.847 04/09/2024     Stable. Continue current medications and regular followup.        21. Medically complex  patient    22. DNR (do not resuscitate)  Assessment & Plan:  She declines resuscitation or even hospitalization if she were to get sick  Declines hospice at this time but had the discussion      23. Patient declines dialysis  Assessment & Plan:  Offered hospice

## 2024-11-14 LAB
BILIRUBIN, UA POC OHS: NEGATIVE
BLOOD, UA POC OHS: ABNORMAL
CLARITY, UA POC OHS: CLEAR
COLOR, UA POC OHS: YELLOW
GLUCOSE, UA POC OHS: NEGATIVE
KETONES, UA POC OHS: NEGATIVE
LEUKOCYTES, UA POC OHS: ABNORMAL
NITRITE, UA POC OHS: NEGATIVE
PH, UA POC OHS: 7
PROTEIN, UA POC OHS: >=300
SPECIFIC GRAVITY, UA POC OHS: 1.02
UROBILINOGEN, UA POC OHS: 0.2

## 2024-11-15 ENCOUNTER — TELEPHONE (OUTPATIENT)
Dept: FAMILY MEDICINE | Facility: CLINIC | Age: 74
End: 2024-11-15
Payer: MEDICARE

## 2024-11-15 DIAGNOSIS — N30.01 ACUTE CYSTITIS WITH HEMATURIA: Primary | ICD-10-CM

## 2024-11-15 RX ORDER — CIPROFLOXACIN 250 MG/1
250 TABLET, FILM COATED ORAL 2 TIMES DAILY
Qty: 6 TABLET | Refills: 0 | Status: SHIPPED | OUTPATIENT
Start: 2024-11-15 | End: 2024-11-18

## 2024-11-15 NOTE — TELEPHONE ENCOUNTER
----- Message from Stephanie sent at 11/15/2024 11:28 AM CST -----  Regarding: Needs medication  Type: Needs Medical Advice  Who Called:  Pt    Pharmacy name and phone #:    [facility] - [place] - [address]  [address] [place] [place] [address]  Phone: [phone] Fax: [phone]      Best Call Back Number: [phone]    Additional Information: Pt states that her urine came back with problems, she was trying to see if antibiotics were going to be called in for that before the weekend

## 2024-11-16 LAB — BACTERIA UR CULT: ABNORMAL

## 2024-12-02 RX ORDER — APIXABAN 5 MG/1
TABLET, FILM COATED ORAL
Qty: 60 TABLET | Refills: 3 | Status: SHIPPED | OUTPATIENT
Start: 2024-12-02

## 2024-12-06 ENCOUNTER — LAB VISIT (OUTPATIENT)
Dept: LAB | Facility: HOSPITAL | Age: 74
End: 2024-12-06
Attending: INTERNAL MEDICINE
Payer: MEDICARE

## 2024-12-06 DIAGNOSIS — N18.9 CHRONIC KIDNEY DISEASE, UNSPECIFIED CKD STAGE: Primary | ICD-10-CM

## 2024-12-06 LAB
ALBUMIN SERPL BCP-MCNC: 3.4 G/DL (ref 3.5–5.2)
ANION GAP SERPL CALC-SCNC: 13 MMOL/L (ref 8–16)
BASOPHILS # BLD AUTO: 0.05 K/UL (ref 0–0.2)
BASOPHILS NFR BLD: 0.7 % (ref 0–1.9)
BUN SERPL-MCNC: 69 MG/DL (ref 8–23)
CALCIUM SERPL-MCNC: 8.7 MG/DL (ref 8.7–10.5)
CHLORIDE SERPL-SCNC: 107 MMOL/L (ref 95–110)
CO2 SERPL-SCNC: 17 MMOL/L (ref 23–29)
CREAT SERPL-MCNC: 6.4 MG/DL (ref 0.5–1.4)
DIFFERENTIAL METHOD BLD: ABNORMAL
EOSINOPHIL # BLD AUTO: 0.1 K/UL (ref 0–0.5)
EOSINOPHIL NFR BLD: 1.1 % (ref 0–8)
ERYTHROCYTE [DISTWIDTH] IN BLOOD BY AUTOMATED COUNT: 14.2 % (ref 11.5–14.5)
EST. GFR  (NO RACE VARIABLE): 6.3 ML/MIN/1.73 M^2
FERRITIN SERPL-MCNC: 94 NG/ML (ref 20–300)
GLUCOSE SERPL-MCNC: 105 MG/DL (ref 70–110)
HCT VFR BLD AUTO: 31.3 % (ref 37–48.5)
HGB BLD-MCNC: 10.2 G/DL (ref 12–16)
IMM GRANULOCYTES # BLD AUTO: 0.02 K/UL (ref 0–0.04)
IMM GRANULOCYTES NFR BLD AUTO: 0.3 % (ref 0–0.5)
IRON SERPL-MCNC: 73 UG/DL (ref 30–160)
LYMPHOCYTES # BLD AUTO: 1.6 K/UL (ref 1–4.8)
LYMPHOCYTES NFR BLD: 21.8 % (ref 18–48)
MCH RBC QN AUTO: 30.9 PG (ref 27–31)
MCHC RBC AUTO-ENTMCNC: 32.6 G/DL (ref 32–36)
MCV RBC AUTO: 95 FL (ref 82–98)
MONOCYTES # BLD AUTO: 0.6 K/UL (ref 0.3–1)
MONOCYTES NFR BLD: 7.5 % (ref 4–15)
NEUTROPHILS # BLD AUTO: 5.1 K/UL (ref 1.8–7.7)
NEUTROPHILS NFR BLD: 68.6 % (ref 38–73)
NRBC BLD-RTO: 0 /100 WBC
PHOSPHATE SERPL-MCNC: 6.7 MG/DL (ref 2.7–4.5)
PLATELET # BLD AUTO: 258 K/UL (ref 150–450)
PMV BLD AUTO: 8.7 FL (ref 9.2–12.9)
POTASSIUM SERPL-SCNC: 4.7 MMOL/L (ref 3.5–5.1)
RBC # BLD AUTO: 3.3 M/UL (ref 4–5.4)
SATURATED IRON: 21 % (ref 20–50)
SODIUM SERPL-SCNC: 137 MMOL/L (ref 136–145)
TOTAL IRON BINDING CAPACITY: 349 UG/DL (ref 250–450)
TRANSFERRIN SERPL-MCNC: 236 MG/DL (ref 200–375)
WBC # BLD AUTO: 7.35 K/UL (ref 3.9–12.7)

## 2024-12-06 PROCEDURE — 83540 ASSAY OF IRON: CPT | Performed by: INTERNAL MEDICINE

## 2024-12-06 PROCEDURE — 85025 COMPLETE CBC W/AUTO DIFF WBC: CPT | Performed by: INTERNAL MEDICINE

## 2024-12-06 PROCEDURE — 36415 COLL VENOUS BLD VENIPUNCTURE: CPT | Performed by: INTERNAL MEDICINE

## 2024-12-06 PROCEDURE — 80069 RENAL FUNCTION PANEL: CPT | Performed by: INTERNAL MEDICINE

## 2024-12-06 PROCEDURE — 82728 ASSAY OF FERRITIN: CPT | Performed by: INTERNAL MEDICINE

## 2025-02-04 ENCOUNTER — LAB VISIT (OUTPATIENT)
Dept: LAB | Facility: HOSPITAL | Age: 75
End: 2025-02-04
Attending: STUDENT IN AN ORGANIZED HEALTH CARE EDUCATION/TRAINING PROGRAM
Payer: MEDICARE

## 2025-02-04 DIAGNOSIS — E11.8 TYPE 2 DIABETES MELLITUS WITH COMPLICATION, WITHOUT LONG-TERM CURRENT USE OF INSULIN: ICD-10-CM

## 2025-02-04 LAB
ESTIMATED AVG GLUCOSE: 143 MG/DL (ref 68–131)
HBA1C MFR BLD: 6.6 % (ref 4–5.6)

## 2025-02-04 PROCEDURE — 36415 COLL VENOUS BLD VENIPUNCTURE: CPT | Performed by: STUDENT IN AN ORGANIZED HEALTH CARE EDUCATION/TRAINING PROGRAM

## 2025-02-04 PROCEDURE — 83036 HEMOGLOBIN GLYCOSYLATED A1C: CPT | Performed by: STUDENT IN AN ORGANIZED HEALTH CARE EDUCATION/TRAINING PROGRAM

## 2025-02-12 ENCOUNTER — OFFICE VISIT (OUTPATIENT)
Dept: FAMILY MEDICINE | Facility: CLINIC | Age: 75
End: 2025-02-12

## 2025-02-12 VITALS
HEIGHT: 65 IN | DIASTOLIC BLOOD PRESSURE: 72 MMHG | WEIGHT: 137.69 LBS | RESPIRATION RATE: 16 BRPM | OXYGEN SATURATION: 99 % | BODY MASS INDEX: 22.94 KG/M2 | HEART RATE: 57 BPM | SYSTOLIC BLOOD PRESSURE: 138 MMHG

## 2025-02-12 DIAGNOSIS — F32.A DEPRESSIVE DISORDER: ICD-10-CM

## 2025-02-12 DIAGNOSIS — E11.8 TYPE 2 DIABETES MELLITUS WITH COMPLICATION, WITHOUT LONG-TERM CURRENT USE OF INSULIN: ICD-10-CM

## 2025-02-12 DIAGNOSIS — F41.9 ANXIETY: ICD-10-CM

## 2025-02-12 DIAGNOSIS — I10 ESSENTIAL HYPERTENSION: ICD-10-CM

## 2025-02-12 DIAGNOSIS — N18.5 CHRONIC KIDNEY DISEASE (CKD), STAGE V: Primary | ICD-10-CM

## 2025-02-12 DIAGNOSIS — E11.22 TYPE 2 DIABETES MELLITUS WITH STAGE 5 CHRONIC KIDNEY DISEASE NOT ON CHRONIC DIALYSIS, WITHOUT LONG-TERM CURRENT USE OF INSULIN: ICD-10-CM

## 2025-02-12 DIAGNOSIS — Z53.20: ICD-10-CM

## 2025-02-12 DIAGNOSIS — N18.5 TYPE 2 DIABETES MELLITUS WITH STAGE 5 CHRONIC KIDNEY DISEASE NOT ON CHRONIC DIALYSIS, WITHOUT LONG-TERM CURRENT USE OF INSULIN: ICD-10-CM

## 2025-02-12 DIAGNOSIS — J44.9 CHRONIC OBSTRUCTIVE PULMONARY DISEASE, UNSPECIFIED COPD TYPE: ICD-10-CM

## 2025-02-12 DIAGNOSIS — I26.99 PULMONARY EMBOLISM, UNSPECIFIED CHRONICITY, UNSPECIFIED PULMONARY EMBOLISM TYPE, UNSPECIFIED WHETHER ACUTE COR PULMONALE PRESENT: ICD-10-CM

## 2025-02-12 DIAGNOSIS — I70.221 ATHEROSCLEROSIS OF NATIVE ARTERIES OF EXTREMITIES WITH REST PAIN, RIGHT LEG: ICD-10-CM

## 2025-02-12 PROBLEM — Z79.01 LONG TERM (CURRENT) USE OF ANTICOAGULANTS: Status: RESOLVED | Noted: 2024-11-13 | Resolved: 2025-02-12

## 2025-02-12 PROCEDURE — 99999 PR PBB SHADOW E&M-EST. PATIENT-LVL V: CPT | Mod: PBBFAC,,, | Performed by: STUDENT IN AN ORGANIZED HEALTH CARE EDUCATION/TRAINING PROGRAM

## 2025-02-12 PROCEDURE — 99215 OFFICE O/P EST HI 40 MIN: CPT | Mod: PBBFAC,PN | Performed by: STUDENT IN AN ORGANIZED HEALTH CARE EDUCATION/TRAINING PROGRAM

## 2025-02-12 RX ORDER — METOPROLOL TARTRATE 25 MG/1
25 TABLET, FILM COATED ORAL 2 TIMES DAILY
Qty: 180 TABLET | Refills: 3 | Status: SHIPPED | OUTPATIENT
Start: 2025-02-12

## 2025-02-12 RX ORDER — CITALOPRAM 40 MG/1
40 TABLET, FILM COATED ORAL DAILY
Qty: 90 TABLET | Refills: 1 | Status: CANCELLED | OUTPATIENT
Start: 2025-02-12

## 2025-02-12 RX ORDER — AMLODIPINE BESYLATE 5 MG/1
5 TABLET ORAL DAILY
Qty: 90 TABLET | Refills: 3 | Status: SHIPPED | OUTPATIENT
Start: 2025-02-12

## 2025-02-12 NOTE — ASSESSMENT & PLAN NOTE
Saw nephrology and was put on sodium bicarb  She was itching so bad she had to stop the medication.  She does not want to go out of [facility] or see that provider.

## 2025-02-12 NOTE — PROGRESS NOTES
Subjective:       Patient ID: Epi cMintosh is a 74 y.o. female.    Chief Complaint: Follow-up    History of Present Illness    CHIEF COMPLAINT:  Ms. Mcintosh presents today for follow up after having difficulties with nephrologist-prescribed medications    MEDICATION SIDE EFFECTS:  She experienced significant side effects from nephrologist-prescribed medications including severe itching, particularly on her legs, leading to bleeding from scratching and sleep disruption. Over-the-counter creams provided no relief; only Dr. Friedman's solution provided temporary relief for a few hours. She also developed foot swelling. Due to these side effects, she independently discontinued both medications and reports feeling better since stopping them.    DIABETES:  She discontinued metformin after 20 years of use due to illness. Since transitioning to Lantus, she reports her lowest hemoglobin A1c in 15 years. Previous trial of Mounjaro resulted in severe adverse effects including complete loss of appetite with inability to tolerate food except Boost energy drinks, leading to weight loss from 160 to 130 lbs in less than three months.    MEDICAL HISTORY:  History notable for pulmonary embolism diagnosed in March of previous year following severe coughing episodes, treated with Eliquis for 6 months. She denies any prior history of blood clots.    RECENT MEDICATION CHANGES:  She discontinued Celexa after 20 years of use, reporting stability after 3 weeks without the medication. She also discontinued Eliquis after completing 6-month course for pulmonary embolism.       Review of Systems   Constitutional:  Negative for activity change and appetite change.   Respiratory:  Negative for shortness of breath.    Cardiovascular:  Positive for leg swelling. Negative for chest pain.   Gastrointestinal:  Negative for abdominal pain.   Genitourinary:  Negative for dysuria.   Integumentary:  Negative for rash.    Psychiatric/Behavioral:  Negative for depressed mood and sleep disturbance. The patient is not nervous/anxious.       Patient Active Problem List   Diagnosis    Coronary artery disease involving left main coronary artery    Essential hypertension, dx 2018    Hyperlipidemia    Tobacco abuse    Type 2 diabetes mellitus with complication, without long-term current use of insulin    Unstable angina pectoris    S/P CABG x 1, 12/2018    Cognitive dysfunction    Nicotine addiction, started age 14, 50+ py and now vaping    Excessive caffeine intake    Sleep disorder    Anemia    Microalbuminuria    Hyperkalemia, on ARB    Bradycardia, drug induced    Aortic calcification    Abdominal obesity    COPD (chronic obstructive pulmonary disease)    History of Pulmonary embolism    Debility    PVD (peripheral vascular disease)    Former cigarette smoker    Chronic kidney disease (CKD), stage V    Atherosclerosis of native arteries of extremities with rest pain, right leg    Anxiety    Depressive disorder    Hypothyroidism    Recurrent UTI    Walker as ambulation aid    History of pyelonephritis    DNR (do not resuscitate)    Patient declines dialysis    Type 2 diabetes mellitus with stage 5 chronic kidney disease not on chronic dialysis, without long-term current use of insulin     Epi has a current medication list which includes the following prescription(s): albuterol, blood sugar diagnostic, blood-glucose meter, lantus solostar u-100 insulin, lancets, levothyroxine, pantoprazole, pen needle, diabetic, pioglitazone, rosuvastatin, amlodipine, and metoprolol tartrate.        Health Maintenance Due   Topic Date Due    TETANUS VACCINE  Never done    DEXA Scan  Never done    Shingles Vaccine (1 of 2) Never done    Diabetic Eye Exam  09/22/2022    RSV Vaccine (Age 60+ and Pregnant patients) (1 - 1-dose 75+ series) Never done    COVID-19 Vaccine (4 - 2024-25 season) 09/01/2024      Health Maintenance reviewed and discussed-  schedule dexa. Encourage eye exam.     Objective:      Physical Exam  Constitutional:       General: She is not in acute distress.     Appearance: Normal appearance. She is not ill-appearing.   Eyes:      Conjunctiva/sclera: Conjunctivae normal.   Cardiovascular:      Rate and Rhythm: Normal rate and regular rhythm.      Heart sounds: Normal heart sounds. No murmur heard.  Pulmonary:      Effort: Pulmonary effort is normal. No respiratory distress.      Breath sounds: Normal breath sounds.   Abdominal:      Palpations: Abdomen is soft.   Musculoskeletal:      Right lower leg: Edema present.      Left lower leg: Edema present.   Skin:     General: Skin is warm and dry.   Neurological:      Mental Status: She is alert. Mental status is at baseline.      Gait: Gait normal.   Psychiatric:         Mood and Affect: Mood normal.         Behavior: Behavior normal.         Thought Content: Thought content normal.         Judgment: Judgment normal.         Assessment:       Assessment & Plan    1. Assessed patient's reaction to nephrologist-prescribed medications, noting adverse effects of swelling and severe itching  2. Evaluated patient's decision to discontinue nephrologist-prescribed medications due to side effects  3. Considered patient's request to change nephrologists due to dissatisfaction with current provider  4. Reviewed patient's improved blood sugar control with Lantus, noting significant improvement in A1C levels  5. Evaluated patient's desire to discontinue long-term citalopram use after 3 weeks without medication  6. Assessed patient's discontinuation of Eliquis, originally prescribed for pulmonary embolism  7. Noted improvement in patient's eyesight, likely due to better blood sugar control  8. Considered patient's negative experience with Mounjaro, resulting in significant weight loss and inability to eat           Plan:       1. Chronic kidney disease (CKD), stage V  Assessment & Plan:  Saw nephrology and  was put on sodium bicarb  She was itching so bad she had to stop the medication.  She does not want to go out of [facility] or see that provider.    Orders:  -     Ambulatory referral/consult to Nephrology; Future; Expected date: 02/19/2025    2. Essential hypertension, dx 2018  -     amLODIPine (NORVASC) 5 MG tablet; Take 1 tablet (5 mg total) by mouth once daily.  Dispense: 90 tablet; Refill: 3  -     metoprolol tartrate (LOPRESSOR) 25 MG tablet; Take 1 tablet (25 mg total) by mouth 2 (two) times daily.  Dispense: 180 tablet; Refill: 3    3. Type 2 diabetes mellitus with complication, without long-term current use of insulin  Assessment & Plan:  Doing better on lantus  Lab Results   Component Value Date    HGBA1C 6.6 (H) 02/04/2025     Stable. Continue current medications and regular followup.  Diabetes Medications               insulin glargine U-100, Lantus, (LANTUS SOLOSTAR U-100 INSULIN) 100 unit/mL (3 mL) InPn pen Inject 10 Units into the skin every evening.    pioglitazone (ACTOS) 15 MG tablet Take 15 mg by mouth once daily.                4. Depressive disorder  Assessment & Plan:  Has been off of celexa for nearly a month and would like to stay off      5. Anxiety  Assessment & Plan:  Stable off of therapy        6. Chronic obstructive pulmonary disease, unspecified COPD type  Assessment & Plan:  She is interested in trelegy.  She changed her mind after finding out it does nto take the place of albuterol and is no longer interested in controller medication      7. Patient declines dialysis  -     Ambulatory referral/consult to Nephrology; Future; Expected date: 02/19/2025    8. Pulmonary embolism, unspecified chronicity, unspecified pulmonary embolism type, unspecified whether acute cor pulmonale present  Assessment & Plan:  Finished 6 months of eliquis for her first blood clot and is now off of eliquis per her choice/cost and will monitor      9. Type 2 diabetes mellitus with stage 5 chronic kidney disease  not on chronic dialysis, without long-term current use of insulin  Assessment & Plan:  Lab Results   Component Value Date    HGBA1C 6.6 (H) 02/04/2025     Stable. Continue current medications and regular followup.        10. Atherosclerosis of native arteries of extremities with rest pain, right leg  Overview:  S/p stenting by vascular  2022- R SFA distal/mid  R SFA mid/proximal        Assessment & Plan:  Continue risk factor management  Stable. Continue current medications and regular followup.             This note was generated with the assistance of ambient listening technology. Verbal consent was obtained by the patient and accompanying visitor(s) for the recording of patient appointment to facilitate this note. I attest to having reviewed and edited the generated note for accuracy, though some syntax or spelling errors may persist. Please contact the author of this note for any clarification.

## 2025-02-12 NOTE — ASSESSMENT & PLAN NOTE
Finished 6 months of eliquis for her first blood clot and is now off of eliquis per her choice/cost and will monitor

## 2025-02-12 NOTE — ASSESSMENT & PLAN NOTE
Doing better on lantus  Lab Results   Component Value Date    HGBA1C 6.6 (H) 02/04/2025     Stable. Continue current medications and regular followup.  Diabetes Medications               insulin glargine U-100, Lantus, (LANTUS SOLOSTAR U-100 INSULIN) 100 unit/mL (3 mL) InPn pen Inject 10 Units into the skin every evening.    pioglitazone (ACTOS) 15 MG tablet Take 15 mg by mouth once daily.

## 2025-02-12 NOTE — ASSESSMENT & PLAN NOTE
She is interested in trelegy.  She changed her mind after finding out it does nto take the place of albuterol and is no longer interested in controller medication

## 2025-02-17 ENCOUNTER — PATIENT MESSAGE (OUTPATIENT)
Dept: FAMILY MEDICINE | Facility: CLINIC | Age: 75
End: 2025-02-17
Payer: MEDICARE

## 2025-02-17 RX ORDER — CITALOPRAM 40 MG/1
40 TABLET, FILM COATED ORAL DAILY
Qty: 30 TABLET | Refills: 11 | Status: SHIPPED | OUTPATIENT
Start: 2025-02-17 | End: 2026-02-17

## 2025-02-17 NOTE — TELEPHONE ENCOUNTER
"Pt seen in office 2/12/25, per note, " Has been off of celexa for nearly a month and would like to stay off "  Pt c/o depression, crying, upset  Pt requesting refill on Celexa 40 mg once daily  Pended to MD      Pt c/o possible kidney infection.  S/s lumbar discomfort, lower abdomen. Offered e visit   Pt states, " I need the abx now. We have talked about iit and if I wait I will end up in the hospital. Please advise"  "

## 2025-02-17 NOTE — TELEPHONE ENCOUNTER
----- Message from Rosalia sent at 2/17/2025 11:53 AM CST -----  Contact: PT  Type:  RX Refill RequestWho Called:  PTRefill or New Rx:  New RXRX Name and Strength:  citalopram (CELEXA) 40 MG tabletHow is the patient currently taking it? Take 1 tablet (40 mg total) by mouth once daily. - OralIs this a 30 day or 90 day RX: 90 Preferred Pharmacy with phone number: [facility]/pharmacy #[id number] - [place] - [address] [place] [address]Uvxrm: [phone] Fax: [phone] Call Back Number:  [phone] Information:

## 2025-02-17 NOTE — TELEPHONE ENCOUNTER
No care due was identified.  [facility] Embedded Care Due Messages. Reference number: [id number].   2/17/2025 12:18:27 PM CST

## 2025-02-17 NOTE — TELEPHONE ENCOUNTER
----- Message from Rosalia sent at 2/17/2025 12:02 PM CST -----  Contact: PT  Type: Needs Medical AdviceWho Called:  PTSymptoms (please be specific):  2 daysHow long has patient had these symptoms:  Kidney InfectionPharmacy name and phone #:  [id number] - [place] - [address] [place] [place] [address]Hegyf: [phone] Fax: [phone] Call Back Number:  [phone] Information: Would like a call to know when script has been sent to pharm

## 2025-02-18 ENCOUNTER — OFFICE VISIT (OUTPATIENT)
Dept: FAMILY MEDICINE | Facility: CLINIC | Age: 75
End: 2025-02-18
Payer: MEDICARE

## 2025-02-18 VITALS
HEART RATE: 63 BPM | OXYGEN SATURATION: 99 % | DIASTOLIC BLOOD PRESSURE: 84 MMHG | RESPIRATION RATE: 17 BRPM | WEIGHT: 138.81 LBS | SYSTOLIC BLOOD PRESSURE: 138 MMHG | BODY MASS INDEX: 23.13 KG/M2 | HEIGHT: 65 IN

## 2025-02-18 DIAGNOSIS — N30.01 ACUTE CYSTITIS WITH HEMATURIA: Primary | ICD-10-CM

## 2025-02-18 DIAGNOSIS — N39.0 RECURRENT UTI: ICD-10-CM

## 2025-02-18 DIAGNOSIS — N18.5 CHRONIC KIDNEY DISEASE (CKD), STAGE V: ICD-10-CM

## 2025-02-18 DIAGNOSIS — Z53.20: ICD-10-CM

## 2025-02-18 LAB
BILIRUBIN, UA POC OHS: NEGATIVE
BLOOD, UA POC OHS: ABNORMAL
CLARITY, UA POC OHS: CLEAR
COLOR, UA POC OHS: YELLOW
GLUCOSE, UA POC OHS: NEGATIVE
KETONES, UA POC OHS: NEGATIVE
LEUKOCYTES, UA POC OHS: ABNORMAL
NITRITE, UA POC OHS: NEGATIVE
PH, UA POC OHS: 6
PROTEIN, UA POC OHS: NEGATIVE
SPECIFIC GRAVITY, UA POC OHS: <=1.005
UROBILINOGEN, UA POC OHS: 0.2

## 2025-02-18 PROCEDURE — 87088 URINE BACTERIA CULTURE: CPT | Performed by: NURSE PRACTITIONER

## 2025-02-18 PROCEDURE — 87086 URINE CULTURE/COLONY COUNT: CPT | Performed by: NURSE PRACTITIONER

## 2025-02-18 PROCEDURE — 99214 OFFICE O/P EST MOD 30 MIN: CPT | Mod: PBBFAC,PN | Performed by: NURSE PRACTITIONER

## 2025-02-18 PROCEDURE — 81003 URINALYSIS AUTO W/O SCOPE: CPT | Mod: PBBFAC,PN | Performed by: NURSE PRACTITIONER

## 2025-02-18 PROCEDURE — 81003 URINALYSIS AUTO W/O SCOPE: CPT | Mod: QW,S$GLB,, | Performed by: NURSE PRACTITIONER

## 2025-02-18 PROCEDURE — 87186 SC STD MICRODIL/AGAR DIL: CPT | Performed by: NURSE PRACTITIONER

## 2025-02-18 RX ORDER — CIPROFLOXACIN 250 MG/1
250 TABLET, FILM COATED ORAL 2 TIMES DAILY
Qty: 6 TABLET | Refills: 0 | Status: SHIPPED | OUTPATIENT
Start: 2025-02-18 | End: 2025-02-21

## 2025-02-18 NOTE — PROGRESS NOTES
Subjective:       Patient ID: Epi Mcintosh is a 74 y.o. female.    Chief Complaint: Flank Pain    Epi Mcintosh presents to the clinic today for UTI symptoms  Established patient within the clinic, new patient to myself    Under the care of the following:  PCP: Dr. Herrera  Nephrology: Dr. Hernandez    Patient Active Problem List  as of 2/18/2025 4:24 PM  Diagnosis  · Coronary artery disease involving left main coronary artery  · Essential hypertension, dx 2018  · Hyperlipidemia  · Tobacco abuse  · Type 2 diabetes mellitus with complication, without long-term current use of insulin  · Unstable angina pectoris  · S/P CABG x 1, 12/2018  · Cognitive dysfunction  · Nicotine addiction, started age 14, 50+ py and now vaping  · Excessive caffeine intake  · Sleep disorder  · Anemia  · Microalbuminuria  · Hyperkalemia, on ARB  · Bradycardia, drug induced  · Aortic calcification  · Abdominal obesity  · COPD (chronic obstructive pulmonary disease)  · History of Pulmonary embolism  · Debility  · PVD (peripheral vascular disease)  · Former cigarette smoker  · Chronic kidney disease (CKD), stage V  · Atherosclerosis of native arteries of extremities with rest pain, right leg  · Anxiety  · Depressive disorder  · Hypothyroidism  · Recurrent UTI  · Walker as ambulation aid  · History of pyelonephritis  · DNR (do not resuscitate)  · Patient declines dialysis  · Type 2 diabetes mellitus with stage 5 chronic kidney disease not on chronic dialysis, without long-term current use of insulin      Hx of UTI   CKD, Stage V- declines dialysis   Reports lower back/abdominal discomfort (pressure),dysuria- started yesterday.        Review of Systems   Constitutional:  Negative for chills and fever.   Gastrointestinal:  Negative for abdominal pain, nausea and vomiting.   Genitourinary:  Positive for dysuria, flank pain and pelvic pain. Negative for difficulty urinating, hematuria and urgency.       Problem List[1]    Objective:     "  Physical Exam  Constitutional:       General: She is not in acute distress.     Appearance: Normal appearance. She is not ill-appearing.   Eyes:      Conjunctiva/sclera: Conjunctivae normal.      Comments: Corrective lenses    Cardiovascular:      Rate and Rhythm: Normal rate and regular rhythm.   Pulmonary:      Effort: Pulmonary effort is normal. No respiratory distress.      Breath sounds: Normal breath sounds.   Abdominal:      Palpations: Abdomen is soft.   Musculoskeletal:      Right lower leg: Edema present.      Left lower leg: Edema present.   Skin:     General: Skin is warm and dry.   Neurological:      General: No focal deficit present.      Mental Status: She is alert and oriented to person, place, and time.      Gait: Gait normal.   Psychiatric:         Mood and Affect: Mood normal.         Behavior: Behavior normal.         Thought Content: Thought content normal.         Judgment: Judgment normal.         Lab Results   Component Value Date    WBC 7.35 12/06/2024    HGB 10.2 (L) 12/06/2024    HCT 31.3 (L) 12/06/2024     12/06/2024    CHOL 125 07/09/2024    TRIG 170 (H) 07/09/2024    HDL 39 (L) 07/09/2024    ALT 5 (L) 10/20/2024    AST 12 10/20/2024     12/06/2024    K 4.7 12/06/2024     12/06/2024    CREATININE 6.4 (H) 12/06/2024    BUN 69 (H) 12/06/2024    CO2 17 (L) 12/06/2024    TSH 0.847 04/09/2024    INR 1.0 03/17/2024    HGBA1C 6.6 (H) 02/04/2025     The ASCVD Risk score ([place] DK, et al., 2019) failed to calculate for the following reasons:    The valid total cholesterol range is 130 to 320 mg/dL  Visit Vitals  /84 (BP Location: Right arm, Patient Position: Sitting)   Pulse 63   Resp 17   Ht 5' 5" (1.651 m)   Wt 63 kg (138 lb 12.8 oz)   SpO2 99%   BMI 23.10 kg/m²      Assessment:       1. Acute cystitis with hematuria    2. Recurrent UTI    3. Chronic kidney disease (CKD), stage V    4. Patient declines dialysis        Plan:       1. Acute cystitis with hematuria  -   "   Urine Culture High Risk  -     ciprofloxacin HCl (CIPRO) 250 MG tablet; Take 1 tablet (250 mg total) by mouth 2 (two) times daily. for 3 days  Dispense: 6 tablet; Refill: 0  UA +blood, leukocytes, nitrites will send for culture  Previous cultures reviewed with patient- will send in Cipro.  Patient is aware of risks of medication with CKD      2. Recurrent UTI  Overview:  Has seen urology and infectious disease    Orders:  -     POCT Urinalysis(Instrument)  -     Urine Culture High Risk    3. Chronic kidney disease (CKD), stage V  -     Urine Culture High Risk    4. Patient declines dialysis  Discussed hospice/palliative care referral- in between insurances at this time. Will rediscuss at up coming appointment.      No follow-ups on file.      Future Appointments       Date Provider Specialty Appt Notes    5/13/2025 May Herrera MD Family Medicine 3 month f/u                  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    Cognitive dysfunction    Nicotine addiction, started age 14, 50+ py and now vaping    Excessive caffeine intake    Sleep disorder    Anemia    Microalbuminuria    Hyperkalemia, on ARB    Bradycardia, drug induced    Aortic calcification    Abdominal obesity    COPD (chronic obstructive pulmonary disease)    History of Pulmonary embolism    Debility    PVD (peripheral vascular disease)    Former cigarette smoker    Chronic kidney disease (CKD), stage V    Atherosclerosis of native arteries of extremities with rest pain, right leg    Anxiety    Depressive disorder    Hypothyroidism    Recurrent UTI    Walker as ambulation aid    History of pyelonephritis    DNR (do not resuscitate)    Patient declines dialysis    Type 2  diabetes mellitus with stage 5 chronic kidney disease not on chronic dialysis, without long-term current use of insulin

## 2025-02-21 ENCOUNTER — RESULTS FOLLOW-UP (OUTPATIENT)
Dept: FAMILY MEDICINE | Facility: CLINIC | Age: 75
End: 2025-02-21

## 2025-02-21 LAB — BACTERIA UR CULT: ABNORMAL

## 2025-04-24 DIAGNOSIS — Z78.0 MENOPAUSE: ICD-10-CM

## 2025-05-13 ENCOUNTER — OFFICE VISIT (OUTPATIENT)
Dept: FAMILY MEDICINE | Facility: CLINIC | Age: 75
End: 2025-05-13
Payer: MEDICARE

## 2025-05-13 VITALS
HEIGHT: 65 IN | RESPIRATION RATE: 16 BRPM | BODY MASS INDEX: 21.89 KG/M2 | OXYGEN SATURATION: 95 % | DIASTOLIC BLOOD PRESSURE: 68 MMHG | SYSTOLIC BLOOD PRESSURE: 122 MMHG | WEIGHT: 131.38 LBS | HEART RATE: 57 BPM

## 2025-05-13 DIAGNOSIS — J44.9 CHRONIC OBSTRUCTIVE PULMONARY DISEASE, UNSPECIFIED COPD TYPE: ICD-10-CM

## 2025-05-13 DIAGNOSIS — F41.9 ANXIETY: Primary | ICD-10-CM

## 2025-05-13 DIAGNOSIS — F32.A DEPRESSIVE DISORDER: ICD-10-CM

## 2025-05-13 DIAGNOSIS — Z78.0 POSTMENOPAUSAL: ICD-10-CM

## 2025-05-13 DIAGNOSIS — Z76.0 MEDICATION REFILL: ICD-10-CM

## 2025-05-13 DIAGNOSIS — N18.5 CHRONIC KIDNEY DISEASE (CKD), STAGE V: ICD-10-CM

## 2025-05-13 DIAGNOSIS — E78.2 MIXED HYPERLIPIDEMIA: ICD-10-CM

## 2025-05-13 DIAGNOSIS — E11.22 TYPE 2 DIABETES MELLITUS WITH STAGE 4 CHRONIC KIDNEY DISEASE, WITHOUT LONG-TERM CURRENT USE OF INSULIN: ICD-10-CM

## 2025-05-13 DIAGNOSIS — E11.22 TYPE 2 DIABETES MELLITUS WITH STAGE 5 CHRONIC KIDNEY DISEASE NOT ON CHRONIC DIALYSIS, WITHOUT LONG-TERM CURRENT USE OF INSULIN: ICD-10-CM

## 2025-05-13 DIAGNOSIS — N18.5 TYPE 2 DIABETES MELLITUS WITH STAGE 5 CHRONIC KIDNEY DISEASE NOT ON CHRONIC DIALYSIS, WITHOUT LONG-TERM CURRENT USE OF INSULIN: ICD-10-CM

## 2025-05-13 DIAGNOSIS — Z99.89 WALKER AS AMBULATION AID: ICD-10-CM

## 2025-05-13 DIAGNOSIS — E11.8 TYPE 2 DIABETES MELLITUS WITH COMPLICATION, WITHOUT LONG-TERM CURRENT USE OF INSULIN: ICD-10-CM

## 2025-05-13 DIAGNOSIS — I73.9 PVD (PERIPHERAL VASCULAR DISEASE): ICD-10-CM

## 2025-05-13 DIAGNOSIS — I10 ESSENTIAL HYPERTENSION: ICD-10-CM

## 2025-05-13 DIAGNOSIS — N18.4 TYPE 2 DIABETES MELLITUS WITH STAGE 4 CHRONIC KIDNEY DISEASE, WITHOUT LONG-TERM CURRENT USE OF INSULIN: ICD-10-CM

## 2025-05-13 DIAGNOSIS — I25.10 CORONARY ARTERY DISEASE INVOLVING LEFT MAIN CORONARY ARTERY: ICD-10-CM

## 2025-05-13 DIAGNOSIS — K21.9 GASTROESOPHAGEAL REFLUX DISEASE WITHOUT ESOPHAGITIS: ICD-10-CM

## 2025-05-13 DIAGNOSIS — E03.9 HYPOTHYROIDISM, UNSPECIFIED TYPE: ICD-10-CM

## 2025-05-13 PROCEDURE — 1159F MED LIST DOCD IN RCRD: CPT | Mod: CPTII,S$GLB,, | Performed by: STUDENT IN AN ORGANIZED HEALTH CARE EDUCATION/TRAINING PROGRAM

## 2025-05-13 PROCEDURE — 1126F AMNT PAIN NOTED NONE PRSNT: CPT | Mod: CPTII,S$GLB,, | Performed by: STUDENT IN AN ORGANIZED HEALTH CARE EDUCATION/TRAINING PROGRAM

## 2025-05-13 PROCEDURE — 3074F SYST BP LT 130 MM HG: CPT | Mod: CPTII,S$GLB,, | Performed by: STUDENT IN AN ORGANIZED HEALTH CARE EDUCATION/TRAINING PROGRAM

## 2025-05-13 PROCEDURE — 99214 OFFICE O/P EST MOD 30 MIN: CPT | Mod: S$GLB,,, | Performed by: STUDENT IN AN ORGANIZED HEALTH CARE EDUCATION/TRAINING PROGRAM

## 2025-05-13 PROCEDURE — 1101F PT FALLS ASSESS-DOCD LE1/YR: CPT | Mod: CPTII,S$GLB,, | Performed by: STUDENT IN AN ORGANIZED HEALTH CARE EDUCATION/TRAINING PROGRAM

## 2025-05-13 PROCEDURE — 99999 PR PBB SHADOW E&M-EST. PATIENT-LVL IV: CPT | Mod: PBBFAC,,, | Performed by: STUDENT IN AN ORGANIZED HEALTH CARE EDUCATION/TRAINING PROGRAM

## 2025-05-13 PROCEDURE — 3288F FALL RISK ASSESSMENT DOCD: CPT | Mod: CPTII,S$GLB,, | Performed by: STUDENT IN AN ORGANIZED HEALTH CARE EDUCATION/TRAINING PROGRAM

## 2025-05-13 PROCEDURE — 3078F DIAST BP <80 MM HG: CPT | Mod: CPTII,S$GLB,, | Performed by: STUDENT IN AN ORGANIZED HEALTH CARE EDUCATION/TRAINING PROGRAM

## 2025-05-13 RX ORDER — LANCETS 33 GAUGE
EACH MISCELLANEOUS
Qty: 100 EACH | Refills: 2 | Status: SHIPPED | OUTPATIENT
Start: 2025-05-13

## 2025-05-13 RX ORDER — INSULIN GLARGINE 100 [IU]/ML
10 INJECTION, SOLUTION SUBCUTANEOUS NIGHTLY
Qty: 3 ML | Refills: 2 | Status: SHIPPED | OUTPATIENT
Start: 2025-05-13

## 2025-05-13 RX ORDER — ALBUTEROL SULFATE 90 UG/1
2 INHALANT RESPIRATORY (INHALATION) 4 TIMES DAILY PRN
Qty: 18 G | Refills: 2 | Status: SHIPPED | OUTPATIENT
Start: 2025-05-13

## 2025-05-13 RX ORDER — PANTOPRAZOLE SODIUM 40 MG/1
40 TABLET, DELAYED RELEASE ORAL DAILY
Qty: 90 TABLET | Refills: 1 | Status: SHIPPED | OUTPATIENT
Start: 2025-05-13

## 2025-05-13 RX ORDER — AMLODIPINE BESYLATE 5 MG/1
5 TABLET ORAL DAILY
Qty: 90 TABLET | Refills: 3 | Status: SHIPPED | OUTPATIENT
Start: 2025-05-13

## 2025-05-13 RX ORDER — METOPROLOL TARTRATE 25 MG/1
25 TABLET, FILM COATED ORAL 2 TIMES DAILY
Qty: 180 TABLET | Refills: 3 | Status: SHIPPED | OUTPATIENT
Start: 2025-05-13

## 2025-05-13 RX ORDER — PEN NEEDLE, DIABETIC 30 GX3/16"
NEEDLE, DISPOSABLE MISCELLANEOUS
Qty: 100 EACH | Refills: 2 | Status: SHIPPED | OUTPATIENT
Start: 2025-05-13

## 2025-05-13 RX ORDER — LEVOTHYROXINE SODIUM 88 UG/1
88 TABLET ORAL
Qty: 90 TABLET | Refills: 1 | Status: SHIPPED | OUTPATIENT
Start: 2025-05-13

## 2025-05-13 RX ORDER — ROSUVASTATIN CALCIUM 40 MG/1
40 TABLET, COATED ORAL DAILY
Qty: 90 TABLET | Refills: 3 | Status: SHIPPED | OUTPATIENT
Start: 2025-05-13

## 2025-05-13 RX ORDER — PIOGLITAZONE 15 MG/1
15 TABLET ORAL DAILY
Qty: 90 TABLET | Refills: 3 | Status: SHIPPED | OUTPATIENT
Start: 2025-05-13

## 2025-05-13 RX ORDER — CITALOPRAM 40 MG/1
40 TABLET ORAL DAILY
Qty: 30 TABLET | Refills: 11 | Status: SHIPPED | OUTPATIENT
Start: 2025-05-13 | End: 2026-05-13

## 2025-05-13 NOTE — ASSESSMENT & PLAN NOTE
Lab Results   Component Value Date    HGBA1C 6.6 (H) 02/04/2025     Stable. Continue current medications and regular followup.  She has not been following with nephrology

## 2025-05-13 NOTE — ASSESSMENT & PLAN NOTE
Doing so much better on lantus  Lab Results   Component Value Date    HGBA1C 6.6 (H) 02/04/2025     Diabetes Medications              insulin glargine U-100, Lantus, (LANTUS SOLOSTAR U-100 INSULIN) 100 unit/mL (3 mL) InPn pen Inject 10 Units into the skin every evening.    pioglitazone (ACTOS) 15 MG tablet Take 15 mg by mouth once daily.

## 2025-05-13 NOTE — PROGRESS NOTES
Subjective:       Patient ID: Epi Mcintosh is a 75 y.o. female.    Chief Complaint: Follow-up    History of Present Illness    CHIEF COMPLAINT:  Ms. Mcintosh presents today for follow up.    DIABETES MANAGEMENT:  She reports her diabetes is now well controlled for the first time. After switching from her previous medication of 20 years to Lantus, she has experienced significant improvement. Her blood sugar consistently remains between 100-120 mg/dL, leading her to discontinue daily blood sugar testing. Her A1C has improved from 11 to 6.6 as of February.    RESPIRATORY:  She reports occasional episodes of heavy breathing which she manages independently. Her breathing has been stable and manageable for the past couple of months.    MUSCULOSKELETAL:  She reports leg weakness which she attributes to deconditioning and lack of exercise. Despite limited strength, she maintains ability to ambulate and perform activities.    RENAL:  She has not followed up with nephrology due to provider concerns. Iron studies and blood counts show improvement although remain low due to kidney disease. Kidney function tests demonstrate improvement from previous values.    MEDICATIONS:  She takes nine medications daily including Lantus and expresses reluctance to add any additional medications.       Review of Systems   Constitutional:  Negative for activity change and appetite change.   Respiratory:  Negative for shortness of breath.    Cardiovascular:  Negative for chest pain.   Gastrointestinal:  Negative for abdominal pain.   Genitourinary:  Negative for dysuria.   Integumentary:  Negative for rash.   Psychiatric/Behavioral:  Negative for depressed mood and sleep disturbance. The patient is not nervous/anxious.       Problem List[1]  Epi has a current medication list which includes the following prescription(s): blood-glucose meter, albuterol, amlodipine, blood sugar diagnostic, citalopram, lantus solostar u-100  insulin, lancets, levothyroxine, metoprolol tartrate, pantoprazole, pen needle, diabetic, pioglitazone, and rosuvastatin.        Health Maintenance Due   Topic Date Due    DEXA Scan  Never done    Diabetic Eye Exam  09/22/2022    Diabetes Urine Screening  04/09/2025      Health Maintenance reviewed and discussed- labs and dexa ordered.     Objective:      Physical Exam  Constitutional:       General: She is not in acute distress.     Appearance: Normal appearance. She is not ill-appearing.   Eyes:      Conjunctiva/sclera: Conjunctivae normal.   Cardiovascular:      Rate and Rhythm: Normal rate and regular rhythm.      Heart sounds: Normal heart sounds. No murmur heard.  Pulmonary:      Effort: Pulmonary effort is normal. No respiratory distress.      Breath sounds: Normal breath sounds. No wheezing or rales.   Musculoskeletal:      Right lower leg: No edema.      Left lower leg: No edema.   Lymphadenopathy:      Cervical: No cervical adenopathy.   Skin:     General: Skin is warm and dry.   Neurological:      Mental Status: She is alert. Mental status is at baseline.      Gait: Gait normal.   Psychiatric:         Mood and Affect: Mood normal.         Behavior: Behavior normal.         Thought Content: Thought content normal.         Judgment: Judgment normal.             Assessment:       Assessment & Plan    1. Noted significant improvement in diabetes control with A1C reduction from 11 to 6.6, attributing success to Lantus.  2. Renal function improved based on recent lab values.  3. BP and general well-being improved.           Plan:       1. Anxiety  Assessment & Plan:  Stable off of therapy      Orders:  -     citalopram (CELEXA) 40 MG tablet; Take 1 tablet (40 mg total) by mouth once daily.  Dispense: 30 tablet; Refill: 11    2. Type 2 diabetes mellitus with stage 5 chronic kidney disease not on chronic dialysis, without long-term current use of insulin  Assessment & Plan:  Lab Results   Component Value Date     "HGBA1C 6.6 (H) 02/04/2025     Stable. Continue current medications and regular followup.  She has not been following with nephrology    Orders:  -     pen needle, diabetic 32 gauge x 5/32" Ndle; Use 1 pen needle nightly to administer insulin  Dispense: 100 each; Refill: 2  -     rosuvastatin (CRESTOR) 40 MG Tab; Take 1 tablet (40 mg total) by mouth once daily.  Dispense: 90 tablet; Refill: 3  -     pioglitazone (ACTOS) 15 MG tablet; Take 1 tablet (15 mg total) by mouth once daily.  Dispense: 90 tablet; Refill: 3  -     insulin glargine U-100, Lantus, (LANTUS SOLOSTAR U-100 INSULIN) 100 unit/mL (3 mL) InPn pen; Inject 10 Units into the skin every evening.  Dispense: 3 mL; Refill: 2  -     blood sugar diagnostic (TRUE METRIX GLUCOSE TEST STRIP) Strp; Sig: To check BG two times daily, to use with insurance preferred meter  Dispense: 100 strip; Refill: 2  -     lancets (TRUEPLUS LANCETS) 33 gauge Misc; To check BG two times daily, to use with insurance preferred meter  Dispense: 100 each; Refill: 2  -     CBC Without Differential; Future; Expected date: 05/13/2025  -     Comprehensive Metabolic Panel; Future; Expected date: 05/13/2025  -     Hemoglobin A1C; Future; Expected date: 05/13/2025    3. Type 2 diabetes mellitus with stage 4 chronic kidney disease, without long-term current use of insulin  -     pen needle, diabetic 32 gauge x 5/32" Ndle; Use 1 pen needle nightly to administer insulin  Dispense: 100 each; Refill: 2  -     rosuvastatin (CRESTOR) 40 MG Tab; Take 1 tablet (40 mg total) by mouth once daily.  Dispense: 90 tablet; Refill: 3  -     pioglitazone (ACTOS) 15 MG tablet; Take 1 tablet (15 mg total) by mouth once daily.  Dispense: 90 tablet; Refill: 3  -     insulin glargine U-100, Lantus, (LANTUS SOLOSTAR U-100 INSULIN) 100 unit/mL (3 mL) InPn pen; Inject 10 Units into the skin every evening.  Dispense: 3 mL; Refill: 2  -     blood sugar diagnostic (TRUE METRIX GLUCOSE TEST STRIP) Strp; Sig: To check BG " two times daily, to use with insurance preferred meter  Dispense: 100 strip; Refill: 2  -     lancets (TRUEPLUS LANCETS) 33 gauge Misc; To check BG two times daily, to use with insurance preferred meter  Dispense: 100 each; Refill: 2    4. Medication refill    5. Essential hypertension, dx 2018  Assessment & Plan:  Stable. Continue current medications and regular followup.  Hypertension Medications               amLODIPine (NORVASC) 5 MG tablet Take 1 tablet (5 mg total) by mouth once daily.    metoprolol tartrate (LOPRESSOR) 25 MG tablet Take 1 tablet (25 mg total) by mouth 2 (two) times daily.              Orders:  -     metoprolol tartrate (LOPRESSOR) 25 MG tablet; Take 1 tablet (25 mg total) by mouth 2 (two) times daily.  Dispense: 180 tablet; Refill: 3  -     amLODIPine (NORVASC) 5 MG tablet; Take 1 tablet (5 mg total) by mouth once daily.  Dispense: 90 tablet; Refill: 3    6. Hypothyroidism, unspecified type  Assessment & Plan:  Lab Results   Component Value Date    TSH 0.847 04/09/2024     Stable. Continue current medications and regular followup.      Orders:  -     levothyroxine (SYNTHROID) 88 MCG tablet; Take 1 tablet (88 mcg total) by mouth before breakfast.  Dispense: 90 tablet; Refill: 1  -     TSH; Future; Expected date: 05/13/2025    7. Depressive disorder  Assessment & Plan:  Stable off of medications    Orders:  -     citalopram (CELEXA) 40 MG tablet; Take 1 tablet (40 mg total) by mouth once daily.  Dispense: 30 tablet; Refill: 11    8. Chronic obstructive pulmonary disease, unspecified COPD type  Assessment & Plan:  Breathing is doing much better.  Stable. Continue current medications and regular followup.      Orders:  -     albuterol (PROVENTIL/VENTOLIN HFA) 90 mcg/actuation inhaler; Inhale 2 puffs into the lungs 4 (four) times daily as needed for Shortness of Breath or Wheezing.  Dispense: 18 g; Refill: 2    9. Coronary artery disease involving left main coronary artery  Overview:  Added  "automatically from request for surgery [id number]    Assessment & Plan:  Stable. Continue current medications and regular followup.  Seeing cardiology  Continue risk factor management      Orders:  -     rosuvastatin (CRESTOR) 40 MG Tab; Take 1 tablet (40 mg total) by mouth once daily.  Dispense: 90 tablet; Refill: 3    10. Mixed hyperlipidemia  Assessment & Plan:  Stable. Continue current medications and regular followup.  Hyperlipidemia Medications               rosuvastatin (CRESTOR) 40 MG Tab Take 40 mg by mouth once daily.              Orders:  -     rosuvastatin (CRESTOR) 40 MG Tab; Take 1 tablet (40 mg total) by mouth once daily.  Dispense: 90 tablet; Refill: 3  -     Lipid Panel; Future; Expected date: 05/13/2025    11. PVD (peripheral vascular disease)  Assessment & Plan:  No claudication currently.  Not exercising but doing well    Orders:  -     rosuvastatin (CRESTOR) 40 MG Tab; Take 1 tablet (40 mg total) by mouth once daily.  Dispense: 90 tablet; Refill: 3    12. Chronic kidney disease (CKD), stage V  Assessment & Plan:  Saw nephrology and was put on sodium bicarb  Encouraged her to see nephrology    Orders:  -     Microalbumin/Creatinine Ratio, Urine; Future; Expected date: 05/13/2025    13. Type 2 diabetes mellitus with complication, without long-term current use of insulin  Assessment & Plan:  Doing so much better on lantus  Lab Results   Component Value Date    HGBA1C 6.6 (H) 02/04/2025     Diabetes Medications              insulin glargine U-100, Lantus, (LANTUS SOLOSTAR U-100 INSULIN) 100 unit/mL (3 mL) InPn pen Inject 10 Units into the skin every evening.    pioglitazone (ACTOS) 15 MG tablet Take 15 mg by mouth once daily.              Orders:  -     pen needle, diabetic 32 gauge x 5/32" Ndle; Use 1 pen needle nightly to administer insulin  Dispense: 100 each; Refill: 2  -     rosuvastatin (CRESTOR) 40 MG Tab; Take 1 tablet (40 mg total) by mouth once daily.  Dispense: 90 tablet; Refill: 3  -     " pioglitazone (ACTOS) 15 MG tablet; Take 1 tablet (15 mg total) by mouth once daily.  Dispense: 90 tablet; Refill: 3  -     insulin glargine U-100, Lantus, (LANTUS SOLOSTAR U-100 INSULIN) 100 unit/mL (3 mL) InPn pen; Inject 10 Units into the skin every evening.  Dispense: 3 mL; Refill: 2  -     blood sugar diagnostic (TRUE METRIX GLUCOSE TEST STRIP) Strp; Sig: To check BG two times daily, to use with insurance preferred meter  Dispense: 100 strip; Refill: 2  -     lancets (TRUEPLUS LANCETS) 33 gauge Misc; To check BG two times daily, to use with insurance preferred meter  Dispense: 100 each; Refill: 2    14. Walker as ambulation aid  Assessment & Plan:  Stable without recent falls      15. Gastroesophageal reflux disease without esophagitis  -     pantoprazole (PROTONIX) 40 MG tablet; Take 1 tablet (40 mg total) by mouth once daily.  Dispense: 90 tablet; Refill: 1    16. Postmenopausal  -     DEXA Bone Density Axial Skeleton 1 or more Site W TBS XPD; Future; Expected date: 05/13/2025         This note was generated with the assistance of ambient listening technology. Verbal consent was obtained by the patient and accompanying visitor(s) for the recording of patient appointment to facilitate this note. I attest to having reviewed and edited the generated note for accuracy, though some syntax or spelling errors may persist. Please contact the author of this note for any clarification.                    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    Cognitive dysfunction    Nicotine addiction, started age 14, 50+ py and now vaping    Excessive caffeine intake    Sleep disorder    Anemia    Microalbuminuria    Hyperkalemia, on ARB    Bradycardia,  drug induced    Aortic calcification    Abdominal obesity    COPD (chronic obstructive pulmonary disease)    History of Pulmonary embolism    Debility    PVD (peripheral vascular disease)    Former cigarette smoker    Chronic kidney disease (CKD), stage V    Atherosclerosis of native arteries of extremities with rest pain, right leg    Anxiety    Depressive disorder    Hypothyroidism    Recurrent UTI    Walker as ambulation aid    History of pyelonephritis    DNR (do not resuscitate)    Patient declines dialysis    Type 2 diabetes mellitus with stage 5 chronic kidney disease not on chronic dialysis, without long-term current use of insulin

## 2025-05-15 ENCOUNTER — RESULTS FOLLOW-UP (OUTPATIENT)
Dept: FAMILY MEDICINE | Facility: CLINIC | Age: 75
End: 2025-05-15

## 2025-05-15 ENCOUNTER — HOSPITAL ENCOUNTER (OUTPATIENT)
Dept: RADIOLOGY | Facility: HOSPITAL | Age: 75
Discharge: HOME | End: 2025-05-15
Attending: STUDENT IN AN ORGANIZED HEALTH CARE EDUCATION/TRAINING PROGRAM
Payer: MEDICARE

## 2025-05-15 DIAGNOSIS — Z78.0 POSTMENOPAUSAL: ICD-10-CM

## 2025-05-15 DIAGNOSIS — M81.0 AGE-RELATED OSTEOPOROSIS WITHOUT CURRENT PATHOLOGICAL FRACTURE: Primary | ICD-10-CM

## 2025-05-15 PROCEDURE — 77080 DXA BONE DENSITY AXIAL: CPT | Mod: TC

## 2025-05-15 PROCEDURE — 77080 DXA BONE DENSITY AXIAL: CPT | Mod: 26,,, | Performed by: RADIOLOGY

## 2025-05-15 PROCEDURE — 77092 TBS I&R FX RSK QHP: CPT | Mod: ,,, | Performed by: RADIOLOGY

## 2025-05-23 ENCOUNTER — PATIENT MESSAGE (OUTPATIENT)
Dept: FAMILY MEDICINE | Facility: CLINIC | Age: 75
End: 2025-05-23
Payer: MEDICARE

## 2025-05-23 ENCOUNTER — TELEPHONE (OUTPATIENT)
Dept: FAMILY MEDICINE | Facility: CLINIC | Age: 75
End: 2025-05-23
Payer: MEDICARE

## 2025-05-23 NOTE — TELEPHONE ENCOUNTER
"Spoke w/ pt  Pt c/o kidney infection.   S/s "pain at my kidney, pain runs to my neck. Low grade fever"   Offered appt. Pt refused.   " I just need antibiotics. I am too sick to come in"  "

## 2025-05-23 NOTE — TELEPHONE ENCOUNTER
----- Message from Alisson sent at 5/23/2025  8:54 AM CDT -----  Name of Who is Calling:JAVI WRIGHT [[id number]]  What is the request in detail:Pt is having another kidney attack. Pt needs the doctor to send her a prescription over for antibiotics.  Can the clinic reply by [facility]:Call  What Number to Call Back if not in [facility]:Telephone Information:Mobile          [phone]

## 2025-05-23 NOTE — TELEPHONE ENCOUNTER
"----- Message from Usha sent at 5/23/2025 12:57 PM CDT -----  Contact: FriendShreya  Type:  Needs Medical AdviceWho Called:   Mechelle Jonymptoms (please be specific):    kidney infection Pharmacy name and phone #:    [id number] - [place] - [address] [place] [address]Mdesw: [phone] Fax: [phone] the patient rather a call back or a response via [facility]?  Call [facility] Call Back Number:    [phone] Information:  States they called this morning for prescription to be called in - states they need to know if she has to go to the lab "because it's getting late" - please call - thank you  "

## 2025-05-26 ENCOUNTER — OFFICE VISIT (OUTPATIENT)
Dept: FAMILY MEDICINE | Facility: CLINIC | Age: 75
End: 2025-05-26
Payer: MEDICARE

## 2025-05-26 VITALS
OXYGEN SATURATION: 95 % | HEIGHT: 65 IN | SYSTOLIC BLOOD PRESSURE: 118 MMHG | BODY MASS INDEX: 21.77 KG/M2 | RESPIRATION RATE: 16 BRPM | DIASTOLIC BLOOD PRESSURE: 68 MMHG | WEIGHT: 130.69 LBS | HEART RATE: 64 BPM

## 2025-05-26 DIAGNOSIS — N39.0 RECURRENT UTI: Primary | ICD-10-CM

## 2025-05-26 DIAGNOSIS — E11.22 TYPE 2 DIABETES MELLITUS WITH STAGE 5 CHRONIC KIDNEY DISEASE NOT ON CHRONIC DIALYSIS, WITH LONG-TERM CURRENT USE OF INSULIN: ICD-10-CM

## 2025-05-26 DIAGNOSIS — N18.4 CHRONIC KIDNEY DISEASE, STAGE 4, SEVERELY DECREASED GFR: ICD-10-CM

## 2025-05-26 DIAGNOSIS — N18.6 END STAGE RENAL DISEASE: ICD-10-CM

## 2025-05-26 DIAGNOSIS — N18.5 TYPE 2 DIABETES MELLITUS WITH STAGE 5 CHRONIC KIDNEY DISEASE NOT ON CHRONIC DIALYSIS, WITH LONG-TERM CURRENT USE OF INSULIN: ICD-10-CM

## 2025-05-26 DIAGNOSIS — J44.9 CHRONIC OBSTRUCTIVE PULMONARY DISEASE, UNSPECIFIED COPD TYPE: ICD-10-CM

## 2025-05-26 DIAGNOSIS — Z79.4 TYPE 2 DIABETES MELLITUS WITH STAGE 5 CHRONIC KIDNEY DISEASE NOT ON CHRONIC DIALYSIS, WITH LONG-TERM CURRENT USE OF INSULIN: ICD-10-CM

## 2025-05-26 LAB
BACTERIA #/AREA URNS HPF: ABNORMAL /HPF
BILIRUB UR QL STRIP.AUTO: NEGATIVE
BILIRUBIN, UA POC OHS: NEGATIVE
BLOOD, UA POC OHS: ABNORMAL
CLARITY UR: ABNORMAL
CLARITY, UA POC OHS: ABNORMAL
COLOR UR AUTO: YELLOW
COLOR, UA POC OHS: YELLOW
GLUCOSE UR QL STRIP: NEGATIVE
GLUCOSE, UA POC OHS: NEGATIVE
HGB UR QL STRIP: ABNORMAL
HYALINE CASTS #/AREA URNS LPF: 0 /LPF (ref 0–1)
KETONES UR QL STRIP: NEGATIVE
KETONES, UA POC OHS: NEGATIVE
LEUKOCYTE ESTERASE UR QL STRIP: ABNORMAL
LEUKOCYTES, UA POC OHS: ABNORMAL
MICROSCOPIC COMMENT: ABNORMAL
NITRITE UR QL STRIP: POSITIVE
NITRITE, UA POC OHS: NEGATIVE
PH UR STRIP: 6 [PH]
PH, UA POC OHS: 6
PROT UR QL STRIP: ABNORMAL
PROTEIN, UA POC OHS: >=300
RBC #/AREA URNS HPF: 4 /HPF (ref 0–4)
SP GR UR STRIP: 1.01
SPECIFIC GRAVITY, UA POC OHS: 1.01
SQUAMOUS #/AREA URNS HPF: 4 /HPF
UROBILINOGEN UR STRIP-ACNC: NEGATIVE EU/DL
UROBILINOGEN, UA POC OHS: 0.2
WBC #/AREA URNS HPF: >100 /HPF (ref 0–5)

## 2025-05-26 PROCEDURE — 81003 URINALYSIS AUTO W/O SCOPE: CPT

## 2025-05-26 PROCEDURE — 1125F AMNT PAIN NOTED PAIN PRSNT: CPT | Mod: CPTII,S$GLB,,

## 2025-05-26 PROCEDURE — 1160F RVW MEDS BY RX/DR IN RCRD: CPT | Mod: CPTII,S$GLB,,

## 2025-05-26 PROCEDURE — 81003 URINALYSIS AUTO W/O SCOPE: CPT | Mod: QW,S$GLB,,

## 2025-05-26 PROCEDURE — 3078F DIAST BP <80 MM HG: CPT | Mod: CPTII,S$GLB,,

## 2025-05-26 PROCEDURE — 3074F SYST BP LT 130 MM HG: CPT | Mod: CPTII,S$GLB,,

## 2025-05-26 PROCEDURE — 3288F FALL RISK ASSESSMENT DOCD: CPT | Mod: CPTII,S$GLB,,

## 2025-05-26 PROCEDURE — 1101F PT FALLS ASSESS-DOCD LE1/YR: CPT | Mod: CPTII,S$GLB,,

## 2025-05-26 PROCEDURE — 1159F MED LIST DOCD IN RCRD: CPT | Mod: CPTII,S$GLB,,

## 2025-05-26 PROCEDURE — 99214 OFFICE O/P EST MOD 30 MIN: CPT | Mod: S$GLB,,,

## 2025-05-26 PROCEDURE — 99999 PR PBB SHADOW E&M-EST. PATIENT-LVL V: CPT | Mod: PBBFAC,,,

## 2025-05-26 PROCEDURE — 87088 URINE BACTERIA CULTURE: CPT

## 2025-05-26 RX ORDER — CIPROFLOXACIN 250 MG/1
250 TABLET, FILM COATED ORAL 2 TIMES DAILY
Qty: 12 TABLET | Refills: 0 | Status: SHIPPED | OUTPATIENT
Start: 2025-05-26

## 2025-05-26 NOTE — TELEPHONE ENCOUNTER
Called patient to follow up and ensure she was seen in the ED per MD recommendations. No Answer. Left voicemail requesting call back.

## 2025-05-26 NOTE — PROGRESS NOTES
Subjective:        Chief Complaint: Follow-up (Follow up for kidney infection. Pt. wants to discuss going on hospice.)    Epi Mcintosh is a 75 y.o. female, presents to clinic   History of Present Illness    CHIEF COMPLAINT:  75-year-old female presents today for a kidney infection    GENITOURINARY/RENAL:  She has terminal kidney disease with one non-functional kidney and minimal function in the other. She currently experiences symptoms consistent with a kidney infection including constant throbbing pain and spasms in the kidney area, bladder spasms, urinary frequency (hourly), and incontinence requiring constant pad use. She notes brown-colored urine without dysuria. Initial symptoms included chills and fever which have resolved. She reports the infection persists. She has been hospitalized 3 times in the past year, with the last two experiences being particularly distressing, leading to reluctance for hospital admission. She reports previous successful treatment with Cipro, noting rapid symptom improvement within 3 days of initiation.    MEDICAL HISTORY:  She has COPD, heart disease, and neuropathy affecting arms and legs. She developed extensive bruising on arms and hands during her October hospitalization.    SOCIAL HISTORY:  She lives alone.         Problem List[1]  Epi has a current medication list which includes the following prescription(s): albuterol, amlodipine, blood sugar diagnostic, blood-glucose meter, citalopram, lantus solostar u-100 insulin, lancets, levothyroxine, metoprolol tartrate, pantoprazole, pen needle, diabetic, pioglitazone, rosuvastatin, and ciprofloxacin hcl.      Review of Systems   Constitutional: Negative.    HENT: Negative.     Eyes: Negative.    Respiratory: Negative.     Cardiovascular: Negative.    Gastrointestinal: Negative.    Genitourinary:  Positive for dysuria, flank pain, frequency and urgency. Negative for bladder incontinence, difficulty urinating and  hematuria.   Integumentary:  Negative.   Neurological: Negative.    Hematological: Negative.    Psychiatric/Behavioral: Negative.       Health Maintenance Due   Topic Date Due    Diabetic Eye Exam  09/22/2022    RSV Vaccine (Age 60+ and Pregnant patients) (1 - 1-dose 75+ series) Never done      Health Maintenance reviewed and discussed-   Objective:      Physical Exam              Physical Exam  Vitals reviewed.   Constitutional:       Appearance: Normal appearance. She is normal weight.   HENT:      Head: Normocephalic.      Nose: Nose normal.   Eyes:      Extraocular Movements: Extraocular movements intact.   Cardiovascular:      Rate and Rhythm: Normal rate and regular rhythm.      Pulses: Normal pulses.      Heart sounds: Normal heart sounds.   Pulmonary:      Effort: Pulmonary effort is normal.      Breath sounds: Normal breath sounds.   Abdominal:      Palpations: Abdomen is soft.   Musculoskeletal:         General: Normal range of motion.      Cervical back: Normal range of motion.   Skin:     General: Skin is warm and dry.   Neurological:      General: No focal deficit present.      Mental Status: She is alert and oriented to person, place, and time.   Psychiatric:         Mood and Affect: Mood normal.         Behavior: Behavior normal.         Thought Content: Thought content normal.         Judgment: Judgment normal.        Assessment:       1. Recurrent UTI    2. Type 2 diabetes mellitus with stage 5 chronic kidney disease not on chronic dialysis, with long-term current use of insulin    3. Chronic obstructive pulmonary disease, unspecified COPD type    4. Chronic kidney disease, stage 4, severely decreased GFR    5. End stage renal disease            Plan:       1. Recurrent UTI  Overview:  Has seen urology and infectious disease    Orders:  -     POCT Urinalysis(Instrument)  -     Urinalysis  -     Urine Culture High Risk  -     ciprofloxacin HCl (CIPRO) 250 MG tablet; Take 1 tablet (250 mg total) by  mouth 2 (two) times daily.  Dispense: 12 tablet; Refill: 0    2. Type 2 diabetes mellitus with stage 5 chronic kidney disease not on chronic dialysis, with long-term current use of insulin  Assessment & Plan:  Stable.   Diabetes Medications              insulin glargine U-100, Lantus, (LANTUS SOLOSTAR U-100 INSULIN) 100 unit/mL (3 mL) InPn pen Inject 10 Units into the skin every evening.    pioglitazone (ACTOS) 15 MG tablet Take 1 tablet (15 mg total) by mouth once daily.          Continue current medication regimen  Lab Results   Component Value Date    HGBA1C 6.4 (H) 05/15/2025     Declines dialysis, does not want to follow with nephrologist       3. Chronic obstructive pulmonary disease, unspecified COPD type    4. Chronic kidney disease, stage 4, severely decreased GFR    5. End stage renal disease      Assessment & Plan      END STAGE RENAL DISEASE:  - Monitored renal function, noting one non-functional kidney and minimal function in the other.  - Prescribed Ciprofloxacin 250 mg twice daily for 6 days (total 12 tablets) with appropriate renal dosing adjustments, balancing antibiotic therapy risks with the patient's wishes.  -she has refused dialysis with her nephrologist  -she wishes not to proceed with urologist/nephrologist appointments.   -she would just like antibiotics when she feels like the urinary tract infection starts  -    URINARY TRACT INFECTION:  - Evaluated symptoms including hourly urination, bladder spasms, brown urine, and pain in the kidney area.  - Patient reports resolution of fever but persistent infection symptoms with history of recurrent kidney infections.  - Ordered urinalysis to confirm infection while initiating treatment, urine culture pending     CHRONIC OBSTRUCTIVE PULMONARY DISEASE:  - Monitored respiratory status, noting COPD history with adequate air movement but some wheezing present.  -stable at this time  -uses PRN inhaler-continue current medication regiment     HEART  DISEASE:  - Monitored cardiac status, noting history of heart disease.    POLYNEUROPATHY:  - Monitored neurological symptoms, noting neuropathy in upper and lower extremities with pain during venipuncture.    PROBLEMS RELATED TO LIVING ALONE:  - Patient lives alone but has adequate support from two sons and a neighbor.  - Respecting patient's preference to avoid hospitalization due to previous negative experiences.         She would like a referral to hospice at this time as she feels is the best option for her as she just wants to be comfortable and maintain her quality of living as much as possible at this time. She states she does not want to have to go to the doctors or hospital every time she needs a 3 day course of antibiotics for her UTI, as she knows the risk of taking antibiotics with kidney disease, she states she understands the risk of antibiotic use and kidney concerns but at this time she does not want dialysis, or go to urology or nephrologist as she wants to treat her symptoms and have quality of life.      This note was generated with the assistance of ambient listening technology. Verbal consent was obtained by the patient and accompanying visitor(s) for the recording of patient appointment to facilitate this note. I attest to having reviewed and edited the generated note for accuracy, though some syntax or spelling errors may persist. Please contact the author of this note for any clarification.                  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    Cognitive dysfunction    Nicotine addiction, started age 14, 50+ py and now vaping    Excessive caffeine intake    Sleep disorder    Anemia    Microalbuminuria     Hyperkalemia, on ARB    Bradycardia, drug induced    Aortic calcification    Abdominal obesity    COPD (chronic obstructive pulmonary disease)    History of Pulmonary embolism    Debility    PVD (peripheral vascular disease)    Former cigarette smoker    Chronic kidney disease (CKD), stage V    Atherosclerosis of native arteries of extremities with rest pain, right leg    Anxiety    Depressive disorder    Hypothyroidism    Recurrent UTI    Walker as ambulation aid    History of pyelonephritis    DNR (do not resuscitate)    Patient declines dialysis    Type 2 diabetes mellitus with stage 5 chronic kidney disease not on chronic dialysis, with long-term current use of insulin    Age-related osteoporosis without current pathological fracture

## 2025-05-26 NOTE — ASSESSMENT & PLAN NOTE
Stable.   Diabetes Medications              insulin glargine U-100, Lantus, (LANTUS SOLOSTAR U-100 INSULIN) 100 unit/mL (3 mL) InPn pen Inject 10 Units into the skin every evening.    pioglitazone (ACTOS) 15 MG tablet Take 1 tablet (15 mg total) by mouth once daily.          Continue current medication regimen  Lab Results   Component Value Date    HGBA1C 6.4 (H) 05/15/2025     Declines dialysis, does not want to follow with nephrologist

## 2025-05-27 ENCOUNTER — TELEPHONE (OUTPATIENT)
Dept: FAMILY MEDICINE | Facility: CLINIC | Age: 75
End: 2025-05-27
Payer: MEDICARE

## 2025-05-27 NOTE — TELEPHONE ENCOUNTER
5/13/25, 5/26/25 office note, demographic faxed to [facility]. Fax # [phone]   Briana notified and voiced understanding

## 2025-05-27 NOTE — TELEPHONE ENCOUNTER
----- Message from Rosalia sent at 5/27/2025  9:49 AM CDT -----  Contact: Briana  Type: Needs Medical AdviceWho Called: Briana/ [facility] Call Back Number: [phone]  Information: Requesting  to get history & physical, clinical notes,  lab results, imaging results supporting pt  diagnosis for kidney disease faxed to FAX# [phone] Advise- Thank you

## 2025-05-29 ENCOUNTER — TELEPHONE (OUTPATIENT)
Dept: FAMILY MEDICINE | Facility: CLINIC | Age: 75
End: 2025-05-29
Payer: MEDICARE

## 2025-05-29 ENCOUNTER — RESULTS FOLLOW-UP (OUTPATIENT)
Dept: FAMILY MEDICINE | Facility: CLINIC | Age: 75
End: 2025-05-29

## 2025-05-29 LAB — BACTERIA UR CULT: ABNORMAL

## 2025-05-29 NOTE — TELEPHONE ENCOUNTER
Called patient to see if [facility] has called her to get her registered. No answer left message for a call back.

## 2025-08-29 ENCOUNTER — PATIENT MESSAGE (OUTPATIENT)
Dept: FAMILY MEDICINE | Facility: CLINIC | Age: 75
End: 2025-08-29
Payer: MEDICARE

## (undated) DEVICE — Device

## (undated) DEVICE — SYR 30CC LUER LOCK

## (undated) DEVICE — SET CYSTO IRR DRP CHMBR 84IN

## (undated) DEVICE — DRAPE MEDIUM SHEET 40X70IN

## (undated) DEVICE — CATHETER TRAILBLAZER 35X135

## (undated) DEVICE — GOWN X-LG STERILE BACK

## (undated) DEVICE — SCRUB 10% POVIDONE IODINE 4OZ

## (undated) DEVICE — CATH BLLN OTW MUSTANG 5.0MMX200MMX135CM

## (undated) DEVICE — GUIDEWIRE STIFF ANGLED 035X260 LAUREATE

## (undated) DEVICE — SHEATH PINNACLE 5FRX10CM W/GUIDEWIRE

## (undated) DEVICE — SPONGE GAUZE 16PLY 4X4

## (undated) DEVICE — KIT INFLATION MERIT (IN8152, HP9200E)

## (undated) DEVICE — CONTAINER SPECIMEN OR STER 4OZ

## (undated) DEVICE — CATHETER ANGIO OMNI FLUSH 10732201

## (undated) DEVICE — SHEATH INTRODUCER DESTINATION 6FX45

## (undated) DEVICE — GLOVE SENSICARE PI ALOE 7.5